# Patient Record
Sex: FEMALE | Race: WHITE | NOT HISPANIC OR LATINO | Employment: FULL TIME | URBAN - METROPOLITAN AREA
[De-identification: names, ages, dates, MRNs, and addresses within clinical notes are randomized per-mention and may not be internally consistent; named-entity substitution may affect disease eponyms.]

---
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
## 2022-01-05 ENCOUNTER — TREATMENT (OUTPATIENT)
Dept: URGENT CARE | Facility: CLINIC | Age: 22
End: 2022-01-05

## 2022-01-05 ENCOUNTER — APPOINTMENT (OUTPATIENT)
Dept: URGENT CARE | Facility: CLINIC | Age: 22
End: 2022-01-05
Payer: COMMERCIAL

## 2022-01-05 VITALS
RESPIRATION RATE: 16 BRPM | TEMPERATURE: 96.8 F | HEART RATE: 90 BPM | WEIGHT: 91 LBS | OXYGEN SATURATION: 99 % | HEIGHT: 59 IN | BODY MASS INDEX: 18.35 KG/M2

## 2022-01-05 DIAGNOSIS — Z11.59 SPECIAL SCREENING EXAMINATION FOR VIRAL DISEASE: Primary | ICD-10-CM

## 2022-01-05 PROCEDURE — 99213 OFFICE O/P EST LOW 20 MIN: CPT | Performed by: PHYSICIAN ASSISTANT

## 2022-01-05 PROCEDURE — U0003 INFECTIOUS AGENT DETECTION BY NUCLEIC ACID (DNA OR RNA); SEVERE ACUTE RESPIRATORY SYNDROME CORONAVIRUS 2 (SARS-COV-2) (CORONAVIRUS DISEASE [COVID-19]), AMPLIFIED PROBE TECHNIQUE, MAKING USE OF HIGH THROUGHPUT TECHNOLOGIES AS DESCRIBED BY CMS-2020-01-R: HCPCS | Performed by: PHYSICIAN ASSISTANT

## 2022-01-05 PROCEDURE — 99213 OFFICE O/P EST LOW 20 MIN: CPT

## 2022-01-05 RX ORDER — ATOMOXETINE 10 MG/1
10 CAPSULE ORAL DAILY
COMMUNITY
End: 2022-04-18

## 2022-01-05 NOTE — PROGRESS NOTES
[facility]        NAME: Martha Leija is a 25 y o  female  : 2000    MRN: [id number]  DATE: 2022  TIME: 1:54 PM    Assessment and Plan   Special screening examination for viral disease [Z11 59]  1  Special screening examination for viral disease  COVID Only -Office Collect         Patient Instructions     Swabbed for COVID-19, discussed self quarantine until contacted with results  Monitor for worsening symptoms  C/w OTC symptom relief including tylenol, fluids, rest  Recommend supplementing with vitamins D & C as well as a multivitamin    Follow up with PCP in 3-5 days  Proceed to  ER if symptoms worsen  Chief Complaint     Chief Complaint   Patient presents with    Cold Like Symptoms     Pt reports yesterday she developed a cough, congestion and sore throat  History of Present Illness       Patient presents for COVID-19 testing  Pt reports symptoms of cough, congestion, and fatigue  Pt denies fever, chills, sweats, chest tightness, dyspnea, abdominal pain, nausea, vomiting, and diarrhea  Pt denies history of underlying medical problems such as asthma  Pt reports taking OTC symptom relief without significant improvement  Pt denies recent known COVID exposure  Review of Systems   Review of Systems   Constitutional: Negative for chills and fever  HENT: Positive for congestion  Negative for ear pain and sore throat  Eyes: Negative for pain and visual disturbance  Respiratory: Positive for cough  Negative for shortness of breath  Cardiovascular: Negative for chest pain and palpitations  Gastrointestinal: Negative for abdominal pain and vomiting  Genitourinary: Negative for dysuria and hematuria  Musculoskeletal: Negative for arthralgias and back pain  Skin: Negative for color change and rash  Neurological: Positive for headaches  Negative for seizures and syncope  All other systems reviewed and are negative          Current Medications From: Radha Moura  To: Wood Mata  Sent: 4/19/2021 4:53 PM CDT  Subject: Medication Question    Good afternoon, can you please send refills for both my inhalers to the pharmacy on file and can you also send a prescription for the nebulizer albuterol solution? Thank you   Current Outpatient Medications:     atomoxetine (STRATTERA) 10 MG capsule, Take 10 mg by mouth daily, Disp: , Rfl:     citalopram (CeleXA) 20 mg tablet, Take 20 mg by mouth daily , Disp: , Rfl:     Current Allergies     Allergies as of 01/05/2022    (No Known Allergies)            The following portions of the patient's history were reviewed and updated as appropriate: allergies, current medications, past family history, past medical history, past social history, past surgical history and problem list      Past Medical History:   Diagnosis Date    Psychiatric disorder        Past Surgical History:   Procedure Laterality Date    HEMANGIOMA EXCISION Right 2011    [facility]       Family History   Problem Relation Age of Onset    Hypertension Mother     Migraines Mother     Hypertension Father     Diabetes Father          Medications have been verified  Objective   Pulse 90   Temp (!) 96 8 °F (36 °C)   Resp 16   Ht 4' 11" (1 499 m)   Wt 41 3 kg (91 lb)   SpO2 99%   BMI 18 38 kg/m²   No LMP recorded  Physical Exam     Physical Exam  Vitals and nursing note reviewed  Constitutional:       General: She is not in acute distress  Appearance: Normal appearance  She is well-developed  She is not ill-appearing or diaphoretic  HENT:      Head: Normocephalic and atraumatic  Right Ear: Tympanic membrane, ear canal and external ear normal       Left Ear: Tympanic membrane, ear canal and external ear normal       Nose: Congestion present  Mouth/Throat:      Mouth: Mucous membranes are moist       Pharynx: Oropharynx is clear  No oropharyngeal exudate or posterior oropharyngeal erythema  Eyes:      Conjunctiva/sclera: Conjunctivae normal       Pupils: Pupils are equal, round, and reactive to light  Cardiovascular:      Rate and Rhythm: Normal rate and regular rhythm  Heart sounds: Normal heart sounds     Pulmonary:      Effort: Pulmonary effort is normal  No respiratory distress  Breath sounds: Normal breath sounds  No stridor  No wheezing, rhonchi or rales  Musculoskeletal:      Cervical back: Normal range of motion and neck supple  Lymphadenopathy:      Cervical: No cervical adenopathy  Skin:     General: Skin is warm and dry  Capillary Refill: Capillary refill takes less than 2 seconds  Findings: No rash  Neurological:      Mental Status: She is alert and oriented to person, place, and time  Cranial Nerves: No cranial nerve deficit  Sensory: No sensory deficit  Psychiatric:         Behavior: Behavior normal          Thought Content:  Thought content normal

## 2022-01-05 NOTE — PATIENT INSTRUCTIONS
Swabbed for COVID-19, discussed self quarantine until contacted with results  Monitor for worsening symptoms  C/w OTC symptom relief including tylenol, fluids, rest  Recommend supplementing with vitamins D & C as well as a multivitamin  Discussed etiology is likely viral

## 2022-01-05 NOTE — LETTER
January 5, 2022     RUSS Salazar [address] [place] [address]    Patient: Alf Espitia   YOB: 2000   Date of Visit: 1/5/2022        To Whom This May Concern:     Thiago Hylton was seen in our urgent care on 1/5/2022  Please excuse her from work through 1/7/2022      Sincerely,        Ilda Smith PA-C

## 2022-01-08 LAB — SARS-COV-2 RNA RESP QL NAA+PROBE: POSITIVE

## 2022-01-14 ENCOUNTER — OFFICE VISIT (OUTPATIENT)
Dept: URGENT CARE | Facility: CLINIC | Age: 22
End: 2022-01-14
Payer: COMMERCIAL

## 2022-01-14 DIAGNOSIS — R51.9 ACUTE INTRACTABLE HEADACHE, UNSPECIFIED HEADACHE TYPE: Primary | ICD-10-CM

## 2022-01-14 PROCEDURE — 96372 THER/PROPH/DIAG INJ SC/IM: CPT | Performed by: PHYSICIAN ASSISTANT

## 2022-01-14 PROCEDURE — 99213 OFFICE O/P EST LOW 20 MIN: CPT | Performed by: PHYSICIAN ASSISTANT

## 2022-01-14 RX ORDER — KETOROLAC TROMETHAMINE 30 MG/ML
15 INJECTION, SOLUTION INTRAMUSCULAR; INTRAVENOUS ONCE
Status: COMPLETED | OUTPATIENT
Start: 2022-01-14 | End: 2022-01-14

## 2022-01-14 RX ADMIN — KETOROLAC TROMETHAMINE 15 MG: 30 INJECTION, SOLUTION INTRAMUSCULAR; INTRAVENOUS at 18:08

## 2022-01-14 NOTE — PATIENT INSTRUCTIONS
Injection of Toradol this evening to try and settle headache down  Starting tomorrow you may take your leave twice a day and your extra-strength Tylenol every 6-8 hours  May also do cold compresses on head for comfort as needed  Would recommend staying well hydrated  You may want to increase your electrolyte beverages such as Gatorade instead of plain water  If significant worsening of your headache recommend that you proceed to emergency room for further evaluation otherwise follow-up with your primary care provider this next week if persistent headaches

## 2022-01-14 NOTE — PROGRESS NOTES
[facility]    NAME: Azul Ramos is a 25 y o  female  : 2000    MRN: [id number]  DATE: 2022  TIME: 6:05 PM    Assessment and Plan   Acute intractable headache, unspecified headache type [R51 9]  1  Acute intractable headache, unspecified headache type  ketorolac (TORADOL) injection 15 mg     Nurse administered Toradol 15 mg IM  Patient waited 10 minutes after injection before discharge  Patient Instructions   Patient Instructions   Injection of Toradol this evening to try and settle headache down  Starting tomorrow you may take your leave twice a day and your extra-strength Tylenol every 6-8 hours  May also do cold compresses on head for comfort as needed  Would recommend staying well hydrated  You may want to increase your electrolyte beverages such as Gatorade instead of plain water  If significant worsening of your headache recommend that you proceed to emergency room for further evaluation otherwise follow-up with your primary care provider this next week if persistent headaches  Chief Complaint     Chief Complaint   Patient presents with    Headache     Patient with bad headaches since she had COVID  Taking   Tylenol and Ibuprofen       History of Present Illness   Nelly Ulises Sean presents to the clinic c/o  79-year-old female comes in with persistent headache  Diagnosed with COVID a days ago  Had telemedicine visit with her primary care provider yesterday who evidently recommended that she get seen in person  She was told by [facility] that they would not see her  She came here for further evaluation  She has been taking Aleve 1 tablet twice a day and 3 Tylenol twice a day without much relief  History of headache problems  Has seen Neurology  Review of Systems   Review of Systems   Constitutional: Positive for activity change, appetite change and fatigue  Negative for chills, diaphoresis and fever  HENT: Negative  Respiratory: Negative  Cardiovascular: Negative  Gastrointestinal: Negative for nausea and vomiting  Neurological: Positive for headaches  Negative for dizziness and weakness  Current Medications     Long-Term Medications   Medication Sig Dispense Refill    citalopram (CeleXA) 20 mg tablet Take 20 mg by mouth daily          Current Allergies     Allergies as of 01/14/2022    (No Known Allergies)          The following portions of the patient's history were reviewed and updated as appropriate: allergies, current medications, past family history, past medical history, past social history, past surgical history and problem list   Past Medical History:   Diagnosis Date    Psychiatric disorder      Past Surgical History:   Procedure Laterality Date    [facility]CISION Right [facility]     Family History   Problem Relation Age of Onset    Hypertension Mother     Migraines Mother     Hypertension Father     Diabetes Father        Objective   There were no vitals taken for this visit  No LMP recorded  Physical Exam     Physical Exam  Vitals and nursing note reviewed  Constitutional:       General: She is not in acute distress  Appearance: Normal appearance  She is not ill-appearing, toxic-appearing or diaphoretic  HENT:      Head: Normocephalic and atraumatic  Nose: Nose normal  No congestion or rhinorrhea  Mouth/Throat:      Mouth: Mucous membranes are moist       Pharynx: No oropharyngeal exudate or posterior oropharyngeal erythema  Eyes:      General: No scleral icterus  Right eye: No discharge  Left eye: No discharge  Extraocular Movements: Extraocular movements intact  Conjunctiva/sclera: Conjunctivae normal       Pupils: Pupils are equal, round, and reactive to light  Cardiovascular:      Rate and Rhythm: Normal rate and regular rhythm  Heart sounds: Normal heart sounds  No murmur heard  No friction rub  No gallop  Pulmonary:      Effort: Pulmonary effort is normal  No respiratory distress  Breath sounds: Normal breath sounds  No stridor  No wheezing, rhonchi or rales  Musculoskeletal:      Cervical back: Normal range of motion and neck supple  No rigidity or tenderness  Lymphadenopathy:      Cervical: No cervical adenopathy  Skin:     General: Skin is warm and dry  Coloration: Skin is not pale  Neurological:      General: No focal deficit present  Mental Status: She is alert and oriented to person, place, and time  Cranial Nerves: No cranial nerve deficit  Sensory: No sensory deficit  Motor: No weakness        Coordination: Coordination normal       Gait: Gait normal       Deep Tendon Reflexes: Reflexes normal    Psychiatric:         Mood and Affect: Mood normal          Behavior: Behavior normal

## 2022-04-06 ENCOUNTER — HOSPITAL ENCOUNTER (EMERGENCY)
Facility: HOSPITAL | Age: 22
Discharge: HOME | End: 2022-04-06
Attending: EMERGENCY MEDICINE
Payer: COMMERCIAL

## 2022-04-06 ENCOUNTER — APPOINTMENT (EMERGENCY)
Dept: CT IMAGING | Facility: HOSPITAL | Age: 22
End: 2022-04-06
Payer: COMMERCIAL

## 2022-04-06 VITALS
DIASTOLIC BLOOD PRESSURE: 57 MMHG | TEMPERATURE: 98.7 F | BODY MASS INDEX: 18.55 KG/M2 | OXYGEN SATURATION: 98 % | HEART RATE: 87 BPM | WEIGHT: 92 LBS | SYSTOLIC BLOOD PRESSURE: 112 MMHG | RESPIRATION RATE: 18 BRPM | HEIGHT: 59 IN

## 2022-04-06 DIAGNOSIS — N20.1 URETEROLITHIASIS: Primary | ICD-10-CM

## 2022-04-06 LAB
ALBUMIN SERPL BCP-MCNC: 4 G/DL (ref 3.5–5)
ALP SERPL-CCNC: 46 U/L (ref 46–384)
ALT SERPL W P-5'-P-CCNC: 12 U/L (ref 12–78)
ANION GAP SERPL CALCULATED.3IONS-SCNC: 7 MMOL/L (ref 4–13)
AST SERPL W P-5'-P-CCNC: 14 U/L (ref 5–45)
BASOPHILS # BLD AUTO: 0.03 THOUSANDS/ΜL (ref 0–0.1)
BASOPHILS NFR BLD AUTO: 0 % (ref 0–1)
BILIRUB SERPL-MCNC: 0.4 MG/DL (ref 0.2–1)
BILIRUB UR QL STRIP: NEGATIVE
BUN SERPL-MCNC: 12 MG/DL (ref 5–25)
CALCIUM SERPL-MCNC: 8.6 MG/DL (ref 8.3–10.1)
CHLORIDE SERPL-SCNC: 104 MMOL/L (ref 100–108)
CLARITY UR: CLEAR
CO2 SERPL-SCNC: 26 MMOL/L (ref 21–32)
COLOR UR: YELLOW
CREAT SERPL-MCNC: 0.78 MG/DL (ref 0.6–1.3)
EOSINOPHIL # BLD AUTO: 0.12 THOUSAND/ΜL (ref 0–0.61)
EOSINOPHIL NFR BLD AUTO: 1 % (ref 0–6)
ERYTHROCYTE [DISTWIDTH] IN BLOOD BY AUTOMATED COUNT: 12.6 % (ref 11.6–15.1)
EXT PREG TEST URINE: NEGATIVE
EXT. CONTROL ED NAV: NORMAL
GFR SERPL CREATININE-BSD FRML MDRD: 110 ML/MIN/1.73SQ M
GLUCOSE SERPL-MCNC: 89 MG/DL (ref 65–140)
GLUCOSE UR STRIP-MCNC: NEGATIVE MG/DL
HCT VFR BLD AUTO: 42.6 % (ref 34.8–46.1)
HGB BLD-MCNC: 14.8 G/DL (ref 11.5–15.4)
HGB UR QL STRIP.AUTO: NEGATIVE
IMM GRANULOCYTES # BLD AUTO: 0.02 THOUSAND/UL (ref 0–0.2)
IMM GRANULOCYTES NFR BLD AUTO: 0 % (ref 0–2)
KETONES UR STRIP-MCNC: NEGATIVE MG/DL
LEUKOCYTE ESTERASE UR QL STRIP: NEGATIVE
LIPASE SERPL-CCNC: 82 U/L (ref 73–393)
LYMPHOCYTES # BLD AUTO: 2.33 THOUSANDS/ΜL (ref 0.6–4.47)
LYMPHOCYTES NFR BLD AUTO: 27 % (ref 14–44)
MCH RBC QN AUTO: 30.1 PG (ref 26.8–34.3)
MCHC RBC AUTO-ENTMCNC: 34.7 G/DL (ref 31.4–37.4)
MCV RBC AUTO: 87 FL (ref 82–98)
MONOCYTES # BLD AUTO: 0.59 THOUSAND/ΜL (ref 0.17–1.22)
MONOCYTES NFR BLD AUTO: 7 % (ref 4–12)
NEUTROPHILS # BLD AUTO: 5.51 THOUSANDS/ΜL (ref 1.85–7.62)
NEUTS SEG NFR BLD AUTO: 65 % (ref 43–75)
NITRITE UR QL STRIP: NEGATIVE
NRBC BLD AUTO-RTO: 0 /100 WBCS
PH UR STRIP.AUTO: 7 [PH]
PLATELET # BLD AUTO: 231 THOUSANDS/UL (ref 149–390)
PMV BLD AUTO: 9.4 FL (ref 8.9–12.7)
POTASSIUM SERPL-SCNC: 3.5 MMOL/L (ref 3.5–5.3)
PROT SERPL-MCNC: 6.9 G/DL (ref 6.4–8.2)
PROT UR STRIP-MCNC: NEGATIVE MG/DL
RBC # BLD AUTO: 4.91 MILLION/UL (ref 3.81–5.12)
SODIUM SERPL-SCNC: 137 MMOL/L (ref 136–145)
SP GR UR STRIP.AUTO: 1.02 (ref 1–1.03)
UROBILINOGEN UR QL STRIP.AUTO: 0.2 E.U./DL
WBC # BLD AUTO: 8.6 THOUSAND/UL (ref 4.31–10.16)

## 2022-04-06 PROCEDURE — 85025 COMPLETE CBC W/AUTO DIFF WBC: CPT | Performed by: EMERGENCY MEDICINE

## 2022-04-06 PROCEDURE — 83690 ASSAY OF LIPASE: CPT | Performed by: EMERGENCY MEDICINE

## 2022-04-06 PROCEDURE — G1004 CDSM NDSC: HCPCS

## 2022-04-06 PROCEDURE — 74176 CT ABD & PELVIS W/O CONTRAST: CPT

## 2022-04-06 PROCEDURE — 96374 THER/PROPH/DIAG INJ IV PUSH: CPT

## 2022-04-06 PROCEDURE — 99284 EMERGENCY DEPT VISIT MOD MDM: CPT

## 2022-04-06 PROCEDURE — 99285 EMERGENCY DEPT VISIT HI MDM: CPT | Performed by: EMERGENCY MEDICINE

## 2022-04-06 PROCEDURE — 81003 URINALYSIS AUTO W/O SCOPE: CPT | Performed by: EMERGENCY MEDICINE

## 2022-04-06 PROCEDURE — 36415 COLL VENOUS BLD VENIPUNCTURE: CPT | Performed by: EMERGENCY MEDICINE

## 2022-04-06 PROCEDURE — 80053 COMPREHEN METABOLIC PANEL: CPT | Performed by: EMERGENCY MEDICINE

## 2022-04-06 PROCEDURE — 81025 URINE PREGNANCY TEST: CPT | Performed by: EMERGENCY MEDICINE

## 2022-04-06 RX ORDER — TAMSULOSIN HYDROCHLORIDE 0.4 MG/1
0.4 CAPSULE ORAL
Qty: 20 CAPSULE | Refills: 0 | Status: SHIPPED | OUTPATIENT
Start: 2022-04-06 | End: 2022-04-19 | Stop reason: HOSPADM

## 2022-04-06 RX ORDER — HYDROMORPHONE HCL/PF 1 MG/ML
0.5 SYRINGE (ML) INJECTION ONCE
Status: COMPLETED | OUTPATIENT
Start: 2022-04-06 | End: 2022-04-06

## 2022-04-06 RX ORDER — OXYCODONE HYDROCHLORIDE 5 MG/1
5 TABLET ORAL EVERY 4 HOURS PRN
Qty: 12 TABLET | Refills: 0 | Status: SHIPPED | OUTPATIENT
Start: 2022-04-06 | End: 2022-04-16

## 2022-04-06 RX ORDER — ONDANSETRON 4 MG/1
4 TABLET, ORALLY DISINTEGRATING ORAL EVERY 6 HOURS PRN
Qty: 20 TABLET | Refills: 0 | Status: SHIPPED | OUTPATIENT
Start: 2022-04-06

## 2022-04-06 RX ORDER — IBUPROFEN 800 MG/1
800 TABLET ORAL 3 TIMES DAILY
Qty: 21 TABLET | Refills: 0 | Status: SHIPPED | OUTPATIENT
Start: 2022-04-06

## 2022-04-06 RX ADMIN — HYDROMORPHONE HYDROCHLORIDE 0.5 MG: 1 INJECTION, SOLUTION INTRAMUSCULAR; INTRAVENOUS; SUBCUTANEOUS at 20:50

## 2022-04-06 NOTE — Clinical Note
Prabhu Phillips was seen and treated in our emergency department on 4/6/2022  Diagnosis:     Armando Newman  may return to work on return date  She may return on this date: 04/11/2022         If you have any questions or concerns, please don't hesitate to call        Jacobo Coffman MD    ______________________________           _______________          _______________  Hospital Representative                              Date                                Time
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
## 2022-04-12 ENCOUNTER — TELEPHONE (OUTPATIENT)
Dept: UROLOGY | Facility: AMBULATORY SURGERY CENTER | Age: 22
End: 2022-04-12

## 2022-04-12 ENCOUNTER — OFFICE VISIT (OUTPATIENT)
Dept: UROLOGY | Facility: AMBULATORY SURGERY CENTER | Age: 22
End: 2022-04-12
Payer: COMMERCIAL

## 2022-04-12 VITALS
HEART RATE: 88 BPM | HEIGHT: 59 IN | SYSTOLIC BLOOD PRESSURE: 92 MMHG | BODY MASS INDEX: 18.47 KG/M2 | WEIGHT: 91.6 LBS | DIASTOLIC BLOOD PRESSURE: 68 MMHG

## 2022-04-12 DIAGNOSIS — N20.0 NEPHROLITHIASIS: Primary | ICD-10-CM

## 2022-04-12 PROCEDURE — 87086 URINE CULTURE/COLONY COUNT: CPT | Performed by: NURSE PRACTITIONER

## 2022-04-12 PROCEDURE — 99203 OFFICE O/P NEW LOW 30 MIN: CPT | Performed by: NURSE PRACTITIONER

## 2022-04-12 NOTE — TELEPHONE ENCOUNTER
Returned call to patient mother   Pain levels is reported to be 8/10 with motrin  No nausea or vomiting     Advise patient mother she could be seen in our [place] location today at 1:30 pm

## 2022-04-12 NOTE — H&P (VIEW-ONLY)
4/12/2022      Chief Complaint   Patient presents with    Nephrolithiasis     Assessment and Plan    25 y o  female managed by NEW PATIENT    1  Nephrolithiasis  · CT abdomen pelvis performed 04/06/2022 reveals a right-sided UVJ stone with moderate right hydronephrosis  · Will continue with medical expulsive therapy-ibuprofen, tamsulosin, oxybutynin, Zofran  · Will continue with increase water intake upwards to 60-80 ounces daily  · Urine culture sent from office today  · Previous urine testing performed 04/06/2022 unremarkable for infection  · Urine hCG negative for pregnancy 04/06/2022  · OR case requested  · ER precautions discussed    History of Present Illness  Anne-Marie Pickett is a 25 y o  female here for follow up evaluation of nephrolithiasis  Patient initially presented to the emergency department 04/06/2022 with complaints of right flank pain and discomfort  At that time she was evaluated with CT scan the abdomen pelvis and was noted to have a 5 millimeter right UVJ calculi with moderate right hydroureteronephrosis  She reports this to be her 1st kidney stone  She has a significant family history of nephrolithiasis and all of her siblings  She reports drinking approximately 6 bottles of water per day and 1 energy drink per month  Patient denies smoking and the use/abuse of illicit substances and alcohol  Patient's mother reports she has a personal history of tolerating anesthesia; she reports difficulty waking up after surgery  The patient denies a history of complications with anesthesia  Review of Systems   Constitutional: Negative for chills and fever  Respiratory: Negative for cough and shortness of breath  Cardiovascular: Negative for chest pain  Gastrointestinal: Positive for nausea  Negative for abdominal distention, abdominal pain, blood in stool and vomiting  Genitourinary: Positive for flank pain   Negative for difficulty urinating, dysuria, enuresis, frequency, hematuria and urgency  Skin: Negative for rash  Urinary Incontinence Screening      Most Recent Value   Urinary Incontinence    Urinary Incontinence? No   Incomplete emptying? No   Urinary frequency? No   Urinary urgency? No   Urinary hesitancy? No   Dysuria (painful difficult urination)? Yes  [at times]   Nocturia (waking up to use the bathroom)? No   Straining (having to push to go)?  No   Weak stream? No   Intermittent stream? No                 Past Medical History  Past Medical History:   Diagnosis Date    Psychiatric disorder        Past Social History  Past Surgical History:   Procedure Laterality Date    HEMANGIOMA EXCISION Right 2011    [facility]     Social History     Tobacco Use   Smoking Status Never Smoker   Smokeless Tobacco Never Used       Past Family History  Family History   Problem Relation Age of Onset    Hypertension Mother    [facility] Migraines Mother     Hypertension Father     Diabetes Father        Past Social history  Social History     Socioeconomic History    Marital status: Single     Spouse name: Not on file    Number of children: Not on file    Years of education: Not on file    Highest education level: Not on file   Occupational History    Not on file   Tobacco Use    Smoking status: Never Smoker    Smokeless tobacco: Never Used   Vaping Use    Vaping Use: Never used   Substance and Sexual Activity    Alcohol use: Never    Drug use: Never    Sexual activity: Not on file   Other Topics Concern    Not on file   Social History Narrative    Not on file     Social Determinants of Health     Financial Resource Strain: Not on file   Food Insecurity: Not on file   Transportation Needs: Not on file   Physical Activity: Not on file   Stress: Not on file   Social Connections: Not on file   Intimate Partner Violence: Not At Risk    Fear of Current or Ex-Partner: No    Emotionally Abused: No    Physically Abused: No    Sexually Abused: No   Housing Stability: Not on file Current Medications  Current Outpatient Medications   Medication Sig Dispense Refill    ibuprofen (MOTRIN) 800 mg tablet Take 1 tablet (800 mg total) by mouth 3 (three) times a day 21 tablet 0    ondansetron (Zofran ODT) 4 mg disintegrating tablet Take 1 tablet (4 mg total) by mouth every 6 (six) hours as needed for nausea or vomiting 20 tablet 0    oxyCODONE (Roxicodone) 5 immediate release tablet Take 1 tablet (5 mg total) by mouth every 4 (four) hours as needed for moderate pain for up to 10 days Max Daily Amount: 30 mg 12 tablet 0    tamsulosin (FLOMAX) 0 4 mg Take 1 capsule (0 4 mg total) by mouth daily with dinner 20 capsule 0    atomoxetine (STRATTERA) 10 MG capsule Take 10 mg by mouth daily   (Patient not taking: Reported on 4/12/2022 )      citalopram (CeleXA) 20 mg tablet Take 20 mg by mouth daily   (Patient not taking: Reported on 4/12/2022 )       No current facility-administered medications for this visit  Allergies  Allergies   Allergen Reactions    Keflex [Cephalexin] Rash         The following portions of the patient's history were reviewed and updated as appropriate: allergies, current medications, past medical history, past social history, past surgical history and problem list       Vitals  Vitals:    04/12/22 1324   BP: 92/68   BP Location: Left arm   Patient Position: Sitting   Cuff Size: Adult   Pulse: 88   Weight: 41 5 kg (91 lb 9 6 oz)   Height: 4' 11" (1 499 m)     Physical Exam  Physical Exam  Vitals reviewed  Constitutional:       General: She is not in acute distress  Appearance: Normal appearance  HENT:      Head: Normocephalic  Eyes:      Pupils: Pupils are equal, round, and reactive to light  Cardiovascular:      Rate and Rhythm: Normal rate and regular rhythm  Heart sounds: Normal heart sounds  Pulmonary:      Effort: Pulmonary effort is normal  No respiratory distress  Breath sounds: Normal breath sounds     Abdominal:      Palpations: Abdomen is soft  Tenderness: There is no abdominal tenderness  Musculoskeletal:         General: Normal range of motion  Skin:     General: Skin is warm and dry  Neurological:      General: No focal deficit present  Mental Status: She is alert  Psychiatric:         Mood and Affect: Mood normal          Behavior: Behavior normal            Results  No results found for this or any previous visit (from the past 1 hour(s))  ]  No results found for: PSA  Lab Results   Component Value Date    CALCIUM 8 6 04/06/2022    K 3 5 04/06/2022    CO2 26 04/06/2022     04/06/2022    BUN 12 04/06/2022    CREATININE 0 78 04/06/2022     Lab Results   Component Value Date    WBC 8 60 04/06/2022    HGB 14 8 04/06/2022    HCT 42 6 04/06/2022    MCV 87 04/06/2022     04/06/2022     CT ABDOMEN AND PELVIS WITHOUT IV CONTRAST - LOW DOSE RENAL STONE      INDICATION:   Right flank pain stone suspected      COMPARISON:  None      TECHNIQUE:  Low radiation dose thin section CT examination of the abdomen and pelvis was performed without intravenous or oral contrast according to a protocol specifically designed to evaluate for urinary tract calculus  Axial, sagittal, and coronal 2D   reformatted images were created from the source data and submitted for interpretation  Evaluation for pathology in the abdomen and pelvis that is unrelated to urinary tract calculi is limited        Radiation dose length product (DLP) for this visit:  335 88 mGy-cm   This examination, like all CT scans performed in the [facility], was performed utilizing techniques to minimize radiation dose exposure, including the use of iterative   reconstruction and automated exposure control      URINARY TRACT FINDINGS:     RIGHT KIDNEY AND URETER:  Moderate hydronephrosis and moderate hydroureter visualized to the level of the mid ureter    The distal 3rd portion of the ureter is not well visualized due to overlap with nondilated, fluid-filled small bowel loops in the   pelvis  There is a focus of calcification in the pelvis in the region of the proximal ureterovesical junction measuring 5 mm suspicious for obstructing calculus  Multiple nonobstructing intrarenal calculi measuring up to 4 mm      LEFT KIDNEY AND URETER:  No urinary tract calculi  No hydronephrosis or hydroureter      URINARY BLADDER:  Decompressed         ADDITIONAL FINDINGS:     LOWER CHEST:  No clinically significant abnormality identified in the visualized lower chest      SOLID VISCERA: Limited low radiation dose noncontrast CT evaluation demonstrates no clinically significant abnormality of the imaged portions of the liver, spleen, pancreas, or adrenal glands        GALLBLADDER/BILIARY TREE:  No calcified gallstones  No pericholecystic inflammatory change  No biliary dilatation      STOMACH AND BOWEL:  Unremarkable      APPENDIX:  A normal appendix was visualized      ABDOMINOPELVIC CAVITY:  There is a small volume of free pelvic fluid, likely physiologic given the patient's age and gender  No pneumoperitoneum  No lymphadenopathy      REPRODUCTIVE ORGANS:  Unremarkable for patient's age      ABDOMINAL WALL/INGUINAL REGIONS:  Unremarkable      OSSEOUS STRUCTURES:  No acute fracture or destructive osseous lesion  Lumbar levocurvature      IMPRESSION:     1  Moderate right hydronephrosis and moderate hydroureter visualized to the level of the mid ureter  The distal 3rd portion of the right ureter is not well visualized  There is a focus of calcification in the pelvis in the region of the proximal   ureterovesical junction suspicious for obstructing calculus  Differential includes a phlebolith although this is felt to be less likely      2  Nonobstructing right renal calculi        Orders  Orders Placed This Encounter   Procedures    Urine culture     Order Specific Question:   Release to patient through [employer]     Answer:   Immediate       RUSS Vaz

## 2022-04-12 NOTE — TELEPHONE ENCOUNTER
Returned call to patients mother  Patient is reported to have a lot of pain  Pain levels are higher yesterday took oxycodone ever 4 hours  + nausea, vomiting on and off  No reported fevers or chills,burning with urination  Urine is dark  Is taking Zofran, Tamsulosin, Motrin   Took motrin this morning pain is 5/10  Advised patient mother with high levels of pain and nausea they should go to Ed  Ed precautions reviewed  Patient mother states she is not in as much pain as yesterday no nausea at this time  Appt given for today at 1:30pm pending providers review

## 2022-04-12 NOTE — PROGRESS NOTES
4/12/2022      Chief Complaint   Patient presents with    Nephrolithiasis     Assessment and Plan    25 y o  female managed by NEW PATIENT    1  Nephrolithiasis  · CT abdomen pelvis performed 04/06/2022 reveals a right-sided UVJ stone with moderate right hydronephrosis  · Will continue with medical expulsive therapy-ibuprofen, tamsulosin, oxybutynin, Zofran  · Will continue with increase water intake upwards to 60-80 ounces daily  · Urine culture sent from office today  · Previous urine testing performed 04/06/2022 unremarkable for infection  · Urine hCG negative for pregnancy 04/06/2022  · OR case requested  · ER precautions discussed    History of Present Illness  Dulce Peña is a 25 y o  female here for follow up evaluation of nephrolithiasis  Patient initially presented to the emergency department 04/06/2022 with complaints of right flank pain and discomfort  At that time she was evaluated with CT scan the abdomen pelvis and was noted to have a 5 millimeter right UVJ calculi with moderate right hydroureteronephrosis  She reports this to be her 1st kidney stone  She has a significant family history of nephrolithiasis and all of her siblings  She reports drinking approximately 6 bottles of water per day and 1 energy drink per month  Patient denies smoking and the use/abuse of illicit substances and alcohol  Patient's mother reports she has a personal history of tolerating anesthesia; she reports difficulty waking up after surgery  The patient denies a history of complications with anesthesia  Review of Systems   Constitutional: Negative for chills and fever  Respiratory: Negative for cough and shortness of breath  Cardiovascular: Negative for chest pain  Gastrointestinal: Positive for nausea  Negative for abdominal distention, abdominal pain, blood in stool and vomiting  Genitourinary: Positive for flank pain   Negative for difficulty urinating, dysuria, enuresis, frequency, hematuria and urgency  Skin: Negative for rash  Urinary Incontinence Screening      Most Recent Value   Urinary Incontinence    Urinary Incontinence? No   Incomplete emptying? No   Urinary frequency? No   Urinary urgency? No   Urinary hesitancy? No   Dysuria (painful difficult urination)? Yes  [at times]   Nocturia (waking up to use the bathroom)? No   Straining (having to push to go)?  No   Weak stream? No   Intermittent stream? No                 Past Medical History  Past Medical History:   Diagnosis Date    Psychiatric disorder        Past Social History  Past Surgical History:   Procedure Laterality Date    HEMANGIOMA [facility]CISION Right 2011    [facility]     Social History     Tobacco Use   Smoking Status Never Smoker   Smokeless Tobacco Never Used       Past Family History  Family History   Problem Relation Age of Onset    Hypertension Mother    Pooja Tarango Migraines Mother     Hypertension Father     Diabetes Father        Past Social history  Social History     Socioeconomic History    Marital status: Single     Spouse name: Not on file    Number of children: Not on file    Years of education: Not on file    Highest education level: Not on file   Occupational History    Not on file   Tobacco Use    Smoking status: Never Smoker    Smokeless tobacco: Never Used   Vaping Use    Vaping Use: Never used   Substance and Sexual Activity    Alcohol use: Never    Drug use: Never    Sexual activity: Not on file   Other Topics Concern    Not on file   Social History Narrative    Not on file     Social Determinants of Health     Financial Resource Strain: Not on file   Food Insecurity: Not on file   Transportation Needs: Not on file   Physical Activity: Not on file   Stress: Not on file   Social Connections: Not on file   Intimate Partner Violence: Not At Risk    Fear of Current or Ex-Partner: No    Emotionally Abused: No    Physically Abused: No    Sexually Abused: No   Housing Stability: Not on file Current Medications  Current Outpatient Medications   Medication Sig Dispense Refill    ibuprofen (MOTRIN) 800 mg tablet Take 1 tablet (800 mg total) by mouth 3 (three) times a day 21 tablet 0    ondansetron (Zofran ODT) 4 mg disintegrating tablet Take 1 tablet (4 mg total) by mouth every 6 (six) hours as needed for nausea or vomiting 20 tablet 0    oxyCODONE (Roxicodone) 5 immediate release tablet Take 1 tablet (5 mg total) by mouth every 4 (four) hours as needed for moderate pain for up to 10 days Max Daily Amount: 30 mg 12 tablet 0    tamsulosin (FLOMAX) 0 4 mg Take 1 capsule (0 4 mg total) by mouth daily with dinner 20 capsule 0    atomoxetine (STRATTERA) 10 MG capsule Take 10 mg by mouth daily   (Patient not taking: Reported on 4/12/2022 )      citalopram (CeleXA) 20 mg tablet Take 20 mg by mouth daily   (Patient not taking: Reported on 4/12/2022 )       No current facility-administered medications for this visit  Allergies  Allergies   Allergen Reactions    Keflex [Cephalexin] Rash         The following portions of the patient's history were reviewed and updated as appropriate: allergies, current medications, past medical history, past social history, past surgical history and problem list       Vitals  Vitals:    04/12/22 1324   BP: 92/68   BP Location: Left arm   Patient Position: Sitting   Cuff Size: Adult   Pulse: 88   Weight: 41 5 kg (91 lb 9 6 oz)   Height: 4' 11" (1 499 m)     Physical Exam  Physical Exam  Vitals reviewed  Constitutional:       General: She is not in acute distress  Appearance: Normal appearance  HENT:      Head: Normocephalic  Eyes:      Pupils: Pupils are equal, round, and reactive to light  Cardiovascular:      Rate and Rhythm: Normal rate and regular rhythm  Heart sounds: Normal heart sounds  Pulmonary:      Effort: Pulmonary effort is normal  No respiratory distress  Breath sounds: Normal breath sounds     Abdominal:      Palpations: Abdomen is soft  Tenderness: There is no abdominal tenderness  Musculoskeletal:         General: Normal range of motion  Skin:     General: Skin is warm and dry  Neurological:      General: No focal deficit present  Mental Status: She is alert  Psychiatric:         Mood and Affect: Mood normal          Behavior: Behavior normal            Results  No results found for this or any previous visit (from the past 1 hour(s))  ]  No results found for: PSA  Lab Results   Component Value Date    CALCIUM 8 6 04/06/2022    K 3 5 04/06/2022    CO2 26 04/06/2022     04/06/2022    BUN 12 04/06/2022    CREATININE 0 78 04/06/2022     Lab Results   Component Value Date    WBC 8 60 04/06/2022    HGB 14 8 04/06/2022    HCT 42 6 04/06/2022    MCV 87 04/06/2022     04/06/2022     CT ABDOMEN AND PELVIS WITHOUT IV CONTRAST - LOW DOSE RENAL STONE      INDICATION:   Right flank pain stone suspected      COMPARISON:  None      TECHNIQUE:  Low radiation dose thin section CT examination of the abdomen and pelvis was performed without intravenous or oral contrast according to a protocol specifically designed to evaluate for urinary tract calculus  Axial, sagittal, and coronal 2D   reformatted images were created from the source data and submitted for interpretation  Evaluation for pathology in the abdomen and pelvis that is unrelated to urinary tract calculi is limited        Radiation dose length product (DLP) for this visit:  335 88 mGy-cm   This examination, like all CT scans performed in the [facility], was performed utilizing techniques to minimize radiation dose exposure, including the use of iterative   reconstruction and automated exposure control      URINARY TRACT FINDINGS:     RIGHT KIDNEY AND URETER:  Moderate hydronephrosis and moderate hydroureter visualized to the level of the mid ureter    The distal 3rd portion of the ureter is not well visualized due to overlap with nondilated, fluid-filled small bowel loops in the   pelvis  There is a focus of calcification in the pelvis in the region of the proximal ureterovesical junction measuring 5 mm suspicious for obstructing calculus  Multiple nonobstructing intrarenal calculi measuring up to 4 mm      LEFT KIDNEY AND URETER:  No urinary tract calculi  No hydronephrosis or hydroureter      URINARY BLADDER:  Decompressed         ADDITIONAL FINDINGS:     LOWER CHEST:  No clinically significant abnormality identified in the visualized lower chest      SOLID VISCERA: Limited low radiation dose noncontrast CT evaluation demonstrates no clinically significant abnormality of the imaged portions of the liver, spleen, pancreas, or adrenal glands        GALLBLADDER/BILIARY TREE:  No calcified gallstones  No pericholecystic inflammatory change  No biliary dilatation      STOMACH AND BOWEL:  Unremarkable      APPENDIX:  A normal appendix was visualized      ABDOMINOPELVIC CAVITY:  There is a small volume of free pelvic fluid, likely physiologic given the patient's age and gender  No pneumoperitoneum  No lymphadenopathy      REPRODUCTIVE ORGANS:  Unremarkable for patient's age      ABDOMINAL WALL/INGUINAL REGIONS:  Unremarkable      OSSEOUS STRUCTURES:  No acute fracture or destructive osseous lesion  Lumbar levocurvature      IMPRESSION:     1  Moderate right hydronephrosis and moderate hydroureter visualized to the level of the mid ureter  The distal 3rd portion of the right ureter is not well visualized  There is a focus of calcification in the pelvis in the region of the proximal   ureterovesical junction suspicious for obstructing calculus  Differential includes a phlebolith although this is felt to be less likely      2  Nonobstructing right renal calculi        Orders  Orders Placed This Encounter   Procedures    Urine culture     Order Specific Question:   Release to patient through [facility]     Answer:   Immediate       RUSS Trinh

## 2022-04-12 NOTE — TELEPHONE ENCOUNTER
Patient mom called back and wanted to talk to Triage nurse again about the patient's pain level and if she should go to the ED for just come in for her appointment at 1:30

## 2022-04-12 NOTE — TELEPHONE ENCOUNTER
Please Triage  New Patient    What is the reason for the patients appointment? ED follow up    Pain right flank, nausea--given medication at ED  Sometimes she throws up  Burning when urinating sometimes  No blood in urine  Urine is a very dark yellow  Ureterolithiasis  What office location does the patient prefer? [place]    Imaging/Lab Results: epic    Do we accept the patient's insurance or is the patient Self-Pay? Insurance Provider: [facility]  Plan Type/Number:  Member ID#: Has the patient had any previous Urologist(s)? No    Have patient records been requested? If not are records showing in Epic: epic     Has the patient had any outside testing done? epic    Does the patient have a personal history of cancer?  No     Patient's mom Sena Wilkerson can be reached [phone]

## 2022-04-12 NOTE — PATIENT INSTRUCTIONS
Kidney Stones   WHAT YOU NEED TO KNOW:   Kidney stones form in the urinary system when the water and waste in your urine are out of balance  When this happens, certain types of waste crystals separate from the urine  The crystals build up and form kidney stones  You may have more than one kidney stone  DISCHARGE INSTRUCTIONS:   Return to the emergency department if:   · You are vomiting and it is not relieved with medicine  Call your doctor or kidney specialist if:   · You have a fever  · You have trouble urinating  · You see blood in your urine  · You have severe pain  · You have any questions or concerns about your condition or care  Medicines: You may need any of the following:  · NSAIDs , such as ibuprofen, help decrease swelling, pain, and fever  This medicine is available with or without a doctor's order  NSAIDs can cause stomach bleeding or kidney problems in certain people  If you take blood thinner medicine, always ask your healthcare provider if NSAIDs are safe for you  Always read the medicine label and follow directions  · Acetaminophen  decreases pain and fever  It is available without a doctor's order  Ask how much to take and how often to take it  Follow directions  Read the labels of all other medicines you are using to see if they also contain acetaminophen, or ask your doctor or pharmacist  Acetaminophen can cause liver damage if not taken correctly  Do not use more than 4 grams (4,000 milligrams) total of acetaminophen in one day  · Prescription pain medicine  may be given  Ask your healthcare provider how to take this medicine safely  Some prescription pain medicines contain acetaminophen  Do not take other medicines that contain acetaminophen without talking to your healthcare provider  Too much acetaminophen may cause liver damage  Prescription pain medicine may cause constipation  Ask your healthcare provider how to prevent or treat constipation  · Medicines  to balance your electrolytes may be needed  · Take your medicine as directed  Contact your healthcare provider if you think your medicine is not helping or if you have side effects  Tell him or her if you are allergic to any medicine  Keep a list of the medicines, vitamins, and herbs you take  Include the amounts, and when and why you take them  Bring the list or the pill bottles to follow-up visits  Carry your medicine list with you in case of an emergency  What you can do to manage kidney stones:   · Drink more liquids  Your healthcare provider may tell you to drink at least 8 to 12 (eight-ounce) cups of liquids each day  This helps flush out the kidney stones when you urinate  Water is the best liquid to drink  · Strain your urine every time you go to the bathroom  Urinate through a strainer or a piece of thin cloth to catch the stones  Take the stones to your healthcare provider so they can be sent to the lab for tests  This will help your healthcare providers plan the best treatment for you  · Ask if you should avoid any foods  You may need to limit oxalate  Oxalate is a chemical found in some plant foods  The most common type of kidney stone is made up of crystals that contain calcium and oxalate  Your healthcare provider or dietitian may recommend that you limit oxalate if you get this type of kidney stone often  You may need to limit how much sodium (salt) or protein you eat  Ask for information about the best foods for you  · Be physically active as directed  Your stones may pass more easily if you stay active  Physical activity can also help you manage your weight  Ask about the best activities for you  After you pass the kidney stones: Your healthcare provider may  order a 24-hour urine test  Results from a 24-hour urine test will help your healthcare provider plan ways to prevent more stones from forming   Your healthcare provider will give you more instructions  Follow up with your doctor or kidney specialist as directed:  Write down your questions so you remember to ask them during your visits  © Copyright [employer] 2022 Information is for End User's use only and may not be sold, redistributed or otherwise used for commercial purposes  All illustrations and images included in CareNotes® are the copyrighted property of A D A M [employer] Inc  or [employer]   The above information is an  only  It is not intended as medical advice for individual conditions or treatments  Talk to your doctor, nurse or pharmacist before following any medical regimen to see if it is safe and effective for you  Low Oxalate Diet   WHAT YOU NEED TO KNOW:   Oxalate is a chemical found in plant foods  You may need to eat foods that are low in oxalate to help clear kidney stones or prevent them from forming  People who have had kidney stones are at a higher risk of forming kidney stones again  The most common type of kidney stone is made up of crystals that contain calcium and oxalate  Your healthcare provider or dietitian may recommend that you limit oxalate if you get this type of kidney stone often  DISCHARGE INSTRUCTIONS:   Foods to include: Include the following foods that have a low to medium amount of oxalate  · Grains:      ? Egg noodles    ? Laz crackers    ? Pancakes and waffles    ? Cooked and dry cereals without nuts or bran    ? White or wild rice    ? White bread, cornbread, bagels, and white English muffins (medium oxalate)    ? Saltine or soda crackers and vanilla wafers (medium oxalate)    ? Brown rice, spaghetti, and other noodles and pastas (medium oxalate)    · Fruit:      ? Apples, bananas, grapes    ? Cranberries    ? Peaches, nectarines, apricots, and pears    ? Papayas and strawberries    ? Melons and pineapples    ?  Blackberries, blueberries, mangoes, and prunes (medium oxalate)    · Vegetables:      ? Artichokes, asparagus, and brussels sprouts    ? Broccoli and cauliflower    ? Kale, endive, cabbage, and lettuce    ? Cucumbers, peas, and zucchini    ? Mushrooms, onions, and peppers    ? Corn    ? Carrots, celery, and green beans (medium oxalate)    ? Parsnips, summer squash, tomatoes, and turnips (medium oxalate)    · Dairy:      ? American cheese, Swiss cheese, cottage cheese, ricotta cheese, and cheddar cheese    ? Milk and buttermilk    ? Yogurt    · Protein foods:      ? Meat, fish, shellfish, chicken, and turkey    ? Lunch meat and ham (medium oxalate)    ? Hot dogs, bratwurst, langford, and sausage (medium oxalate)    · Drinks and desserts:      ? Coffee    ? Fruit punch and lemonade or limeade without added vitamin C    · Desserts:      ? Cookies, cakes, and ice cream    ? Pudding without chocolate    Foods to limit or avoid:  Limit the following foods that are high in oxalate  · Grains:      ? Wheat bran, wheat germ, and barley    ? Grits and bran cereal    ? White corn flour and buckwheat flour    ? Whole wheat bread    · Fruit:      ? Dried apricots    ? Red currants, figs, and rhubarb    ? Stephanie Dice    ? Grapefruit    · Vegetables:      ? Potatoes and yams    ? Andre greens, leeks, okra, and spinach    ? Wax beans     ? Eggplant    ? Beets and beet greens    ? Swiss chard, escarole, parsley, and rutabagas    ? Tomato paste    · Protein foods:      ? Baked beans with tomato sauce    ? Nut butters and nuts (peanuts, almonds, pecans, cashews, hazelnuts)    ? Soy burgers    ? Miso    ? Dried beans    · Desserts:      ? Fruitcake    ? Chocolate    ? Carob and marmalade    · Beverages:      ? Chocolate drink mixes    ? Soy milk    ? Instant iced tea    · Other foods:      ? Sesame seeds and tahini (paste made of sesame seeds)    ? Poppy seeds    Other dietary guidelines:   · Drink about 12 to 16 (eight-ounce) cups of liquid each day  Liquids help clear kidney stones and prevent them from forming again   Water is the best liquid to drink  You may need more liquid if you are physically active  Ask your healthcare provider or dietitian how much liquid you need to drink each day  · Your healthcare provider may suggest that you make other diet changes to help prevent kidney stones  This may include decreasing the amount of sodium you eat each day  © Copyright [employer] 2022 Information is for End User's use only and may not be sold, redistributed or otherwise used for commercial purposes  All illustrations and images included in CareNotes® are the copyrighted property of [employer]  or [facility] Desire John   The above information is an  only  It is not intended as medical advice for individual conditions or treatments  Talk to your doctor, nurse or pharmacist before following any medical regimen to see if it is safe and effective for you

## 2022-04-13 NOTE — TELEPHONE ENCOUNTER
I spoke to Vannessa, she did not see my vm from earlier today   4/19/2022 works better due to a family situation     -instructions given verbally  -PATs are complete  -[address]  - on [employer] tracker 4/13/2022

## 2022-04-13 NOTE — TELEPHONE ENCOUNTER
I left a voice mail message for patients mom Gómez Royal to schedule a fast track #5 ordered by [facility]  Suggested Monday 4/18/2022 at [facility] with Dr Gaston Breen

## 2022-04-13 NOTE — TELEPHONE ENCOUNTER
Patient's mother  would like to talk to the office to schedule to schedule her surgery  Did not hear from the office today

## 2022-04-14 ENCOUNTER — TELEPHONE (OUTPATIENT)
Dept: UROLOGY | Facility: MEDICAL CENTER | Age: 22
End: 2022-04-14

## 2022-04-14 LAB — BACTERIA UR CULT: ABNORMAL

## 2022-04-18 NOTE — PRE-PROCEDURE INSTRUCTIONS
Pre-Surgery Instructions:   Medication Instructions    ibuprofen (MOTRIN) 800 mg tablet Avoid tonight and DOS    ondansetron (Zofran ODT) 4 mg disintegrating tablet Instructed to take as needed including DOS    tamsulosin (FLOMAX) 0 4 mg   Instructed to take per normal schedule including DOS     Spoke with pt via phone, COVID screening negative  Advised hospital location will call with arrival time night before surgery and NPO after midnight night before  Advised one vaccinated visitor is allowed to accompany pt to wait during the procedure  Instructed to leave contacts/jewelery/valuables at home  Instructed to avoid all ASA and OTC Vit/Supp  Tylenol ok to take prn  Reviewed above medication instructions and showering instructions  Patient verbalized understanding of all of the above

## 2022-04-19 ENCOUNTER — ANESTHESIA EVENT (OUTPATIENT)
Dept: PERIOP | Facility: HOSPITAL | Age: 22
End: 2022-04-19
Payer: COMMERCIAL

## 2022-04-19 ENCOUNTER — ANESTHESIA (OUTPATIENT)
Dept: PERIOP | Facility: HOSPITAL | Age: 22
End: 2022-04-19
Payer: COMMERCIAL

## 2022-04-19 ENCOUNTER — TELEPHONE (OUTPATIENT)
Dept: UROLOGY | Facility: AMBULATORY SURGERY CENTER | Age: 22
End: 2022-04-19

## 2022-04-19 ENCOUNTER — APPOINTMENT (OUTPATIENT)
Dept: RADIOLOGY | Facility: HOSPITAL | Age: 22
End: 2022-04-19
Payer: COMMERCIAL

## 2022-04-19 ENCOUNTER — HOSPITAL ENCOUNTER (OUTPATIENT)
Facility: HOSPITAL | Age: 22
Setting detail: OUTPATIENT SURGERY
Discharge: HOME | End: 2022-04-19
Attending: UROLOGY | Admitting: UROLOGY
Payer: COMMERCIAL

## 2022-04-19 VITALS
DIASTOLIC BLOOD PRESSURE: 58 MMHG | SYSTOLIC BLOOD PRESSURE: 110 MMHG | HEART RATE: 58 BPM | HEIGHT: 58 IN | OXYGEN SATURATION: 100 % | WEIGHT: 88 LBS | BODY MASS INDEX: 18.47 KG/M2 | TEMPERATURE: 98.6 F | RESPIRATION RATE: 16 BRPM

## 2022-04-19 DIAGNOSIS — N20.0 NEPHROLITHIASIS: ICD-10-CM

## 2022-04-19 DIAGNOSIS — N20.0 NEPHROLITHIASIS: Primary | ICD-10-CM

## 2022-04-19 PROBLEM — U07.1 COVID-19: Status: ACTIVE | Noted: 2022-04-19

## 2022-04-19 LAB
EXT PREGNANCY TEST URINE: NEGATIVE
EXT. CONTROL: NORMAL

## 2022-04-19 PROCEDURE — 74018 RADEX ABDOMEN 1 VIEW: CPT

## 2022-04-19 PROCEDURE — 52356 CYSTO/URETERO W/LITHOTRIPSY: CPT | Performed by: UROLOGY

## 2022-04-19 PROCEDURE — C1769 GUIDE WIRE: HCPCS | Performed by: UROLOGY

## 2022-04-19 PROCEDURE — C2617 STENT, NON-COR, TEM W/O DEL: HCPCS | Performed by: UROLOGY

## 2022-04-19 PROCEDURE — 81025 URINE PREGNANCY TEST: CPT | Performed by: UROLOGY

## 2022-04-19 DEVICE — STENT URETERAL 6 FR 26CM INLAY OPTIMA: Type: IMPLANTABLE DEVICE | Site: URETER | Status: FUNCTIONAL

## 2022-04-19 RX ORDER — ONDANSETRON 2 MG/ML
INJECTION INTRAMUSCULAR; INTRAVENOUS AS NEEDED
Status: DISCONTINUED | OUTPATIENT
Start: 2022-04-19 | End: 2022-04-19

## 2022-04-19 RX ORDER — FENTANYL CITRATE 50 UG/ML
INJECTION, SOLUTION INTRAMUSCULAR; INTRAVENOUS AS NEEDED
Status: DISCONTINUED | OUTPATIENT
Start: 2022-04-19 | End: 2022-04-19

## 2022-04-19 RX ORDER — HYDROCODONE BITARTRATE AND ACETAMINOPHEN 5; 325 MG/1; MG/1
1-2 TABLET ORAL EVERY 6 HOURS PRN
Qty: 5 TABLET | Refills: 0 | Status: SHIPPED | OUTPATIENT
Start: 2022-04-19

## 2022-04-19 RX ORDER — SODIUM CHLORIDE, SODIUM LACTATE, POTASSIUM CHLORIDE, CALCIUM CHLORIDE 600; 310; 30; 20 MG/100ML; MG/100ML; MG/100ML; MG/100ML
125 INJECTION, SOLUTION INTRAVENOUS CONTINUOUS
Status: DISCONTINUED | OUTPATIENT
Start: 2022-04-19 | End: 2022-04-19 | Stop reason: HOSPADM

## 2022-04-19 RX ORDER — OXYBUTYNIN CHLORIDE 5 MG/1
5 TABLET ORAL 3 TIMES DAILY PRN
Qty: 20 TABLET | Refills: 0 | Status: SHIPPED | OUTPATIENT
Start: 2022-04-19

## 2022-04-19 RX ORDER — PHENAZOPYRIDINE HYDROCHLORIDE 100 MG/1
200 TABLET, FILM COATED ORAL ONCE
Status: COMPLETED | OUTPATIENT
Start: 2022-04-19 | End: 2022-04-19

## 2022-04-19 RX ORDER — DEXAMETHASONE SODIUM PHOSPHATE 10 MG/ML
INJECTION, SOLUTION INTRAMUSCULAR; INTRAVENOUS AS NEEDED
Status: DISCONTINUED | OUTPATIENT
Start: 2022-04-19 | End: 2022-04-19

## 2022-04-19 RX ORDER — ONDANSETRON 2 MG/ML
4 INJECTION INTRAMUSCULAR; INTRAVENOUS ONCE AS NEEDED
Status: DISCONTINUED | OUTPATIENT
Start: 2022-04-19 | End: 2022-04-19 | Stop reason: HOSPADM

## 2022-04-19 RX ORDER — SODIUM CHLORIDE, SODIUM LACTATE, POTASSIUM CHLORIDE, CALCIUM CHLORIDE 600; 310; 30; 20 MG/100ML; MG/100ML; MG/100ML; MG/100ML
20 INJECTION, SOLUTION INTRAVENOUS CONTINUOUS
Status: DISCONTINUED | OUTPATIENT
Start: 2022-04-19 | End: 2022-04-19 | Stop reason: HOSPADM

## 2022-04-19 RX ORDER — PHENAZOPYRIDINE HYDROCHLORIDE 200 MG/1
200 TABLET, FILM COATED ORAL 3 TIMES DAILY PRN
Qty: 30 TABLET | Refills: 0 | Status: SHIPPED | OUTPATIENT
Start: 2022-04-19

## 2022-04-19 RX ORDER — KETOROLAC TROMETHAMINE 10 MG/1
10 TABLET, FILM COATED ORAL EVERY 6 HOURS PRN
Status: DISCONTINUED | OUTPATIENT
Start: 2022-04-19 | End: 2022-04-19 | Stop reason: HOSPADM

## 2022-04-19 RX ORDER — NITROFURANTOIN 25; 75 MG/1; MG/1
100 CAPSULE ORAL 2 TIMES DAILY
Qty: 6 CAPSULE | Refills: 0 | Status: SHIPPED | OUTPATIENT
Start: 2022-04-19 | End: 2022-04-23 | Stop reason: HOSPADM

## 2022-04-19 RX ORDER — MAGNESIUM HYDROXIDE 1200 MG/15ML
LIQUID ORAL AS NEEDED
Status: DISCONTINUED | OUTPATIENT
Start: 2022-04-19 | End: 2022-04-19 | Stop reason: HOSPADM

## 2022-04-19 RX ORDER — DIPHENHYDRAMINE HYDROCHLORIDE 50 MG/ML
25 INJECTION INTRAMUSCULAR; INTRAVENOUS ONCE
Status: COMPLETED | OUTPATIENT
Start: 2022-04-19 | End: 2022-04-19

## 2022-04-19 RX ORDER — LIDOCAINE HYDROCHLORIDE 10 MG/ML
0.5 INJECTION, SOLUTION EPIDURAL; INFILTRATION; INTRACAUDAL; PERINEURAL ONCE AS NEEDED
Status: DISCONTINUED | OUTPATIENT
Start: 2022-04-19 | End: 2022-04-19 | Stop reason: HOSPADM

## 2022-04-19 RX ORDER — LEVOFLOXACIN 5 MG/ML
750 INJECTION, SOLUTION INTRAVENOUS ONCE
Status: COMPLETED | OUTPATIENT
Start: 2022-04-19 | End: 2022-04-19

## 2022-04-19 RX ORDER — FENTANYL CITRATE/PF 50 MCG/ML
25 SYRINGE (ML) INJECTION
Status: DISCONTINUED | OUTPATIENT
Start: 2022-04-19 | End: 2022-04-19 | Stop reason: HOSPADM

## 2022-04-19 RX ORDER — HYDROCODONE BITARTRATE AND ACETAMINOPHEN 5; 325 MG/1; MG/1
1 TABLET ORAL EVERY 6 HOURS PRN
Status: DISCONTINUED | OUTPATIENT
Start: 2022-04-19 | End: 2022-04-19 | Stop reason: HOSPADM

## 2022-04-19 RX ORDER — OXYBUTYNIN CHLORIDE 5 MG/1
5 TABLET ORAL 3 TIMES DAILY PRN
Status: DISCONTINUED | OUTPATIENT
Start: 2022-04-19 | End: 2022-04-19 | Stop reason: HOSPADM

## 2022-04-19 RX ORDER — MIDAZOLAM HYDROCHLORIDE 2 MG/2ML
INJECTION, SOLUTION INTRAMUSCULAR; INTRAVENOUS AS NEEDED
Status: DISCONTINUED | OUTPATIENT
Start: 2022-04-19 | End: 2022-04-19

## 2022-04-19 RX ORDER — PROPOFOL 10 MG/ML
INJECTION, EMULSION INTRAVENOUS AS NEEDED
Status: DISCONTINUED | OUTPATIENT
Start: 2022-04-19 | End: 2022-04-19

## 2022-04-19 RX ADMIN — FENTANYL CITRATE 25 MCG: 50 INJECTION INTRAMUSCULAR; INTRAVENOUS at 12:50

## 2022-04-19 RX ADMIN — GENTAMICIN SULFATE 50 MG: 40 INJECTION, SOLUTION INTRAMUSCULAR; INTRAVENOUS at 12:43

## 2022-04-19 RX ADMIN — MIDAZOLAM 2 MG: 1 INJECTION INTRAMUSCULAR; INTRAVENOUS at 12:41

## 2022-04-19 RX ADMIN — HYDROCODONE BITARTRATE AND ACETAMINOPHEN 1 TABLET: 5; 325 TABLET ORAL at 14:24

## 2022-04-19 RX ADMIN — PHENAZOPYRIDINE 200 MG: 100 TABLET ORAL at 14:24

## 2022-04-19 RX ADMIN — FENTANYL CITRATE 25 MCG: 50 INJECTION INTRAMUSCULAR; INTRAVENOUS at 13:33

## 2022-04-19 RX ADMIN — LEVOFLOXACIN 750 MG: 5 INJECTION, SOLUTION INTRAVENOUS at 11:32

## 2022-04-19 RX ADMIN — PROPOFOL 140 MG: 10 INJECTION, EMULSION INTRAVENOUS at 12:50

## 2022-04-19 RX ADMIN — DEXAMETHASONE SODIUM PHOSPHATE 8 MG: 10 INJECTION, SOLUTION INTRAMUSCULAR; INTRAVENOUS at 12:51

## 2022-04-19 RX ADMIN — ONDANSETRON 4 MG: 2 INJECTION INTRAMUSCULAR; INTRAVENOUS at 12:51

## 2022-04-19 RX ADMIN — SODIUM CHLORIDE, SODIUM LACTATE, POTASSIUM CHLORIDE, AND CALCIUM CHLORIDE: .6; .31; .03; .02 INJECTION, SOLUTION INTRAVENOUS at 12:26

## 2022-04-19 RX ADMIN — FENTANYL CITRATE 25 MCG: 50 INJECTION INTRAMUSCULAR; INTRAVENOUS at 12:53

## 2022-04-19 RX ADMIN — FENTANYL CITRATE 25 MCG: 50 INJECTION INTRAMUSCULAR; INTRAVENOUS at 13:21

## 2022-04-19 RX ADMIN — DIPHENHYDRAMINE HYDROCHLORIDE 25 MG: 50 INJECTION, SOLUTION INTRAMUSCULAR; INTRAVENOUS at 12:30

## 2022-04-19 RX ADMIN — SODIUM CHLORIDE, SODIUM LACTATE, POTASSIUM CHLORIDE, AND CALCIUM CHLORIDE 125 ML/HR: .6; .31; .03; .02 INJECTION, SOLUTION INTRAVENOUS at 11:29

## 2022-04-19 RX ADMIN — KETOROLAC TROMETHAMINE 10 MG: 10 TABLET, FILM COATED ORAL at 14:56

## 2022-04-19 NOTE — OP NOTE
OPERATIVE REPORT  PATIENT NAME: Kely Prince    :  2000  MRN: [id number]  Pt Location: [facility] CYSTO ROOM 01    SURGERY DATE: 2022    Surgeon(s) and Role:     * Gisell Sandoval MD - Primary    Preop Diagnosis:  Nephrolithiasis [N20 0]right, right ureteral calculi 5 mm mid ureter    Post-Op Diagnosis Codes:     * Nephrolithiasis [N20 0],same    Procedure(s) (LRB):  CYSTOSCOPY URETEROSCOPY WITH LITHOTRIPSY HOLMIUM LASER AND INSERTION STENT URETERAL (Right)    Specimen(s):  * No specimens in log *    Estimated Blood Loss:   Minimal    Drains:  * No LDAs found *    Anesthesia Type:   Choice    Operative Indications:  Nephrolithiasis [N20 0]  5 mm mid right ureteral stone, 4mm stones in right kidney    Operative Findings:  4-5 mm stone in the lower pole right kidney, no other stones  Dilated right ureter  Broken up into tiny passable fragments with the laser  Complications:   None    Procedure and Technique:  72-year-old woman with right flank pain and hydronephrosis due to a distal right ureteral stone and she has 4 mm stones in her right kidney as well  She was offered cystoscopy right ureteroscopy laser lithotripsy right ureteral stent placement  The risks of bleeding infection damage urinary tract and possible need for additional procedures were explained she gives informed consent  The patient was brought to the operating room identified properly  LMA was induced patient was prepped and draped in dorsal lithotomy position usual fashion  A time-out was performed  Cursory inspection of the genitalia revealed no abnormalities  Urethra was normal without stricture  The bladder was smooth not trabeculated there are no stones tumors or lesions  The orifices were orthotopic intact  Fluoroscopy revealed no radiopaque stones    I placed a 0 035 in guidewire up the right ureter without any resistance into the kidney under fluoroscopic guidance and then placed a rigid ureteral scope into the distal ureter  This went up easily up a dilated right ureter and no stone was seen I went up to the UPJ  I then placed another wire and over that wire I placed a flexible ureteral scope which I performed panendoscopy of the renal pelvis and calices  I only saw 1 stone in the lower pole calyx which was about 4-5 mm, and using the 200 micron holmium laser fiber broken up into tiny passable fragments  I then withdrew the scope and I placed a 6 [place] by 26 cm stent over the wire and coils were established renal pelvis and bladder  The bladder was drained cystoscopy sheath and stent string was taped to the lower abdomen     I was present for the entire procedure and A qualified resident physician was not available    Patient Disposition:  PACU       SIGNATURE: Hailey Lucas MD  DATE: April 19, 2022  TIME: 1:29 PM

## 2022-04-19 NOTE — DISCHARGE INSTRUCTIONS
Expect to see blood in the urine, and to experience urgency/frequency/burning with urination and dribbling  This is normal after urological procedures  Is also normal to experience some nausea after these procedures  Go back your regular diet carefully  It is normal to feel pain in the kidney when urinating and when the bladder is filling due to urine refluxing up to the kidney because of the open stent  The stent is necessary to keep the ureter open to allow clots and swelling to resolve and to allow the kidney to drain properly after instrumentation  Some stones may pass around the stent, but most stones pass after the stent is removed  The presence of the stent makes the ureter wider while it is in and after has been removed, allowing passage of larger fragments  You had 1 stone that was broken up into tiny passable fragments  You may remove the stent on your own on Monday by pulling on the string, or our nurses can do for you  I will have our office contact you with a nephrology referral, a nurse visit to remove the stent if you wish, and a follow-up appointment in 6 months with a renal ultrasound  Call for fever greater that 101 5, inability to urinate, prolonged nausea and vomiting, or severe pain not relieved by pain medications  Magda Console Keep stent string taped- if it becomes dislodged or gets pulled out early, that is OK- simply remove it completely by pulling on string until it is completely out  No driving/operating machinery for 24 hours, and while taking narcotics  Take over the counter remedy of choice to avoid constipation  Drink plenty of fluids

## 2022-04-19 NOTE — ANESTHESIA PREPROCEDURE EVALUATION
Procedure:  CYSTOSCOPY URETEROSCOPY WITH LITHOTRIPSY HOLMIUM LASER, RETROGRADE PYELOGRAM AND INSERTION STENT URETERAL (Right Bladder)    Relevant Problems   /RENAL   (+) Nephrolithiasis      NEURO/PSYCH   (+) Other headache syndrome      Other   (+) Allergic rhinitis   (+) COVID-19 (1/2022)        Physical Exam    Airway    Mallampati score: III  TM Distance: >3 FB  Neck ROM: full     Dental       Cardiovascular      Pulmonary      Other Findings        Anesthesia Plan  ASA Score- 2     Anesthesia Type- general with ASA Monitors  Additional Monitors:   Airway Plan: LMA  Comment: History of mild motion sickness  Will also give decadron for ponv ppx  Update: in holding area, while receiving levaquin IV, developed rash and itchiness over left arm where infusion was running  Allergy list updated, will give benadryl 25mg IV  Jenny Burk Plan Factors-    Chart reviewed  Existing labs reviewed  Patient summary reviewed  Induction- intravenous  Postoperative Plan- Plan for postoperative opioid use  Informed Consent- Anesthetic plan and risks discussed with patient  I personally reviewed this patient with the CRNA  Discussed and agreed on the Anesthesia Plan with the CRNA  Jenny Burk

## 2022-04-19 NOTE — INTERVAL H&P NOTE
H&P reviewed  After examining the patient I find no changes in the patients condition since the H&P had been written  procedure and risks of bleeding, infection, damage to urinary tract and neeed for additional procedures explained and she gives informed consent      Vitals:    04/19/22 1021   BP: 140/84   Pulse: 66   Resp: 18   Temp: (!) 96 5 °F (35 8 °C)   SpO2: 100%

## 2022-04-19 NOTE — ANESTHESIA POSTPROCEDURE EVALUATION
Post-Op Assessment Note    CV Status:  Stable  Pain Score: 1    Pain management: adequate     Mental Status:  Alert and awake   Hydration Status:  Euvolemic   PONV Controlled:  Controlled   Airway Patency:  Patent      Post Op Vitals Reviewed: Yes            No complications documented      BP   97/60   Temp  98 3   Pulse  68   Resp   18   SpO2   100

## 2022-04-19 NOTE — LETTER
[address]  [address] [place] [address]  Dept: [phone]    April 19, 2022     Patient: Miguel Liu   YOB: 2000   Date of Visit: 4/13/2022       To Whom it May Concern:    Hardeep Anna is under my professional care  She was seen in the hospital from 4/13/2022   to 04/19/22 for surgery  She has a trip booked starting April 29th  I do not think she should be traveling for the next 4 weeks  There is a possibility of her having complications due to stone fragment passage  She will be unable to go on the cruise that was planned  If you have any questions or concerns, please don't hesitate to call           Sincerely,          Joseph Dick MD

## 2022-04-19 NOTE — TELEPHONE ENCOUNTER
----- Message from Grady Henley MD sent at 4/19/2022  1:37 PM EDT -----  Patient underwent cystoscopy right ureteroscopy laser lithotripsy right ureteral stent placement-she is a string  Please call her with an appointment see nurse's next week for stent removal by pulling on the string, then follow-up with a provider in 6 months with a renal ultrasound  She also can be offered the option of pulling the stent out on her own  Please also make nephrology referral for recurrent nephrolithiasis  This has been ordered  Renal ultrasound has also been ordered for 6 months

## 2022-04-20 ENCOUNTER — HOSPITAL ENCOUNTER (INPATIENT)
Facility: HOSPITAL | Age: 22
LOS: 1 days | Discharge: HOME | DRG: 690 | End: 2022-04-23
Attending: EMERGENCY MEDICINE | Admitting: STUDENT IN AN ORGANIZED HEALTH CARE EDUCATION/TRAINING PROGRAM
Payer: COMMERCIAL

## 2022-04-20 ENCOUNTER — APPOINTMENT (EMERGENCY)
Dept: CT IMAGING | Facility: HOSPITAL | Age: 22
DRG: 690 | End: 2022-04-20
Payer: COMMERCIAL

## 2022-04-20 DIAGNOSIS — N20.0 NEPHROLITHIASIS: ICD-10-CM

## 2022-04-20 DIAGNOSIS — R10.9 RIGHT FLANK PAIN: ICD-10-CM

## 2022-04-20 DIAGNOSIS — R10.9 LEFT FLANK PAIN: Primary | ICD-10-CM

## 2022-04-20 PROBLEM — F32.A ANXIETY AND DEPRESSION: Status: ACTIVE | Noted: 2022-04-20

## 2022-04-20 PROBLEM — D72.829 LEUKOCYTOSIS: Status: ACTIVE | Noted: 2022-04-20

## 2022-04-20 PROBLEM — N39.0 UTI (URINARY TRACT INFECTION): Status: ACTIVE | Noted: 2022-04-20

## 2022-04-20 PROBLEM — F41.9 ANXIETY AND DEPRESSION: Status: ACTIVE | Noted: 2022-04-20

## 2022-04-20 LAB
ALBUMIN SERPL BCP-MCNC: 3.8 G/DL (ref 3.5–5)
ALP SERPL-CCNC: 43 U/L (ref 46–384)
ALT SERPL W P-5'-P-CCNC: 14 U/L (ref 12–78)
ANION GAP SERPL CALCULATED.3IONS-SCNC: 8 MMOL/L (ref 4–13)
AST SERPL W P-5'-P-CCNC: 10 U/L (ref 5–45)
BACTERIA UR QL AUTO: ABNORMAL /HPF
BASOPHILS # BLD AUTO: 0.03 THOUSANDS/ΜL (ref 0–0.1)
BASOPHILS NFR BLD AUTO: 0 % (ref 0–1)
BILIRUB SERPL-MCNC: 0.4 MG/DL (ref 0.2–1)
BILIRUB UR QL STRIP: ABNORMAL
BUN SERPL-MCNC: 18 MG/DL (ref 5–25)
CALCIUM SERPL-MCNC: 8.9 MG/DL (ref 8.3–10.1)
CHLORIDE SERPL-SCNC: 106 MMOL/L (ref 100–108)
CLARITY UR: ABNORMAL
CO2 SERPL-SCNC: 26 MMOL/L (ref 21–32)
COLOR UR: ABNORMAL
CREAT SERPL-MCNC: 1.13 MG/DL (ref 0.6–1.3)
EOSINOPHIL # BLD AUTO: 0.02 THOUSAND/ΜL (ref 0–0.61)
EOSINOPHIL NFR BLD AUTO: 0 % (ref 0–6)
ERYTHROCYTE [DISTWIDTH] IN BLOOD BY AUTOMATED COUNT: 12.4 % (ref 11.6–15.1)
GFR SERPL CREATININE-BSD FRML MDRD: 70 ML/MIN/1.73SQ M
GLUCOSE SERPL-MCNC: 87 MG/DL (ref 65–140)
GLUCOSE UR STRIP-MCNC: ABNORMAL MG/DL
HCT VFR BLD AUTO: 42.3 % (ref 34.8–46.1)
HGB BLD-MCNC: 14.5 G/DL (ref 11.5–15.4)
HGB UR QL STRIP.AUTO: ABNORMAL
IMM GRANULOCYTES # BLD AUTO: 0.05 THOUSAND/UL (ref 0–0.2)
IMM GRANULOCYTES NFR BLD AUTO: 0 % (ref 0–2)
KETONES UR STRIP-MCNC: ABNORMAL MG/DL
LEUKOCYTE ESTERASE UR QL STRIP: ABNORMAL
LIPASE SERPL-CCNC: 47 U/L (ref 73–393)
LYMPHOCYTES # BLD AUTO: 2.68 THOUSANDS/ΜL (ref 0.6–4.47)
LYMPHOCYTES NFR BLD AUTO: 20 % (ref 14–44)
MCH RBC QN AUTO: 30.5 PG (ref 26.8–34.3)
MCHC RBC AUTO-ENTMCNC: 34.3 G/DL (ref 31.4–37.4)
MCV RBC AUTO: 89 FL (ref 82–98)
MONOCYTES # BLD AUTO: 1.23 THOUSAND/ΜL (ref 0.17–1.22)
MONOCYTES NFR BLD AUTO: 9 % (ref 4–12)
NEUTROPHILS # BLD AUTO: 9.31 THOUSANDS/ΜL (ref 1.85–7.62)
NEUTS SEG NFR BLD AUTO: 71 % (ref 43–75)
NITRITE UR QL STRIP: POSITIVE
NON-SQ EPI CELLS URNS QL MICRO: ABNORMAL /HPF
NRBC BLD AUTO-RTO: 0 /100 WBCS
PH UR STRIP.AUTO: 5.5 [PH]
PLATELET # BLD AUTO: 223 THOUSANDS/UL (ref 149–390)
PMV BLD AUTO: 9.9 FL (ref 8.9–12.7)
POTASSIUM SERPL-SCNC: 3.6 MMOL/L (ref 3.5–5.3)
PROT SERPL-MCNC: 6.6 G/DL (ref 6.4–8.2)
PROT UR STRIP-MCNC: >=300 MG/DL
RBC # BLD AUTO: 4.75 MILLION/UL (ref 3.81–5.12)
RBC #/AREA URNS AUTO: ABNORMAL /HPF
SODIUM SERPL-SCNC: 140 MMOL/L (ref 136–145)
SP GR UR STRIP.AUTO: >=1.03 (ref 1–1.03)
UROBILINOGEN UR QL STRIP.AUTO: 4 E.U./DL
WBC # BLD AUTO: 13.32 THOUSAND/UL (ref 4.31–10.16)
WBC #/AREA URNS AUTO: ABNORMAL /HPF

## 2022-04-20 PROCEDURE — 83690 ASSAY OF LIPASE: CPT | Performed by: EMERGENCY MEDICINE

## 2022-04-20 PROCEDURE — 85025 COMPLETE CBC W/AUTO DIFF WBC: CPT | Performed by: EMERGENCY MEDICINE

## 2022-04-20 PROCEDURE — 96375 TX/PRO/DX INJ NEW DRUG ADDON: CPT

## 2022-04-20 PROCEDURE — 80053 COMPREHEN METABOLIC PANEL: CPT | Performed by: EMERGENCY MEDICINE

## 2022-04-20 PROCEDURE — 99220 PR INITIAL OBSERVATION CARE/DAY 70 MINUTES: CPT | Performed by: INTERNAL MEDICINE

## 2022-04-20 PROCEDURE — 74176 CT ABD & PELVIS W/O CONTRAST: CPT

## 2022-04-20 PROCEDURE — 96374 THER/PROPH/DIAG INJ IV PUSH: CPT

## 2022-04-20 PROCEDURE — 81001 URINALYSIS AUTO W/SCOPE: CPT | Performed by: EMERGENCY MEDICINE

## 2022-04-20 PROCEDURE — 99285 EMERGENCY DEPT VISIT HI MDM: CPT

## 2022-04-20 PROCEDURE — 36415 COLL VENOUS BLD VENIPUNCTURE: CPT | Performed by: EMERGENCY MEDICINE

## 2022-04-20 PROCEDURE — 99285 EMERGENCY DEPT VISIT HI MDM: CPT | Performed by: EMERGENCY MEDICINE

## 2022-04-20 PROCEDURE — G1004 CDSM NDSC: HCPCS

## 2022-04-20 PROCEDURE — 96361 HYDRATE IV INFUSION ADD-ON: CPT

## 2022-04-20 RX ORDER — ONDANSETRON 2 MG/ML
4 INJECTION INTRAMUSCULAR; INTRAVENOUS EVERY 6 HOURS PRN
Status: DISCONTINUED | OUTPATIENT
Start: 2022-04-20 | End: 2022-04-23 | Stop reason: HOSPADM

## 2022-04-20 RX ORDER — ONDANSETRON 2 MG/ML
4 INJECTION INTRAMUSCULAR; INTRAVENOUS ONCE
Status: COMPLETED | OUTPATIENT
Start: 2022-04-20 | End: 2022-04-20

## 2022-04-20 RX ORDER — IBUPROFEN 400 MG/1
800 TABLET ORAL 3 TIMES DAILY
Status: DISCONTINUED | OUTPATIENT
Start: 2022-04-20 | End: 2022-04-23 | Stop reason: HOSPADM

## 2022-04-20 RX ORDER — PHENAZOPYRIDINE HYDROCHLORIDE 100 MG/1
200 TABLET, FILM COATED ORAL
Status: DISCONTINUED | OUTPATIENT
Start: 2022-04-21 | End: 2022-04-20

## 2022-04-20 RX ORDER — TAMSULOSIN HYDROCHLORIDE 0.4 MG/1
0.4 CAPSULE ORAL
Status: DISCONTINUED | OUTPATIENT
Start: 2022-04-21 | End: 2022-04-23 | Stop reason: HOSPADM

## 2022-04-20 RX ORDER — KETOROLAC TROMETHAMINE 30 MG/ML
30 INJECTION, SOLUTION INTRAMUSCULAR; INTRAVENOUS EVERY 6 HOURS PRN
Status: DISPENSED | OUTPATIENT
Start: 2022-04-20 | End: 2022-04-22

## 2022-04-20 RX ORDER — KETOROLAC TROMETHAMINE 30 MG/ML
30 INJECTION, SOLUTION INTRAMUSCULAR; INTRAVENOUS ONCE
Status: COMPLETED | OUTPATIENT
Start: 2022-04-20 | End: 2022-04-20

## 2022-04-20 RX ORDER — HYDROCODONE BITARTRATE AND ACETAMINOPHEN 5; 325 MG/1; MG/1
1 TABLET ORAL EVERY 6 HOURS PRN
Status: DISCONTINUED | OUTPATIENT
Start: 2022-04-20 | End: 2022-04-23 | Stop reason: HOSPADM

## 2022-04-20 RX ORDER — SODIUM CHLORIDE 9 MG/ML
125 INJECTION, SOLUTION INTRAVENOUS CONTINUOUS
Status: DISCONTINUED | OUTPATIENT
Start: 2022-04-20 | End: 2022-04-23 | Stop reason: HOSPADM

## 2022-04-20 RX ORDER — PHENAZOPYRIDINE HYDROCHLORIDE 100 MG/1
200 TABLET, FILM COATED ORAL
Status: COMPLETED | OUTPATIENT
Start: 2022-04-21 | End: 2022-04-21

## 2022-04-20 RX ORDER — HYDROMORPHONE HCL/PF 1 MG/ML
1 SYRINGE (ML) INJECTION ONCE
Status: COMPLETED | OUTPATIENT
Start: 2022-04-20 | End: 2022-04-20

## 2022-04-20 RX ORDER — OXYBUTYNIN CHLORIDE 5 MG/1
5 TABLET ORAL 3 TIMES DAILY PRN
Status: DISCONTINUED | OUTPATIENT
Start: 2022-04-20 | End: 2022-04-23 | Stop reason: HOSPADM

## 2022-04-20 RX ORDER — ACETAMINOPHEN 325 MG/1
650 TABLET ORAL EVERY 6 HOURS PRN
Status: DISCONTINUED | OUTPATIENT
Start: 2022-04-20 | End: 2022-04-23 | Stop reason: HOSPADM

## 2022-04-20 RX ORDER — FENTANYL CITRATE 50 UG/ML
50 INJECTION, SOLUTION INTRAMUSCULAR; INTRAVENOUS ONCE
Status: COMPLETED | OUTPATIENT
Start: 2022-04-20 | End: 2022-04-20

## 2022-04-20 RX ORDER — POLYETHYLENE GLYCOL 3350 17 G/17G
17 POWDER, FOR SOLUTION ORAL DAILY PRN
Status: DISCONTINUED | OUTPATIENT
Start: 2022-04-20 | End: 2022-04-22

## 2022-04-20 RX ORDER — PHENAZOPYRIDINE HYDROCHLORIDE 100 MG/1
200 TABLET, FILM COATED ORAL 3 TIMES DAILY PRN
Status: DISCONTINUED | OUTPATIENT
Start: 2022-04-20 | End: 2022-04-20

## 2022-04-20 RX ORDER — TAMSULOSIN HYDROCHLORIDE 0.4 MG/1
0.4 CAPSULE ORAL
Qty: 30 CAPSULE | Refills: 0 | Status: SHIPPED | OUTPATIENT
Start: 2022-04-20 | End: 2022-05-15

## 2022-04-20 RX ORDER — SENNOSIDES 8.6 MG
2 TABLET ORAL
Status: DISCONTINUED | OUTPATIENT
Start: 2022-04-20 | End: 2022-04-23 | Stop reason: HOSPADM

## 2022-04-20 RX ORDER — NITROFURANTOIN 25; 75 MG/1; MG/1
100 CAPSULE ORAL 2 TIMES DAILY
Status: COMPLETED | OUTPATIENT
Start: 2022-04-20 | End: 2022-04-22

## 2022-04-20 RX ADMIN — HYDROMORPHONE HYDROCHLORIDE 1 MG: 1 INJECTION, SOLUTION INTRAMUSCULAR; INTRAVENOUS; SUBCUTANEOUS at 21:20

## 2022-04-20 RX ADMIN — SODIUM CHLORIDE 1000 ML: 0.9 INJECTION, SOLUTION INTRAVENOUS at 18:27

## 2022-04-20 RX ADMIN — SODIUM CHLORIDE 125 ML/HR: 0.9 INJECTION, SOLUTION INTRAVENOUS at 22:41

## 2022-04-20 RX ADMIN — IBUPROFEN 800 MG: 400 TABLET, FILM COATED ORAL at 23:17

## 2022-04-20 RX ADMIN — FENTANYL CITRATE 50 MCG: 50 INJECTION, SOLUTION INTRAMUSCULAR; INTRAVENOUS at 20:35

## 2022-04-20 RX ADMIN — KETOROLAC TROMETHAMINE 30 MG: 30 INJECTION, SOLUTION INTRAMUSCULAR at 18:23

## 2022-04-20 RX ADMIN — SENNOSIDES 17.2 MG: 8.6 TABLET, FILM COATED ORAL at 23:17

## 2022-04-20 RX ADMIN — NITROFURANTOIN (MONOHYDRATE/MACROCRYSTALS) 100 MG: 75; 25 CAPSULE ORAL at 23:17

## 2022-04-20 RX ADMIN — ONDANSETRON 4 MG: 2 INJECTION INTRAMUSCULAR; INTRAVENOUS at 18:24

## 2022-04-20 NOTE — TELEPHONE ENCOUNTER
Patient's mother calling back stating that she has been taking her medication without any pain relief  Mother states she took her tamsulosin about an hour ago and it still isn't helping  Informed mother of providers recommendations regarding hydration, stool softeners, oxybutynin and tamsulosin  Also informed mother that she can continue to take her hydrocodone and tylneol and ibuprofen as prescribed for pain relief  Mother is asking if there is anything else she can do for her pain

## 2022-04-20 NOTE — ED PROVIDER NOTES
History  Chief Complaint   Patient presents with    Flank Pain     Pt with R flank pain post stent placement 4/19/22     This is a 61-year-old female presents with right flank pain nausea vomiting increasing today she did have a stent placed for a 5 mm right-sided kidney stone denies any fevers does have some burning with urination  Patient is currently on Flomax Percocet and Macrobid  History provided by:  Patient and parent  Medical Problem  Location:  Right flank  Quality:  Sharp pain  Severity:  Severe  Onset quality:  Gradual  Timing:  Constant  Chronicity:  Recurrent  Context:  Right flank pain with kidney stone and recent stent placement  Relieved by:  Nothing  Worsened by:  Sitting up  Associated symptoms: nausea and vomiting    Associated symptoms: no abdominal pain and no fever        Prior to Admission Medications   Prescriptions Last Dose Informant Patient Reported? Taking?    HYDROcodone-acetaminophen (NORCO) 5-325 mg per tablet   No No   Sig: Take 1-2 tablets by mouth every 6 (six) hours as needed for pain for up to 5 doses Max Daily Amount: 8 tablets   ibuprofen (MOTRIN) 800 mg tablet   No No   Sig: Take 1 tablet (800 mg total) by mouth 3 (three) times a day   nitrofurantoin (MACROBID) 100 mg capsule   No No   Sig: Take 1 capsule (100 mg total) by mouth 2 (two) times a day for 3 days   ondansetron (Zofran ODT) 4 mg disintegrating tablet   No No   Sig: Take 1 tablet (4 mg total) by mouth every 6 (six) hours as needed for nausea or vomiting   oxybutynin (DITROPAN) 5 mg tablet   No No   Sig: Take 1 tablet (5 mg total) by mouth 3 (three) times a day as needed (bladder spasm)   phenazopyridine (PYRIDIUM) 200 mg tablet   No No   Sig: Take 1 tablet (200 mg total) by mouth 3 (three) times a day as needed for bladder spasms   tamsulosin (FLOMAX) 0 4 mg   No No   Sig: Take 1 capsule (0 4 mg total) by mouth daily with dinner      Facility-Administered Medications: None       Past Medical History: Diagnosis Date    Kidney stone     Psychiatric disorder        Past Surgical History:   Procedure Laterality Date    [facility]ISION [facility]    MA CYSTO/URETERO W/LITHOTRIPSY &INDWELL STENT INSRT Right 4/19/2022    Procedure: CYSTOSCOPY URETEROSCOPY WITH LITHOTRIPSY HOLMIUM LASER AND INSERTION STENT URETERAL;  Surgeon: Nacho Palumbo MD;  Location: [facility];  Service: Urology       Family History   Problem Relation Age of Onset    Hypertension Mother     Migraines Mother     Hypertension Father     Diabetes Father      I have reviewed and agree with the history as documented  E-Cigarette/Vaping    E-Cigarette Use Never User      E-Cigarette/Vaping Substances    Nicotine No     THC No     CBD No     Flavoring No     Other No     Unknown No      Social History     Tobacco Use    Smoking status: Never Smoker    Smokeless tobacco: Never Used   Vaping Use    Vaping Use: Never used   Substance Use Topics    Alcohol use: Never    Drug use: Never       Review of Systems   Constitutional: Negative for fever  Gastrointestinal: Positive for nausea and vomiting  Negative for abdominal pain  Genitourinary: Positive for dysuria and flank pain  All other systems reviewed and are negative  Physical Exam  Physical Exam  Vitals reviewed  Constitutional:       Appearance: She is ill-appearing  She is not toxic-appearing or diaphoretic  HENT:      Head: Normocephalic and atraumatic  Right Ear: External ear normal       Left Ear: External ear normal       Nose: Nose normal    Eyes:      General: No scleral icterus  Right eye: No discharge  Left eye: No discharge  Extraocular Movements: Extraocular movements intact  Cardiovascular:      Rate and Rhythm: Normal rate and regular rhythm  Pulses: Normal pulses  Heart sounds: No murmur heard  No friction rub  No gallop      Pulmonary:      Effort: Pulmonary effort is normal  No respiratory distress  Breath sounds: No stridor  No wheezing, rhonchi or rales  Abdominal:      General: There is no distension  Palpations: Abdomen is soft  Tenderness: There is abdominal tenderness (Slight suprapubic)  There is no guarding or rebound  Musculoskeletal:         General: No swelling, tenderness, deformity or signs of injury  Normal range of motion  Cervical back: Normal range of motion and neck supple  No rigidity or tenderness  Right lower leg: No edema  Left lower leg: No edema  Skin:     General: Skin is warm and dry  Coloration: Skin is not jaundiced  Findings: No bruising, erythema or rash  Neurological:      General: No focal deficit present  Mental Status: She is alert and oriented to person, place, and time  Cranial Nerves: No cranial nerve deficit  Sensory: No sensory deficit  Coordination: Coordination normal    Psychiatric:         Mood and Affect: Mood normal          Behavior: Behavior normal          Thought Content:  Thought content normal          Vital Signs  ED Triage Vitals [04/20/22 1720]   Temperature Pulse Respirations Blood Pressure SpO2   98 1 °F (36 7 °C) 78 16 116/76 98 %      Temp Source Heart Rate Source Patient Position - Orthostatic VS BP Location FiO2 (%)   Temporal Monitor Sitting Left arm --      Pain Score       10 - Worst Possible Pain           Vitals:    04/20/22 2033 04/20/22 2213 04/21/22 0729 04/21/22 1443   BP: 117/63 110/76 126/78 111/72   Pulse: 63 62 (!) 51 55   Patient Position - Orthostatic VS:  Lying Lying Lying         Visual Acuity  Visual Acuity      Most Recent Value   L Pupil Size (mm) 5   R Pupil Size (mm) 5   L Pupil Shape Round   R Pupil Shape Round          ED Medications  Medications   acetaminophen (TYLENOL) tablet 650 mg (650 mg Oral Given 4/21/22 8892)   HYDROcodone-acetaminophen (NORCO) 5-325 mg per tablet 1 tablet (has no administration in time range)   ketorolac (TORADOL) injection 30 mg (30 mg Intravenous Given 4/21/22 1156)   sodium chloride 0 9 % infusion (125 mL/hr Intravenous New Bag 4/21/22 1455)   senna (SENOKOT) tablet 17 2 mg (17 2 mg Oral Given 4/20/22 2317)   polyethylene glycol (MIRALAX) packet 17 g (has no administration in time range)   ibuprofen (MOTRIN) tablet 800 mg (800 mg Oral Given 4/21/22 0804)   nitrofurantoin (MACROBID) extended-release capsule 100 mg (100 mg Oral Given 4/21/22 0804)   tamsulosin (FLOMAX) capsule 0 4 mg (has no administration in time range)   oxybutynin (DITROPAN) tablet 5 mg (has no administration in time range)   ondansetron (ZOFRAN) injection 4 mg (4 mg Intravenous Given 4/21/22 0508)   phenazopyridine (PYRIDIUM) tablet 200 mg (200 mg Oral Given 4/21/22 1209)   sodium chloride 0 9 % bolus 1,000 mL (0 mL Intravenous Stopped 4/20/22 2016)   ondansetron (ZOFRAN) injection 4 mg (4 mg Intravenous Given 4/20/22 1824)   ketorolac (TORADOL) injection 30 mg (30 mg Intravenous Given 4/20/22 1823)   fentanyl citrate (PF) 100 MCG/2ML 50 mcg (50 mcg Intravenous Given 4/20/22 2035)   HYDROmorphone (DILAUDID) injection 1 mg (1 mg Intravenous Given 4/20/22 2120)       Diagnostic Studies  Results Reviewed     Procedure Component Value Units Date/Time    Urine culture [[id number]] Collected: 04/20/22 2130    Lab Status:  In process Specimen: Urine, Straight Cath Updated: 04/20/22 2254    Urine Microscopic [[id number]]  (Abnormal) Collected: 04/20/22 2130    Lab Status: Final result Specimen: Urine, Straight Cath Updated: 04/20/22 2145     RBC, UA Innumerable /hpf      WBC, UA 0-1 /hpf      Epithelial Cells None Seen /hpf      Bacteria, UA None Seen /hpf     UA w Reflex to Microscopic w Reflex to Culture [[id number]]  (Abnormal) Collected: 04/20/22 2130    Lab Status: Final result Specimen: Urine, Straight Cath Updated: 04/20/22 2144     Color, UA Orange     Clarity, UA Cloudy     Specific Gravity, UA >=1 030     pH, UA 5 5     Leukocytes, UA Small     Nitrite, UA Positive Protein, UA >=300 mg/dl      Glucose,  (1/10%) mg/dl      Ketones, UA 15 (1+) mg/dl      Urobilinogen, UA 4 0 E U /dl      Bilirubin, UA Interference- unable to analyze     Blood, UA Large    Comprehensive metabolic panel [[id number]]  (Abnormal) Collected: 04/20/22 1826    Lab Status: Final result Specimen: Blood from Arm, Right Updated: 04/20/22 1859     Sodium 140 mmol/L      Potassium 3 6 mmol/L      Chloride 106 mmol/L      CO2 26 mmol/L      ANION GAP 8 mmol/L      BUN 18 mg/dL      Creatinine 1 13 mg/dL      Glucose 87 mg/dL      Calcium 8 9 mg/dL      AST 10 U/L      ALT 14 U/L      Alkaline Phosphatase 43 U/L      Total Protein 6 6 g/dL      Albumin 3 8 g/dL      Total Bilirubin 0 40 mg/dL      eGFR 70 ml/min/1 73sq m     Narrative:      National Kidney Disease Foundation guidelines for Chronic Kidney Disease (CKD):     Stage 1 with normal or high GFR (GFR > 90 mL/min/1 73 square meters)    Stage 2 Mild CKD (GFR = 60-89 mL/min/1 73 square meters)    Stage 3A Moderate CKD (GFR = 45-59 mL/min/1 73 square meters)    Stage 3B Moderate CKD (GFR = 30-44 mL/min/1 73 square meters)    Stage 4 Severe CKD (GFR = 15-29 mL/min/1 73 square meters)    Stage 5 End Stage CKD (GFR <15 mL/min/1 73 square meters)  Note: GFR calculation is accurate only with a steady state creatinine    Lipase [[id number]]  (Abnormal) Collected: 04/20/22 1826    Lab Status: Final result Specimen: Blood from Arm, Right Updated: 04/20/22 1859     Lipase 47 u/L     CBC and differential [[id number]]  (Abnormal) Collected: 04/20/22 1826    Lab Status: Final result Specimen: Blood from Arm, Right Updated: 04/20/22 1836     WBC 13 32 Thousand/uL      RBC 4 75 Million/uL      Hemoglobin 14 5 g/dL      Hematocrit 42 3 %      MCV 89 fL      MCH 30 5 pg      MCHC 34 3 g/dL      RDW 12 4 %      MPV 9 9 fL      Platelets 277 Thousands/uL      nRBC 0 /100 WBCs      Neutrophils Relative 71 %      Immat GRANS % 0 %      Lymphocytes Relative 20 % Monocytes Relative 9 %      Eosinophils Relative 0 %      Basophils Relative 0 %      Neutrophils Absolute 9 31 Thousands/µL      Immature Grans Absolute 0 05 Thousand/uL      Lymphocytes Absolute 2 68 Thousands/µL      Monocytes Absolute 1 23 Thousand/µL      Eosinophils Absolute 0 02 Thousand/µL      Basophils Absolute 0 03 Thousands/µL     POCT urinalysis dipstick [[id number]]     Lab Status: No result Specimen: Urine                  CT renal stone study abdomen pelvis without contrast   Final Result by Zena Landeros MD (04/20 1917)      Interval placement of a right renal stent with mild right renal collecting system fullness  No hydronephrosis  The previously seen distal right ureteral calculus is no longer identified  Workstation performed: [id number]                    Procedures  Procedures         ED Course  ED Course as of 04/21/22 1518   Wed Apr 20, 2022 2117 Discussed case with urology PA patient still uncomfortable will admit for observation pain control and urology consult in the morning   2125 Bladder scan shows 150 mL of urine patient still not passing her urine will do straight cath         CRAFFT      Most Recent Value   SBIRT (13-21 yo)    In order to provide better care to our patients, we are screening all of our patients for alcohol and drug use  Would it be okay to ask you these screening questions?  No Filed at: 04/20/2022 1731                                          MDM  Number of Diagnoses or Management Options  Diagnosis management comments: Right flank pain with recent stent placement rule out infection versus stent displacement will check CT scan labs urinalysis for further evaluation treat symptomatically       Amount and/or Complexity of Data Reviewed  Clinical lab tests: ordered  Tests in the radiology section of CPT®: ordered        Disposition  Final diagnoses:   Left flank pain - With intractable pain     Time reflects when diagnosis was documented in both MDM as applicable and the Disposition within this note     Time User Action Codes Description Comment    4/20/2022  9:26 PM Jackelin Price Add [R10 9] Left flank pain     4/20/2022  9:27 PM Jackelin Price Modify [R10 9] Left flank pain With intractable pain    4/20/2022  9:45 PM Kate Talley Add [R10 9] Right flank pain       ED Disposition     ED Disposition Condition Date/Time Comment    Admit Stable Wed Apr 20, 2022  9:27 PM Case was discussed with *[facility] covering Dr Walter Velazquez** and the patient's admission status was agreed to be Admission Status: observation status to the service of Dr Walter Velazquez           Follow-up Information    None         Current Discharge Medication List      CONTINUE these medications which have NOT CHANGED    Details   HYDROcodone-acetaminophen (NORCO) 5-325 mg per tablet Take 1-2 tablets by mouth every 6 (six) hours as needed for pain for up to 5 doses Max Daily Amount: 8 tablets  Qty: 5 tablet, Refills: 0    Associated Diagnoses: Nephrolithiasis      ibuprofen (MOTRIN) 800 mg tablet Take 1 tablet (800 mg total) by mouth 3 (three) times a day  Qty: 21 tablet, Refills: 0    Associated Diagnoses: Ureterolithiasis      nitrofurantoin (MACROBID) 100 mg capsule Take 1 capsule (100 mg total) by mouth 2 (two) times a day for 3 days  Qty: 6 capsule, Refills: 0    Associated Diagnoses: Nephrolithiasis      ondansetron (Zofran ODT) 4 mg disintegrating tablet Take 1 tablet (4 mg total) by mouth every 6 (six) hours as needed for nausea or vomiting  Qty: 20 tablet, Refills: 0    Associated Diagnoses: Ureterolithiasis      oxybutynin (DITROPAN) 5 mg tablet Take 1 tablet (5 mg total) by mouth 3 (three) times a day as needed (bladder spasm)  Qty: 20 tablet, Refills: 0    Associated Diagnoses: Nephrolithiasis      phenazopyridine (PYRIDIUM) 200 mg tablet Take 1 tablet (200 mg total) by mouth 3 (three) times a day as needed for bladder spasms  Qty: 30 tablet, Refills: 0    Associated Diagnoses: Nephrolithiasis tamsulosin (FLOMAX) 0 4 mg Take 1 capsule (0 4 mg total) by mouth daily with dinner  Qty: 30 capsule, Refills: 0    Associated Diagnoses: Nephrolithiasis             No discharge procedures on file      PDMP Review       Value Time User    PDMP Reviewed  Yes 4/19/2022 12:17 PM Marline Childs MD          ED Provider  Electronically Signed by           Parvez Christopher DO  04/21/22 4305

## 2022-04-20 NOTE — TELEPHONE ENCOUNTER
Patient is likely experiencing stent colic  Recommend she take oxybutynin and Flomax was sent to her pharmacy  Encouraged patient to increase water intake and avoid constipation  She may take OTC stool softeners

## 2022-04-20 NOTE — TELEPHONE ENCOUNTER
Called and LM for patient in regards to medications that were sent to her pharmacy, increasing water intake and trying to avoid constipation by taking OTC stool softeners

## 2022-04-20 NOTE — TELEPHONE ENCOUNTER
Post Op Note    Dagmar Riggs is a 25 y o  female s/p CYSTOSCOPY URETEROSCOPY WITH LITHOTRIPSY HOLMIUM LASER AND INSERTION STENT URETERAL (Right) performed 4/19/2022  Dagmar Riggs is a patient of Dr Jose Ambriz and is seen at the [place] office  How would you rate your pain on a scale from 1 to 10, 10 being the worst pain ever? 10  Have you had a fever? No  Have your bowel movements been regular? No  Do you have any difficulty urinating? Yes  Do you have any other questions or concerns that I can address at this time? Patient has a lot of discomfort in her back  Stated she can't have a bowel movement because she can't sit long or her back locks up from the pain  Reviewed normal and abnormal symptoms with a stent in place as well as when it is removed  Reviewed how to remove stent on Monday with patient  She is comfortable removing stent on her own  Advised to call the office once the stent is removed  Scheduled 6 month follow up in [facility] per patient request  Advised she will need US ptv  Order in chart

## 2022-04-21 LAB
ANION GAP SERPL CALCULATED.3IONS-SCNC: 7 MMOL/L (ref 4–13)
BASOPHILS # BLD AUTO: 0.02 THOUSANDS/ΜL (ref 0–0.1)
BASOPHILS NFR BLD AUTO: 0 % (ref 0–1)
BUN SERPL-MCNC: 18 MG/DL (ref 5–25)
CALCIUM SERPL-MCNC: 8.2 MG/DL (ref 8.3–10.1)
CHLORIDE SERPL-SCNC: 108 MMOL/L (ref 100–108)
CO2 SERPL-SCNC: 25 MMOL/L (ref 21–32)
CREAT SERPL-MCNC: 1.02 MG/DL (ref 0.6–1.3)
EOSINOPHIL # BLD AUTO: 0.04 THOUSAND/ΜL (ref 0–0.61)
EOSINOPHIL NFR BLD AUTO: 0 % (ref 0–6)
ERYTHROCYTE [DISTWIDTH] IN BLOOD BY AUTOMATED COUNT: 12.5 % (ref 11.6–15.1)
GFR SERPL CREATININE-BSD FRML MDRD: 79 ML/MIN/1.73SQ M
GLUCOSE P FAST SERPL-MCNC: 83 MG/DL (ref 65–99)
GLUCOSE SERPL-MCNC: 83 MG/DL (ref 65–140)
HCT VFR BLD AUTO: 39.2 % (ref 34.8–46.1)
HGB BLD-MCNC: 12.8 G/DL (ref 11.5–15.4)
IMM GRANULOCYTES # BLD AUTO: 0.02 THOUSAND/UL (ref 0–0.2)
IMM GRANULOCYTES NFR BLD AUTO: 0 % (ref 0–2)
LYMPHOCYTES # BLD AUTO: 3.27 THOUSANDS/ΜL (ref 0.6–4.47)
LYMPHOCYTES NFR BLD AUTO: 35 % (ref 14–44)
MCH RBC QN AUTO: 29.6 PG (ref 26.8–34.3)
MCHC RBC AUTO-ENTMCNC: 32.7 G/DL (ref 31.4–37.4)
MCV RBC AUTO: 91 FL (ref 82–98)
MONOCYTES # BLD AUTO: 0.73 THOUSAND/ΜL (ref 0.17–1.22)
MONOCYTES NFR BLD AUTO: 8 % (ref 4–12)
NEUTROPHILS # BLD AUTO: 5.18 THOUSANDS/ΜL (ref 1.85–7.62)
NEUTS SEG NFR BLD AUTO: 57 % (ref 43–75)
NRBC BLD AUTO-RTO: 0 /100 WBCS
PLATELET # BLD AUTO: 187 THOUSANDS/UL (ref 149–390)
PMV BLD AUTO: 9.9 FL (ref 8.9–12.7)
POTASSIUM SERPL-SCNC: 3.6 MMOL/L (ref 3.5–5.3)
RBC # BLD AUTO: 4.33 MILLION/UL (ref 3.81–5.12)
SODIUM SERPL-SCNC: 140 MMOL/L (ref 136–145)
WBC # BLD AUTO: 9.26 THOUSAND/UL (ref 4.31–10.16)

## 2022-04-21 PROCEDURE — 85025 COMPLETE CBC W/AUTO DIFF WBC: CPT | Performed by: STUDENT IN AN ORGANIZED HEALTH CARE EDUCATION/TRAINING PROGRAM

## 2022-04-21 PROCEDURE — 99214 OFFICE O/P EST MOD 30 MIN: CPT | Performed by: PHYSICIAN ASSISTANT

## 2022-04-21 PROCEDURE — 80048 BASIC METABOLIC PNL TOTAL CA: CPT | Performed by: STUDENT IN AN ORGANIZED HEALTH CARE EDUCATION/TRAINING PROGRAM

## 2022-04-21 PROCEDURE — 99225 PR SBSQ OBSERVATION CARE/DAY 25 MINUTES: CPT | Performed by: STUDENT IN AN ORGANIZED HEALTH CARE EDUCATION/TRAINING PROGRAM

## 2022-04-21 RX ORDER — LANOLIN ALCOHOL/MO/W.PET/CERES
3 CREAM (GRAM) TOPICAL
Status: DISCONTINUED | OUTPATIENT
Start: 2022-04-21 | End: 2022-04-23 | Stop reason: HOSPADM

## 2022-04-21 RX ORDER — HYDROMORPHONE HCL/PF 1 MG/ML
0.5 SYRINGE (ML) INJECTION EVERY 6 HOURS PRN
Status: DISCONTINUED | OUTPATIENT
Start: 2022-04-21 | End: 2022-04-23 | Stop reason: HOSPADM

## 2022-04-21 RX ORDER — PROMETHAZINE HYDROCHLORIDE 25 MG/ML
12.5 INJECTION, SOLUTION INTRAMUSCULAR; INTRAVENOUS EVERY 6 HOURS PRN
Status: DISCONTINUED | OUTPATIENT
Start: 2022-04-21 | End: 2022-04-21

## 2022-04-21 RX ORDER — PROMETHAZINE HYDROCHLORIDE 25 MG/ML
12.5 INJECTION, SOLUTION INTRAMUSCULAR; INTRAVENOUS EVERY 6 HOURS PRN
Status: DISCONTINUED | OUTPATIENT
Start: 2022-04-22 | End: 2022-04-23 | Stop reason: HOSPADM

## 2022-04-21 RX ADMIN — SENNOSIDES 17.2 MG: 8.6 TABLET, FILM COATED ORAL at 22:24

## 2022-04-21 RX ADMIN — IBUPROFEN 800 MG: 400 TABLET, FILM COATED ORAL at 22:23

## 2022-04-21 RX ADMIN — ONDANSETRON 4 MG: 2 INJECTION INTRAMUSCULAR; INTRAVENOUS at 16:43

## 2022-04-21 RX ADMIN — TAMSULOSIN HYDROCHLORIDE 0.4 MG: 0.4 CAPSULE ORAL at 16:39

## 2022-04-21 RX ADMIN — SODIUM CHLORIDE 125 ML/HR: 0.9 INJECTION, SOLUTION INTRAVENOUS at 06:45

## 2022-04-21 RX ADMIN — PHENAZOPYRIDINE HYDROCHLORIDE 200 MG: 100 TABLET ORAL at 12:09

## 2022-04-21 RX ADMIN — PHENAZOPYRIDINE HYDROCHLORIDE 200 MG: 100 TABLET ORAL at 08:04

## 2022-04-21 RX ADMIN — PHENAZOPYRIDINE HYDROCHLORIDE 200 MG: 100 TABLET ORAL at 16:39

## 2022-04-21 RX ADMIN — Medication 3 MG: at 22:23

## 2022-04-21 RX ADMIN — KETOROLAC TROMETHAMINE 30 MG: 30 INJECTION, SOLUTION INTRAMUSCULAR at 11:56

## 2022-04-21 RX ADMIN — HYDROMORPHONE HYDROCHLORIDE 0.5 MG: 1 INJECTION, SOLUTION INTRAMUSCULAR; INTRAVENOUS; SUBCUTANEOUS at 17:20

## 2022-04-21 RX ADMIN — IBUPROFEN 800 MG: 400 TABLET, FILM COATED ORAL at 08:04

## 2022-04-21 RX ADMIN — NITROFURANTOIN (MONOHYDRATE/MACROCRYSTALS) 100 MG: 75; 25 CAPSULE ORAL at 08:04

## 2022-04-21 RX ADMIN — NITROFURANTOIN (MONOHYDRATE/MACROCRYSTALS) 100 MG: 75; 25 CAPSULE ORAL at 19:41

## 2022-04-21 RX ADMIN — SODIUM CHLORIDE 125 ML/HR: 0.9 INJECTION, SOLUTION INTRAVENOUS at 22:23

## 2022-04-21 RX ADMIN — ACETAMINOPHEN 650 MG: 325 TABLET ORAL at 05:08

## 2022-04-21 RX ADMIN — SODIUM CHLORIDE 125 ML/HR: 0.9 INJECTION, SOLUTION INTRAVENOUS at 14:55

## 2022-04-21 RX ADMIN — HYDROCODONE BITARTRATE AND ACETAMINOPHEN 1 TABLET: 5; 325 TABLET ORAL at 19:41

## 2022-04-21 RX ADMIN — PROMETHAZINE HYDROCHLORIDE 12.5 MG: 25 INJECTION INTRAMUSCULAR; INTRAVENOUS at 18:39

## 2022-04-21 RX ADMIN — ONDANSETRON 4 MG: 2 INJECTION INTRAMUSCULAR; INTRAVENOUS at 05:08

## 2022-04-21 RX ADMIN — KETOROLAC TROMETHAMINE 30 MG: 30 INJECTION, SOLUTION INTRAMUSCULAR at 03:37

## 2022-04-21 RX ADMIN — IBUPROFEN 800 MG: 400 TABLET, FILM COATED ORAL at 16:39

## 2022-04-21 NOTE — PROGRESS NOTES
[place]  Progress Note Sonja Alvarenga 2000, 25 y o  female MRN: [id number]  Unit/Bed#: -01 Encounter: [id number]  Primary Care Provider: Ananya Llanes DO   Date and time admitted to hospital: 4/20/2022  5:27 PM    * Right flank pain  Assessment & Plan  · Right flank pain, nausea, vomiting  Increased flank pain with urination/BM  · s/p CYSTOSCOPY URETEROSCOPY WITH LITHOTRIPSY HOLMIUM LASER AND INSERTION STENT URETERAL performed on 04/19  · Urology recommended oxybutynin, Flomax, Pyridium and stool softener  · CT Renal Stone Study  · Interval placement of a right renal stent with mild right renal collecting system fullness  No hydronephrosis  The previously seen distal right ureteral calculus is no longer identified  · Continuous IVF  · Urinary retention protocol  · I/O's  · Manage pain  · Consult urology    Anxiety and depression  Assessment & Plan  · Reports history of anxiety and depression  · Has not seen a therapist in over 2 years due to COVID-19  · No longer on antidepressants were sleep aid medications due to not seeing a therapist  · Patient reports chronic issues with insomnia  · Denies SI/HI    UTI (urinary tract infection)  Assessment & Plan  · Urine (partially treated with antibiotic)  · UA small amount of leukocytes, positive nitrate  · Micro no bacteria or WBC seen  · Patient was started on Macrobid on 04/19 after procedure  Continue antibiotic  · Elevated WBC at 13 32  Other vital stable  Not meeting SIRS criteria        VTE Pharmacologic Prophylaxis: VTE Score: 1 Low Risk (Score 0-2) - Encourage Ambulation  Patient Centered Rounds: I performed bedside rounds with nursing staff today  Discussions with Specialists or Other Care Team Provider: Urology    Education and Discussions with Family / Patient: Updated  (mother) at bedside  Time Spent for Care: 15 minutes   More than 50% of total time spent on counseling and coordination of care as described above  Current Length of Stay: 0 day(s)  Current Patient Status: Observation   Certification Statement: The patient will continue to require additional inpatient hospital stay due to Ureteral stone  Discharge Plan: Anticipate discharge in 24-48 hrs to home  Code Status: Level 1 - Full Code    Subjective:   Alysha Smith was seen and examined at bedside  No acute events overnight  Patient looks ill  She describes pain and associated nausea with pain all other symptoms on review of systems negative  Discussed plan of care  All questions and concerns were answered and addressed  Objective:     Vitals:   Temp (24hrs), Av 8 °F (36 6 °C), Min:97 5 °F (36 4 °C), Max:98 1 °F (36 7 °C)    Temp:  [97 5 °F (36 4 °C)-98 1 °F (36 7 °C)] 97 7 °F (36 5 °C)  HR:  [51-78] 51  Resp:  [16-18] 18  BP: (110-126)/(63-78) 126/78  SpO2:  [96 %-98 %] 96 %  Body mass index is 17 77 kg/m²  Input and Output Summary (last 24 hours): Intake/Output Summary (Last 24 hours) at 2022 0742  Last data filed at 2022 0501  Gross per 24 hour   Intake 1000 ml   Output 450 ml   Net 550 ml       Physical Exam:   Physical Exam  Vitals and nursing note reviewed  Constitutional:       Appearance: She is ill-appearing  Comments: thin   HENT:      Head: Normocephalic and atraumatic  Cardiovascular:      Rate and Rhythm: Normal rate and regular rhythm  Pulses: Normal pulses  Heart sounds: Normal heart sounds  Pulmonary:      Effort: Pulmonary effort is normal       Breath sounds: Normal breath sounds  Abdominal:      General: Abdomen is flat  Bowel sounds are normal       Palpations: Abdomen is soft  Tenderness: There is right CVA tenderness  There is no left CVA tenderness  Musculoskeletal:      Right lower leg: No edema  Left lower leg: No edema  Skin:     General: Skin is warm  Coloration: Skin is pale  Neurological:      General: No focal deficit present  Mental Status: She is alert and oriented to person, place, and time  Additional Data:     Labs:  Results from last 7 days   Lab Units 04/21/22  0352   WBC Thousand/uL 9 26   HEMOGLOBIN g/dL 12 8   HEMATOCRIT % 39 2   PLATELETS Thousands/uL 187   NEUTROS PCT % 57   LYMPHS PCT % 35   MONOS PCT % 8   EOS PCT % 0     Results from last 7 days   Lab Units 04/21/22  0353 04/20/22  1826 04/20/22  1826   SODIUM mmol/L 140   < > 140   POTASSIUM mmol/L 3 6   < > 3 6   CHLORIDE mmol/L 108   < > 106   CO2 mmol/L 25   < > 26   BUN mg/dL 18   < > 18   CREATININE mg/dL 1 02   < > 1 13   ANION GAP mmol/L 7   < > 8   CALCIUM mg/dL 8 2*   < > 8 9   ALBUMIN g/dL  --   --  3 8   TOTAL BILIRUBIN mg/dL  --   --  0 40   ALK PHOS U/L  --   --  43*   ALT U/L  --   --  14   AST U/L  --   --  10   GLUCOSE RANDOM mg/dL 83   < > 87    < > = values in this interval not displayed                         Lines/Drains:  Invasive Devices  Report    Peripheral Intravenous Line            Peripheral IV 04/20/22 Right Antecubital <1 day                      Imaging: Reviewed radiology reports from this admission including: abdominal/pelvic CT    Recent Cultures (last 7 days):         Last 24 Hours Medication List:   Current Facility-Administered Medications   Medication Dose Route Frequency Provider Last Rate    acetaminophen  650 mg Oral Q6H PRN [facility], PA-SARA      HYDROcodone-acetaminophen  1 tablet Oral Q6H PRN [facility], PA-C      ibuprofen  800 mg Oral TID [facility], PA-SARA      ketorolac  30 mg Intravenous Q6H PRN [facility], PA-C      nitrofurantoin  100 mg Oral BID [facility], PA-SARA      ondansetron  4 mg Intravenous Q6H PRN [facility], PA-SARA      oxybutynin  5 mg Oral TID PRN [facility], PA-SARA      phenazopyridine  200 mg Oral TID With Meals [facility]      polyethylene glycol  17 g Oral Daily PRN [facility]      senna  2 tablet Oral HS Sabi Jay PA-C      sodium chloride  125 mL/hr Intravenous Continuous Derrick Wolf PA-C 125 mL/hr (04/21/22 0645)    tamsulosin  0 4 mg Oral Daily With Dinner Derrick Wolf PA-C          Today, Patient Was Seen By: Fermin Hale MD    **Please Note: This note may have been constructed using a voice recognition system  **

## 2022-04-21 NOTE — ASSESSMENT & PLAN NOTE
· WBC 13 32    Other vitals WNL   · On 04/19 had cystoscope be with lithotripsy and stent insertion on the right side   · WBC elevation most likely inflammatory reaction from surgery/pain   · UA pending  · Continue to monitor without the use of antibiotics at this time

## 2022-04-21 NOTE — PLAN OF CARE
Problem: PAIN - ADULT  Goal: Verbalizes/displays adequate comfort level or baseline comfort level  Description: Interventions:  - Encourage patient to monitor pain and request assistance  - Assess pain using appropriate pain scale  - Administer analgesics based on type and severity of pain and evaluate response  - Implement non-pharmacological measures as appropriate and evaluate response  - Consider cultural and social influences on pain and pain management  - Notify physician/advanced practitioner if interventions unsuccessful or patient reports new pain  Outcome: Progressing     Problem: INFECTION - ADULT  Goal: Absence of fever/infection during neutropenic period  Description: INTERVENTIONS:  - Monitor WBC    Outcome: Progressing

## 2022-04-21 NOTE — CONSULTS
Consultation - General Surgery   Pasquale Oliveira 25 y o  female MRN: [id number]  Unit/Bed#: -01 Encounter: [id number]               Assessment/Plan     Nephrolithiasis, POD 2 s/p CYSTOSCOPY URETEROSCOPY WITH LITHOTRIPSY HOLMIUM LASER AND INSERTION STENT URETERAL   -pain most likely 2/2 colic  CT yesterday :-Interval placement of a right renal stent with mild right renal collecting system fullness  No hydronephrosis  The previously seen distal right ureteral calculus is no longer identified    -cont flomax and oxybutynin and pyridium and stool softener  -IVF  -Monitor I/Os  -Cr 1 02  -Retention protocol  -pain control  -needs nurse visit next week for pulling out stent or she can do it on her own  -She needs nephro referral for recurrent nephrolithiasis OP  -Needs renal ultrasound in 6 months and follow up with urology    UTI  -positive leukocytes/nitrites  -Macrobid  -on flomax and IVF  -pain control  -wbc 13 on admission    Anxiety/Depression  -home meds, plan per slim        History of Present Illness    Admit Date:  4/20/2022  Hospital Day:  0 days  Primary Service:  Hospitalist  Attending Provider:  Gene Silveira MD  Physician Requesting Consult: Gene Silveira MD    HPI: Pasquale Oliveira is a 25y o  year old female with pmhx of anxiety and depression who presents with R Flank pain  Patient went to the ER on 6th and was found to have stones  Patient was treated with MET but did not tolerate  Patient was continuing to have pain and was then seen on 4/19 and underwent cystoscopy ureteroscopy with lithotripsy laser and stent insertion  Pt toelrated procedure and was discharged  Has not noted any stones in her urine yet  Yesterday she was having dysuria and flank pain on the right side  She was called in Oxybutynin and flomax but that did not work and she ended up coming to the ER  She was admitted  UA was positive and she is now on abx  Urine cx was sent  Pain is now controlled   She denies fevers or chills, no cp or abdominal pain  +some nausea and vomiting on admission  She had some blood in urine, but is currently on her menstrual cycle  She was asked to take out her stent with the string on wed/thur next week  She has not done so but states she will do it  Inpatient consult to Urology  Consult performed by: Geri Montano PA-C  Consult ordered by: Apryl Goode PA-C          Review of Systems     CONSTITUTIONAL: Denies any fever, chills, rigors, and weight loss  HEENT: No facial trauma, earache or tinnitus  Denies hearing loss or visual disturbances  CARDIOVASCULAR: No chest pain or palpitations  RESPIRATORY: Denies any cough, hemoptysis, shortness of breath or dyspnea on exertion  GASTROINTESTINAL: No constipation, no diarrhea, no abdominal hernias, + nausea or vomiting  GENITOURINARY: see hpi  NEUROLOGIC:  negative for dizziness, weakness, vertigo, denies headaches  MUSCULOSKELETAL: Denies any muscle or joint pain  SKIN: Denies skin rashes or itching  ENDOCRINE: Denies excessive thirst  Denies intolerance to heat or cold  PSYCHOSOCIAL:  Neg  for depression  Does have some anxiety Denies any recent memory loss              Historical Information   Past Medical History:   Diagnosis Date    Kidney stone     Psychiatric disorder      Past Surgical History:   Procedure Laterality Date    [facility]CISION [facility]    [place] CYSTO/URETERO W/LITHOTRIPSY &INDWELL STENT INSRT Right 4/19/2022    Procedure: CYSTOSCOPY URETEROSCOPY WITH LITHOTRIPSY HOLMIUM LASER AND INSERTION STENT URETERAL;  Surgeon: Brian French MD;  Location: [facility];  Service: Urology     Social History   Social History     Substance and Sexual Activity   Alcohol Use Never     Social History     Substance and Sexual Activity   Drug Use Never     E-Cigarette/Vaping    E-Cigarette Use Never User      E-Cigarette/Vaping Substances    Nicotine No     THC No     CBD No     Flavoring No     Other No     Unknown No      Social History     Tobacco Use   Smoking Status Never Smoker   Smokeless Tobacco Never Used     Family History:   Family History   Problem Relation Age of Onset    Hypertension Mother    [place] Migraines Mother     Hypertension Father     Diabetes Father        Meds/Allergies   current meds:   Current Facility-Administered Medications   Medication Dose Route Frequency    acetaminophen (TYLENOL) tablet 650 mg  650 mg Oral Q6H PRN    HYDROcodone-acetaminophen (NORCO) 5-325 mg per tablet 1 tablet  1 tablet Oral Q6H PRN    ibuprofen (MOTRIN) tablet 800 mg  800 mg Oral TID    ketorolac (TORADOL) injection 30 mg  30 mg Intravenous Q6H PRN    nitrofurantoin (MACROBID) extended-release capsule 100 mg  100 mg Oral BID    ondansetron (ZOFRAN) injection 4 mg  4 mg Intravenous Q6H PRN    oxybutynin (DITROPAN) tablet 5 mg  5 mg Oral TID PRN    phenazopyridine (PYRIDIUM) tablet 200 mg  200 mg Oral TID With Meals    polyethylene glycol (MIRALAX) packet 17 g  17 g Oral Daily PRN    senna (SENOKOT) tablet 17 2 mg  2 tablet Oral HS    sodium chloride 0 9 % infusion  125 mL/hr Intravenous Continuous    tamsulosin (FLOMAX) capsule 0 4 mg  0 4 mg Oral Daily With Dinner       Allergies   Allergen Reactions    Keflex [Cephalexin] Rash    Levaquin [Levofloxacin] Rash     Upper arm red and burning        Objective   Temp:  [97 5 °F (36 4 °C)-98 1 °F (36 7 °C)] 97 7 °F (36 5 °C)  HR:  [51-78] 51  Resp:  [16-18] 18  BP: (110-126)/(63-78) 126/78    Intake/Output Summary (Last 24 hours) at 4/21/2022 0944  Last data filed at 4/21/2022 0501  Gross per 24 hour   Intake 1000 ml   Output 450 ml   Net 550 ml       Physical Exam   General Appearance: NAD, cooperative, alert  Eyes: Anicteric, conjunctiva clear  HENT:  Normocephalic, atraumatic, normal mucosa    external ears normal, external nose normal, no drainage  Neck:    Supple, symmetrical, trachea midline  Resp:  Clear to auscultation bilaterally; no rales, rhonchi or wheezing; respirations unlabored   CV:  S1 S2, Regular rate and rhythm; no murmur, rub, or gallop  GI:  Soft, non-tender, non-distended; normal bowel sounds; no masses, no organomegaly   Musculoskeletal: No cyanosis, clubbing or edema  Normal ROM  +R flank pain  Skin:   No jaundice, rashes, or lesions   Psych: Normal affect, good eye contact  Neuro: No gross deficits, AAOx3, speech nl, reyes      Lab Results:   CBC:   Lab Results   Component Value Date    WBC 9 26 04/21/2022    HGB 12 8 04/21/2022    HCT 39 2 04/21/2022    MCV 91 04/21/2022     04/21/2022    MCH 29 6 04/21/2022    MCHC 32 7 04/21/2022    RDW 12 5 04/21/2022    MPV 9 9 04/21/2022    NRBC 0 04/21/2022   , CMP:   Lab Results   Component Value Date    SODIUM 140 04/21/2022    K 3 6 04/21/2022     04/21/2022    CO2 25 04/21/2022    BUN 18 04/21/2022    CREATININE 1 02 04/21/2022    CALCIUM 8 2 (L) 04/21/2022    AST 10 04/20/2022    ALT 14 04/20/2022    ALKPHOS 43 (L) 04/20/2022    EGFR 79 04/21/2022     Imaging Studies: I have personally reviewed pertinent reports  EKG, Pathology, and Other Studies: I have personally reviewed pertinent reports  Counseling / Coordination of Care  Total floor / unit time spent today 15 minutes  Greater than 50% of total time was spent with the patient and / or family counseling and / or coordination of care   A description of the counseling / coordination of care: 30mins

## 2022-04-21 NOTE — ASSESSMENT & PLAN NOTE
· Urine (partially treated with antibiotic)  · UA small amount of leukocytes, positive nitrate  · Micro no bacteria or WBC seen  · Patient was started on Macrobid on 04/19 after procedure  Continue antibiotic  · Elevated WBC at 13 32  Other vital stable    Not meeting SIRS criteria

## 2022-04-21 NOTE — ASSESSMENT & PLAN NOTE
· Reports history of anxiety and depression  · Has not seen a therapist in over 2 years due to COVID-19    · No longer on antidepressants were sleep aid medications due to not seeing a therapist  · Patient reports chronic issues with insomnia  · Denies SI/HI

## 2022-04-21 NOTE — ASSESSMENT & PLAN NOTE
· Urine (partially treated with antibiotic)  · UA small amount of leukocytes, positive nitrate  · Micro no bacteria or WBC seen  · Patient was started on Macrobid on 04/19 after procedure  Continue antibiotic day 2/5  · Elevated WBC at 13 32  Other vital stable    Not meeting SIRS criteria

## 2022-04-21 NOTE — H&P
Dae Elyssaon  H&P- Ghazal Sprain 2000, 25 y o  female MRN: [id number]  Unit/Bed#: -01 Encounter: [id number]  Primary Care Provider: Mendy Diez DO   Date and time admitted to hospital: 4/20/2022  5:27 PM    * Right flank pain  Assessment & Plan  · Right flank pain, nausea, vomiting  Increased flank pain with urination/BM  · s/p CYSTOSCOPY URETEROSCOPY WITH LITHOTRIPSY HOLMIUM LASER AND INSERTION STENT URETERAL performed on 04/19  · Urology recommended oxybutynin, Flomax, Pyridium and stool softener  · CT Renal Stone Study  · Interval placement of a right renal stent with mild right renal collecting system fullness  No hydronephrosis  The previously seen distal right ureteral calculus is no longer identified  · Continuous IVF  · Urinary retention protocol  · I/O's  · Manage pain  · Consult urology    UTI (urinary tract infection)  Assessment & Plan  · Urine (partially treated with antibiotic)  · UA small amount of leukocytes, positive nitrate  · Micro no bacteria or WBC seen  · Patient was started on Macrobid on 04/19 after procedure  Continue antibiotic  · Elevated WBC at 13 32  Other vital stable  Not meeting SIRS criteria    Anxiety and depression  Assessment & Plan  · Reports history of anxiety and depression  · Has not seen a therapist in over 2 years due to COVID-19  · No longer on antidepressants were sleep aid medications due to not seeing a therapist  · Patient reports chronic issues with insomnia  · Denies SI/HI    VTE Pharmacologic Prophylaxis: VTE Score: 1 Low Risk (Score 0-2) - Encourage Ambulation  Code Status: Level 1 - Full Code   Discussion with family: Patient declined call to   Anticipated Length of Stay: Patient will be admitted on an observation basis with an anticipated length of stay of less than 2 midnights secondary to Right flank pain, UTI      Total Time for Visit, including Counseling / Coordination of Care: 60 minutes Greater than 50% of this total time spent on direct patient counseling and coordination of care  Chief Complaint:  Right flank pain    History of Present Illness:  Moraima Resendiz is a 25 y o  female with a PMH of anxiety and depression who presents with right flank pain, nausea and vomiting  Patient with sudden right-sided flank pain on 04/06  Presented to the ER at that time in found to have kidney stones on the right side  Patient was instructed to follow-up with urology/   Saw Urology on 04/12  Patient was instructed to treat with ibuprofen, Tamsulosin, oxybutynin and attempt medical expulsion therapy  Continue to have pain outpatient and surgery was planned for 4/19  Surgery was performed without issues  However, on 04/20 patient with continued right-sided flank pain, nausea and vomiting  Denies fevers or chills  Patient having difficulty urinating or having bowel movements due to increased pain  Last bowel movement morning of 4/19  Denies chest pain, shortness of breath, abdominal pain, dysuria, hematuria or other complaints  Review of Systems:  Review of Systems   Constitutional: Negative for fatigue and fever  HENT: Negative for sore throat  Respiratory: Negative for cough, chest tightness and shortness of breath  Cardiovascular: Negative for chest pain  Gastrointestinal: Negative for abdominal distention, abdominal pain, diarrhea, nausea and vomiting  Pain with defecation   Genitourinary: Positive for difficulty urinating  Musculoskeletal: Positive for back pain  Negative for arthralgias  Right-sided flank pain   Neurological: Negative for weakness and headaches  Psychiatric/Behavioral: Negative for agitation and behavioral problems  All other systems reviewed and are negative        Past Medical and Surgical History:   Past Medical History:   Diagnosis Date    Kidney stone     Psychiatric disorder        Past Surgical History:   Procedure Laterality Date    HEMANGIOMA EXCISION Right 2011    [facility]    TN CYSTO/URETERO W/LITHOTRIPSY &INDWELL STENT INSRT Right 4/19/2022    Procedure: CYSTOSCOPY URETEROSCOPY WITH LITHOTRIPSY HOLMIUM LASER AND INSERTION STENT URETERAL;  Surgeon: Seble Pollard MD;  Location: [facility];  Service: Urology       Meds/Allergies:  Prior to Admission medications    Medication Sig Start Date End Date Taking? Authorizing Provider   HYDROcodone-acetaminophen (NORCO) 5-325 mg per tablet Take 1-2 tablets by mouth every 6 (six) hours as needed for pain for up to 5 doses Max Daily Amount: 8 tablets 4/19/22   Seble Pollard MD   ibuprofen (MOTRIN) 800 mg tablet Take 1 tablet (800 mg total) by mouth 3 (three) times a day 4/6/22   Anthony Castaneda MD   nitrofurantoin (MACROBID) 100 mg capsule Take 1 capsule (100 mg total) by mouth 2 (two) times a day for 3 days 4/19/22 4/22/22  Seble Pollard MD   ondansetron (Zofran ODT) 4 mg disintegrating tablet Take 1 tablet (4 mg total) by mouth every 6 (six) hours as needed for nausea or vomiting 4/6/22   Anthony Castaneda MD   oxybutynin (DITROPAN) 5 mg tablet Take 1 tablet (5 mg total) by mouth 3 (three) times a day as needed (bladder spasm) 4/19/22   Seble Pollard MD   phenazopyridine (PYRIDIUM) 200 mg tablet Take 1 tablet (200 mg total) by mouth 3 (three) times a day as needed for bladder spasms 4/19/22   Seble Pollard MD   tamsulosin [facility]) 0 4 mg Take 1 capsule (0 4 mg total) by mouth daily with dinner 4/20/22   RUSS Rodriguez     I have reviewed home medications with patient personally  Allergies:    Allergies   Allergen Reactions    Keflex [Cephalexin] Rash    Levaquin [Levofloxacin] Rash     Upper arm red and burning        Social History:  Marital Status: Single   Occupation:  Unknown  Patient Pre-hospital Living Situation: Home  Patient Pre-hospital Level of Mobility: walks  Patient Pre-hospital Diet Restrictions:  Regular  Substance Use History:   Social History Substance and Sexual Activity   Alcohol Use Never     Social History     Tobacco Use   Smoking Status Never Smoker   Smokeless Tobacco Never Used     Social History     Substance and Sexual Activity   Drug Use Never       Family History:  Family History   Problem Relation Age of Onset    Hypertension Mother     Migraines Mother     Hypertension Father     Diabetes Father        Physical Exam:     Vitals:   Blood Pressure: 110/76 (04/20/22 2213)  Pulse: 62 (04/20/22 2213)  Temperature: 97 5 °F (36 4 °C) (04/20/22 2213)  Temp Source: Oral (04/20/22 2213)  Respirations: 16 (04/20/22 2213)  Height: 4' 11" (149 9 cm) (04/20/22 1720)  Weight - Scale: 39 9 kg (88 lb) (04/20/22 1720)  SpO2: 98 % (04/20/22 2033)    Physical Exam  Vitals and nursing note reviewed  Constitutional:       Appearance: Normal appearance  HENT:      Head: Normocephalic  Eyes:      Extraocular Movements: Extraocular movements intact  Pupils: Pupils are equal, round, and reactive to light  Cardiovascular:      Rate and Rhythm: Normal rate and regular rhythm  Heart sounds: No murmur heard  Pulmonary:      Effort: Pulmonary effort is normal  No respiratory distress  Breath sounds: Normal breath sounds  No wheezing  Abdominal:      General: Bowel sounds are normal  There is no distension  Tenderness: There is no abdominal tenderness  There is right CVA tenderness  There is no left CVA tenderness or guarding  Musculoskeletal:         General: Normal range of motion  Cervical back: Normal range of motion  Right lower leg: No edema  Left lower leg: No edema  Skin:     General: Skin is warm  Neurological:      General: No focal deficit present  Mental Status: She is alert and oriented to person, place, and time  Psychiatric:         Mood and Affect: Mood normal          Behavior: Behavior normal          Thought Content:  Thought content normal           Additional Data:     Lab Results:  Results from last 7 days   Lab Units 04/20/22  1826   WBC Thousand/uL 13 32*   HEMOGLOBIN g/dL 14 5   HEMATOCRIT % 42 3   PLATELETS Thousands/uL 223   NEUTROS PCT % 71   LYMPHS PCT % 20   MONOS PCT % 9   EOS PCT % 0     Results from last 7 days   Lab Units 04/20/22  1826   SODIUM mmol/L 140   POTASSIUM mmol/L 3 6   CHLORIDE mmol/L 106   CO2 mmol/L 26   BUN mg/dL 18   CREATININE mg/dL 1 13   ANION GAP mmol/L 8   CALCIUM mg/dL 8 9   ALBUMIN g/dL 3 8   TOTAL BILIRUBIN mg/dL 0 40   ALK PHOS U/L 43*   ALT U/L 14   AST U/L 10   GLUCOSE RANDOM mg/dL 87                       Imaging: Reviewed radiology reports from this admission including: abdominal/pelvic CT  CT renal stone study abdomen pelvis without contrast   Final Result by Deirdre Myrick MD (04/20 1917)      Interval placement of a right renal stent with mild right renal collecting system fullness  No hydronephrosis  The previously seen distal right ureteral calculus is no longer identified  Workstation performed: [id number]             ** Please Note: This note has been constructed using a voice recognition system   **

## 2022-04-21 NOTE — ASSESSMENT & PLAN NOTE
· Right flank pain, nausea, vomiting  Increased flank pain with urination/BM  · s/p CYSTOSCOPY URETEROSCOPY WITH LITHOTRIPSY HOLMIUM LASER AND INSERTION STENT URETERAL performed on 04/19  · Urology recommended oxybutynin, Flomax, Pyridium and stool softener  · CT Renal Stone Study  · Interval placement of a right renal stent with mild right renal collecting system fullness  No hydronephrosis  The previously seen distal right ureteral calculus is no longer identified    · Continuous IVF  · Urinary retention protocol  · I/O's  · Manage pain

## 2022-04-21 NOTE — NURSING NOTE
Pt got to floor at 2200  Pt is not able to be hooked up to lelo until 2300  [facility] called and Evander Najjar, notified us RENA Escobar will start shift at 2300 and that only a nurse can give the 4 digit number to set pt up  Pt is stable and being assessed and monitored      Gillian Dao RN

## 2022-04-21 NOTE — ASSESSMENT & PLAN NOTE
· Right flank pain, nausea, vomiting  Increased flank pain with urination/BM  · s/p CYSTOSCOPY URETEROSCOPY WITH LITHOTRIPSY HOLMIUM LASER AND INSERTION STENT URETERAL performed on 04/19  · Urology recommended oxybutynin, Flomax, Pyridium and stool softener  · CT Renal Stone Study  · Interval placement of a right renal stent with mild right renal collecting system fullness  No hydronephrosis  The previously seen distal right ureteral calculus is no longer identified    · Continuous IVF  · Urinary retention protocol  · I/O's  · Manage pain  · Consult urology

## 2022-04-21 NOTE — UTILIZATION REVIEW
Initial Clinical Review    Admission: Date/Time/Statement:  4/20/22 2127 Observation  AND CHANGED 4/22/22 1436 INPATIENT RE: AFTER 41 HOURS OF OBSERVATION CARE PATIENT  HAS CONTINUED NEED PAIN AND NAUSEA CONTROL POST OP PROCEDURE DONE ON 4/19/22 AS HAS CONTINUED NEED FOR IVF, ANALGESIA AND ANTIEMETICS AS NEEDED  Start   Ordered   04/22/22 1436  Inpatient Admission  Once        Transfer Service: Hospitalist       Question Answer Comment   Level of Care Med Surg    Estimated length of stay More than 2 Midnights    Certification I certify that inpatient services are medically necessary for this patient for a duration of greater than two midnights  See H&P and MD Progress Notes for additional information about the patient's course of treatment  04/22/22 1436       ED Arrival Information     Expected Arrival Acuity    - 4/20/2022 17:20 Urgent         Means of arrival Escorted by Service Admission type    [facility] Urgent         Arrival complaint    flank pain, vomiting        Chief Complaint   Patient presents with    Flank Pain     Pt with R flank pain post stent placement 4/19/22       Initial Presentation: 25 y o  female from home to ED admitted to observation due to Right flank pain/UTI  Patient is status post cystoscopy, ureteroscopy with lithotripsy holmium laser and insertion of ureteral stent 4/19/22  Presented due to worsening right flank pain, vomiting and burning with urination starting day of arrival  On Macrobid post procedure  On exam right CVA tenderness  Wbc 13 32  Ct abdomen showed right ureteral stent with right renal collecting system fullness  UA small leukocytes, + nitrite, >300 protein, 1/10% glucose, 1+ ketones, large blood  In the ED given 1 liter IVF, Zofran, and 3 doses of IV analgesia  Plan is IVF, pain control and consult Urology  Continue Macrobid  4/21/22 observation:  Pain is controlled  Has nausea  On exam: no focal deficits    Urine culture in progress  Continue IVF, Macrobid, flomax and pain control  Antiemetics as needed  4/21/22 per Urology - Patient with nephrolithiasis and is post operative day 2 s/p cystoscopy, ureteroscopy with lithotripsy holmium laser and insertion of ureteral stent  Suspect pain is due to colic,  Has UTI on Macrobid  Plan is continue IVF, Flomax, oxybutynin, pyridium and stool softener  Continue Macrobid  4/22/22 CHANGED TO INPATIENT - Still with right flank pain  On exam :  Stent string noted, pulled out  Stent out in one piece  Urine culture in process  Plan is continued IVF  Check for urinary retention  Pain control  Nausea control  On day 2/5 of antibiotics and to continue  ED Triage Vitals [04/20/22 1720]   Temperature Pulse Respirations Blood Pressure SpO2   98 1 °F (36 7 °C) 78 16 116/76 98 %      Temp Source Heart Rate Source Patient Position - Orthostatic VS BP Location FiO2 (%)   Temporal Monitor Sitting Left arm --      Pain Score       10 - Worst Possible Pain          Wt Readings from Last 1 Encounters:   04/20/22 39 9 kg (88 lb) (<1 %, Z= -3 07)*     * Growth percentiles are based on CDC (Girls, 2-20 Years) data  Additional Vital Signs:   04/22/22 0700 98 °F (36 7 °C) 53 Abnormal  16 103/61 77 95 % None (Room air) Lying   04/21/22 2228 97 9 °F (36 6 °C) 56 -- 101/55 72 94 % None (Room air      04/21/22 0729 97 7 °F (36 5 °C) 51 Abnormal  18 126/78 97 96 % None (Room air) Lying   04/20/22 2213 97 5 °F (36 4 °C) 62 16 110/76 88 -- None (Room air) Lying   04/20/22 2033 -- 63 -- 117/63 84 98 %         Pertinent Labs/Diagnostic Test Results:   CT renal stone study abdomen pelvis without contrast   Final Result by Tyler Eldridge MD (04/20 1917)      Interval placement of a right renal stent with mild right renal collecting system fullness  No hydronephrosis  The previously seen distal right ureteral calculus is no longer identified           Workstation performed: [id number]             Results from last 7 days   Lab Units 04/22/22  0536 04/21/22  0352 04/20/22  1826   WBC Thousand/uL 5 97 9 26 13 32*   HEMOGLOBIN g/dL 12 2 12 8 14 5   HEMATOCRIT % 37 7 39 2 42 3   PLATELETS Thousands/uL 144* 187 223   NEUTROS ABS Thousands/µL  --  5 18 9 31*     Results from last 7 days   Lab Units 04/22/22  0536 04/21/22  0353 04/20/22  1826   SODIUM mmol/L 139 140 140   POTASSIUM mmol/L 3 7 3 6 3 6   CHLORIDE mmol/L 108 108 106   CO2 mmol/L 25 25 26   ANION GAP mmol/L 6 7 8   BUN mg/dL 10 18 18   CREATININE mg/dL 0 83 1 02 1 13   EGFR ml/min/1 73sq m 102 79 70   CALCIUM mg/dL 7 7* 8 2* 8 9     Results from last 7 days   Lab Units 04/20/22  1826   AST U/L 10   ALT U/L 14   ALK PHOS U/L 43*   TOTAL PROTEIN g/dL 6 6   ALBUMIN g/dL 3 8   TOTAL BILIRUBIN mg/dL 0 40     Results from last 7 days   Lab Units 04/22/22  0536 04/21/22  0353 04/20/22  1826   GLUCOSE RANDOM mg/dL 106 83 87     Results from last 7 days   Lab Units 04/20/22  1826   LIPASE u/L 47*     Results from last 7 days   Lab Units 04/20/22  2130   CLARITY UA  Cloudy   COLOR UA  Orange   SPEC GRAV UA  >=1 030   PH UA  5 5   GLUCOSE UA mg/dl 100 (1/10%)*   KETONES UA mg/dl 15 (1+)*   BLOOD UA  Large*   PROTEIN UA mg/dl >=300*   NITRITE UA  Positive*   BILIRUBIN UA  Interference- unable to analyze*   UROBILINOGEN UA E U /dl 4 0*   LEUKOCYTES UA  Small*   WBC UA /hpf 0-1   RBC UA /hpf Innumerable*   BACTERIA UA /hpf None Seen   EPITHELIAL CELLS WET PREP /hpf None Seen     Collected Updated Procedure Result Status Patient Facility Result Comment    04/20/2022 2130 04/20/2022 2254 Urine culture [[id number]]   Urine, Straight Cath    In process [place]  Component Value   No component results          ED Treatment:   Medication Administration from 04/20/2022 1720 to 04/20/2022 2200       Date/Time Order Dose Route Action Comments     04/20/2022 1827 sodium chloride 0 9 % bolus 1,000 mL 1,000 mL Intravenous New Bag      04/20/2022 1824 ondansetron (ZOFRAN) injection 4 mg 4 mg Intravenous Given      04/20/2022 1823 ketorolac (TORADOL) injection 30 mg 30 mg Intravenous Given      04/20/2022 2035 fentanyl citrate (PF) 100 MCG/2ML 50 mcg 50 mcg Intravenous Given      04/20/2022 2120 HYDROmorphone (DILAUDID) injection 1 mg 1 mg Intravenous Given         Past Medical History:   Diagnosis Date    Kidney stone     Psychiatric disorder      Present on Admission:  **None**      Admitting Diagnosis: Flank pain [R10 9]  Right flank pain [R10 9]  Left flank pain [R10 9]  Age/Sex: 25 y o  female  Admission Orders:  Scheduled Medications:  ibuprofen, 800 mg, Oral, TID  nitrofurantoin, 100 mg, Oral, BID - dc 1759 on 4/22/22  phenazopyridine, 200 mg, Oral, TID With Meals  senna, 2 tablet, Oral, HS  tamsulosin, 0 4 mg, Oral, Daily With Dinner      Continuous IV Infusions:  sodium chloride, 125 mL/hr, Intravenous, Continuous      PRN Meds:  acetaminophen, 650 mg, Oral, Q6H PRN - used x 1 4/21/22   HYDROcodone-acetaminophen, 1 tablet, Oral, Q6H PRN - used x 1 4/21, x 1 4/22  ketorolac, 30 mg, Intravenous, Q6H PRN - used x 2 4/21/22   ondansetron, 4 mg, Intravenous, Q6H PRN - used x 2 4/21/22   oxybutynin, 5 mg, Oral, TID PRN  polyethylene glycol, 17 g, Oral, Daily PRN    HYDROmorphone (DILAUDID) injection 0 5 mg - used x 1 4/21, x 1 4/22/22   Dose: 0 5 mg  Freq: Every 6 hours PRN Route: IV  PRN Reason: breakthrough pain  PRN Comment: Breakthrough pain  Start: 04/21/22 1548    promethazine (PHENERGAN) injection 12 5 mg - used x 1 4/21/22, x 1 4/22/22    Dose: 12 5 mg  Freq: Every 6 hours PRN Route: IV  PRN Reasons: nausea,vomiting      IP CONSULT TO UROLOGY      Network Utilization Review Department  ATTENTION: Please call with any questions or concerns to [phone] and carefully listen to the prompts so that you are directed to the right person   All voicemails are confidential   Deirdre Richie all requests for admission clinical reviews, approved or denied determinations and any other requests to dedicated fax number below belonging to the campus where the patient is receiving treatment   List of dedicated fax numbers for the Facilities:  [address] [place] [address] DENIALS (Administrative/Medical Necessity) [phone]   [address]  [facility] (Maternity/NICU/Pediatrics) [phone]   [address] [facility]  [address] [place] [address] [facility] [address] [place] [address] [place] [address] [address] [phone]

## 2022-04-22 LAB
ANION GAP SERPL CALCULATED.3IONS-SCNC: 6 MMOL/L (ref 4–13)
BUN SERPL-MCNC: 10 MG/DL (ref 5–25)
CALCIUM SERPL-MCNC: 7.7 MG/DL (ref 8.3–10.1)
CHLORIDE SERPL-SCNC: 108 MMOL/L (ref 100–108)
CO2 SERPL-SCNC: 25 MMOL/L (ref 21–32)
CREAT SERPL-MCNC: 0.83 MG/DL (ref 0.6–1.3)
ERYTHROCYTE [DISTWIDTH] IN BLOOD BY AUTOMATED COUNT: 12.5 % (ref 11.6–15.1)
GFR SERPL CREATININE-BSD FRML MDRD: 102 ML/MIN/1.73SQ M
GLUCOSE P FAST SERPL-MCNC: 106 MG/DL (ref 65–99)
GLUCOSE SERPL-MCNC: 106 MG/DL (ref 65–140)
HCT VFR BLD AUTO: 37.7 % (ref 34.8–46.1)
HGB BLD-MCNC: 12.2 G/DL (ref 11.5–15.4)
MCH RBC QN AUTO: 29.9 PG (ref 26.8–34.3)
MCHC RBC AUTO-ENTMCNC: 32.4 G/DL (ref 31.4–37.4)
MCV RBC AUTO: 92 FL (ref 82–98)
PLATELET # BLD AUTO: 144 THOUSANDS/UL (ref 149–390)
PMV BLD AUTO: 10.5 FL (ref 8.9–12.7)
POTASSIUM SERPL-SCNC: 3.7 MMOL/L (ref 3.5–5.3)
RBC # BLD AUTO: 4.08 MILLION/UL (ref 3.81–5.12)
SODIUM SERPL-SCNC: 139 MMOL/L (ref 136–145)
WBC # BLD AUTO: 5.97 THOUSAND/UL (ref 4.31–10.16)

## 2022-04-22 PROCEDURE — 99232 SBSQ HOSP IP/OBS MODERATE 35: CPT | Performed by: PHYSICIAN ASSISTANT

## 2022-04-22 PROCEDURE — 85027 COMPLETE CBC AUTOMATED: CPT | Performed by: STUDENT IN AN ORGANIZED HEALTH CARE EDUCATION/TRAINING PROGRAM

## 2022-04-22 PROCEDURE — 0TP9XDZ REMOVAL OF INTRALUMINAL DEVICE FROM URETER, EXTERNAL APPROACH: ICD-10-PCS | Performed by: UROLOGY

## 2022-04-22 PROCEDURE — 80048 BASIC METABOLIC PNL TOTAL CA: CPT | Performed by: STUDENT IN AN ORGANIZED HEALTH CARE EDUCATION/TRAINING PROGRAM

## 2022-04-22 PROCEDURE — 99232 SBSQ HOSP IP/OBS MODERATE 35: CPT | Performed by: STUDENT IN AN ORGANIZED HEALTH CARE EDUCATION/TRAINING PROGRAM

## 2022-04-22 RX ORDER — POLYETHYLENE GLYCOL 3350 17 G/17G
17 POWDER, FOR SOLUTION ORAL DAILY
Status: DISCONTINUED | OUTPATIENT
Start: 2022-04-22 | End: 2022-04-23 | Stop reason: HOSPADM

## 2022-04-22 RX ADMIN — Medication 3 MG: at 22:06

## 2022-04-22 RX ADMIN — HYDROCODONE BITARTRATE AND ACETAMINOPHEN 1 TABLET: 5; 325 TABLET ORAL at 07:58

## 2022-04-22 RX ADMIN — SODIUM CHLORIDE 125 ML/HR: 0.9 INJECTION, SOLUTION INTRAVENOUS at 20:49

## 2022-04-22 RX ADMIN — HYDROMORPHONE HYDROCHLORIDE 0.5 MG: 1 INJECTION, SOLUTION INTRAMUSCULAR; INTRAVENOUS; SUBCUTANEOUS at 10:15

## 2022-04-22 RX ADMIN — PROMETHAZINE HYDROCHLORIDE 12.5 MG: 25 INJECTION INTRAMUSCULAR; INTRAVENOUS at 11:45

## 2022-04-22 RX ADMIN — POLYETHYLENE GLYCOL 3350 17 G: 17 POWDER, FOR SOLUTION ORAL at 15:56

## 2022-04-22 RX ADMIN — TAMSULOSIN HYDROCHLORIDE 0.4 MG: 0.4 CAPSULE ORAL at 15:50

## 2022-04-22 RX ADMIN — IBUPROFEN 800 MG: 400 TABLET, FILM COATED ORAL at 20:24

## 2022-04-22 RX ADMIN — IBUPROFEN 800 MG: 400 TABLET, FILM COATED ORAL at 07:58

## 2022-04-22 RX ADMIN — IBUPROFEN 800 MG: 400 TABLET, FILM COATED ORAL at 15:51

## 2022-04-22 RX ADMIN — SODIUM CHLORIDE 125 ML/HR: 0.9 INJECTION, SOLUTION INTRAVENOUS at 05:31

## 2022-04-22 RX ADMIN — SENNOSIDES 17.2 MG: 8.6 TABLET, FILM COATED ORAL at 22:06

## 2022-04-22 RX ADMIN — NITROFURANTOIN (MONOHYDRATE/MACROCRYSTALS) 100 MG: 75; 25 CAPSULE ORAL at 07:58

## 2022-04-22 RX ADMIN — HYDROCODONE BITARTRATE AND ACETAMINOPHEN 1 TABLET: 5; 325 TABLET ORAL at 15:50

## 2022-04-22 RX ADMIN — HYDROCODONE BITARTRATE AND ACETAMINOPHEN 1 TABLET: 5; 325 TABLET ORAL at 20:24

## 2022-04-22 NOTE — PROGRESS NOTES
[place]  Progress Note Jessaha Bowens 2000, 25 y o  female MRN: [id number]  Unit/Bed#: -01 Encounter: [id number]  Primary Care Provider: Tyrone Hoffman DO   Date and time admitted to hospital: 4/20/2022  5:27 PM    * Right flank pain  Assessment & Plan  · Right flank pain, nausea, vomiting  Increased flank pain with urination/BM  · s/p CYSTOSCOPY URETEROSCOPY WITH LITHOTRIPSY HOLMIUM LASER AND INSERTION STENT URETERAL performed on 04/19  · Urology recommended oxybutynin, Flomax, Pyridium and stool softener  · CT Renal Stone Study  · Interval placement of a right renal stent with mild right renal collecting system fullness  No hydronephrosis  The previously seen distal right ureteral calculus is no longer identified  · Continuous IVF  · Urinary retention protocol  · I/O's  · Manage pain    Anxiety and depression  Assessment & Plan  · Reports history of anxiety and depression  · Has not seen a therapist in over 2 years due to COVID-19  · No longer on antidepressants were sleep aid medications due to not seeing a therapist  · Patient reports chronic issues with insomnia  · Denies SI/HI    UTI (urinary tract infection)  Assessment & Plan  · Urine (partially treated with antibiotic)  · UA small amount of leukocytes, positive nitrate  · Micro no bacteria or WBC seen  · Patient was started on Macrobid on 04/19 after procedure  Continue antibiotic day 2/5  · Elevated WBC at 13 32  Other vital stable  Not meeting SIRS criteria        VTE Pharmacologic Prophylaxis: VTE Score: 1 Low Risk (Score 0-2) - Encourage Ambulation  Patient Centered Rounds: I performed bedside rounds with nursing staff today  Discussions with Specialists or Other Care Team Provider: Urology, CM    Education and Discussions with Family / Patient: Patient declined call to   Time Spent for Care: 20 minutes   More than 50% of total time spent on counseling and coordination of care as described above  Current Length of Stay: 0 day(s)  Current Patient Status: Observation   Certification Statement: The patient will continue to require additional inpatient hospital stay due to renal stone  Discharge Plan: Anticipate discharge tomorrow to home  Code Status: Level 1 - Full Code    Subjective:   Sergio Lund was seen and examined at bedside  No acute events overnight  States that she is feeling much better  All other symptoms on review of systems negative  Discussed plan of care  All questions and concerns were answered and addressed  Objective:     Vitals:   Temp (24hrs), Av 8 °F (36 6 °C), Min:97 6 °F (36 4 °C), Max:98 °F (36 7 °C)    Temp:  [97 6 °F (36 4 °C)-98 °F (36 7 °C)] 98 °F (36 7 °C)  HR:  [51-56] 53  Resp:  [16-18] 16  BP: (101-126)/(55-78) 103/61  SpO2:  [94 %-97 %] 95 %  Body mass index is 17 77 kg/m²  Input and Output Summary (last 24 hours): Intake/Output Summary (Last 24 hours) at 2022 0713  Last data filed at 2022 0600  Gross per 24 hour   Intake 3240 ml   Output 2500 ml   Net 740 ml       Physical Exam:   Physical Exam  Vitals and nursing note reviewed  Constitutional:       Appearance: Normal appearance  Comments: thin   HENT:      Head: Normocephalic and atraumatic  Cardiovascular:      Rate and Rhythm: Normal rate and regular rhythm  Pulses: Normal pulses  Heart sounds: Normal heart sounds  Pulmonary:      Effort: Pulmonary effort is normal       Breath sounds: Normal breath sounds  Abdominal:      General: Abdomen is flat  Bowel sounds are normal       Palpations: Abdomen is soft  Musculoskeletal:      Right lower leg: No edema  Left lower leg: No edema  Skin:     General: Skin is warm  Neurological:      General: No focal deficit present  Mental Status: She is alert and oriented to person, place, and time            Additional Data:     Labs:  Results from last 7 days   Lab Units 22  6706 04/21/22  0352 04/21/22  0352   WBC Thousand/uL 5 97   < > 9 26   HEMOGLOBIN g/dL 12 2   < > 12 8   HEMATOCRIT % 37 7   < > 39 2   PLATELETS Thousands/uL 144*   < > 187   NEUTROS PCT %  --   --  57   LYMPHS PCT %  --   --  35   MONOS PCT %  --   --  8   EOS PCT %  --   --  0    < > = values in this interval not displayed  Results from last 7 days   Lab Units 04/22/22  0536 04/21/22  0353 04/20/22  1826   SODIUM mmol/L 139   < > 140   POTASSIUM mmol/L 3 7   < > 3 6   CHLORIDE mmol/L 108   < > 106   CO2 mmol/L 25   < > 26   BUN mg/dL 10   < > 18   CREATININE mg/dL 0 83   < > 1 13   ANION GAP mmol/L 6   < > 8   CALCIUM mg/dL 7 7*   < > 8 9   ALBUMIN g/dL  --   --  3 8   TOTAL BILIRUBIN mg/dL  --   --  0 40   ALK PHOS U/L  --   --  43*   ALT U/L  --   --  14   AST U/L  --   --  10   GLUCOSE RANDOM mg/dL 106   < > 87    < > = values in this interval not displayed                         Lines/Drains:  Invasive Devices  Report    Peripheral Intravenous Line            Peripheral IV 04/20/22 Right Antecubital 1 day                      Imaging: Reviewed radiology reports from this admission including: procedure reports    Recent Cultures (last 7 days):         Last 24 Hours Medication List:   Current Facility-Administered Medications   Medication Dose Route Frequency Provider Last Rate    acetaminophen  650 mg Oral Q6H PRN Erma Aleman PA-C      HYDROcodone-acetaminophen  1 tablet Oral Q6H PRN Erma Aleman PA-C      HYDROmorphone  0 5 mg Intravenous Q6H PRN Nicolle Mcmillan MD      ibuprofen  800 mg Oral TID Erma Aleman PA-C      ketorolac  30 mg Intravenous Q6H PRN Erma Aleman PA-C      melatonin  3 mg Oral HS Nicolle Mcmillan MD      nitrofurantoin  100 mg Oral BID Erma Aleman PA-C      ondansetron  4 mg Intravenous Q6H PRN Erma Aleman PA-C      oxybutynin  5 mg Oral TID PRN Erma Aleman PA-C      polyethylene glycol  17 g Oral Daily PRN Erma Aleman PA-C      promethazine  12 5 mg Intravenous Q6H PRN Irma Martines MD [place]      senna  2 tablet Oral HS Anabel Dakins, PA-C      sodium chloride  125 mL/hr Intravenous Continuous Anabel Dakins, PA-C 125 mL/hr (04/22/22 0531)    tamsulosin  0 4 mg Oral Daily With Dinner Anabel Dakins, PA-C          Today, Patient Was Seen By: Alex He MD    **Please Note: This note may have been constructed using a voice recognition system  **

## 2022-04-22 NOTE — ASSESSMENT & PLAN NOTE
· Urine (partially treated with antibiotic)  · UA small amount of leukocytes, positive nitrate  · Micro no bacteria or WBC seen  · Patient was started on Macrobid on 04/19 after procedure  Continue antibiotic day 3/5  · Elevated WBC at 13 32  Other vital stable    Not meeting SIRS criteria

## 2022-04-22 NOTE — ASSESSMENT & PLAN NOTE
· Right flank pain, nausea, vomiting  Increased flank pain with urination/BM  · s/p CYSTOSCOPY URETEROSCOPY WITH LITHOTRIPSY HOLMIUM LASER AND INSERTION STENT URETERAL performed on 04/19  · Urology recommended oxybutynin, Flomax, Pyridium and stool softener  · CT Renal Stone Study  · Interval placement of a right renal stent with mild right renal collecting system fullness  No hydronephrosis  The previously seen distal right ureteral calculus is no longer identified    · Continuous IVF  · Urinary retention protocol  · I/O's  · Manage pain  · Stent removed on 4/22/22

## 2022-04-22 NOTE — NURSING NOTE
Pt slept during approx half of hrly rounds overnight; denied pain  Used BR to void ad malachi and qs

## 2022-04-22 NOTE — PROGRESS NOTES
Progress Note - Pasquale Camachor 25 y o  female MRN: [id number]    Unit/Bed#: -01 Encounter: [id number]      Assessment/Plan    Nephrolithiasis, POD 3 s/p CYSTOSCOPY URETEROSCOPY WITH LITHOTRIPSY HOLMIUM LASER AND INSERTION STENT URETERAL   UTI  -pain most likely 2/2 colic  CT yesterday :-Interval placement of a right renal stent with mild right renal collecting system fullness   No hydronephrosis   The previously seen distal right ureteral calculus is no longer identified    -cont flomax and oxybutynin and pyridium and stool softener  -stent removed  -Retention protocol  -pain control  -ok to dc once pain controlled  -Abx for UTI  Change to po and narrow as urine cx results  -She needs nephro referral for recurrent nephrolithiasis OP   -Needs renal ultrasound in 6 months and follow up with urology        Subjective:   Still with flank pain, improved somewhat    Objective:     Vitals: Blood pressure 103/61, pulse (!) 53, temperature 98 °F (36 7 °C), temperature source Oral, resp  rate 16, height 4' 11" (1 499 m), weight 39 9 kg (88 lb), last menstrual period 04/15/2022, SpO2 95 %, not currently breastfeeding  ,Body mass index is 17 77 kg/m²  Intake/Output Summary (Last 24 hours) at 4/22/2022 9316  Last data filed at 4/22/2022 0600  Gross per 24 hour   Intake 3240 ml   Output 2500 ml   Net 740 ml       Physical Exam:  General Appearance: NAD, cooperative, alert  Eyes: Anicteric, conjunctiva clear  HENT:  Normocephalic, atraumatic, normal mucosa  external ears normal, external nose normal, no drainage  Neck:    Supple, symmetrical, trachea midline  Resp:  Clear to auscultation bilaterally; no rales, rhonchi or wheezing; respirations unlabored   CV:  S1 S2, Regular rate and rhythm; no murmur, rub, or gallop  GI:  Soft, non-tender, non-distended; normal bowel sounds; no masses, no organomegaly   : stent string noted, pulled out  Stent out in once piece   Pt tolerated it well  Musculoskeletal: No cyanosis, clubbing or edema  Normal ROM    Skin:   No jaundice, rashes, or lesions   Psych: Normal affect, good eye contact  Neuro: No gross deficits, AAOx3, speech nl, eryes      Invasive Devices  Report    Peripheral Intravenous Line            Peripheral IV 04/20/22 Right Antecubital 1 day

## 2022-04-22 NOTE — TELEPHONE ENCOUNTER
Patient was hospitalized due to ureteral stent colic  Discussed with Dr Adrienne Jimenez or removing ureteral stent early postop day 3  Ureteral stent was removed in the hospital       Continue with outpatient follow-up in 6 months as directed by Dr Adrienne Jimenez

## 2022-04-22 NOTE — PLAN OF CARE
Problem: PAIN - ADULT  Goal: Verbalizes/displays adequate comfort level or baseline comfort level  Description: Interventions:  - Encourage patient to monitor pain and request assistance  - Assess pain using appropriate pain scale  - Administer analgesics based on type and severity of pain and evaluate response  - Implement non-pharmacological measures as appropriate and evaluate response  - Consider cultural and social influences on pain and pain management  - Notify physician/advanced practitioner if interventions unsuccessful or patient reports new pain  Outcome: Progressing     Problem: INFECTION - ADULT  Goal: Absence or prevention of progression during hospitalization  Description: INTERVENTIONS:  - Assess and monitor for signs and symptoms of infection  - Monitor lab/diagnostic results  - Monitor all insertion sites, i e  indwelling lines, tubes, and drains  - Monitor endotracheal if appropriate and nasal secretions for changes in amount and color  - Hopkins appropriate cooling/warming therapies per order  - Administer medications as ordered  - Instruct and encourage patient and family to use good hand hygiene technique  - Identify and instruct in appropriate isolation precautions for identified infection/condition  Outcome: Progressing  Goal: Absence of fever/infection during neutropenic period  Description: INTERVENTIONS:  - Monitor WBC    Outcome: Progressing     Problem: SAFETY ADULT  Goal: Patient will remain free of falls  Description: INTERVENTIONS:  - Educate patient/family on patient safety including physical limitations  - Instruct patient to call for assistance with activity   - Consult OT/PT to assist with strengthening/mobility   - Keep Call bell within reach  - Keep bed low and locked with side rails adjusted as appropriate  - Keep care items and personal belongings within reach  - Initiate and maintain comfort rounds  - Make Fall Risk Sign visible to staff  - Offer Toileting every 2 Hours, in advance of need  - Initiate/Maintain alarm  - Obtain necessary fall risk management equipment:   - Apply yellow socks and bracelet for high fall risk patients  - Consider moving patient to room near nurses station  Outcome: Progressing  Goal: Maintain or return to baseline ADL function  Description: INTERVENTIONS:  -  Assess patient's ability to carry out ADLs; assess patient's baseline for ADL function and identify physical deficits which impact ability to perform ADLs (bathing, care of mouth/teeth, toileting, grooming, dressing, etc )  - Assess/evaluate cause of self-care deficits   - Assess range of motion  - Assess patient's mobility; develop plan if impaired  - Assess patient's need for assistive devices and provide as appropriate  - Encourage maximum independence but intervene and supervise when necessary  - Involve family in performance of ADLs  - Assess for home care needs following discharge   - Consider OT consult to assist with ADL evaluation and planning for discharge  - Provide patient education as appropriate  Outcome: Progressing  Goal: Maintains/Returns to pre admission functional level  Description: INTERVENTIONS:  - Perform BMAT or MOVE assessment daily    - Set and communicate daily mobility goal to care team and patient/family/caregiver     - Collaborate with rehabilitation services on mobility goals if consulted  - Out of bed for toileting  - Record patient progress and toleration of activity level   Outcome: Progressing     Problem: DISCHARGE PLANNING  Goal: Discharge to home or other facility with appropriate resources  Description: INTERVENTIONS:  - Identify barriers to discharge w/patient and caregiver  - Arrange for needed discharge resources and transportation as appropriate  - Identify discharge learning needs (meds, wound care, etc )  - Arrange for interpretive services to assist at discharge as needed  - Refer to Case Management Department for coordinating discharge planning if the patient needs post-hospital services based on physician/advanced practitioner order or complex needs related to functional status, cognitive ability, or social support system  Outcome: Progressing     Problem: Knowledge Deficit  Goal: Patient/family/caregiver demonstrates understanding of disease process, treatment plan, medications, and discharge instructions  Description: Complete learning assessment and assess knowledge base  Interventions:  - Provide teaching at level of understanding  - Provide teaching via preferred learning methods  Outcome: Progressing     Problem: Potential for Falls  Goal: Patient will remain free of falls  Description: INTERVENTIONS:  - Educate patient/family on patient safety including physical limitations  - Instruct patient to call for assistance with activity   - Consult OT/PT to assist with strengthening/mobility   - Keep Call bell within reach  - Keep bed low and locked with side rails adjusted as appropriate  - Keep care items and personal belongings within reach  - Initiate and maintain comfort rounds  - Make Fall Risk Sign visible to staff  - Offer Toileting every 2 Hours, in advance of need  - Initiate/Maintain alarm  - Obtain necessary fall risk management equipment:   - Apply yellow socks and bracelet for high fall risk patients  - Consider moving patient to room near nurses station  Outcome: Progressing     Problem: Nutrition/Hydration-ADULT  Goal: Nutrient/Hydration intake appropriate for improving, restoring or maintaining nutritional needs  Description: Monitor and assess patient's nutrition/hydration status for malnutrition  Collaborate with interdisciplinary team and initiate plan and interventions as ordered  Monitor patient's weight and dietary intake as ordered or per policy  Utilize nutrition screening tool and intervene as necessary  Determine patient's food preferences and provide high-protein, high-caloric foods as appropriate       INTERVENTIONS:  - Monitor oral intake, urinary output, labs, and treatment plans  - Assess nutrition and hydration status and recommend course of action  - Evaluate amount of meals eaten  - Assist patient with eating if necessary   - Allow adequate time for meals  - Recommend/ encourage appropriate diets, oral nutritional supplements, and vitamin/mineral supplements  - Order, calculate, and assess calorie counts as needed  - Recommend, monitor, and adjust tube feedings and TPN/PPN based on assessed needs  - Assess need for intravenous fluids  - Provide specific nutrition/hydration education as appropriate  - Include patient/family/caregiver in decisions related to nutrition  Outcome: Progressing

## 2022-04-22 NOTE — DISCHARGE SUMMARY
[place]  Discharge- Jena Frost 2000, 25 y o  female MRN: [id number]  Unit/Bed#: -01 Encounter: [id number]  Primary Care Provider: Walter Navarro DO   Date and time admitted to hospital: 4/20/2022  5:27 PM    * Right flank pain  Assessment & Plan  · Right flank pain, nausea, vomiting  Increased flank pain with urination/BM  · s/p CYSTOSCOPY URETEROSCOPY WITH LITHOTRIPSY HOLMIUM LASER AND INSERTION STENT URETERAL performed on 04/19  · Urology recommended oxybutynin, Flomax, Pyridium and stool softener  · CT Renal Stone Study  · Interval placement of a right renal stent with mild right renal collecting system fullness  No hydronephrosis  The previously seen distal right ureteral calculus is no longer identified  · Continuous IVF  · Urinary retention protocol  · I/O's  · Manage pain  · Stent removed on 4/22/22    Anxiety and depression  Assessment & Plan  · Reports history of anxiety and depression  · Has not seen a therapist in over 2 years due to COVID-19  · No longer on antidepressants were sleep aid medications due to not seeing a therapist  · Patient reports chronic issues with insomnia  · Denies SI/HI    UTI (urinary tract infection)  Assessment & Plan  · Urine (partially treated with antibiotic)  · UA small amount of leukocytes, positive nitrate  · Micro no bacteria or WBC seen  · Patient was started on Macrobid on 04/19 after procedure  Continue antibiotic day 3/5  · Elevated WBC at 13 32  Other vital stable    Not meeting SIRS criteria    Medical Problems             Resolved Problems  Date Reviewed: 4/20/2022    None              Discharging Physician / Practitioner: Ruby Gloria MD  PCP: Walter Navarro DO  Admission Date:   Admission Orders (From admission, onward)     Ordered        04/22/22 1436  Inpatient Admission  Once            04/20/22 2127  Place in Observation  Once                      Discharge Date: 04/23/22    Consultations During Hospital Stay:  · Urology  · CM    Procedures Performed:   · Ureteral right stent removal on 4/22/22    Significant Findings / Test Results:   · CT renal:Interval placement of a right renal stent with mild right renal collecting system fullness   No hydronephrosis   The previously seen distal right ureteral calculus is no longer identified  Incidental Findings:   · none     Test Results Pending at Discharge (will require follow up):   · none     Outpatient Tests Requested:  · none    Complications:  None known at this time    Reason for Admission: right flank pain    Hospital Course:   Pasquale Oliveira is a 25 y o  female patient who originally presented to the hospital on 4/20/2022 due to continued right flank pain, nausea, vomiting status post cystoscopy ureteroscopy with lithotripsy that was performed on 04/19/2022  Urology was consulted and recommended oxybutynin, Flomax and peridium as well as a stool softener  She was placed on IV fluids Zofran and pain management  She continued to have severe pain from spasms of the stent placement  On 04/22/2022 this stent was removed  She was observed for another 24 hours and was found to have a relief of pain  She remained hemodynamically stable throughout her stay and has been medically cleared by Urology and internal medicine for discharge  She is to follow-up with nephrology as to why she developed nephrolithiasis  If she continues to have pain she is to follow-up with urology  She is also to follow-up with her PCP in 1-2 weeks  Please see above list of diagnoses and related plan for additional information  Condition at Discharge: stable    Discharge Day Visit / Exam:   Subjective:  Vickie Meadows was seen and examined at bedside  No acute events overnight  Discussed plan of care  All questions and concerns were answered and addressed  All symptoms on review of systems was negative    Vitals: Blood Pressure: 119/76 (04/23/22 0728)  Pulse: 62 (04/23/22 6241)  Temperature: 98 2 °F (36 8 °C) (04/23/22 0728)  Temp Source: Oral (04/23/22 0728)  Respirations: 14 (04/23/22 0728)  Height: 4' 11" (149 9 cm) (04/20/22 1720)  Weight - Scale: 39 9 kg (88 lb) (04/20/22 1720)  SpO2: 98 % (04/23/22 0728)  Exam:   Physical Exam  Vitals and nursing note reviewed  Constitutional:       Appearance: Normal appearance  Comments: thin   HENT:      Head: Normocephalic and atraumatic  Cardiovascular:      Rate and Rhythm: Normal rate and regular rhythm  Pulses: Normal pulses  Heart sounds: Normal heart sounds  Pulmonary:      Effort: Pulmonary effort is normal       Breath sounds: Normal breath sounds  Abdominal:      General: Abdomen is flat  Bowel sounds are normal       Palpations: Abdomen is soft  Musculoskeletal:      Right lower leg: No edema  Left lower leg: No edema  Skin:     General: Skin is warm  Neurological:      General: No focal deficit present  Mental Status: She is alert and oriented to person, place, and time  Discussion with Family: Updated  (mother) at bedside  Discharge instructions/Information to patient and family:   See after visit summary for information provided to patient and family  Provisions for Follow-Up Care:  See after visit summary for information related to follow-up care and any pertinent home health orders  Disposition:   Home    Planned Readmission: no     Discharge Statement:  I spent 20 minutes discharging the patient  This time was spent on the day of discharge  I had direct contact with the patient on the day of discharge  Greater than 50% of the total time was spent examining patient, answering all patient questions, arranging and discussing plan of care with patient as well as directly providing post-discharge instructions  Additional time then spent on discharge activities      Discharge Medications:  See after visit summary for reconciled discharge medications provided to patient and/or family        **Please Note: This note may have been constructed using a voice recognition system**

## 2022-04-22 NOTE — DISCHARGE INSTRUCTIONS
Ureteral Stones   WHAT YOU NEED TO KNOW:   A ureteral stone forms in the kidney and moves down the ureter and gets stuck there  The ureter is the tube that takes urine from the kidney to the bladder  Stones can form in the urinary system when your urine has high levels of minerals and salts  Urinary stones can be made of uric acid, calcium, phosphate, or oxalate crystals  DISCHARGE INSTRUCTIONS:   Seek care immediately if:   · You have severe pain that does not improve, even after you take medicine  · You have vomiting that is not relieved by medicine  Call your doctor if:   · You develop a fever  · You have questions or concerns about your condition or care  Medicines: You may need any of the following:  · NSAIDs , such as ibuprofen, help decrease swelling, pain, and fever  This medicine is available with or without a doctor's order  NSAIDs can cause stomach bleeding or kidney problems in certain people  If you take blood thinner medicine, always ask your healthcare provider if NSAIDs are safe for you  Always read the medicine label and follow directions  · Prescription pain medicine  may be given  Ask your healthcare provider how to take this medicine safely  Some prescription pain medicines contain acetaminophen  Do not take other medicines that contain acetaminophen without talking to your healthcare provider  Too much acetaminophen may cause liver damage  Prescription pain medicine may cause constipation  Ask your healthcare provider how to prevent or treat constipation  · Nausea medicine  may help calm your stomach and prevent vomiting  · Take your medicine as directed  Contact your healthcare provider if you think your medicine is not helping or if you have side effects  Tell him or her if you are allergic to any medicine  Keep a list of the medicines, vitamins, and herbs you take  Include the amounts, and when and why you take them   Bring the list or the pill bottles to follow-up visits  Carry your medicine list with you in case of an emergency  What you can do to manage uretal stones:   · Drink more liquids  Your healthcare provider may tell you to drink at least 8 to 12 (eight-ounce) cups of liquid each day  This helps flush out the stones when you urinate  Water is the best liquid to drink  Your urine will be clear (not yellow) if you are drinking enough liquid  · Strain your urine every time you go to the bathroom  Urinate through a strainer or a piece of thin cloth to catch the stone  Take the stone to your healthcare provider so it can be sent to a lab for tests  This will help your healthcare providers plan the best treatment for you  · Ask your healthcare provider about any nutrition changes you need to make  You may need to limit certain foods, such as foods high in sodium (salt) or protein  You may need to limit leafy green vegetables, carbonated drinks, or beer  These changes will depend on the kind of stones you have  · Stay active  Your stones may pass more easily if you stay active  Exercise can also help you manage your weight  Ask about the best activities for you  After you pass the ureteral stone: Your healthcare provider may  order a 24-hour urine test  Results from a 24-hour urine test will help your healthcare provider plan ways to prevent more stones from forming  Your healthcare provider will give you more instructions  Follow up with your doctor as directed: You may need to return for more tests  Write down your questions so you remember to ask them during your visits  © Copyright [address] 2022 Information is for End User's use only and may not be sold, redistributed or otherwise used for commercial purposes  All illustrations and images included in CareNotes® are the copyrighted property of A D A M [employer]  or [employer]   The above information is an  only   It is not intended as medical advice for individual conditions or treatments  Talk to your doctor, nurse or pharmacist before following any medical regimen to see if it is safe and effective for you

## 2022-04-22 NOTE — MALNUTRITION/BMI
This medical record reflects one or more clinical indicators suggestive of malnutrition and/or morbid obesity  BMI Findings:  Adult BMI Classifications: Underweight < 18 5        Body mass index is 17 77 kg/m²  Treat with regular diet, pt declines supplemnents currently  See Nutrition note dated 4/22/22  for additional details  Completed nutrition assessment is viewable in the nutrition documentation

## 2022-04-23 VITALS
RESPIRATION RATE: 14 BRPM | DIASTOLIC BLOOD PRESSURE: 76 MMHG | WEIGHT: 88 LBS | HEIGHT: 59 IN | TEMPERATURE: 98.2 F | BODY MASS INDEX: 17.74 KG/M2 | OXYGEN SATURATION: 98 % | HEART RATE: 62 BPM | SYSTOLIC BLOOD PRESSURE: 119 MMHG

## 2022-04-23 LAB
ANION GAP SERPL CALCULATED.3IONS-SCNC: 4 MMOL/L (ref 4–13)
BUN SERPL-MCNC: 8 MG/DL (ref 5–25)
CALCIUM SERPL-MCNC: 7.6 MG/DL (ref 8.3–10.1)
CHLORIDE SERPL-SCNC: 111 MMOL/L (ref 100–108)
CO2 SERPL-SCNC: 26 MMOL/L (ref 21–32)
CREAT SERPL-MCNC: 0.71 MG/DL (ref 0.6–1.3)
ERYTHROCYTE [DISTWIDTH] IN BLOOD BY AUTOMATED COUNT: 12.2 % (ref 11.6–15.1)
GFR SERPL CREATININE-BSD FRML MDRD: 123 ML/MIN/1.73SQ M
GLUCOSE SERPL-MCNC: 95 MG/DL (ref 65–140)
HCT VFR BLD AUTO: 37.3 % (ref 34.8–46.1)
HGB BLD-MCNC: 12.5 G/DL (ref 11.5–15.4)
MCH RBC QN AUTO: 29.6 PG (ref 26.8–34.3)
MCHC RBC AUTO-ENTMCNC: 33.5 G/DL (ref 31.4–37.4)
MCV RBC AUTO: 88 FL (ref 82–98)
PLATELET # BLD AUTO: 178 THOUSANDS/UL (ref 149–390)
PMV BLD AUTO: 9.8 FL (ref 8.9–12.7)
POTASSIUM SERPL-SCNC: 3.8 MMOL/L (ref 3.5–5.3)
RBC # BLD AUTO: 4.22 MILLION/UL (ref 3.81–5.12)
SODIUM SERPL-SCNC: 141 MMOL/L (ref 136–145)
WBC # BLD AUTO: 5.19 THOUSAND/UL (ref 4.31–10.16)

## 2022-04-23 PROCEDURE — 85027 COMPLETE CBC AUTOMATED: CPT | Performed by: STUDENT IN AN ORGANIZED HEALTH CARE EDUCATION/TRAINING PROGRAM

## 2022-04-23 PROCEDURE — 99238 HOSP IP/OBS DSCHRG MGMT 30/<: CPT | Performed by: STUDENT IN AN ORGANIZED HEALTH CARE EDUCATION/TRAINING PROGRAM

## 2022-04-23 PROCEDURE — 80048 BASIC METABOLIC PNL TOTAL CA: CPT | Performed by: STUDENT IN AN ORGANIZED HEALTH CARE EDUCATION/TRAINING PROGRAM

## 2022-04-23 RX ORDER — ONDANSETRON 4 MG/1
4 TABLET, FILM COATED ORAL EVERY 8 HOURS PRN
Qty: 20 TABLET | Refills: 0 | Status: SHIPPED | OUTPATIENT
Start: 2022-04-23

## 2022-04-23 RX ADMIN — IBUPROFEN 800 MG: 400 TABLET, FILM COATED ORAL at 08:46

## 2022-04-23 RX ADMIN — POLYETHYLENE GLYCOL 3350 17 G: 17 POWDER, FOR SOLUTION ORAL at 08:47

## 2022-04-23 RX ADMIN — SODIUM CHLORIDE 125 ML/HR: 0.9 INJECTION, SOLUTION INTRAVENOUS at 04:10

## 2022-04-23 NOTE — PLAN OF CARE
Problem: PAIN - ADULT  Goal: Verbalizes/displays adequate comfort level or baseline comfort level  Description: Interventions:  - Encourage patient to monitor pain and request assistance  - Assess pain using appropriate pain scale  - Administer analgesics based on type and severity of pain and evaluate response  - Implement non-pharmacological measures as appropriate and evaluate response  - Consider cultural and social influences on pain and pain management  - Notify physician/advanced practitioner if interventions unsuccessful or patient reports new pain  Outcome: Progressing     Problem: INFECTION - ADULT  Goal: Absence or prevention of progression during hospitalization  Description: INTERVENTIONS:  - Assess and monitor for signs and symptoms of infection  - Monitor lab/diagnostic results  - Monitor all insertion sites, i e  indwelling lines, tubes, and drains  - Monitor endotracheal if appropriate and nasal secretions for changes in amount and color  - [place] appropriate cooling/warming therapies per order  - Administer medications as ordered  - Instruct and encourage patient and family to use good hand hygiene technique  - Identify and instruct in appropriate isolation precautions for identified infection/condition  Outcome: Progressing  Goal: Absence of fever/infection during neutropenic period  Description: INTERVENTIONS:  - Monitor WBC    Outcome: Progressing     Problem: SAFETY ADULT  Goal: Patient will remain free of falls  Description: INTERVENTIONS:  - Educate patient/family on patient safety including physical limitations  - Instruct patient to call for assistance with activity   - Consult OT/PT to assist with strengthening/mobility   - Keep Call bell within reach  - Keep bed low and locked with side rails adjusted as appropriate  - Keep care items and personal belongings within reach  - Initiate and maintain comfort rounds  - Make Fall Risk Sign visible to staff  - Offer Toileting every 2 Hours, in advance of need  - Initiate/Maintain bed alarm  - Obtain necessary fall risk management equipment:   - Apply yellow socks and bracelet for high fall risk patients  - Consider moving patient to room near nurses station  Outcome: Progressing  Goal: Maintain or return to baseline ADL function  Description: INTERVENTIONS:  -  Assess patient's ability to carry out ADLs; assess patient's baseline for ADL function and identify physical deficits which impact ability to perform ADLs (bathing, care of mouth/teeth, toileting, grooming, dressing, etc )  - Assess/evaluate cause of self-care deficits   - Assess range of motion  - Assess patient's mobility; develop plan if impaired  - Assess patient's need for assistive devices and provide as appropriate  - Encourage maximum independence but intervene and supervise when necessary  - Involve family in performance of ADLs  - Assess for home care needs following discharge   - Consider OT consult to assist with ADL evaluation and planning for discharge  - Provide patient education as appropriate  Outcome: Progressing  Goal: Maintains/Returns to pre admission functional level  Description: INTERVENTIONS:  - Perform BMAT or MOVE assessment daily    - Set and communicate daily mobility goal to care team and patient/family/caregiver  - Collaborate with rehabilitation services on mobility goals if consulted  - Perform Range of Motion 2 times a day  - Reposition patient every 2 hours    - Dangle patient 2 times a day  - Stand patient 2 times a day  - Ambulate patient 2 times a day  - Out of bed to chair 2 times a day   - Out of bed for meals 2 times a day  - Out of bed for toileting  - Record patient progress and toleration of activity level   Outcome: Progressing     Problem: DISCHARGE PLANNING  Goal: Discharge to home or other facility with appropriate resources  Description: INTERVENTIONS:  - Identify barriers to discharge w/patient and caregiver  - Arrange for needed discharge resources and transportation as appropriate  - Identify discharge learning needs (meds, wound care, etc )  - Arrange for interpretive services to assist at discharge as needed  - Refer to Case Management Department for coordinating discharge planning if the patient needs post-hospital services based on physician/advanced practitioner order or complex needs related to functional status, cognitive ability, or social support system  Outcome: Progressing     Problem: Knowledge Deficit  Goal: Patient/family/caregiver demonstrates understanding of disease process, treatment plan, medications, and discharge instructions  Description: Complete learning assessment and assess knowledge base  Interventions:  - Provide teaching at level of understanding  - Provide teaching via preferred learning methods  Outcome: Progressing     Problem: Potential for Falls  Goal: Patient will remain free of falls  Description: INTERVENTIONS:  - Educate patient/family on patient safety including physical limitations  - Instruct patient to call for assistance with activity   - Consult OT/PT to assist with strengthening/mobility   - Keep Call bell within reach  - Keep bed low and locked with side rails adjusted as appropriate  - Keep care items and personal belongings within reach  - Initiate and maintain comfort rounds  - Make Fall Risk Sign visible to staff  - Offer Toileting every 2 Hours, in advance of need  - Initiate/Maintain bed alarm  - Obtain necessary fall risk management equipment:   - Apply yellow socks and bracelet for high fall risk patients  - Consider moving patient to room near nurses station  Outcome: Progressing     Problem: Nutrition/Hydration-ADULT  Goal: Nutrient/Hydration intake appropriate for improving, restoring or maintaining nutritional needs  Description: Monitor and assess patient's nutrition/hydration status for malnutrition  Collaborate with interdisciplinary team and initiate plan and interventions as ordered  Monitor patient's weight and dietary intake as ordered or per policy  Utilize nutrition screening tool and intervene as necessary  Determine patient's food preferences and provide high-protein, high-caloric foods as appropriate       INTERVENTIONS:  - Monitor oral intake, urinary output, labs, and treatment plans  - Assess nutrition and hydration status and recommend course of action  - Evaluate amount of meals eaten  - Assist patient with eating if necessary   - Allow adequate time for meals  - Recommend/ encourage appropriate diets, oral nutritional supplements, and vitamin/mineral supplements  - Order, calculate, and assess calorie counts as needed  - Recommend, monitor, and adjust tube feedings and TPN/PPN based on assessed needs  - Assess need for intravenous fluids  - Provide specific nutrition/hydration education as appropriate  - Include patient/family/caregiver in decisions related to nutrition  Outcome: Progressing

## 2022-04-23 NOTE — UTILIZATION REVIEW
Continued Stay Review    Date: 04/23/2022  Day 2:                    Current Patient Class: Inpatient  Current Level of Care: Med Surg    HPI:21 y o  female initially admitted on 04/20/2022    Assessment/Plan: No acute events overnight  Pt reports relief from pain  On exam, abdomen is flat, soft with normal bowel sounds  Pain and nausea control prn  F/u with op nephrology for nephrolithiasis and OP urology if continues with pain  Vital Signs: /76 (BP Location: Left arm)   Pulse 62   Temp 98 2 °F (36 8 °C) (Oral)   Resp 14   Ht 4' 11" (1 499 m)   Wt 39 9 kg (88 lb)   LMP 04/15/2022   SpO2 98%   BMI 17 77 kg/m²       Pertinent Labs/Diagnostic Results:       Results from last 7 days   Lab Units 04/23/22 0415 04/22/22 0536 04/21/22 0352 04/20/22  1826 04/20/22  1826   WBC Thousand/uL 5 19 5 97 9 26   < > 13 32*   HEMOGLOBIN g/dL 12 5 12 2 12 8  --  14 5   HEMATOCRIT % 37 3 37 7 39 2  --  42 3   PLATELETS Thousands/uL 178 144* 187   < > 223   NEUTROS ABS Thousands/µL  --   --  5 18  --  9 31*    < > = values in this interval not displayed           Results from last 7 days   Lab Units 04/23/22 0415 04/22/22 0536 04/21/22 0353 04/20/22  1826   SODIUM mmol/L 141 139 140 140   POTASSIUM mmol/L 3 8 3 7 3 6 3 6   CHLORIDE mmol/L 111* 108 108 106   CO2 mmol/L 26 25 25 26   ANION GAP mmol/L 4 6 7 8   BUN mg/dL 8 10 18 18   CREATININE mg/dL 0 71 0 83 1 02 1 13   EGFR ml/min/1 73sq m 123 102 79 70   CALCIUM mg/dL 7 6* 7 7* 8 2* 8 9     Results from last 7 days   Lab Units 04/20/22  1826   AST U/L 10   ALT U/L 14   ALK PHOS U/L 43*   TOTAL PROTEIN g/dL 6 6   ALBUMIN g/dL 3 8   TOTAL BILIRUBIN mg/dL 0 40         Results from last 7 days   Lab Units 04/23/22 0415 04/22/22  0536 04/21/22 0353 04/20/22  1826   GLUCOSE RANDOM mg/dL 95 106 83 87       Results from last 7 days   Lab Units 04/20/22  1826   LIPASE u/L 47*     Results from last 7 days   Lab Units 04/20/22  2130   CLARITY UA  Cloudy   COLOR UA Orange   SPEC GRAV UA  >=1 030   PH UA  5 5   GLUCOSE UA mg/dl 100 (1/10%)*   KETONES UA mg/dl 15 (1+)*   BLOOD UA  Large*   PROTEIN UA mg/dl >=300*   NITRITE UA  Positive*   BILIRUBIN UA  Interference- unable to analyze*   UROBILINOGEN UA E U /dl 4 0*   LEUKOCYTES UA  Small*   WBC UA /hpf 0-1   RBC UA /hpf Innumerable*   BACTERIA UA /hpf None Seen   EPITHELIAL CELLS WET PREP /hpf None Seen       Medications:   Scheduled Medications:  ibuprofen, 800 mg, Oral, TID  melatonin, 3 mg, Oral, HS  polyethylene glycol, 17 g, Oral, Daily  senna, 2 tablet, Oral, HS  tamsulosin, 0 4 mg, Oral, Daily With Dinner      Continuous IV Infusions:  sodium chloride, 125 mL/hr, Intravenous, Continuous      PRN Meds:  acetaminophen, 650 mg, Oral, Q6H PRN  HYDROcodone-acetaminophen, 1 tablet, Oral, Q6H PRN  HYDROmorphone, 0 5 mg, Intravenous, Q6H PRN  ondansetron, 4 mg, Intravenous, Q6H PRN  oxybutynin, 5 mg, Oral, TID PRN  promethazine, 12 5 mg, Intravenous, Q6H PRN        Discharge Plan: D    Network Utilization Review Department  ATTENTION: Please call with any questions or concerns to [phone] and carefully listen to the prompts so that you are directed to the right person  All voicemails are confidential   Basia Puentes all requests for admission clinical reviews, approved or denied determinations and any other requests to dedicated fax number below belonging to the campus where the patient is receiving treatment   List of dedicated fax numbers for the Facilities:  [address] DENIALS (Administrative/Medical Necessity) [phone]   [address] (Maternity/NICU/Pediatrics) [phone]   [address] 40 125 [facility]  [address] [facility] [place] [address]   192 [facility]   [address] [facility] [address] [phone]

## 2022-04-24 NOTE — UTILIZATION REVIEW
Inpatient Admission Authorization Request   NOTIFICATION OF INPATIENT ADMISSION/INPATIENT AUTHORIZATION REQUEST   SERVICING FACILITY:   [address] [address]  Tax ID: [id number]  NPI: [id number]  Place of Service: Inpatient [address] [facility]  [address]  Place of Service Code: 24     ATTENDING PROVIDER:  Attending Name and NPI#: IlaTanisha Jurado [[id number]]  Address: [address] [place] [address]  Phone: [phone]     UTILIZATION REVIEW CONTACT:  Ricardo Mckoy, Utilization   Network Utilization Review Department  Phone: [phone]  Fax [phone]  Email: [email or web address]     PHYSICIAN ADVISORY SERVICES:  FOR SVON-DT-GLET REVIEW - MEDICAL NECESSITY DENIAL  Phone: [phone]  Fax: [phone]  Email: [email or web address]     TYPE OF REQUEST:  Inpatient Status     ADMISSION INFORMATION:  ADMISSION DATE/TIME: 4/22/22  2:36 PM  PATIENT DIAGNOSIS CODE/DESCRIPTION:  Flank pain [R10 9]  Right flank pain [R10 9]  Left flank pain [R10 9]  DISCHARGE DATE/TIME: 4/23/2022 10:21 AM   IMPORTANT INFORMATION:  Please contact the Ricardo Mckoy directly with any questions or concerns regarding this request  Department voicemails are confidential     Send requests for admission clinical reviews, concurrent reviews, approvals, and administrative denials due to lack of clinical to fax [phone]

## 2022-05-15 DIAGNOSIS — N20.0 NEPHROLITHIASIS: ICD-10-CM

## 2022-05-15 RX ORDER — TAMSULOSIN HYDROCHLORIDE 0.4 MG/1
CAPSULE ORAL
Qty: 30 CAPSULE | Refills: 0 | Status: SHIPPED | OUTPATIENT
Start: 2022-05-15 | End: 2022-05-16

## 2022-05-16 DIAGNOSIS — N20.0 NEPHROLITHIASIS: ICD-10-CM

## 2022-05-16 RX ORDER — TAMSULOSIN HYDROCHLORIDE 0.4 MG/1
0.4 CAPSULE ORAL
Qty: 90 CAPSULE | Refills: 3 | Status: SHIPPED | OUTPATIENT
Start: 2022-05-16

## 2022-06-01 ENCOUNTER — HOSPITAL ENCOUNTER (EMERGENCY)
Facility: HOSPITAL | Age: 22
Discharge: HOME | End: 2022-06-01
Attending: EMERGENCY MEDICINE
Payer: OTHER MISCELLANEOUS

## 2022-06-01 VITALS
OXYGEN SATURATION: 98 % | DIASTOLIC BLOOD PRESSURE: 74 MMHG | TEMPERATURE: 97.7 F | HEART RATE: 89 BPM | SYSTOLIC BLOOD PRESSURE: 124 MMHG | RESPIRATION RATE: 16 BRPM

## 2022-06-01 DIAGNOSIS — S06.0X9A CONCUSSION: ICD-10-CM

## 2022-06-01 DIAGNOSIS — S09.90XA CLOSED HEAD INJURY, INITIAL ENCOUNTER: Primary | ICD-10-CM

## 2022-06-01 DIAGNOSIS — R82.71 BACTERIA IN URINE: ICD-10-CM

## 2022-06-01 LAB
BACTERIA UR QL AUTO: ABNORMAL /HPF
BILIRUB UR QL STRIP: NEGATIVE
CLARITY UR: ABNORMAL
COLOR UR: YELLOW
EXT PREG TEST URINE: NEGATIVE
EXT. CONTROL ED NAV: NORMAL
GLUCOSE UR STRIP-MCNC: NEGATIVE MG/DL
HGB UR QL STRIP.AUTO: ABNORMAL
KETONES UR STRIP-MCNC: ABNORMAL MG/DL
LEUKOCYTE ESTERASE UR QL STRIP: ABNORMAL
MUCOUS THREADS UR QL AUTO: ABNORMAL
NITRITE UR QL STRIP: POSITIVE
NON-SQ EPI CELLS URNS QL MICRO: ABNORMAL /HPF
PH UR STRIP.AUTO: 7.5 [PH]
PROT UR STRIP-MCNC: ABNORMAL MG/DL
RBC #/AREA URNS AUTO: ABNORMAL /HPF
SP GR UR STRIP.AUTO: 1.02 (ref 1–1.03)
UROBILINOGEN UR QL STRIP.AUTO: 0.2 E.U./DL
WBC #/AREA URNS AUTO: ABNORMAL /HPF

## 2022-06-01 PROCEDURE — 81025 URINE PREGNANCY TEST: CPT | Performed by: EMERGENCY MEDICINE

## 2022-06-01 PROCEDURE — 99283 EMERGENCY DEPT VISIT LOW MDM: CPT

## 2022-06-01 PROCEDURE — 99282 EMERGENCY DEPT VISIT SF MDM: CPT | Performed by: EMERGENCY MEDICINE

## 2022-06-01 PROCEDURE — 81001 URINALYSIS AUTO W/SCOPE: CPT | Performed by: EMERGENCY MEDICINE

## 2022-06-01 RX ORDER — ACETAMINOPHEN 325 MG/1
975 TABLET ORAL ONCE
Status: COMPLETED | OUTPATIENT
Start: 2022-06-01 | End: 2022-06-01

## 2022-06-01 RX ORDER — IBUPROFEN 400 MG/1
400 TABLET ORAL ONCE
Status: COMPLETED | OUTPATIENT
Start: 2022-06-01 | End: 2022-06-01

## 2022-06-01 RX ADMIN — IBUPROFEN 400 MG: 400 TABLET, FILM COATED ORAL at 19:55

## 2022-06-01 RX ADMIN — ACETAMINOPHEN 975 MG: 325 TABLET ORAL at 19:55

## 2022-06-01 NOTE — Clinical Note
Arnol Cochran was seen and treated in our emergency department on 6/1/2022  Diagnosis:     Jolie Epps  may return to work on return date  She may return on this date: 06/04/2022         If you have any questions or concerns, please don't hesitate to call        Olegario Haynes,     ______________________________           _______________          _______________  Hospital Representative                              Date                                Time

## 2022-06-02 NOTE — ED PROVIDER NOTES
History  Chief Complaint   Patient presents with    Head Injury     Pt reports that while at work a ladder fell on her head approximately 3 hours ago  Denies loc  States that she is feeling dizzy with a headache, denies blurred vision or vomiting  30-year-old female presents for evaluation of low mechanism head injury that occurred a few hours prior to arrival   Patient was at work and a ladder that was and upright position fell and hit her in the back of her head  Patient states the ladder was approximately 7 ft tall  No loss of consciousness a patient reports feeling lightheaded with a mild posterior headache  No pain medications prior to arrival   Denies changes in vision or light sensitivity  Patient denies any urinary symptoms of frequency, urgency, dysuria  Recent history of urinary tract infection with kidney stone and completed course of antibiotics  Denies abdominal pain, flank pain, fever  Prior to Admission Medications   Prescriptions Last Dose Informant Patient Reported? Taking?    HYDROcodone-acetaminophen (NORCO) 5-325 mg per tablet   No No   Sig: Take 1-2 tablets by mouth every 6 (six) hours as needed for pain for up to 5 doses Max Daily Amount: 8 tablets   Patient not taking: Reported on 4/22/2022    ibuprofen (MOTRIN) 800 mg tablet   No No   Sig: Take 1 tablet (800 mg total) by mouth 3 (three) times a day   Patient not taking: Reported on 4/22/2022    ondansetron (ZOFRAN) 4 mg tablet   No No   Sig: Take 1 tablet (4 mg total) by mouth every 8 (eight) hours as needed for nausea or vomiting   ondansetron (Zofran ODT) 4 mg disintegrating tablet   No No   Sig: Take 1 tablet (4 mg total) by mouth every 6 (six) hours as needed for nausea or vomiting   Patient not taking: Reported on 4/22/2022    oxybutynin (DITROPAN) 5 mg tablet   No No   Sig: Take 1 tablet (5 mg total) by mouth 3 (three) times a day as needed (bladder spasm)   Patient not taking: Reported on 4/22/2022 phenazopyridine (PYRIDIUM) 200 mg tablet   No No   Sig: Take 1 tablet (200 mg total) by mouth 3 (three) times a day as needed for bladder spasms   Patient not taking: Reported on 4/22/2022    tamsulosin (FLOMAX) 0 4 mg   No No   Sig: Take 1 capsule (0 4 mg total) by mouth daily with dinner      Facility-Administered Medications: None       Past Medical History:   Diagnosis Date    Kidney stone     Psychiatric disorder        Past Surgical History:   Procedure Laterality Date    [facility]CISION Right 2011    [facility]    [place] CYSTO/URETERO W/LITHOTRIPSY &INDWELL STENT INSRT Right 4/19/2022    Procedure: CYSTOSCOPY URETEROSCOPY WITH LITHOTRIPSY HOLMIUM LASER AND INSERTION STENT URETERAL;  Surgeon: Godwin Sanchez MD;  [facility];  Service: Urology       Family History   Problem Relation Age of Onset    Hypertension Mother     Migraines Mother     Hypertension Father     Diabetes Father      I have reviewed and agree with the history as documented  E-Cigarette/Vaping    E-Cigarette Use Never User      E-Cigarette/Vaping Substances    Nicotine No     THC No     CBD No     Flavoring No     Other No     Unknown No      Social History     Tobacco Use    Smoking status: Never Smoker    Smokeless tobacco: Never Used   Vaping Use    Vaping Use: Never used   Substance Use Topics    Alcohol use: Never    Drug use: Never       Review of Systems   Constitutional: Negative for fever  Neurological: Positive for dizziness and headaches  All other systems reviewed and are negative  Physical Exam  Physical Exam  Vitals and nursing note reviewed  Constitutional:       Appearance: She is well-developed  HENT:      Head: Normocephalic and atraumatic  Comments: No external evidence of trauma including hematoma, laceration     Right Ear: External ear normal       Left Ear: External ear normal       Nose: Nose normal    Eyes:      General: No scleral icterus       Extraocular Movements: Extraocular movements intact  Pupils: Pupils are equal, round, and reactive to light  Comments: Visual fields intact in all 4 quadrants  Pupils equal reactive  Cardiovascular:      Rate and Rhythm: Normal rate  Pulmonary:      Effort: Pulmonary effort is normal  No respiratory distress  Abdominal:      General: There is no distension  Tenderness: There is no abdominal tenderness  Musculoskeletal:         General: No deformity  Normal range of motion  Cervical back: Normal range of motion  Comments: 5/5 strength of bilateral upper and lower extremities  Skin:     Findings: No rash  Neurological:      General: No focal deficit present  Mental Status: She is alert and oriented to person, place, and time     Psychiatric:         Mood and Affect: Mood normal          Vital Signs  ED Triage Vitals [06/01/22 1738]   Temperature Pulse Respirations Blood Pressure SpO2   97 7 °F (36 5 °C) 89 16 124/74 98 %      Temp Source Heart Rate Source Patient Position - Orthostatic VS BP Location FiO2 (%)   Temporal Monitor Sitting Left arm --      Pain Score       10 - Worst Possible Pain           Vitals:    06/01/22 1738   BP: 124/74   Pulse: 89   Patient Position - Orthostatic VS: Sitting         Visual Acuity  Visual Acuity    Flowsheet Row Most Recent Value   L Pupil Size (mm) 3   R Pupil Size (mm) 2          ED Medications  Medications   acetaminophen (TYLENOL) tablet 975 mg (975 mg Oral Given 6/1/22 1955)   ibuprofen (MOTRIN) tablet 400 mg (400 mg Oral Given 6/1/22 1955)       Diagnostic Studies  Results Reviewed     Procedure Component Value Units Date/Time    Urine Microscopic [[id number]]  (Abnormal) Collected: 06/01/22 1738    Lab Status: Final result Specimen: Urine, Clean Catch Updated: 06/01/22 1852     RBC, UA None Seen /hpf      WBC, UA 4-10 /hpf      Epithelial Cells Occasional /hpf      Bacteria, UA Innumerable /hpf      MUCUS THREADS None Seen    UA w Reflex to Microscopic w Reflex to Culture [[id number]]  (Abnormal) Collected: 06/01/22 1738    Lab Status: Final result Specimen: Urine, Clean Catch Updated: 06/01/22 1750     Color, UA Yellow     Clarity, UA Slightly Cloudy     Specific [place], UA 1 020     pH, UA 7 5     Leukocytes, UA Small     Nitrite, UA Positive     Protein, UA Trace mg/dl      Glucose, UA Negative mg/dl      Ketones, UA Trace mg/dl      Urobilinogen, UA 0 2 E U /dl      Bilirubin, UA Negative     Blood, UA Moderate    POCT pregnancy, urine [[id number]]  (Normal) Resulted: 06/01/22 1739    Lab Status: Final result Updated: 06/01/22 1739     EXT PREG TEST UR (Ref: Negative) negative     Control valid                 No orders to display              Procedures  Procedures         ED Course         CRAFFT    Flowsheet Row Most Recent Value   SBIRT (13-23 yo)    In order to provide better care to our patients, we are screening all of our patients for alcohol and drug use  Would it be okay to ask you these screening questions? Yes Filed at: 06/01/2022 1958   SAE Initial Screen: During the past 12 months, did you:    1  Drink any alcohol (more than a few sips)? No Filed at: 06/01/2022 1958   2  Smoke any marijuana or hashish No Filed at: 06/01/2022 1958   3  Use anything else to get high? ("anything else" includes illegal drugs, over the counter and prescription drugs, and things that you sniff or 'bowser')? No Filed at: 06/01/2022 1958                                          MDM  Number of Diagnoses or Management Options  Bacteria in urine  Closed head injury, initial encounter  Concussion: new and requires workup  Diagnosis management comments: 68-year-old female presenting with close head injury and symptoms of concussion  Symptom control  Discussed UA results with patient  Patient is completely asymptomatic and has been treated within the last few weeks  Has appointment with Urology  Ambulatory referral to concussion Clinic ordered         Amount and/or Complexity of Data Reviewed  Clinical lab tests: reviewed  Tests in the radiology section of CPT®: reviewed  Tests in the medicine section of CPT®: reviewed and ordered  Decide to obtain previous medical records or to obtain history from someone other than the patient: yes  Review and summarize past medical records: yes        Disposition  Final diagnoses:   Closed head injury, initial encounter   Concussion   Bacteria in urine     Time reflects when diagnosis was documented in both MDM as applicable and the Disposition within this note     Time User Action Codes Description Comment    6/1/2022  7:53 PM Felix Barahona Add [S09 90XA] Closed head injury, initial encounter     6/1/2022  7:53 PM Felix Barahona Add [S06 0X9A] Concussion     6/1/2022  7:53 PM Felix Barahona Add [R82 71] Bacteria in urine       ED Disposition     ED Disposition   Discharge    Condition   Stable    Date/Time   Wed Jun 1, 2022  7:53 PM    Comment   Brent Estrada discharge to home/self care                 Follow-up Information     Follow up With Specialties Details Why Contact Info Additional [facility], [address]   [address]  [address] [facility] [address] [place] [address] Emergency Department Emergency Medicine  If symptoms worsen [address] [place], [id number]  [address] [facility] Emergency Department, [address] [place] [address], [facility], [facility]          Discharge Medication List as of 6/1/2022  7:54 PM      CONTINUE these medications which have NOT CHANGED    Details   HYDROcodone-acetaminophen (NORCO) 5-325 mg per tablet Take 1-2 tablets by mouth every 6 (six) hours as needed for pain for up to 5 doses Max Daily Amount: 8 tablets, Starting Tue 4/19/2022, Normal      ibuprofen (MOTRIN) 800 mg tablet Take 1 tablet (800 mg total) by mouth 3 (three) times a day, Starting Wed 4/6/2022, Normal      ondansetron (Zofran ODT) 4 mg disintegrating tablet Take 1 tablet (4 mg total) by mouth every 6 (six) hours as needed for nausea or vomiting, Starting Wed 4/6/2022, Normal      ondansetron (ZOFRAN) 4 mg tablet Take 1 tablet (4 mg total) by mouth every 8 (eight) hours as needed for nausea or vomiting, Starting Sat 4/23/2022, Normal      oxybutynin (DITROPAN) 5 mg tablet Take 1 tablet (5 mg total) by mouth 3 (three) times a day as needed (bladder spasm), Starting Tue 4/19/2022, Normal      phenazopyridine (PYRIDIUM) 200 mg tablet Take 1 tablet (200 mg total) by mouth 3 (three) times a day as needed for bladder spasms, Starting Tue 4/19/2022, Normal      tamsulosin (FLOMAX) 0 4 mg Take 1 capsule (0 4 mg total) by mouth daily with dinner, Starting Mon 5/16/2022, Normal                 PDMP Review       Value Time User    PDMP Reviewed  Yes 4/22/2022  2:26 PM Noni Rodarte MD          ED Provider  Electronically Signed by           Anthony Walls DO  06/02/22 0031

## 2022-06-06 ENCOUNTER — TELEPHONE (OUTPATIENT)
Dept: URGENT CARE | Facility: CLINIC | Age: 22
End: 2022-06-06

## 2022-06-29 ENCOUNTER — CONSULT (OUTPATIENT)
Dept: NEPHROLOGY | Facility: HOSPITAL | Age: 22
End: 2022-06-29
Payer: COMMERCIAL

## 2022-06-29 VITALS
SYSTOLIC BLOOD PRESSURE: 118 MMHG | HEIGHT: 59 IN | BODY MASS INDEX: 18.14 KG/M2 | DIASTOLIC BLOOD PRESSURE: 72 MMHG | HEART RATE: 59 BPM | WEIGHT: 90 LBS

## 2022-06-29 DIAGNOSIS — E55.9 VITAMIN D DEFICIENCY: ICD-10-CM

## 2022-06-29 DIAGNOSIS — N20.0 NEPHROLITHIASIS: Primary | ICD-10-CM

## 2022-06-29 DIAGNOSIS — R10.9 LEFT FLANK PAIN: ICD-10-CM

## 2022-06-29 PROCEDURE — 99203 OFFICE O/P NEW LOW 30 MIN: CPT | Performed by: INTERNAL MEDICINE

## 2022-06-29 PROCEDURE — 1036F TOBACCO NON-USER: CPT | Performed by: INTERNAL MEDICINE

## 2022-06-29 PROCEDURE — 3008F BODY MASS INDEX DOCD: CPT | Performed by: INTERNAL MEDICINE

## 2022-06-29 NOTE — PATIENT INSTRUCTIONS
1  Nephrolithiasis-noted on previous imaging status post right renal stent   -avoid high salt/sodium diet  Avoid canned foods, packets foods, [place] food, restaurant and fast food   -Stay well hydrated  Drink enough water to urinate 2-2-1/2 L a day  - obtain litholink(24 hr urine collection)  Bottle to be mailed to your home  -repeat CMP, and stone analysis bloodwork including PTH, phos, mag, uric acid levels  -[address] [place] [address] kidney stones for more information  -Obtain renal ultrasound ordered by urology in Oct  2022    2  Flank pain - resolved    3  Vitamin D deficiency - takes OTC vitamin D 1000u daily, level was low at 19 as of 5/5/22    Of note, you are not anemic  RTC in 1 year

## 2022-06-29 NOTE — PROGRESS NOTES
NEPHROLOGY OUTPATIENT CONSULTATION   Francisco Estrada 25 y o  female MRN: [id number]  Date: 6/29/2022  Reason for consultation:   Chief Complaint   Patient presents with    Consult    Nephrolithiasis       Patient instructions:  Patient Instructions   1  Nephrolithiasis-noted on previous imaging status post right renal stent   -avoid high salt/sodium diet  Avoid canned foods, packets foods, [place] food, restaurant and fast food   -Stay well hydrated  Drink enough water to urinate 2-2-1/2 L a day  - obtain litholink(24 hr urine collection)  Bottle to be mailed to your home  -repeat CMP, and stone analysis bloodwork including PTH, phos, mag, uric acid levels  -[address] [place] [address] kidney stones for more information  -Obtain renal ultrasound ordered by urology in Oct  2022    2  Flank pain - resolved    3  Vitamin D deficiency - takes OTC vitamin D 1000u daily, level was low at 19 as of 5/5/22    Of note, you are not anemic  RTC in 1 year  Hermila Norberto was seen today for consult and nephrolithiasis  Diagnoses and all orders for this visit:    Nephrolithiasis  -     Ambulatory Referral to Nephrology  -     Ambulatory referral to Nephrology    Left flank pain  Comments: With intractable pain  Orders:  -     Ambulatory referral to Nephrology    Vitamin D deficiency        ASSESSMENT and PLAN:  1  Nephrolithiasis-noted on previous imaging status post right renal stent   -avoid high salt/sodium diet  Avoid canned foods, packets foods, [place] food, restaurant and fast food   -Stay well hydrated  Drink enough water to urinate 2-2-1/2 L a day  - obtain litholink(24 hr urine collection)  Bottle to be mailed to your home  -repeat CMP, and stone analysis bloodwork including PTH, phos, mag, uric acid levels  -[address] [place] [address] kidney stones for more information  -Obtain renal ultrasound ordered by urology in Oct  2022    2  Flank pain - resolved    3   Vitamin D deficiency - takes OTC vitamin D 1000u daily, level was low at 19 as of 5/5/22    Of note, you are not anemic  HISTORY OF PRESENT ILLNESS:  Requesting Physician: DO Dagmar Chinchilla Oneil is a 25 y o  female with history of kidney stone who presents in consultation for kidney stones  Denies history of recent NSAID use or herbal medications  Nephrolithiasis - first episode of kidney stones in April 2020, had an obstructive stone, pain medication did not help, had surgery(cysto + laser lithotripsy + stent), and went back for another 4 days due to stent pain  Was also treated UTI  Had had UTIs more frequently since the stent       PAST MEDICAL HISTORY:  Past Medical History:   Diagnosis Date    Kidney stone     Psychiatric disorder        PAST SURGICAL HISTORY:  Past Surgical History:   Procedure Laterality Date    [facility]ISION Right 2011    [facility]    KIDNEY STONE SURGERY      LITHOTRIPSY      [place] CYSTO/URETERO W/LITHOTRIPSY &INDWELL STENT INSRT Right 04/19/2022    Procedure: CYSTOSCOPY URETEROSCOPY WITH LITHOTRIPSY HOLMIUM LASER AND INSERTION STENT URETERAL;  Surgeon: West Gillette MD;  Location[facility];  Service: Urology       ALLERGIES:  Allergies   Allergen Reactions    Keflex [Cephalexin] Rash    Levaquin [Levofloxacin] Rash     Upper arm red and burning        SOCIAL HISTORY:  Social History     Substance and Sexual Activity   Alcohol Use Never     Social History     Substance and Sexual Activity   Drug Use Never     Social History     Tobacco Use   Smoking Status Never Smoker   Smokeless Tobacco Never Used       FAMILY HISTORY:  Family History   Problem Relation Age of Onset    Hypertension Mother     Migraines Mother     Hypertension Father     Diabetes Father     Nephrolithiasis Brother        MEDICATIONS:    Current Outpatient Medications:     ondansetron (ZOFRAN) 4 mg tablet, Take 1 tablet (4 mg total) by mouth every 8 (eight) hours as needed for nausea or vomiting, Disp: 20 tablet, Rfl: 0    tamsulosin (FLOMAX) 0 4 mg, Take 1 capsule (0 4 mg total) by mouth daily with dinner, Disp: 90 capsule, Rfl: 3    HYDROcodone-acetaminophen (NORCO) 5-325 mg per tablet, Take 1-2 tablets by mouth every 6 (six) hours as needed for pain for up to 5 doses Max Daily Amount: 8 tablets (Patient not taking: No sig reported), Disp: 5 tablet, Rfl: 0    ibuprofen (MOTRIN) 800 mg tablet, Take 1 tablet (800 mg total) by mouth 3 (three) times a day (Patient not taking: No sig reported), Disp: 21 tablet, Rfl: 0    ondansetron (Zofran ODT) 4 mg disintegrating tablet, Take 1 tablet (4 mg total) by mouth every 6 (six) hours as needed for nausea or vomiting (Patient not taking: No sig reported), Disp: 20 tablet, Rfl: 0    oxybutynin (DITROPAN) 5 mg tablet, Take 1 tablet (5 mg total) by mouth 3 (three) times a day as needed (bladder spasm) (Patient not taking: No sig reported), Disp: 20 tablet, Rfl: 0    phenazopyridine (PYRIDIUM) 200 mg tablet, Take 1 tablet (200 mg total) by mouth 3 (three) times a day as needed for bladder spasms (Patient not taking: No sig reported), Disp: 30 tablet, Rfl: 0    REVIEW OF SYSTEMS:  Review of Systems   Constitutional: Negative for chills and fever  HENT: Negative for sore throat and trouble swallowing  Eyes: Negative for visual disturbance  Respiratory: Negative for cough and shortness of breath  Cardiovascular: Negative for chest pain and leg swelling  Gastrointestinal: Negative for abdominal pain, constipation, diarrhea, nausea and vomiting  Endocrine: Negative for polyuria  Genitourinary: Positive for vaginal bleeding (more frequent)  Negative for difficulty urinating, dysuria and hematuria  Musculoskeletal: Negative for back pain and neck pain  Skin: Negative for rash  Neurological: Negative for dizziness, light-headedness and numbness  Hematological: Negative for adenopathy  Bruises/bleeds easily (bruise easily)     Psychiatric/Behavioral: The patient is not nervous/anxious  PHYSICAL EXAM:   Vitals:    22 0856   BP: 118/72   BP Location: Left arm   Patient Position: Sitting   Cuff Size: Standard   Pulse: 59   Weight: 40 8 kg (90 lb)   Height: 4' 11" (1 499 m)     Body mass index is 18 18 kg/m²  Physical Exam  Vitals and nursing note reviewed  Constitutional:       General: She is not in acute distress  Appearance: Normal appearance  She is well-developed  She is not diaphoretic  HENT:      Head: Normocephalic and atraumatic  Nose: Nose normal       Mouth/Throat:      Mouth: Mucous membranes are moist       Pharynx: No oropharyngeal exudate  Eyes:      General: No scleral icterus  Right eye: No discharge  Left eye: No discharge  Neck:      Thyroid: No thyromegaly  Cardiovascular:      Rate and Rhythm: Normal rate and regular rhythm  Heart sounds: No murmur heard  Pulmonary:      Effort: Pulmonary effort is normal  No respiratory distress  Breath sounds: Normal breath sounds  No wheezing  Abdominal:      General: Bowel sounds are normal  There is no distension  Palpations: Abdomen is soft  Musculoskeletal:         General: No swelling  Normal range of motion  Cervical back: Normal range of motion and neck supple  Comments: thin   Skin:     General: Skin is warm and dry  Coloration: Skin is not jaundiced  Findings: No rash  Neurological:      General: No focal deficit present  Mental Status: She is alert  Motor: No abnormal muscle tone        Comments: awake   Psychiatric:         Mood and Affect: Mood normal          Behavior: Behavior normal            Laboratory results:   Lab Results   Component Value Date    SODIUM 141 2022    K 3 8 2022     (H) 2022    CO2 26 2022    BUN 8 2022    CREATININE 0 71 2022    GLUC 95 2022    CALCIUM 7 6 (L) 2022        Imagin22 CT scan: right renal stent with mild righ renal collecting system fullness  4mm and smaller nonobstructive calculi     Portions of the record may have been created with voice recognition software   Occasional wrong word or "sound a like" substitutions may have occurred due to the inherent limitations of voice recognition software   Read the chart carefully and recognize, using context, where substitutions have occurred

## 2022-09-10 ENCOUNTER — APPOINTMENT (OUTPATIENT)
Dept: ULTRASOUND IMAGING | Facility: HOSPITAL | Age: 22
End: 2022-09-10
Payer: COMMERCIAL

## 2022-09-10 ENCOUNTER — HOSPITAL ENCOUNTER (EMERGENCY)
Facility: HOSPITAL | Age: 22
Discharge: HOME | End: 2022-09-10
Attending: EMERGENCY MEDICINE
Payer: COMMERCIAL

## 2022-09-10 VITALS
HEIGHT: 59 IN | WEIGHT: 91.7 LBS | TEMPERATURE: 98.1 F | RESPIRATION RATE: 14 BRPM | BODY MASS INDEX: 18.48 KG/M2 | OXYGEN SATURATION: 100 % | SYSTOLIC BLOOD PRESSURE: 97 MMHG | DIASTOLIC BLOOD PRESSURE: 62 MMHG | HEART RATE: 68 BPM

## 2022-09-10 DIAGNOSIS — R10.9 ABDOMINAL CRAMPING: ICD-10-CM

## 2022-09-10 DIAGNOSIS — Z34.90 INTRAUTERINE PREGNANCY: Primary | ICD-10-CM

## 2022-09-10 LAB
ALBUMIN SERPL BCP-MCNC: 4 G/DL (ref 3.5–5)
ALP SERPL-CCNC: 44 U/L (ref 46–384)
ALT SERPL W P-5'-P-CCNC: 19 U/L (ref 12–78)
ANION GAP SERPL CALCULATED.3IONS-SCNC: 7 MMOL/L (ref 4–13)
AST SERPL W P-5'-P-CCNC: 14 U/L (ref 5–45)
B-HCG SERPL-ACNC: ABNORMAL MIU/ML
BASOPHILS # BLD AUTO: 0.04 THOUSANDS/ΜL (ref 0–0.1)
BASOPHILS NFR BLD AUTO: 1 % (ref 0–1)
BILIRUB SERPL-MCNC: 0.32 MG/DL (ref 0.2–1)
BILIRUB UR QL STRIP: NEGATIVE
BUN SERPL-MCNC: 10 MG/DL (ref 5–25)
CALCIUM SERPL-MCNC: 9.4 MG/DL (ref 8.3–10.1)
CHLORIDE SERPL-SCNC: 104 MMOL/L (ref 96–108)
CLARITY UR: CLEAR
CO2 SERPL-SCNC: 27 MMOL/L (ref 21–32)
COLOR UR: YELLOW
CREAT SERPL-MCNC: 0.73 MG/DL (ref 0.6–1.3)
EOSINOPHIL # BLD AUTO: 0.15 THOUSAND/ΜL (ref 0–0.61)
EOSINOPHIL NFR BLD AUTO: 2 % (ref 0–6)
ERYTHROCYTE [DISTWIDTH] IN BLOOD BY AUTOMATED COUNT: 12.6 % (ref 11.6–15.1)
GFR SERPL CREATININE-BSD FRML MDRD: 119 ML/MIN/1.73SQ M
GLUCOSE SERPL-MCNC: 85 MG/DL (ref 65–140)
GLUCOSE UR STRIP-MCNC: NEGATIVE MG/DL
HCT VFR BLD AUTO: 40.3 % (ref 34.8–46.1)
HGB BLD-MCNC: 14.4 G/DL (ref 11.5–15.4)
HGB UR QL STRIP.AUTO: NEGATIVE
KETONES UR STRIP-MCNC: NEGATIVE MG/DL
LEUKOCYTE ESTERASE UR QL STRIP: NEGATIVE
LIPASE SERPL-CCNC: 74 U/L (ref 73–393)
LYMPHOCYTES # BLD AUTO: 2.53 THOUSANDS/ΜL (ref 0.6–4.47)
LYMPHOCYTES NFR BLD AUTO: 38 % (ref 14–44)
MCH RBC QN AUTO: 30.3 PG (ref 26.8–34.3)
MCHC RBC AUTO-ENTMCNC: 35.7 G/DL (ref 31.4–37.4)
MCV RBC AUTO: 85 FL (ref 82–98)
MONOCYTES # BLD AUTO: 0.69 THOUSAND/ΜL (ref 0.17–1.22)
MONOCYTES NFR BLD AUTO: 10 % (ref 4–12)
NEUTROPHILS # BLD AUTO: 3.21 THOUSANDS/ΜL (ref 1.85–7.62)
NEUTS SEG NFR BLD AUTO: 48 % (ref 43–75)
NITRITE UR QL STRIP: NEGATIVE
PH UR STRIP.AUTO: 6 [PH]
PLATELET # BLD AUTO: 241 THOUSANDS/UL (ref 149–390)
PMV BLD AUTO: 9.4 FL (ref 8.9–12.7)
POTASSIUM SERPL-SCNC: 3.9 MMOL/L (ref 3.5–5.3)
PROT SERPL-MCNC: 7.2 G/DL (ref 6.4–8.4)
PROT UR STRIP-MCNC: NEGATIVE MG/DL
RBC # BLD AUTO: 4.75 MILLION/UL (ref 3.81–5.12)
SODIUM SERPL-SCNC: 138 MMOL/L (ref 135–147)
SP GR UR STRIP.AUTO: 1.02 (ref 1–1.03)
UROBILINOGEN UR QL STRIP.AUTO: 0.2 E.U./DL
WBC # BLD AUTO: 6.64 THOUSAND/UL (ref 4.31–10.16)

## 2022-09-10 PROCEDURE — 85025 COMPLETE CBC W/AUTO DIFF WBC: CPT | Performed by: PHYSICIAN ASSISTANT

## 2022-09-10 PROCEDURE — 99284 EMERGENCY DEPT VISIT MOD MDM: CPT

## 2022-09-10 PROCEDURE — 36415 COLL VENOUS BLD VENIPUNCTURE: CPT | Performed by: PHYSICIAN ASSISTANT

## 2022-09-10 PROCEDURE — 81003 URINALYSIS AUTO W/O SCOPE: CPT | Performed by: PHYSICIAN ASSISTANT

## 2022-09-10 PROCEDURE — 99284 EMERGENCY DEPT VISIT MOD MDM: CPT | Performed by: PHYSICIAN ASSISTANT

## 2022-09-10 PROCEDURE — 80053 COMPREHEN METABOLIC PANEL: CPT | Performed by: PHYSICIAN ASSISTANT

## 2022-09-10 PROCEDURE — 84702 CHORIONIC GONADOTROPIN TEST: CPT | Performed by: PHYSICIAN ASSISTANT

## 2022-09-10 PROCEDURE — 83690 ASSAY OF LIPASE: CPT | Performed by: PHYSICIAN ASSISTANT

## 2022-09-10 PROCEDURE — 76815 OB US LIMITED FETUS(S): CPT

## 2022-09-10 NOTE — ED ATTENDING ATTESTATION
9/10/2022  IShawnee DO, saw and evaluated the patient  I have discussed the patient with the resident/non-physician practitioner and agree with the resident's/non-physician practitioner's findings, Plan of Care, and MDM as documented in the resident's/non-physician practitioner's note, except where noted  All available labs and Radiology studies were reviewed  I was present for key portions of any procedure(s) performed by the resident/non-physician practitioner and I was immediately available to provide assistance  At this point I agree with the current assessment done in the Emergency Department  I have conducted an independent evaluation of this patient a history and physical is as follows:    25year-old  presents for abdominal/pelvic pain and cramping beginning this morning  The patient denies any associated vaginal bleeding or discharge  The patient denies fevers or chills  She has had nausea throughout the pregnancy usually in the evening although she is more nauseous now  The patient has also had intermittent nonbloody diarrhea  ED Course     On examination:  The patient is awake, alert and oriented  HEENT: Normocephalic/atraumatic  External examination of the ears is unremarkable  Pupils are equal round and reactive to light, there is no conjunctival injection or scleral icterus noted  Nares are patent without rhinorrhea  The oropharynx is moist without injection  The neck is supple  Lungs: Clear to auscultation bilaterally  Heart: Regular without murmurs rubs or gallops  Abdomen: Soft and nontender  There are positive bowel sounds  there is no rebound or guarding  Musculoskeletal: Normal range of motion with grossly normal strength  Neuro: Cranial nerves II through XII grossly intact   Nonfocal exam  Skin: No rash noted  Psych: Mood and affect normal  Results for orders placed or performed during the hospital encounter of 09/10/22   CBC and differential   Result Value Ref Range WBC 6 64 4 31 - 10 16 Thousand/uL    RBC 4 75 3 81 - 5 12 Million/uL    Hemoglobin 14 4 11 5 - 15 4 g/dL    Hematocrit 40 3 34 8 - 46 1 %    MCV 85 82 - 98 fL    MCH 30 3 26 8 - 34 3 pg    MCHC 35 7 31 4 - 37 4 g/dL    RDW 12 6 11 6 - 15 1 %    MPV 9 4 8 9 - 12 7 fL    Platelets 510 608 - 298 Thousands/uL    Neutrophils Relative 48 43 - 75 %    Lymphocytes Relative 38 14 - 44 %    Monocytes Relative 10 4 - 12 %    Eosinophils Relative 2 0 - 6 %    Basophils Relative 1 0 - 1 %    Neutrophils Absolute 3 21 1 85 - 7 62 Thousands/µL    Lymphocytes Absolute 2 53 0 60 - 4 47 Thousands/µL    Monocytes Absolute 0 69 0 17 - 1 22 Thousand/µL    Eosinophils Absolute 0 15 0 00 - 0 61 Thousand/µL    Basophils Absolute 0 04 0 00 - 0 10 Thousands/µL   Comprehensive metabolic panel   Result Value Ref Range    Sodium 138 135 - 147 mmol/L    Potassium 3 9 3 5 - 5 3 mmol/L    Chloride 104 96 - 108 mmol/L    CO2 27 21 - 32 mmol/L    ANION GAP 7 4 - 13 mmol/L    BUN 10 5 - 25 mg/dL    Creatinine 0 73 0 60 - 1 30 mg/dL    Glucose 85 65 - 140 mg/dL    Calcium 9 4 8 3 - 10 1 mg/dL    AST 14 5 - 45 U/L    ALT 19 12 - 78 U/L    Alkaline Phosphatase 44 (L) 46 - 384 U/L    Total Protein 7 2 6 4 - 8 4 g/dL    Albumin 4 0 3 5 - 5 0 g/dL    Total Bilirubin 0 32 0 20 - 1 00 mg/dL    eGFR 119 ml/min/1 73sq m   Lipase   Result Value Ref Range    Lipase 74 73 - 393 u/L   Pregnancy, hCG, quantitative   Result Value Ref Range    HCG, Quant 14,726 (H) <=6 mIU/mL   UA (URINE) with reflex to Scope   Result Value Ref Range    Color, UA Yellow     Clarity, UA Clear     Specific Gravity, UA 1 025 1 003 - 1 030    pH, UA 6 0 4 5, 5 0, 5 5, 6 0, 6 5, 7 0, 7 5, 8 0    Leukocytes, UA Negative Negative    Nitrite, UA Negative Negative    Protein, UA Negative Negative mg/dl    Glucose, UA Negative Negative mg/dl    Ketones, UA Negative Negative mg/dl    Urobilinogen, UA 0 2 0 2, 1 0 E U /dl E U /dl    Bilirubin, UA Negative Negative    Occult Blood, UA Negative Negative     US OB pregnancy limited with transvaginal   Final Result      1  Findings for early intrauterine pregnancy with small gestational sac and yolk sac  No definite fetal pole but may be too small to assess at this time  Recommend continued follow up with serial serum hcg levels and repeat US as warranted  Workstation performed: [id number]           Plan is for discharge and outpatient Ob/gyn follow-up  I discussed the case with the physician assistant      Critical Care Time  Procedures

## 2022-09-10 NOTE — ED PROVIDER NOTES
History  Chief Complaint   Patient presents with    Abdominal Pain Pregnant     Pt is , LMP 22  Pt with abd  Pain today with nausea and cramping  53-year-old female  lmp 22 presents today with nausea,  Cramping and diarrhea  No PMHX no Rx  APAP for occasional HAs  Pending initial OB establishment appointment  No vaginal bleeding  Started w/ abdominal cramping at work this morning and came to ED  Hx of kidney stones w/ prior required lithotripsy / cysto and stenting  Prior to Admission Medications   Prescriptions Last Dose Informant Patient Reported? Taking?    HYDROcodone-acetaminophen (NORCO) 5-325 mg per tablet Not Taking at Unknown time  No No   Sig: Take 1-2 tablets by mouth every 6 (six) hours as needed for pain for up to 5 doses Max Daily Amount: 8 tablets   Patient not taking: No sig reported   Prenatal Vit-Fe Fumarate-FA (PRENATAL 19 PO) 9/10/2022 at Unknown time  Yes Yes   Sig: Take by mouth   ibuprofen (MOTRIN) 800 mg tablet Not Taking at Unknown time  No No   Sig: Take 1 tablet (800 mg total) by mouth 3 (three) times a day   Patient not taking: No sig reported   ondansetron (ZOFRAN) 4 mg tablet Not Taking at Unknown time  No No   Sig: Take 1 tablet (4 mg total) by mouth every 8 (eight) hours as needed for nausea or vomiting   Patient not taking: Reported on 9/10/2022   ondansetron (Zofran ODT) 4 mg disintegrating tablet Not Taking at Unknown time  No No   Sig: Take 1 tablet (4 mg total) by mouth every 6 (six) hours as needed for nausea or vomiting   Patient not taking: No sig reported   oxybutynin (DITROPAN) 5 mg tablet Not Taking at Unknown time  No No   Sig: Take 1 tablet (5 mg total) by mouth 3 (three) times a day as needed (bladder spasm)   Patient not taking: No sig reported   phenazopyridine (PYRIDIUM) 200 mg tablet Not Taking at Unknown time  No No   Sig: Take 1 tablet (200 mg total) by mouth 3 (three) times a day as needed for bladder spasms   Patient not taking: No sig reported   tamsulosin (FLOMAX) 0 4 mg Not Taking at Unknown time  No No   Sig: Take 1 capsule (0 4 mg total) by mouth daily with dinner   Patient not taking: Reported on 9/10/2022      Facility-Administered Medications: None       Past Medical History:   Diagnosis Date    Kidney stone     Psychiatric disorder        Past Surgical History:   Procedure Laterality Date    HEMANGIOMA EXCISION Right 2011    [facility]    KIDNEY STONE SURGERY      LITHOTRIPSY      MI CYSTO/URETERO W/LITHOTRIPSY &INDWELL STENT INSRT Right 04/19/2022    Procedure: CYSTOSCOPY URETEROSCOPY WITH LITHOTRIPSY HOLMIUM LASER AND INSERTION STENT URETERAL;  Surgeon: Parminder Arroyo MD;  Location: [facility];  Service: Urology       Family History   Problem Relation Age of Onset    Hypertension Mother     Migraines Mother     Hypertension Father     Diabetes Father     Nephrolithiasis Brother      I have reviewed and agree with the history as documented  E-Cigarette/Vaping    E-Cigarette Use Never User      E-Cigarette/Vaping Substances    Nicotine No     THC No     CBD No     Flavoring No     Other No     Unknown No      Social History     Tobacco Use    Smoking status: Never Smoker    Smokeless tobacco: Never Used   Vaping Use    Vaping Use: Never used   Substance Use Topics    Alcohol use: Never    Drug use: Never       Review of Systems   Constitutional: Negative for chills and fever  HENT: Negative for ear pain and sore throat  Eyes: Negative for pain and visual disturbance  Respiratory: Negative for cough and shortness of breath  Cardiovascular: Negative for chest pain and palpitations  Gastrointestinal: Positive for abdominal pain and nausea  Negative for blood in stool, constipation, diarrhea and vomiting  Genitourinary: Negative for dysuria and hematuria  Musculoskeletal: Negative for arthralgias and back pain  Skin: Negative for color change and rash     Neurological: Negative for seizures and syncope  All other systems reviewed and are negative  Physical Exam  Physical Exam  Vitals and nursing note reviewed  Constitutional:       General: She is not in acute distress  Appearance: She is well-developed  HENT:      Head: Normocephalic and atraumatic  Eyes:      Conjunctiva/sclera: Conjunctivae normal    Cardiovascular:      Rate and Rhythm: Normal rate and regular rhythm  Heart sounds: No murmur heard  Pulmonary:      Effort: Pulmonary effort is normal  No respiratory distress  Breath sounds: Normal breath sounds  Abdominal:      Palpations: Abdomen is soft  Tenderness: There is no abdominal tenderness  Comments: Mild generalized voluntary guarding w/o involuntary guarding  Musculoskeletal:      Cervical back: Neck supple  Skin:     General: Skin is warm and dry  Neurological:      Mental Status: She is alert           Vital Signs  ED Triage Vitals [09/10/22 0941]   Temperature Pulse Respirations Blood Pressure SpO2   98 1 °F (36 7 °C) 78 18 113/72 100 %      Temp Source Heart Rate Source Patient Position - Orthostatic VS BP Location FiO2 (%)   Temporal Monitor Sitting Left arm --      Pain Score       10 - Worst Possible Pain           Vitals:    09/10/22 0941 09/10/22 1223   BP: 113/72 97/62   Pulse: 78 68   Patient Position - Orthostatic VS: Sitting Sitting         Visual Acuity      ED Medications  Medications - No data to display    Diagnostic Studies  Results Reviewed     Procedure Component Value Units Date/Time    UA (URINE) with reflex to Scope [[id number]] Collected: 09/10/22 1118    Lab Status: Final result Specimen: Urine, Clean Catch Updated: 09/10/22 1136     Color, UA Yellow     Clarity, UA Clear     Specific Gravity, UA 1 025     pH, UA 6 0     Leukocytes, UA Negative     Nitrite, UA Negative     Protein, UA Negative mg/dl      Glucose, UA Negative mg/dl      Ketones, UA Negative mg/dl      Urobilinogen, UA 0 2 E U /dl      Bilirubin, UA Negative     Occult Blood, UA Negative    Pregnancy, hCG, quantitative [[id number]]  (Abnormal) Collected: 09/10/22 1009    Lab Status: Final result Specimen: Blood from Arm, Right Updated: 09/10/22 1058     HCG, Quant 14,726 mIU/mL     Narrative:       Expected Ranges:     Approximate               Approximate HCG  Gestation age          Concentration ( mIU/mL)  _____________          ______________________   Praveen Simmons                      HCG values  0 2-1                       5-50  1-2                           2-3                         100-5000  3-4                         500-11211  4-5                         1000-33924  5-6                         [id number]  6-8                         76375-805885  8-12                        74048-767009      Lipase [[id number]]  (Normal) Collected: 09/10/22 1009    Lab Status: Final result Specimen: Blood from Arm, Right Updated: 09/10/22 1057     Lipase 74 u/L     Comprehensive metabolic panel [[id number]]  (Abnormal) Collected: 09/10/22 1009    Lab Status: Final result Specimen: Blood from Arm, Right Updated: 09/10/22 1042     Sodium 138 mmol/L      Potassium 3 9 mmol/L      Chloride 104 mmol/L      CO2 27 mmol/L      ANION GAP 7 mmol/L      BUN 10 mg/dL      Creatinine 0 73 mg/dL      Glucose 85 mg/dL      Calcium 9 4 mg/dL      AST 14 U/L      ALT 19 U/L      Alkaline Phosphatase 44 U/L      Total Protein 7 2 g/dL      Albumin 4 0 g/dL      Total Bilirubin 0 32 mg/dL      eGFR 119 ml/min/1 73sq m     Narrative:      Ramez guidelines for Chronic Kidney Disease (CKD):     Stage 1 with normal or high GFR (GFR > 90 mL/min/1 73 square meters)    Stage 2 Mild CKD (GFR = 60-89 mL/min/1 73 square meters)    Stage 3A Moderate CKD (GFR = 45-59 mL/min/1 73 square meters)    Stage 3B Moderate CKD (GFR = 30-44 mL/min/1 73 square meters)    Stage 4 Severe CKD (GFR = 15-29 mL/min/1 73 square meters)    Stage 5 End Stage CKD (GFR <15 mL/min/1 73 square meters)  Note: GFR calculation is accurate only with a steady state creatinine    CBC and differential [[id number]]  (Normal) Collected: 09/10/22 1009    Lab Status: Final result Specimen: Blood from Arm, Right Updated: 09/10/22 1023     WBC 6 64 Thousand/uL      RBC 4 75 Million/uL      Hemoglobin 14 4 g/dL      Hematocrit 40 3 %      MCV 85 fL      MCH 30 3 pg      MCHC 35 7 g/dL      RDW 12 6 %      MPV 9 4 fL      Platelets 017 Thousands/uL      Neutrophils Relative 48 %      Lymphocytes Relative 38 %      Monocytes Relative 10 %      Eosinophils Relative 2 %      Basophils Relative 1 %      Neutrophils Absolute 3 21 Thousands/µL      Lymphocytes Absolute 2 53 Thousands/µL      Monocytes Absolute 0 69 Thousand/µL      Eosinophils Absolute 0 15 Thousand/µL      Basophils Absolute 0 04 Thousands/µL                  US OB pregnancy limited with transvaginal   Final Result by Angy Garrison MD (09/10 1258)      1  Findings for early intrauterine pregnancy with small gestational sac and yolk sac  No definite fetal pole but may be too small to assess at this time  Recommend continued follow up with serial serum hcg levels and repeat US as warranted  Workstation performed: [id number]                    Procedures  Procedures         ED Course  ED Course as of 09/11/22 0717   Sat Sep 10, 2022   1128 Patient currently status post transvaginal ultrasound  CRAFFT    Flowsheet Row Most Recent Value   SBIRT (13-23 yo)    In order to provide better care to our patients, we are screening all of our patients for alcohol and drug use  Would it be okay to ask you these screening questions? Yes Filed at: 09/10/2022 0948   SAE Initial Screen: During the past 12 months, did you:    1  Drink any alcohol (more than a few sips)? No Filed at: 09/10/2022 0948   2  Smoke any marijuana or hashish No Filed at: 09/10/2022 0948   3   Use anything else to get high? ("anything else" includes illegal drugs, over the counter and prescription drugs, and things that you sniff or 'bowser')? No Filed at: 09/10/2022 0948                                          [facility]  Number of Diagnoses or Management Options  Abdominal cramping: minor  Intrauterine pregnancy: minor     Amount and/or Complexity of Data Reviewed  Clinical lab tests: reviewed  Tests in the radiology section of CPT®: ordered and reviewed        Disposition  Final diagnoses:   Intrauterine pregnancy   Abdominal cramping     Time reflects when diagnosis was documented in both MDM as applicable and the Disposition within this note     Time User Action Codes Description Comment    9/10/2022 12:03 PM Mario Zabala Add [Z34 90] Intrauterine pregnancy     9/10/2022 12:03 PM Mario Zabala Add [R10 9] Abdominal cramping       ED Disposition     ED Disposition   Discharge    Condition   Stable    Date/Time   Sat Sep 10, 2022 12:03 PM    Comment   Zak Estrada discharge to home/self care                 Follow-up Information     Follow up With Specialties Details Why Contact Info Additional [id number] [facility] 24 Ob/Gyn Obstetrics and Gynecology Go to  Go to your scheduled appointment on 09/14  [address] [facility] [address] [facility] Emergency Department Emergency Medicine  Return emergency department if any worsening signs and symptoms specially vaginal bleeding or worsening abdominal pain [address] [place], [id number]  [address] [facility] Emergency Department, [address]          Discharge Medication List as of 9/10/2022 12:04 PM      CONTINUE these medications which have NOT CHANGED    Details   Prenatal Vit-Fe Fumarate-FA (PRENATAL 19 PO) Take by mouth, Historical Med      HYDROcodone-acetaminophen (NORCO) 5-325 mg per tablet Take 1-2 tablets by mouth every 6 (six) hours as needed for pain for up to 5 doses Max Daily Amount: 8 tablets, Starting Tue 4/19/2022, Normal      ibuprofen (MOTRIN) 800 mg tablet Take 1 tablet (800 mg total) by mouth 3 (three) times a day, Starting Wed 4/6/2022, Normal      ondansetron (Zofran ODT) 4 mg disintegrating tablet Take 1 tablet (4 mg total) by mouth every 6 (six) hours as needed for nausea or vomiting, Starting Wed 4/6/2022, Normal      ondansetron (ZOFRAN) 4 mg tablet Take 1 tablet (4 mg total) by mouth every 8 (eight) hours as needed for nausea or vomiting, Starting Sat 4/23/2022, Normal      oxybutynin (DITROPAN) 5 mg tablet Take 1 tablet (5 mg total) by mouth 3 (three) times a day as needed (bladder spasm), Starting Tue 4/19/2022, Normal      phenazopyridine (PYRIDIUM) 200 mg tablet Take 1 tablet (200 mg total) by mouth 3 (three) times a day as needed for bladder spasms, Starting Tue 4/19/2022, Normal      tamsulosin (FLOMAX) 0 4 mg Take 1 capsule (0 4 mg total) by mouth daily with dinner, Starting Mon 5/16/2022, Normal             No discharge procedures on file      PDMP Review       Value Time User    PDMP Reviewed  Yes 4/22/2022  2:26 PM Stephanie Evans MD          ED Provider  Electronically Signed by           Shannon Thomson PA-C  09/11/22 2080

## 2022-09-10 NOTE — Clinical Note
Nani Pollard was seen and treated in our emergency department on 9/10/2022  Diagnosis:     Angela Tineo  may return to work on return date  She may return on this date: 09/15/2022    May return to work if cleared by [facility]     If you have any questions or concerns, please don't hesitate to call        Qamar January, DO    ______________________________           _______________          _______________  Hospital Representative                              Date                                Time

## 2022-09-10 NOTE — ED NOTES
Pt provided discharge instructions from PA-C independent of nursing staff  VS obtained and stable prior to discharge  Pt and pt mother deny further questions prior to discharge  Pt ambulates out of department without difficulty        Judy Solomon RN  09/10/22 8867

## 2022-09-14 LAB
CO2 SERPL-SCNC: 22 MMOL/L (ref 21–32)
EXT GLUCOSE BLD: 87
EXTERNAL ALBUMIN: 4.5
EXTERNAL ALK PHOS: 46
EXTERNAL ALT: 8
EXTERNAL AST: 15
EXTERNAL BUN: 10
EXTERNAL CALCIUM: 9.2
EXTERNAL CHLORIDE: 102
EXTERNAL CREATININE: 0.61
EXTERNAL MAGNESIUM: 2.1
EXTERNAL PHOSPHORUS: 4
EXTERNAL POTASSIUM: 4
EXTERNAL PTH: 15
EXTERNAL SODIUM: 139
EXTERNAL T.BILIRUBIN: 0.2
EXTERNAL TOTAL PROTEIN: 6.6
EXTERNAL URIC ACID: 2.4

## 2022-09-16 ENCOUNTER — TELEPHONE (OUTPATIENT)
Dept: UROLOGY | Facility: AMBULATORY SURGERY CENTER | Age: 22
End: 2022-09-16

## 2022-09-16 NOTE — TELEPHONE ENCOUNTER
Patient returned call regarding changing appointment to October 25 and she can not do that date   She wants to keep it on the 24 since she already took time off     Patient can be reached at [phone]

## 2022-09-16 NOTE — TELEPHONE ENCOUNTER
lmom if the 25th does not work to call the office to r/s to another day because the provider is in bls class on the 24th

## 2022-10-06 ENCOUNTER — TELEPHONE (OUTPATIENT)
Dept: NEPHROLOGY | Facility: CLINIC | Age: 22
End: 2022-10-06

## 2022-10-06 NOTE — TELEPHONE ENCOUNTER
----- Message from [facility] sent at 10/4/2022  4:34 PM EDT -----  Regarding: Daniela Mora  I still have not got my order for the [facility] to take the 24 hour pee test I got a message to order another one and I did but I still havent got either

## 2022-10-06 NOTE — TELEPHONE ENCOUNTER
Called patient and left a voicemail stating I have sent another [employer] to her home address  I advised if she does not receive it in about a week to please give us a call or send another Panjo message

## 2022-10-11 PROBLEM — N39.0 UTI (URINARY TRACT INFECTION): Status: RESOLVED | Noted: 2022-04-20 | Resolved: 2022-10-11

## 2022-11-23 ENCOUNTER — HOSPITAL ENCOUNTER (OUTPATIENT)
Dept: ULTRASOUND IMAGING | Facility: HOSPITAL | Age: 22
Discharge: HOME | End: 2022-11-23
Attending: UROLOGY

## 2022-11-23 DIAGNOSIS — N20.0 NEPHROLITHIASIS: ICD-10-CM

## 2022-11-30 ENCOUNTER — TELEPHONE (OUTPATIENT)
Dept: NEPHROLOGY | Facility: HOSPITAL | Age: 22
End: 2022-11-30

## 2022-11-30 NOTE — TELEPHONE ENCOUNTER
----- Message from Bhargav Thurman DO sent at 11/30/2022 10:47 AM EST -----  11/16/22 litholink results reviewed: urine volume inadequate for preventing stone formation at only 0 56L  She should increase fluid intake to urinate 2-2 5L/day  The lack of water intake alone can lead to kidney stone formation  Further, her urine citrate is low  She should increase fruit juice intake, and should begin drinking 1 tablespoon lemon juice in 8oz water twice daily  She should have repeat litholink testing in 12 weeks after increasing water intake and starting to drink lemon juice in water  Thank you

## 2022-11-30 NOTE — TELEPHONE ENCOUNTER
Called and left voicemail  Urine volume inadequate for preventing stone formation at only 0 56L  Patient should increase fluid intake to urinate 2-2 5L/day  The lack of water can lead to kidney stone formation  Patient  urine citrate is low  She should increase fruit juice intake, and should begin drinking 1 tablespoon lemon juice in 8oz water twice daily  She should have repeat litholink testing in 12 weeks after increasing water intake and starting to drink lemon juice in water

## 2022-12-02 NOTE — TELEPHONE ENCOUNTER
Called unable to reach patient  preventing stone formation at only 0 56L  Patient should increase fluid intake to urinate 2-2 5L/day  The lack of water can lead to kidney stone formation  Patient  urine citrate is low  She should increase fruit juice intake, and should begin drinking 1 tablespoon lemon juice in 8oz water twice daily  She should have repeat litholink testing in 12 weeks after increasing water intake and starting to drink lemon juice in water  2nd Attempt  Letter been mailed

## 2022-12-08 ENCOUNTER — DOCUMENTATION (OUTPATIENT)
Dept: NEPHROLOGY | Facility: CLINIC | Age: 22
End: 2022-12-08

## 2022-12-15 ENCOUNTER — TELEPHONE (OUTPATIENT)
Dept: NEPHROLOGY | Facility: CLINIC | Age: 22
End: 2022-12-15

## 2022-12-15 NOTE — TELEPHONE ENCOUNTER
Left message to schedule June  follow up appointment with Dr Roxana Fitzgerald in [facility]  This is the 2nd attempt

## 2022-12-16 NOTE — TELEPHONE ENCOUNTER
Mailing recall card to patient to please call and schedule June follow up  This is the third attempt

## 2023-01-11 ENCOUNTER — TELEPHONE (OUTPATIENT)
Dept: NEPHROLOGY | Facility: CLINIC | Age: 23
End: 2023-01-11

## 2023-01-11 NOTE — TELEPHONE ENCOUNTER
Spoke with Patient and schedule appointment for 6/22/23 1:30 pm with Dr Angy Abrams in the [facility]

## 2023-03-08 ENCOUNTER — TELEPHONE (OUTPATIENT)
Dept: NEPHROLOGY | Facility: CLINIC | Age: 23
End: 2023-03-08

## 2023-03-08 ENCOUNTER — NURSE TRIAGE (OUTPATIENT)
Dept: OTHER | Facility: OTHER | Age: 23
End: 2023-03-08

## 2023-03-08 ENCOUNTER — APPOINTMENT (EMERGENCY)
Dept: RADIOLOGY | Facility: HOSPITAL | Age: 23
End: 2023-03-08

## 2023-03-08 ENCOUNTER — HOSPITAL ENCOUNTER (EMERGENCY)
Facility: HOSPITAL | Age: 23
Discharge: HOME | End: 2023-03-09
Attending: EMERGENCY MEDICINE | Admitting: EMERGENCY MEDICINE

## 2023-03-08 VITALS
TEMPERATURE: 97.9 F | SYSTOLIC BLOOD PRESSURE: 136 MMHG | DIASTOLIC BLOOD PRESSURE: 94 MMHG | HEART RATE: 74 BPM | OXYGEN SATURATION: 97 % | RESPIRATION RATE: 18 BRPM

## 2023-03-08 DIAGNOSIS — N12 PYELONEPHRITIS: ICD-10-CM

## 2023-03-08 DIAGNOSIS — R10.9 RIGHT FLANK PAIN: Primary | ICD-10-CM

## 2023-03-08 DIAGNOSIS — R30.0 DYSURIA: ICD-10-CM

## 2023-03-08 LAB
AMORPH URATE CRY URNS QL MICRO: ABNORMAL
ANION GAP SERPL CALCULATED.3IONS-SCNC: 2 MMOL/L (ref 4–13)
BACTERIA UR QL AUTO: ABNORMAL /HPF
BASOPHILS # BLD AUTO: 0.04 THOUSANDS/ÂΜL (ref 0–0.1)
BASOPHILS NFR BLD AUTO: 1 % (ref 0–1)
BILIRUB UR QL STRIP: NEGATIVE
BUN SERPL-MCNC: 11 MG/DL (ref 5–25)
CALCIUM SERPL-MCNC: 8.9 MG/DL (ref 8.3–10.1)
CHLORIDE SERPL-SCNC: 110 MMOL/L (ref 96–108)
CLARITY UR: ABNORMAL
CO2 SERPL-SCNC: 25 MMOL/L (ref 21–32)
COLOR UR: YELLOW
CREAT SERPL-MCNC: 0.74 MG/DL (ref 0.6–1.3)
EOSINOPHIL # BLD AUTO: 0.14 THOUSAND/ÂΜL (ref 0–0.61)
EOSINOPHIL NFR BLD AUTO: 2 % (ref 0–6)
ERYTHROCYTE [DISTWIDTH] IN BLOOD BY AUTOMATED COUNT: 12.7 % (ref 11.6–15.1)
EXT PREGNANCY TEST URINE: NEGATIVE
GFR SERPL CREATININE-BSD FRML MDRD: 116 ML/MIN/1.73SQ M
GLUCOSE SERPL-MCNC: 101 MG/DL (ref 65–140)
GLUCOSE UR STRIP-MCNC: NEGATIVE MG/DL
HCT VFR BLD AUTO: 42.1 % (ref 34.8–46.1)
HGB BLD-MCNC: 14.6 G/DL (ref 11.5–15.4)
HGB UR QL STRIP.AUTO: ABNORMAL
IMM GRANULOCYTES # BLD AUTO: 0.03 THOUSAND/UL (ref 0–0.2)
IMM GRANULOCYTES NFR BLD AUTO: 0 % (ref 0–2)
KETONES UR STRIP-MCNC: NEGATIVE MG/DL
LEUKOCYTE ESTERASE UR QL STRIP: ABNORMAL
LYMPHOCYTES # BLD AUTO: 2.84 THOUSANDS/ÂΜL (ref 0.6–4.47)
LYMPHOCYTES NFR BLD AUTO: 38 % (ref 14–44)
MCH RBC QN AUTO: 30.4 PG (ref 26.8–34.3)
MCHC RBC AUTO-ENTMCNC: 34.7 G/DL (ref 31.4–37.4)
MCV RBC AUTO: 88 FL (ref 82–98)
MONOCYTES # BLD AUTO: 0.74 THOUSAND/ÂΜL (ref 0.17–1.22)
MONOCYTES NFR BLD AUTO: 10 % (ref 4–12)
NEUTROPHILS # BLD AUTO: 3.66 THOUSANDS/ÂΜL (ref 1.85–7.62)
NEUTS SEG NFR BLD AUTO: 49 % (ref 43–75)
NITRITE UR QL STRIP: NEGATIVE
NON-SQ EPI CELLS URNS QL MICRO: ABNORMAL /HPF
NRBC BLD AUTO-RTO: 0 /100 WBCS
PH UR STRIP.AUTO: 7.5 [PH]
PLATELET # BLD AUTO: 219 THOUSANDS/UL (ref 149–390)
PMV BLD AUTO: 9.6 FL (ref 8.9–12.7)
POTASSIUM SERPL-SCNC: 3.5 MMOL/L (ref 3.5–5.3)
PROT UR STRIP-MCNC: ABNORMAL MG/DL
RBC # BLD AUTO: 4.8 MILLION/UL (ref 3.81–5.12)
RBC #/AREA URNS AUTO: ABNORMAL /HPF
SODIUM SERPL-SCNC: 137 MMOL/L (ref 135–147)
SP GR UR STRIP.AUTO: 1.02 (ref 1–1.03)
UROBILINOGEN UR STRIP-ACNC: <2 MG/DL
WBC # BLD AUTO: 7.45 THOUSAND/UL (ref 4.31–10.16)
WBC #/AREA URNS AUTO: ABNORMAL /HPF

## 2023-03-08 RX ORDER — OXYCODONE HYDROCHLORIDE 5 MG/1
5 TABLET ORAL ONCE
Status: COMPLETED | OUTPATIENT
Start: 2023-03-08 | End: 2023-03-08

## 2023-03-08 RX ORDER — CEPHALEXIN 500 MG/1
500 CAPSULE ORAL EVERY 6 HOURS SCHEDULED
Qty: 48 CAPSULE | Refills: 0 | Status: SHIPPED | OUTPATIENT
Start: 2023-03-08 | End: 2023-03-09 | Stop reason: CLARIF

## 2023-03-08 RX ORDER — ONDANSETRON 4 MG/1
4 TABLET, FILM COATED ORAL EVERY 6 HOURS
Qty: 20 TABLET | Refills: 0 | Status: SHIPPED | OUTPATIENT
Start: 2023-03-08

## 2023-03-08 RX ORDER — PHENAZOPYRIDINE HYDROCHLORIDE 200 MG/1
200 TABLET, FILM COATED ORAL 3 TIMES DAILY
Qty: 12 TABLET | Refills: 0 | Status: SHIPPED | OUTPATIENT
Start: 2023-03-08

## 2023-03-08 RX ADMIN — OXYCODONE HYDROCHLORIDE 5 MG: 5 TABLET ORAL at 19:41

## 2023-03-08 NOTE — TELEPHONE ENCOUNTER
Reason for Disposition  • Pain or burning with passing urine (urination)    Answer Assessment - Initial Assessment Questions  1  LOCATION: "Where does it hurt?" (e g , left, right)      Right flank    2  ONSET: "When did the pain start?"      8-9:30am    3  SEVERITY: "How bad is the pain?" (e g , Scale 1-10; mild, moderate, or severe)    - MILD (1-3): doesn't interfere with normal activities     - MODERATE (4-7): interferes with normal activities or awakens from sleep     - SEVERE (8-10): excruciating pain and patient unable to do normal activities (stays in bed)        5/10, moderate     4  PATTERN: "Does the pain come and go, or is it constant?"       constant    5  CAUSE: "What do you think is causing the pain?"      Kidney stone    6  OTHER SYMPTOMS:  "Do you have any other symptoms?" (e g , fever, abdominal pain, vomiting, leg weakness, burning with urination, blood in urine)      Burning with urination since this morning, mild lower abdominal pain/cramping but is getting her menses soon  7  PREGNANCY:  "Is there any chance you are pregnant?" "When was your last menstrual period?"      Denies    Urinating normal amounts, no frequency  Patient reports that this pain feels like when she had kidney stones and ended up in the hospital for 2 weeks, but she cannot remember if she had burning with urination when she had kidney stones  Protocols used:  FLANK PAIN-ADULT-

## 2023-03-08 NOTE — Clinical Note
Rosanne Quachsamantha was seen and treated in our emergency department on 3/8/2023  Diagnosis:     Godwin Yang    She may return on this date: 03/10/2023         If you have any questions or concerns, please don't hesitate to call        Major Nunn MD    ______________________________           _______________          _______________  Hospital Representative                              Date                                Time

## 2023-03-08 NOTE — TELEPHONE ENCOUNTER
Returned call to patient   Last seen in [facility] ER for nephrolithiasis on 4/12/22  She did have Urethroscopy / with lithotripsy on 4/19/22  Was advised she should fu in 6 months with US  Appt was cancelled due to provider change in schedule and no new appt was scheduled  Reports burning with urination and lower back pain right side 5/10  continuous 8:30 -9:00 am this morning is getting worse throughout the day  No n,v,f or chills  Has been taking motrin has not helped  Patient states she feels like she did when she had her last kidney stone which did lead to a hospitalization for 2 weeks  They also placed a call to neurology for care advice  They are going to go to Ed for evaluation   Advised we would fu with them in the am to schedule an appt after ed evaluation

## 2023-03-08 NOTE — TELEPHONE ENCOUNTER
Regarding: Lower Back Pain  ----- Message from [address] sent at 3/8/2023  3:49 PM EST -----  " I'm  having a pain in the right side of lower back its  same type of pain when I had my kidney stones I don't know if I should go to the ER  "

## 2023-03-08 NOTE — TELEPHONE ENCOUNTER
Patient called stating she has been having a pain in the right side of lower back as well as a burning pain when urinating since 8:30 AM  Patient stated this is the same type of pain as she had before when she had kidney stones  Patient would like to know if she needs to go to the emergency room or what she should do  Patient denies any other symptoms

## 2023-03-09 LAB — BACTERIA UR CULT: NORMAL

## 2023-03-09 RX ORDER — CEFPODOXIME PROXETIL 200 MG/1
200 TABLET, FILM COATED ORAL 2 TIMES DAILY
Qty: 24 TABLET | Refills: 0 | Status: SHIPPED | OUTPATIENT
Start: 2023-03-09 | End: 2023-03-12 | Stop reason: SDUPTHER

## 2023-03-09 RX ADMIN — CEFTRIAXONE SODIUM 1000 MG: 10 INJECTION, POWDER, FOR SOLUTION INTRAVENOUS at 00:59

## 2023-03-09 NOTE — TELEPHONE ENCOUNTER
Called and spoke with patient, reports she did go to ED and was diagnosed with kidney infection  Pt was started on medication Pydrium and Zofran  Reports still having pain at level 5  Also taking ibuprofen which is helping  Follow up appointment scheduled and ER precautions reviewed

## 2023-03-09 NOTE — TELEPHONE ENCOUNTER
Called and spoke with patient she still have pain she went to ED last night she got a Ct done patient stated she has a kidney infection

## 2023-03-09 NOTE — ED NOTES
Hx of bladder stone  Lithotripsy approx  1 year ago  F/U appt scheduled with Neurology in June  No f/u appts  w/ Urology  Patient states pain feels exactly the same as prior to Lithotripsy  Took Ibuprofen w/ no relief  Pain with urination    No pain with urination during previous episode       Hawk Gannon, RN  03/08/23 0720 [address] Jessica, RN  03/08/23 [id number]  [address] Rangel Diaz RN  03/08/23 1877

## 2023-03-09 NOTE — DISCHARGE INSTRUCTIONS
Take the Keflex for the next 12 days  Take the Zofran for your nausea  Take the pyridium for your pain, it will turn your urine orange  Follow up with the urologists  If you have any worsening nausea and vomiting, fevers, chills, or any other new or worsening symptoms, please return to your nearest ER

## 2023-03-09 NOTE — ED ATTENDING ATTESTATION
Ewa Rojas MD, saw and evaluated the patient  I have discussed the patient with the resident and agree with the resident's findings, Plan of Care, and MDM as documented in the resident's note, except where noted  All available labs and Radiology studies were reviewed  I was present for key portions of any procedure(s) performed by the resident and I was immediately available to provide assistance  At this point I agree with the current assessment done in the Emergency Department  I have conducted an independent evaluation of this patient a history and physical is as follows:    24 yo female with a history of nephrolithiasis, anxiety, and depression presents to the ED complaining of dysuria and flank pain since 0830 today  The patient reports a progressively worsening "stabbing" pain in the right flank  (+) "Burning" with urination  No gross hematuria  No nausea or vomiting  She denies fevers/chills  Symptoms are reportedly consistent with past episodes of renal colic  No vaginal bleeding or discharge  No other specific complaints  ROS: per resident physician note    Gen: uncomfortable appearing, AA&Ox3  HEENT: PERRL, EOMI  Neck: supple  CV: RRR  Lungs: CTA B/L  Abdomen: soft, NT/ND  Back: (+) right CVA tenderness  Ext: no swelling or deformity  Neuro: 5/5 strength all extremities, sensation grossly intact  Skin: no rash    ED Course  The patient is uncomfortable appearing with right CVA tenderness on exam  Vital signs are stable  Nephrolithiasis vs pyelonephritis vs musculoskeletal pain? Will check UA, hCG, and bedside ultrasound  Oxycodone administered, will continue to monitor in the ED  Disposition per workup and reassessment        Critical Care Time  Procedures

## 2023-03-09 NOTE — ED PROVIDER NOTES
History  Chief Complaint   Patient presents with   • Flank Pain     Patient reports right back/flank pain since 0830 this morning  Patient reports hx of kidney stones that were "lasered" but they could not get all of them  Patient reports 7/10 stabbing pain  Denies n/v but softer stool  Denies fever  Patient reports burning with urination  Bryce Tena is a 23year-old woman with a history significant for nephrolithiasis presenting with right flank pain and dysuria which started this morning  Pain is constant  Tylenol and ibuprofen have not helped, last taken shortly prior to presentation  The pain is constant  This feels similar to her prior kidney stone  No fevers, chills, nausea, vomiting, urinary urgency or frequency, hematuria, diarrhea, blood in stool, melena, prior intraabdominal surgeries  Prior to Admission Medications   Prescriptions Last Dose Informant Patient Reported? Taking?    HYDROcodone-acetaminophen (NORCO) 5-325 mg per tablet   No No   Sig: Take 1-2 tablets by mouth every 6 (six) hours as needed for pain for up to 5 doses Max Daily Amount: 8 tablets   Patient not taking: No sig reported   Prenatal Vit-Fe Fumarate-FA (PRENATAL 19 PO)   Yes No   Sig: Take by mouth   ibuprofen (MOTRIN) 800 mg tablet   No No   Sig: Take 1 tablet (800 mg total) by mouth 3 (three) times a day   Patient not taking: No sig reported   ondansetron (ZOFRAN) 4 mg tablet   No No   Sig: Take 1 tablet (4 mg total) by mouth every 8 (eight) hours as needed for nausea or vomiting   Patient not taking: Reported on 9/10/2022   ondansetron (Zofran ODT) 4 mg disintegrating tablet   No No   Sig: Take 1 tablet (4 mg total) by mouth every 6 (six) hours as needed for nausea or vomiting   Patient not taking: No sig reported   oxybutynin (DITROPAN) 5 mg tablet   No No   Sig: Take 1 tablet (5 mg total) by mouth 3 (three) times a day as needed (bladder spasm)   Patient not taking: No sig reported   phenazopyridine (PYRIDIUM) 200 mg tablet   No No   Sig: Take 1 tablet (200 mg total) by mouth 3 (three) times a day as needed for bladder spasms   Patient not taking: No sig reported   tamsulosin (FLOMAX) 0 4 mg   No No   Sig: Take 1 capsule (0 4 mg total) by mouth daily with dinner   Patient not taking: Reported on 9/10/2022      Facility-Administered Medications: None       Past Medical History:   Diagnosis Date   • Kidney stone    • Psychiatric disorder        Past Surgical History:   Procedure Laterality Date   • HEMANGIOMA EXCISION Right 2011    [facility]   • KIDNEY STONE SURGERY     • LITHOTRIPSY     • RI CYSTO/URETERO W/LITHOTRIPSY &INDWELL STENT INSRT Right 04/19/2022    Procedure: CYSTOSCOPY URETEROSCOPY WITH LITHOTRIPSY HOLMIUM LASER AND INSERTION STENT URETERAL;  Surgeon: Diaz Guo MD;  Location: [facility];  Service: Urology       Family History   Problem Relation Age of Onset   • Hypertension Mother    • Migraines Mother    • Hypertension Father    • Diabetes Father    • Nephrolithiasis Brother      I have reviewed and agree with the history as documented  E-Cigarette/Vaping   • E-Cigarette Use Never User      E-Cigarette/Vaping Substances   • Nicotine No    • THC No    • CBD No    • Flavoring No    • Other No    • Unknown No      Social History     Tobacco Use   • Smoking status: Never   • Smokeless tobacco: Never   Vaping Use   • Vaping Use: Never used   Substance Use Topics   • Alcohol use: Never   • Drug use: Never        Review of Systems   All other systems reviewed and are negative        Physical Exam  ED Triage Vitals [03/08/23 1837]   Temperature Pulse Respirations Blood Pressure SpO2   97 9 °F (36 6 °C) 82 18 136/94 100 %      Temp src Heart Rate Source Patient Position - Orthostatic VS BP Location FiO2 (%)   -- Monitor Sitting Left arm --      Pain Score       7             Orthostatic Vital Signs  Vitals:    03/08/23 1837 03/08/23 2130   BP: 136/94    Pulse: 82 74   Patient Position - Orthostatic VS: Sitting        Physical Exam  Vitals and nursing note reviewed  Constitutional:       General: She is not in acute distress  Appearance: She is well-developed  She is not ill-appearing or diaphoretic  HENT:      Head: Normocephalic and atraumatic  Right Ear: External ear normal       Left Ear: External ear normal       Nose: Nose normal    Eyes:      Conjunctiva/sclera: Conjunctivae normal    Cardiovascular:      Rate and Rhythm: Normal rate and regular rhythm  Pulmonary:      Effort: Pulmonary effort is normal  No respiratory distress  Breath sounds: Normal breath sounds  Abdominal:      General: There is no distension  Palpations: Abdomen is soft  Tenderness: There is no abdominal tenderness  There is right CVA tenderness  There is no left CVA tenderness or guarding  Musculoskeletal:         General: No deformity  Cervical back: Neck supple  Skin:     General: Skin is warm and dry  Neurological:      General: No focal deficit present  Mental Status: She is alert and oriented to person, place, and time           ED Medications  Medications   cefTRIAXone (ROCEPHIN) 1,000 mg in dextrose 5 % 50 mL IVPB (1,000 mg Intravenous New Bag 3/9/23 0059)   oxyCODONE (ROXICODONE) IR tablet 5 mg (5 mg Oral Given 3/8/23 1941)       Diagnostic Studies  Results Reviewed     Procedure Component Value Units Date/Time    Basic metabolic panel [[id number]]  (Abnormal) Collected: 03/08/23 2044    Lab Status: Final result Specimen: Blood from Arm, Left Updated: 03/08/23 2114     Sodium 137 mmol/L      Potassium 3 5 mmol/L      Chloride 110 mmol/L      CO2 25 mmol/L      ANION GAP 2 mmol/L      BUN 11 mg/dL      Creatinine 0 74 mg/dL      Glucose 101 mg/dL      Calcium 8 9 mg/dL      eGFR 116 ml/min/1 73sq m     Narrative:      [place] guidelines for Chronic Kidney Disease (CKD):   •  Stage 1 with normal or high GFR (GFR > 90 mL/min/1 73 square meters)  •  Stage 2 Mild CKD (GFR = 60-89 mL/min/1 73 square meters)  •  Stage 3A Moderate CKD (GFR = 45-59 mL/min/1 73 square meters)  •  Stage 3B Moderate CKD (GFR = 30-44 mL/min/1 73 square meters)  •  Stage 4 Severe CKD (GFR = 15-29 mL/min/1 73 square meters)  •  Stage 5 End Stage CKD (GFR <15 mL/min/1 73 square meters)  Note: GFR calculation is accurate only with a steady state creatinine    CBC and differential [[id number]] Collected: 03/08/23 2044    Lab Status: Final result Specimen: Blood from Arm, Left Updated: 03/08/23 2052     WBC 7 45 Thousand/uL      RBC 4 80 Million/uL      Hemoglobin 14 6 g/dL      Hematocrit 42 1 %      MCV 88 fL      MCH 30 4 pg      MCHC 34 7 g/dL      RDW 12 7 %      MPV 9 6 fL      Platelets 272 Thousands/uL      nRBC 0 /100 WBCs      Neutrophils Relative 49 %      Immat GRANS % 0 %      Lymphocytes Relative 38 %      Monocytes Relative 10 %      Eosinophils Relative 2 %      Basophils Relative 1 %      Neutrophils Absolute 3 66 Thousands/µL      Immature Grans Absolute 0 03 Thousand/uL      Lymphocytes Absolute 2 84 Thousands/µL      Monocytes Absolute 0 74 Thousand/µL      Eosinophils Absolute 0 14 Thousand/µL      Basophils Absolute 0 04 Thousands/µL     Urine Microscopic [[id number]]  (Abnormal) Collected: 03/08/23 1941    Lab Status: Final result Specimen: Urine, Clean Catch Updated: 03/08/23 2028     RBC, UA 4-10 /hpf      WBC, UA Innumerable /hpf      Epithelial Cells Occasional /hpf      Bacteria, UA None Seen /hpf      Amorphous Crystals, UA Moderate    Urine culture [[id number]] Collected: 03/08/23 1941    Lab Status:  In process Specimen: Urine, Clean Catch Updated: 03/08/23 2028    UA w Reflex to Microscopic w Reflex to Culture [[id number]]  (Abnormal) Collected: 03/08/23 1941    Lab Status: Final result Specimen: Urine, Clean Catch Updated: 03/08/23 2019     Color, UA Yellow     Clarity, UA Extra Turbid     Specific [place], UA 1 020     pH, UA 7 5     Leukocytes, UA Large     Nitrite, UA Negative     Protein, UA Trace mg/dl      Glucose, UA Negative mg/dl      Ketones, UA Negative mg/dl      Urobilinogen, UA <2 0 mg/dl      Bilirubin, UA Negative     Occult Blood, UA Trace    POCT pregnancy, urine [[id number]]  (Normal) Resulted: 03/08/23 2007    Lab Status: Final result Updated: 03/08/23 2007     EXT Preg Test, Ur Negative     Control --                 CT renal stone study abdomen pelvis without contrast   Final Result by Jessica Alarcon MD (03/08 2674)      No acute intra-abdominal abnormality  No free air or free fluid  No hydronephrosis or hydroureter  A few small right-sided nonobstructing calculi measuring up to 3 mm  Normal appendix visualized  Questionable anterior lower pelvic muscle wall edema  Clinical correlation is recommended  Workstation performed: [id number]               Procedures  Procedures      ED Course  ED Course as of 03/09/23 0115   Wed Mar 08, 2023   2151 Creatinine: 0 74                                       Medical Decision Making  24 yo woman presenting with right flank pain and dysuria  Concerning for nephrolithiasis, pyelonephritis  Will evaluate with UA, urine pregnancy, CT renal stone study, CBC, BMP  Pain treated with oxycodone  UA shows signs of infection  CT negative for kidney stone  Given Rocephin in ED for pyelonephritis  Prescribed cefpodoxime for home  Given Zofran and pyridium for symptom control  Discharged home in stable condition, return precautions given, follow up with PCP or urology  Amount and/or Complexity of Data Reviewed  Labs: ordered  Decision-making details documented in ED Course  Radiology: ordered  Risk  Prescription drug management              Disposition  Final diagnoses:   Right flank pain   Dysuria   Pyelonephritis     Time reflects when diagnosis was documented in both MDM as applicable and the Disposition within this note     Time User Action Codes Description Comment    3/8/2023 11:44 PM Alex Cordia Add [R10 9] Right flank pain     3/8/2023 11:44 PM Alex Cordia Add [R30 0] Dysuria     3/8/2023 11:56 PM Alex Cordia Add [N12] Pyelonephritis       ED Disposition     ED Disposition   Discharge    Condition   Stable    Date/Time   Wed Mar 8, 2023 11:56 PM    Comment   Tee Crooked Klopchin discharge to home/self care  Follow-up Information     Follow up With Specialties Details Why Contact Info Additional [facility] [place] [facility]   [address] [facility] [address] [phone] [facility] [place], [address] [place] [address], 29 [facility]    [address] [facility] 34 [facility] Emergency Medicine  If symptoms worsen [address] [id number]  956 [facility] 64 [facility], [address], [address] [phone]          Patient's Medications   Discharge Prescriptions    CEFPODOXIME (VANTIN) 200 MG TABLET    Take 1 tablet (200 mg total) by mouth 2 (two) times a day for 12 days       Start Date: 3/9/2023  End Date: 3/21/2023       Order Dose: 200 mg       Quantity: 24 tablet    Refills: 0    ONDANSETRON (ZOFRAN) 4 MG TABLET    Take 1 tablet (4 mg total) by mouth every 6 (six) hours       Start Date: 3/8/2023  End Date: --       Order Dose: 4 mg       Quantity: 20 tablet    Refills: 0    PHENAZOPYRIDINE (PYRIDIUM) 200 MG TABLET    Take 1 tablet (200 mg total) by mouth 3 (three) times a day       Start Date: 3/8/2023  End Date: --       Order Dose: 200 mg       Quantity: 12 tablet    Refills: 0     No discharge procedures on file  PDMP Review       Value Time User    PDMP Reviewed  Yes 4/22/2022  2:26 PM Rik Malin MD           ED Provider  Attending physically available and evaluated Bryce Tena I managed the patient along with the ED Attending      Electronically Signed by Kim Medrano MD  03/09/23 8413

## 2023-03-11 ENCOUNTER — HOSPITAL ENCOUNTER (EMERGENCY)
Facility: HOSPITAL | Age: 23
Discharge: HOME | End: 2023-03-12
Attending: EMERGENCY MEDICINE

## 2023-03-11 VITALS
SYSTOLIC BLOOD PRESSURE: 116 MMHG | OXYGEN SATURATION: 97 % | RESPIRATION RATE: 16 BRPM | HEART RATE: 69 BPM | DIASTOLIC BLOOD PRESSURE: 69 MMHG | TEMPERATURE: 98.6 F

## 2023-03-11 DIAGNOSIS — N12 PYELONEPHRITIS: Primary | ICD-10-CM

## 2023-03-11 RX ORDER — KETOROLAC TROMETHAMINE 30 MG/ML
15 INJECTION, SOLUTION INTRAMUSCULAR; INTRAVENOUS ONCE
Status: COMPLETED | OUTPATIENT
Start: 2023-03-11 | End: 2023-03-12

## 2023-03-11 RX ORDER — ONDANSETRON 2 MG/ML
4 INJECTION INTRAMUSCULAR; INTRAVENOUS ONCE
Status: COMPLETED | OUTPATIENT
Start: 2023-03-11 | End: 2023-03-12

## 2023-03-11 RX ORDER — HYDROMORPHONE HCL/PF 1 MG/ML
0.5 SYRINGE (ML) INJECTION ONCE
Status: COMPLETED | OUTPATIENT
Start: 2023-03-11 | End: 2023-03-11

## 2023-03-11 RX ADMIN — HYDROMORPHONE HYDROCHLORIDE 0.5 MG: 1 INJECTION, SOLUTION INTRAMUSCULAR; INTRAVENOUS; SUBCUTANEOUS at 23:23

## 2023-03-12 LAB
AMORPH URATE CRY URNS QL MICRO: NORMAL
BACTERIA UR QL AUTO: NORMAL /HPF
BILIRUB UR QL STRIP: NEGATIVE
CLARITY UR: CLEAR
COLOR UR: ABNORMAL
GLUCOSE UR STRIP-MCNC: ABNORMAL MG/DL
HGB UR QL STRIP.AUTO: NEGATIVE
KETONES UR STRIP-MCNC: ABNORMAL MG/DL
LEUKOCYTE ESTERASE UR QL STRIP: ABNORMAL
NITRITE UR QL STRIP: POSITIVE
NON-SQ EPI CELLS URNS QL MICRO: NORMAL /HPF
PH UR STRIP.AUTO: 6 [PH] (ref 4.5–8)
PROT UR STRIP-MCNC: ABNORMAL MG/DL
RBC #/AREA URNS AUTO: NORMAL /HPF
SP GR UR STRIP.AUTO: 1.01 (ref 1–1.03)
UROBILINOGEN UR QL STRIP.AUTO: 2 E.U./DL
WBC #/AREA URNS AUTO: NORMAL /HPF

## 2023-03-12 RX ORDER — CEFPODOXIME PROXETIL 200 MG/1
200 TABLET, FILM COATED ORAL 2 TIMES DAILY
Qty: 24 TABLET | Refills: 0 | Status: SHIPPED | OUTPATIENT
Start: 2023-03-12 | End: 2023-03-24

## 2023-03-12 RX ADMIN — CEFTRIAXONE SODIUM 1000 MG: 10 INJECTION, POWDER, FOR SOLUTION INTRAVENOUS at 00:10

## 2023-03-12 RX ADMIN — ONDANSETRON 4 MG: 2 INJECTION INTRAMUSCULAR; INTRAVENOUS at 00:11

## 2023-03-12 RX ADMIN — SODIUM CHLORIDE 500 ML: 0.9 INJECTION, SOLUTION INTRAVENOUS at 00:45

## 2023-03-12 RX ADMIN — KETOROLAC TROMETHAMINE 15 MG: 30 INJECTION, SOLUTION INTRAMUSCULAR; INTRAVENOUS at 00:11

## 2023-03-12 NOTE — ED ATTENDING ATTESTATION
Final Diagnoses:     1  Pyelonephritis      ED Course as of 03/12/23 0317   Sat Mar 11, 2023   2333 This is a 24 y/o F with 3mm kidney stone found 4 days ago  Urine had UTI  CT scan and was found with the stone  She was given Ceftriaxone and DC-ed on oral   Patient never picked up the antibiotic  Worsening RIGHT flank pain  Same nausea without vomiting  No fevers / chills  Here for pain control primarily  Denies any urinary tract infection symptoms (burning, itching, pain, blood, frequency)  2334 General: VSS, NAD, awake, alert  Well-nourished, well-developed  Appears stated age  Head: Normocephalic, atraumatic, nontender  Eyes: PERRL, EOM-I  No diplopia  No hyphema  No subconjunctival hemorrhages  Symmetrical lids  ENTAtraumatic external nose and ears  MMM  No stridor  Normal phonation  No drooling  Base of mouth is soft  No mastoid tenderness  Neck: Symmetric, trachea midline  No JVD  CV: Peripheral pulses +2 throughout  No chest wall tenderness  Lungs:   Unlabored   No retractions  No crepitus  No tachypnea  No paradoxical motion  Abd: +BS, soft, NT/ND    MSK:   FROM   Back:   No C/T/L-spine tenderness  RIGHT CVAT  Skin: Dry, intact  Neuro: AAOx3, GCS 15, CN II-XII grossly intact  Motor grossly intact  Psychiatric/Behavioral: Appropriate mood and affect   Exam: deferred     2334 A/P:  - Pyelo vs stone  - will treat with abx; complicated UTI  - pain control  Sun Mar 12, 2023   0316 Leukocytes, UA(!): Large   0316 Nitrite, UA(!): Positive       IJuliano MD, saw and evaluated the patient  All available labs and X-rays were ordered by me or the resident and have been reviewed by myself  I discussed the patient with the resident / non-physician and agree with the resident's / non-physician practitioner's findings and plan as documented in the resident's / non-physician practicitioner's note, except where noted     At this point, I agree with the current assessment done in the ED  I was present during key portions of all procedures performed unless otherwise stated  Nursing Triage:     Chief Complaint   Patient presents with   • Flank Pain     Pt presents ambulatory with c/o worsening kidney infection       HPI:   As above     ASSESSMENT + PLAN:   As above     Physical:   As above     Vitals:    03/11/23 2243   BP: 116/69   Pulse: 69   Resp: 16   Temp: 98 6 °F (37 °C)   TempSrc: Temporal   SpO2: 97%     - There are no obvious limitations to social determinants of care  - Nursing note reviewed  - Vitals reviewed  - Orders placed by myself and/or advanced practitioner / resident     - Previous chart was reviewed  - No language barrier    - History obtained from patient  - There are no limitations to the history obtained:     Past Medical:    has a past medical history of Kidney stone and Psychiatric disorder  Past Surgical:    has a past surgical history that includes Hemangioma excision (Right, 2011); pr cysto/uretero w/lithotripsy &indwell stent insrt (Right, 04/19/2022); Lithotripsy; and Kidney stone surgery  Social:     Social History     Substance and Sexual Activity   Alcohol Use Never     Social History     Tobacco Use   Smoking Status Never   Smokeless Tobacco Never     Social History     Substance and Sexual Activity   Drug Use Never       Echo:   No results found for this or any previous visit  No results found for this or any previous visit  Cath:    No results found for this or any previous visit        Code Status: Prior  Advance Directive and Living Will:      Power of :    POLST:    Medications   HYDROmorphone (DILAUDID) injection 0 5 mg (0 5 mg Intravenous Given 3/11/23 2323)   cefTRIAXone (ROCEPHIN) 1,000 mg in dextrose 5 % 50 mL IVPB (0 mg Intravenous Stopped 3/12/23 0040)   ketorolac (TORADOL) injection 15 mg (15 mg Intravenous Given 3/12/23 0011)   ondansetron (ZOFRAN) injection 4 mg (4 mg Intravenous Given 3/12/23 0011)   sodium chloride 0 9 % bolus 500 mL (0 mL Intravenous Stopped 3/12/23 0113)     No orders to display     Orders Placed This Encounter   Procedures   • Urine Microscopic     Labs Reviewed   URINE MACROSCOPIC, POC - Abnormal       Result Value Ref Range Status    Color, UA Orange   Final    Clarity, UA Clear   Final    pH, UA 6 0  4 5 - 8 0 Final    Leukocytes, UA Large (*) Negative Final    Nitrite, UA Positive (*) Negative Final    Protein, UA 30 (1+) (*) Negative mg/dl Final    Glucose,  (1/4%) (*) Negative mg/dl Final    Ketones, UA Trace (*) Negative mg/dl Final    Urobilinogen, UA 2 0 (*) 0 2, 1 0 E U /dl E U /dl Final    Bilirubin, UA Negative  Negative Final    Occult Blood, UA Negative  Negative Final    Specific [place], UA 1 015  1 003 - 1 030 Final    Narrative:     CLINITEK RESULT     Time reflects when diagnosis was documented in both MDM as applicable and the Disposition within this note     Time User Action Codes Description Comment    3/12/2023 12:59 AM Radha Alicia Add [N12] Pyelonephritis     3/12/2023  3:17 AM Alvarado Cameron Modify [N12] Pyelonephritis       ED Disposition     ED Disposition   Discharge    Condition   Stable    Date/Time   Sun Mar 12, 2023 12:59 AM    Comment   Vishnu Estrada discharge to home/self care                 Follow-up Information     Follow up With Specialties Details Why Contact Info Additional Antonina 27, DO Family Medicine Schedule an appointment as soon as possible for a visit   [address]  [address] [address] [address] Emergency Department Emergency Medicine Go to  If symptoms worsen [address] Emergency Department, [address] [place] [address], [address]        Discharge Medication List as of 3/12/2023  1:00 AM      CONTINUE these medications which have CHANGED    Details   cefpodoxime (VANTIN) 200 mg tablet Take 1 tablet (200 mg total) by mouth 2 (two) times a day for 12 days, Starting Sun 3/12/2023, Until Fri 3/24/2023, Normal         CONTINUE these medications which have NOT CHANGED    Details   HYDROcodone-acetaminophen (NORCO) 5-325 mg per tablet Take 1-2 tablets by mouth every 6 (six) hours as needed for pain for up to 5 doses Max Daily Amount: 8 tablets, Starting Tue 4/19/2022, Normal      ibuprofen (MOTRIN) 800 mg tablet Take 1 tablet (800 mg total) by mouth 3 (three) times a day, Starting Wed 4/6/2022, Normal      ondansetron (Zofran ODT) 4 mg disintegrating tablet Take 1 tablet (4 mg total) by mouth every 6 (six) hours as needed for nausea or vomiting, Starting Wed 4/6/2022, Normal      !! ondansetron (ZOFRAN) 4 mg tablet Take 1 tablet (4 mg total) by mouth every 8 (eight) hours as needed for nausea or vomiting, Starting Sat 4/23/2022, Normal      !! ondansetron (ZOFRAN) 4 mg tablet Take 1 tablet (4 mg total) by mouth every 6 (six) hours, Starting Wed 3/8/2023, Normal      oxybutynin (DITROPAN) 5 mg tablet Take 1 tablet (5 mg total) by mouth 3 (three) times a day as needed (bladder spasm), Starting Tue 4/19/2022, Normal      !! phenazopyridine (PYRIDIUM) 200 mg tablet Take 1 tablet (200 mg total) by mouth 3 (three) times a day as needed for bladder spasms, Starting Tue 4/19/2022, Normal      !! phenazopyridine (PYRIDIUM) 200 mg tablet Take 1 tablet (200 mg total) by mouth 3 (three) times a day, Starting Wed 3/8/2023, Normal      Prenatal Vit-Fe Fumarate-FA (PRENATAL 19 PO) Take by mouth, Historical Med      tamsulosin (FLOMAX) 0 4 mg Take 1 capsule (0 4 mg total) by mouth daily with dinner, Starting Mon 5/16/2022, Normal       !! - Potential duplicate medications found  Please discuss with provider  No discharge procedures on file  Prior to Admission Medications   Prescriptions Last Dose Informant Patient Reported? Taking? HYDROcodone-acetaminophen (NORCO) 5-325 mg per tablet   No No   Sig: Take 1-2 tablets by mouth every 6 (six) hours as needed for pain for up to 5 doses Max Daily Amount: 8 tablets   Patient not taking: No sig reported   Prenatal Vit-Fe Fumarate-FA (PRENATAL 19 PO)   Yes No   Sig: Take by mouth   cefpodoxime (VANTIN) 200 mg tablet   No No   Sig: Take 1 tablet (200 mg total) by mouth 2 (two) times a day for 12 days   cefpodoxime (VANTIN) 200 mg tablet   No Yes   Sig: Take 1 tablet (200 mg total) by mouth 2 (two) times a day for 12 days   ibuprofen (MOTRIN) 800 mg tablet   No No   Sig: Take 1 tablet (800 mg total) by mouth 3 (three) times a day   Patient not taking: No sig reported   ondansetron (ZOFRAN) 4 mg tablet   No No   Sig: Take 1 tablet (4 mg total) by mouth every 8 (eight) hours as needed for nausea or vomiting   Patient not taking: Reported on 9/10/2022   ondansetron (ZOFRAN) 4 mg tablet   No No   Sig: Take 1 tablet (4 mg total) by mouth every 6 (six) hours   ondansetron (Zofran ODT) 4 mg disintegrating tablet   No No   Sig: Take 1 tablet (4 mg total) by mouth every 6 (six) hours as needed for nausea or vomiting   Patient not taking: No sig reported   oxybutynin (DITROPAN) 5 mg tablet   No No   Sig: Take 1 tablet (5 mg total) by mouth 3 (three) times a day as needed (bladder spasm)   Patient not taking: No sig reported   phenazopyridine (PYRIDIUM) 200 mg tablet   No No   Sig: Take 1 tablet (200 mg total) by mouth 3 (three) times a day as needed for bladder spasms   Patient not taking: No sig reported   phenazopyridine (PYRIDIUM) 200 mg tablet   No No   Sig: Take 1 tablet (200 mg total) by mouth 3 (three) times a day   tamsulosin (FLOMAX) 0 4 mg   No No   Sig: Take 1 capsule (0 4 mg total) by mouth daily with dinner   Patient not taking: Reported on 9/10/2022      Facility-Administered Medications: None                        Portions of the record may have been created with voice recognition software  Occasional wrong word or "sound a like" substitutions may have occurred due to the inherent limitations of voice recognition software  Read the chart carefully and recognize, using context, where substitutions have occurred      Electronically signed by:  Kary Epstein

## 2023-03-12 NOTE — ED PROVIDER NOTES
History  Chief Complaint   Patient presents with   • Flank Pain     Pt presents ambulatory with c/o worsening kidney infection     HPI  Patient is a 32-year-old female presenting with flank pain  Patient was recently evaluated in the emergency department was diagnosed with nonobstructing kidney stones in the right side as well as urinalysis concerning for UTI  Patient was given IV ceftriaxone and was discharged with oral antibiotics  Patient states that she was unable to  the antibiotics because the pharmacy did not contact her notifying that she had antibiotics  She was able to get the other medications filled besides the antibiotics  States that since being discharged she has been having worsening right-sided flank pain  Patient is able to urinate without difficulty  Denies any dysuria or hematuria  Patient also endorses mild nausea  Otherwise denies any other complaints  Denies fever, chills, chest pain, shortness of breath, abdominal pain, bowel movement changes, weakness, numbness  Prior to Admission Medications   Prescriptions Last Dose Informant Patient Reported? Taking?    HYDROcodone-acetaminophen (NORCO) 5-325 mg per tablet   No No   Sig: Take 1-2 tablets by mouth every 6 (six) hours as needed for pain for up to 5 doses Max Daily Amount: 8 tablets   Patient not taking: No sig reported   Prenatal Vit-Fe Fumarate-FA (PRENATAL 19 PO)   Yes No   Sig: Take by mouth   cefpodoxime (VANTIN) 200 mg tablet   No No   Sig: Take 1 tablet (200 mg total) by mouth 2 (two) times a day for 12 days   cefpodoxime (VANTIN) 200 mg tablet   No Yes   Sig: Take 1 tablet (200 mg total) by mouth 2 (two) times a day for 12 days   ibuprofen (MOTRIN) 800 mg tablet   No No   Sig: Take 1 tablet (800 mg total) by mouth 3 (three) times a day   Patient not taking: No sig reported   ondansetron (ZOFRAN) 4 mg tablet   No No   Sig: Take 1 tablet (4 mg total) by mouth every 8 (eight) hours as needed for nausea or vomiting Patient not taking: Reported on 9/10/2022   ondansetron (ZOFRAN) 4 mg tablet   No No   Sig: Take 1 tablet (4 mg total) by mouth every 6 (six) hours   ondansetron (Zofran ODT) 4 mg disintegrating tablet   No No   Sig: Take 1 tablet (4 mg total) by mouth every 6 (six) hours as needed for nausea or vomiting   Patient not taking: No sig reported   oxybutynin (DITROPAN) 5 mg tablet   No No   Sig: Take 1 tablet (5 mg total) by mouth 3 (three) times a day as needed (bladder spasm)   Patient not taking: No sig reported   phenazopyridine (PYRIDIUM) 200 mg tablet   No No   Sig: Take 1 tablet (200 mg total) by mouth 3 (three) times a day as needed for bladder spasms   Patient not taking: No sig reported   phenazopyridine (PYRIDIUM) 200 mg tablet   No No   Sig: Take 1 tablet (200 mg total) by mouth 3 (three) times a day   tamsulosin (FLOMAX) 0 4 mg   No No   Sig: Take 1 capsule (0 4 mg total) by mouth daily with dinner   Patient not taking: Reported on 9/10/2022      Facility-Administered Medications: None       Past Medical History:   Diagnosis Date   • Kidney stone    • Psychiatric disorder        Past Surgical History:   Procedure Laterality Date   • HEMANGIOMA EXCISION Right 2011    [facility]   • KIDNEY STONE SURGERY     • LITHOTRIPSY     • ID CYSTO/URETERO W/LITHOTRIPSY &INDWELL STENT INSRT Right 04/19/2022    Procedure: CYSTOSCOPY URETEROSCOPY WITH LITHOTRIPSY HOLMIUM LASER AND INSERTION STENT URETERAL;  Surgeon: Rene Plunkett MD;  Location: [facility];  Service: Urology       Family History   Problem Relation Age of Onset   • Hypertension Mother    • Migraines Mother    • Hypertension Father    • Diabetes Father    • Nephrolithiasis Brother      I have reviewed and agree with the history as documented      E-Cigarette/Vaping   • E-Cigarette Use Never User      E-Cigarette/Vaping Substances   • Nicotine No    • THC No    • CBD No    • Flavoring No    • Other No    • Unknown No      Social History     Tobacco Use   • Smoking status: Never   • Smokeless tobacco: Never   Vaping Use   • Vaping Use: Never used   Substance Use Topics   • Alcohol use: Never   • Drug use: Never        Review of Systems   Constitutional: Negative for chills, diaphoresis, fever and unexpected weight change  HENT: Negative for ear pain and sore throat  Eyes: Negative for visual disturbance  Respiratory: Negative for cough, chest tightness and shortness of breath  Cardiovascular: Negative for chest pain and leg swelling  Gastrointestinal: Positive for nausea and vomiting  Negative for abdominal distention, abdominal pain, constipation and diarrhea  Endocrine: Negative  Genitourinary: Positive for flank pain  Negative for difficulty urinating and dysuria  Skin: Negative  Allergic/Immunologic: Negative  Neurological: Negative  Hematological: Negative  Psychiatric/Behavioral: Negative  All other systems reviewed and are negative  Physical Exam  ED Triage Vitals   Temperature Pulse Respirations Blood Pressure SpO2   03/11/23 2243 03/11/23 2243 03/11/23 2243 03/11/23 2243 03/11/23 2243   98 6 °F (37 °C) 69 16 116/69 97 %      Temp Source Heart Rate Source Patient Position - Orthostatic VS BP Location FiO2 (%)   03/11/23 2243 03/11/23 2243 -- -- --   Temporal Monitor         Pain Score       03/11/23 2323       9             Orthostatic Vital Signs  Vitals:    03/11/23 2243   BP: 116/69   Pulse: 69       Physical Exam  Vitals and nursing note reviewed  Constitutional:       General: She is not in acute distress  Appearance: Normal appearance  She is not ill-appearing  HENT:      Head: Normocephalic and atraumatic  Right Ear: External ear normal       Left Ear: External ear normal       Nose: Nose normal       Mouth/Throat:      Mouth: Mucous membranes are moist       Pharynx: Oropharynx is clear  Eyes:      General: No scleral icterus  Right eye: No discharge  Left eye: No discharge  Extraocular Movements: Extraocular movements intact  Conjunctiva/sclera: Conjunctivae normal       Pupils: Pupils are equal, round, and reactive to light  Cardiovascular:      Rate and Rhythm: Normal rate and regular rhythm  Pulses: Normal pulses  Heart sounds: Normal heart sounds  Pulmonary:      Effort: Pulmonary effort is normal       Breath sounds: Normal breath sounds  Abdominal:      General: Abdomen is flat  Bowel sounds are normal  There is no distension  Palpations: Abdomen is soft  Tenderness: There is no abdominal tenderness  There is right CVA tenderness  There is no guarding or rebound  Musculoskeletal:         General: Normal range of motion  Cervical back: Normal range of motion and neck supple  Skin:     General: Skin is warm and dry  Capillary Refill: Capillary refill takes less than 2 seconds  Neurological:      General: No focal deficit present  Mental Status: She is alert and oriented to person, place, and time  Mental status is at baseline  Psychiatric:         Mood and Affect: Mood normal          Behavior: Behavior normal          Thought Content:  Thought content normal          Judgment: Judgment normal          ED Medications  Medications   HYDROmorphone (DILAUDID) injection 0 5 mg (0 5 mg Intravenous Given 3/11/23 2323)   cefTRIAXone (ROCEPHIN) 1,000 mg in dextrose 5 % 50 mL IVPB (0 mg Intravenous Stopped 3/12/23 0040)   ketorolac (TORADOL) injection 15 mg (15 mg Intravenous Given 3/12/23 0011)   ondansetron (ZOFRAN) injection 4 mg (4 mg Intravenous Given 3/12/23 0011)   sodium chloride 0 9 % bolus 500 mL (0 mL Intravenous Stopped 3/12/23 0113)       Diagnostic Studies  Results Reviewed     Procedure Component Value Units Date/Time    Urine Microscopic [[id number]] Collected: 03/12/23 0057    Lab Status: Final result Specimen: Urine, Clean Catch Updated: 03/12/23 0137     RBC, UA None Seen /hpf      WBC, UA 1-2 /hpf      Epithelial Cells Occasional /hpf      Bacteria, UA Occasional /hpf      Amorphous Crystals, UA Occasional    Urine Macroscopic, POC [[id number]]  (Abnormal) Collected: 03/12/23 0057    Lab Status: Final result Specimen: Urine Updated: 03/12/23 0058     Color, UA Orange     Clarity, UA Clear     pH, UA 6 0     Leukocytes, UA Large     Nitrite, UA Positive     Protein, UA 30 (1+) mg/dl      Glucose,  (1/4%) mg/dl      Ketones, UA Trace mg/dl      Urobilinogen, UA 2 0 E U /dl      Bilirubin, UA Negative     Occult Blood, UA Negative     Specific Gravity, UA 1 015    Narrative:      CLINITEK RESULT                 No orders to display         Procedures  Procedures      ED Course                                       Medical Decision Making  Patient is a 27-year-old female presenting with right-sided flank pain  Patient with a small nonobstructing stone not concerning for infected stone however will obtain urinalysis to assess for ongoing UTI/kidney infection  Urinalysis confirms urinary tract infection  Will discharge with instructions to fill the oral antibiotics  In the meantime patient was given IV antibiotics  Discharged with return precautions provided  Instructed to follow-up with urology    Pyelonephritis: acute illness or injury  Risk  Prescription drug management  Disposition  Final diagnoses:   Pyelonephritis     Time reflects when diagnosis was documented in both MDM as applicable and the Disposition within this note     Time User Action Codes Description Comment    3/12/2023 12:59 AM Sheryl Martinez Add [N12] Pyelonephritis     3/12/2023  3:17 AM Gino Gutierrez Modify [N12] Pyelonephritis       ED Disposition     ED Disposition   Discharge    Condition   Stable    Date/Time   Sun Mar 12, 2023 12:59 AM    Comment   Kezia Estrada discharge to home/self care                 Follow-up Information     Follow up With Specialties Details Why Contact Info Additional Antonina 27, DO Family Medicine Schedule an appointment as soon as possible for a visit   [address]  [address] [address] [address] Emergency Department Emergency Medicine Go to  If symptoms worsen [address] Emergency Department, [address], [place], [facility], [address]          Discharge Medication List as of 3/12/2023  1:00 AM      CONTINUE these medications which have CHANGED    Details   cefpodoxime (VANTIN) 200 mg tablet Take 1 tablet (200 mg total) by mouth 2 (two) times a day for 12 days, Starting Sun 3/12/2023, Until Fri 3/24/2023, Normal         CONTINUE these medications which have NOT CHANGED    Details   HYDROcodone-acetaminophen (NORCO) 5-325 mg per tablet Take 1-2 tablets by mouth every 6 (six) hours as needed for pain for up to 5 doses Max Daily Amount: 8 tablets, Starting Tue 4/19/2022, Normal      ibuprofen (MOTRIN) 800 mg tablet Take 1 tablet (800 mg total) by mouth 3 (three) times a day, Starting Wed 4/6/2022, Normal      ondansetron (Zofran ODT) 4 mg disintegrating tablet Take 1 tablet (4 mg total) by mouth every 6 (six) hours as needed for nausea or vomiting, Starting Wed 4/6/2022, Normal      !! ondansetron (ZOFRAN) 4 mg tablet Take 1 tablet (4 mg total) by mouth every 8 (eight) hours as needed for nausea or vomiting, Starting Sat 4/23/2022, Normal      !! ondansetron (ZOFRAN) 4 mg tablet Take 1 tablet (4 mg total) by mouth every 6 (six) hours, Starting Wed 3/8/2023, Normal      oxybutynin (DITROPAN) 5 mg tablet Take 1 tablet (5 mg total) by mouth 3 (three) times a day as needed (bladder spasm), Starting Tue 4/19/2022, Normal      !! phenazopyridine (PYRIDIUM) 200 mg tablet Take 1 tablet (200 mg total) by mouth 3 (three) times a day as needed for bladder spasms, Starting Tue 4/19/2022, Normal      !! phenazopyridine (PYRIDIUM) 200 mg tablet Take 1 tablet (200 mg total) by mouth 3 (three) times a day, Starting Wed 3/8/2023, Normal      Prenatal Vit-Fe Fumarate-FA (PRENATAL 19 PO) Take by mouth, Historical Med      tamsulosin (FLOMAX) 0 4 mg Take 1 capsule (0 4 mg total) by mouth daily with dinner, Starting Mon 5/16/2022, Normal       !! - Potential duplicate medications found  Please discuss with provider  No discharge procedures on file  PDMP Review       Value Time User    PDMP Reviewed  Yes 4/22/2022  2:26 PM Rik Malin MD           ED Provider  Attending physically available and evaluated Bryce Tena I managed the patient along with the ED Attending      Electronically Signed by         Bucky Hickey MD  03/13/23 1445

## 2023-03-14 NOTE — PROGRESS NOTES
3/16/2023    Tee Estrada  2000  [id number]        Assessment  -Nephrolithiasis s/p right ureteroscopy (4/2022)  -Urinary tract infection    Discussion/Plan  Mariel Stallworth is a 23 y o  female being managed by Dr Chavo Jc  1  Nephrolithiasis s/p right ureteroscopy (4/2022), urinary tract infection- we discussed the results of her recent urinalysis from the emergency department on 3/11/2023 which identified positive nitrites  Unfortunately, a urine culture was not sent  Given her report of symptoms, we discussed completing cefpodoxime as prescribed by ER  We will recheck her urine with culture after completion of antibiotic to ensure resolution of infection  Encouraged patient to increase water hydration  She defers refill for Pyridium  Call patient with results of urine culture  She was advised to call her office sooner with any additional questions or issues     -All questions answered, patients agree with plan     History of Present Illness  23 y o  female with a history of nephrolithiasis presents today for follow up  Patient underwent right sided ureteroscopy on 4/19/2022  She recently experienced acute onset right-sided flank pain  She presented to the emergency department and underwent CT renal stone study on 3/8/2023  Findings revealed nonobstructing renal calculi measuring up to 3 mm in size  Urine culture negative for infection  She represented to the emergency department on 3/11/2023 for worsening symptoms and treated for urinary tract infection  Urine microscopic identified positive nitrites, urine culture was not performed  She is currently on cefpodoxime  Patient continues to report right-sided flank discomfort  She describes as an ache  Patient denies any additional lower urinary tract symptoms, gross hematuria, or dysuria  She denies any fever or chills  Review of Systems  Review of Systems   Constitutional: Negative  HENT: Negative  Respiratory: Negative  Cardiovascular: Negative  Gastrointestinal: Negative  Genitourinary: Positive for flank pain (right sided)  Negative for decreased urine volume, difficulty urinating, dysuria, frequency, hematuria and urgency  Skin: Negative  Neurological: Negative  Psychiatric/Behavioral: Negative  Past Medical History  Past Medical History:   Diagnosis Date   • Kidney stone    • Psychiatric disorder        Past Social History  Past Surgical History:   Procedure Laterality Date   • HEMANGIOMA EXCISION Right 2011    [facility]   • KIDNEY STONE SURGERY     • LITHOTRIPSY     • NM CYSTO/URETERO W/LITHOTRIPSY &INDWELL STENT INSRT Right 04/19/2022    Procedure: CYSTOSCOPY URETEROSCOPY WITH LITHOTRIPSY HOLMIUM LASER AND INSERTION STENT URETERAL;  Surgeon: José Adler MD;  Location: [facility];  Service: Urology       Past Family History  Family History   Problem Relation Age of Onset   • Hypertension Mother    • Migraines Mother    • Hypertension Father    • Diabetes Father    • Nephrolithiasis Brother        Past Social history  Social History     Socioeconomic History   • Marital status: Single     Spouse name: Not on file   • Number of children: Not on file   • Years of education: Not on file   • Highest education level: Not on file   Occupational History   • Not on file   Tobacco Use   • Smoking status: Never   • Smokeless tobacco: Never   Vaping Use   • Vaping Use: Never used   Substance and Sexual Activity   • Alcohol use: Never   • Drug use: Never   • Sexual activity: Not on file   Other Topics Concern   • Not on file   Social History Narrative   • Not on file     Social Determinants of Health     Financial Resource Strain: Not on file   Food Insecurity: No Food Insecurity   • Worried About Running Out of Food in the Last Year: Never true   • Ran Out of Food in the Last Year: Never true   Transportation Needs: No Transportation Needs   • Lack of Transportation (Medical):  No   • Lack of Transportation (Non-Medical):  No   Physical Activity: Not on file   Stress: Not on file   Social Connections: Not on file   Intimate Partner Violence: Not on file   Housing Stability: Low Risk    • Unable to Pay for Housing in the Last Year: No   • Number of Places Lived in the Last Year: 1   • Unstable Housing in the Last Year: No       Current Medications  Current Outpatient Medications   Medication Sig Dispense Refill   • cefpodoxime (VANTIN) 200 mg tablet Take 1 tablet (200 mg total) by mouth 2 (two) times a day for 12 days 24 tablet 0   • HYDROcodone-acetaminophen (NORCO) 5-325 mg per tablet Take 1-2 tablets by mouth every 6 (six) hours as needed for pain for up to 5 doses Max Daily Amount: 8 tablets (Patient not taking: No sig reported) 5 tablet 0   • ibuprofen (MOTRIN) 800 mg tablet Take 1 tablet (800 mg total) by mouth 3 (three) times a day (Patient not taking: No sig reported) 21 tablet 0   • ondansetron (Zofran ODT) 4 mg disintegrating tablet Take 1 tablet (4 mg total) by mouth every 6 (six) hours as needed for nausea or vomiting (Patient not taking: No sig reported) 20 tablet 0   • ondansetron (ZOFRAN) 4 mg tablet Take 1 tablet (4 mg total) by mouth every 8 (eight) hours as needed for nausea or vomiting (Patient not taking: Reported on 9/10/2022) 20 tablet 0   • ondansetron (ZOFRAN) 4 mg tablet Take 1 tablet (4 mg total) by mouth every 6 (six) hours 20 tablet 0   • oxybutynin (DITROPAN) 5 mg tablet Take 1 tablet (5 mg total) by mouth 3 (three) times a day as needed (bladder spasm) (Patient not taking: No sig reported) 20 tablet 0   • phenazopyridine (PYRIDIUM) 200 mg tablet Take 1 tablet (200 mg total) by mouth 3 (three) times a day as needed for bladder spasms (Patient not taking: No sig reported) 30 tablet 0   • phenazopyridine (PYRIDIUM) 200 mg tablet Take 1 tablet (200 mg total) by mouth 3 (three) times a day 12 tablet 0   • Prenatal Vit-Fe Fumarate-FA (PRENATAL 19 PO) Take by mouth     • tamsulosin (FLOMAX) 0 4 mg Take 1 capsule (0 4 mg total) by mouth daily with dinner (Patient not taking: Reported on 9/10/2022) 90 capsule 3     No current facility-administered medications for this visit  Allergies  Allergies   Allergen Reactions   • Keflex [Cephalexin] Rash   • Levaquin [Levofloxacin] Rash     Upper arm red and burning        Past medical history, social history, family history, medications and allergies were reviewed  Vitals  There were no vitals filed for this visit  Physical Exam  Physical Exam  Constitutional:       Appearance: Normal appearance  She is well-developed  HENT:      Head: Normocephalic  Eyes:      Pupils: Pupils are equal, round, and reactive to light  Pulmonary:      Effort: Pulmonary effort is normal    Abdominal:      Palpations: Abdomen is soft  Tenderness: There is no right CVA tenderness or left CVA tenderness  Musculoskeletal:         General: Normal range of motion  Cervical back: Normal range of motion  Skin:     General: Skin is warm and dry  Neurological:      General: No focal deficit present  Mental Status: She is alert and oriented to person, place, and time  Psychiatric:         Mood and Affect: Mood normal          Behavior: Behavior normal          Thought Content: Thought content normal          Judgment: Judgment normal          Results    I have personally reviewed all pertinent lab results and reviewed with patient  Lab Results   Component Value Date    CALCIUM 8 9 03/08/2023    K 3 5 03/08/2023    CO2 25 03/08/2023     (H) 03/08/2023    BUN 11 03/08/2023    CREATININE 0 74 03/08/2023     Lab Results   Component Value Date    WBC 7 45 03/08/2023    HGB 14 6 03/08/2023    HCT 42 1 03/08/2023    MCV 88 03/08/2023     03/08/2023     No results found for this or any previous visit (from the past 1 hour(s))

## 2023-03-16 ENCOUNTER — OFFICE VISIT (OUTPATIENT)
Dept: UROLOGY | Facility: AMBULATORY SURGERY CENTER | Age: 23
End: 2023-03-16

## 2023-03-16 VITALS
WEIGHT: 91 LBS | HEART RATE: 78 BPM | OXYGEN SATURATION: 96 % | DIASTOLIC BLOOD PRESSURE: 82 MMHG | RESPIRATION RATE: 18 BRPM | BODY MASS INDEX: 18.35 KG/M2 | SYSTOLIC BLOOD PRESSURE: 116 MMHG | HEIGHT: 59 IN

## 2023-03-16 DIAGNOSIS — N20.0 NEPHROLITHIASIS: Primary | ICD-10-CM

## 2023-03-16 DIAGNOSIS — N39.0 URINARY TRACT INFECTION WITHOUT HEMATURIA, SITE UNSPECIFIED: ICD-10-CM

## 2023-03-16 RX ORDER — METHYLERGONOVINE MALEATE 0.2 MG/1
200 TABLET ORAL EVERY 6 HOURS
COMMUNITY
Start: 2022-10-03

## 2023-03-16 RX ORDER — MISOPROSTOL 200 UG/1
TABLET ORAL
COMMUNITY
Start: 2022-09-27

## 2023-03-16 RX ORDER — BENZONATATE 100 MG/1
1 CAPSULE ORAL EVERY 8 HOURS
COMMUNITY
Start: 2022-12-12

## 2023-03-16 RX ORDER — BENZONATATE 100 MG/1
100 CAPSULE ORAL 3 TIMES DAILY
COMMUNITY
Start: 2022-12-12

## 2023-05-30 ENCOUNTER — HOSPITAL ENCOUNTER (EMERGENCY)
Facility: HOSPITAL | Age: 23
Discharge: HOME HEALTH | End: 2023-05-30
Attending: EMERGENCY MEDICINE

## 2023-05-30 ENCOUNTER — APPOINTMENT (EMERGENCY)
Dept: ULTRASOUND IMAGING | Facility: HOSPITAL | Age: 23
End: 2023-05-30

## 2023-05-30 ENCOUNTER — HOSPITAL ENCOUNTER (OUTPATIENT)
Facility: HOSPITAL | Age: 23
Setting detail: OBSERVATION
Discharge: HOME | End: 2023-05-31
Attending: EMERGENCY MEDICINE | Admitting: OBSTETRICS & GYNECOLOGY
Payer: COMMERCIAL

## 2023-05-30 VITALS
DIASTOLIC BLOOD PRESSURE: 68 MMHG | SYSTOLIC BLOOD PRESSURE: 128 MMHG | HEART RATE: 64 BPM | OXYGEN SATURATION: 100 % | TEMPERATURE: 98.3 F | RESPIRATION RATE: 16 BRPM

## 2023-05-30 DIAGNOSIS — O21.9 NAUSEA AND VOMITING IN PREGNANCY: Primary | ICD-10-CM

## 2023-05-30 DIAGNOSIS — O23.41 BETA HEMOLYTIC STREP UTI COMPLICATING PREGNANCY, FIRST TRIMESTER: ICD-10-CM

## 2023-05-30 DIAGNOSIS — B95.5 BETA HEMOLYTIC STREP UTI COMPLICATING PREGNANCY, FIRST TRIMESTER: ICD-10-CM

## 2023-05-30 DIAGNOSIS — R10.2 PELVIC CRAMPING: ICD-10-CM

## 2023-05-30 LAB
ALBUMIN SERPL BCP-MCNC: 3.6 G/DL (ref 3.5–5)
ALBUMIN SERPL BCP-MCNC: 4.3 G/DL (ref 3.5–5)
ALP SERPL-CCNC: 29 U/L (ref 34–104)
ALP SERPL-CCNC: 39 U/L (ref 34–104)
ALT SERPL W P-5'-P-CCNC: 11 U/L (ref 7–52)
ALT SERPL W P-5'-P-CCNC: 9 U/L (ref 7–52)
ANION GAP SERPL CALCULATED.3IONS-SCNC: 6 MMOL/L (ref 4–13)
ANION GAP SERPL CALCULATED.3IONS-SCNC: 6 MMOL/L (ref 4–13)
AST SERPL W P-5'-P-CCNC: 14 U/L (ref 13–39)
AST SERPL W P-5'-P-CCNC: 16 U/L (ref 13–39)
B-HCG SERPL-ACNC: ABNORMAL MIU/ML (ref 0–5)
BASOPHILS # BLD AUTO: 0.04 THOUSANDS/ÂΜL (ref 0–0.1)
BASOPHILS NFR BLD AUTO: 0 % (ref 0–1)
BILIRUB SERPL-MCNC: 0.35 MG/DL (ref 0.2–1)
BILIRUB SERPL-MCNC: 0.35 MG/DL (ref 0.2–1)
BILIRUB UR QL STRIP: NEGATIVE
BUN SERPL-MCNC: 13 MG/DL (ref 5–25)
BUN SERPL-MCNC: 14 MG/DL (ref 5–25)
CALCIUM SERPL-MCNC: 9.1 MG/DL (ref 8.4–10.2)
CALCIUM SERPL-MCNC: 9.5 MG/DL (ref 8.4–10.2)
CHLORIDE SERPL-SCNC: 104 MMOL/L (ref 96–108)
CHLORIDE SERPL-SCNC: 107 MMOL/L (ref 96–108)
CLARITY UR: NORMAL
CO2 SERPL-SCNC: 24 MMOL/L (ref 21–32)
CO2 SERPL-SCNC: 26 MMOL/L (ref 21–32)
COLOR UR: YELLOW
CREAT SERPL-MCNC: 0.67 MG/DL (ref 0.6–1.3)
CREAT SERPL-MCNC: 0.74 MG/DL (ref 0.6–1.3)
EOSINOPHIL # BLD AUTO: 0.1 THOUSAND/ÂΜL (ref 0–0.61)
EOSINOPHIL NFR BLD AUTO: 1 % (ref 0–6)
ERYTHROCYTE [DISTWIDTH] IN BLOOD BY AUTOMATED COUNT: 12.7 % (ref 11.6–15.1)
EXT PREGNANCY TEST URINE: POSITIVE
EXT. CONTROL: ABNORMAL
GFR SERPL CREATININE-BSD FRML MDRD: 116 ML/MIN/1.73SQ M
GFR SERPL CREATININE-BSD FRML MDRD: 126 ML/MIN/1.73SQ M
GLUCOSE SERPL-MCNC: 87 MG/DL (ref 65–140)
GLUCOSE SERPL-MCNC: 92 MG/DL (ref 65–140)
GLUCOSE UR STRIP-MCNC: NEGATIVE MG/DL
HCT VFR BLD AUTO: 42.4 % (ref 34.8–46.1)
HGB BLD-MCNC: 14.7 G/DL (ref 11.5–15.4)
HGB UR QL STRIP.AUTO: NEGATIVE
IMM GRANULOCYTES # BLD AUTO: 0.04 THOUSAND/UL (ref 0–0.2)
IMM GRANULOCYTES NFR BLD AUTO: 0 % (ref 0–2)
KETONES UR STRIP-MCNC: NEGATIVE MG/DL
LEUKOCYTE ESTERASE UR QL STRIP: NEGATIVE
LIPASE SERPL-CCNC: 18 U/L (ref 11–82)
LYMPHOCYTES # BLD AUTO: 2.31 THOUSANDS/ÂΜL (ref 0.6–4.47)
LYMPHOCYTES NFR BLD AUTO: 26 % (ref 14–44)
MCH RBC QN AUTO: 30.6 PG (ref 26.8–34.3)
MCHC RBC AUTO-ENTMCNC: 34.7 G/DL (ref 31.4–37.4)
MCV RBC AUTO: 88 FL (ref 82–98)
MONOCYTES # BLD AUTO: 0.76 THOUSAND/ÂΜL (ref 0.17–1.22)
MONOCYTES NFR BLD AUTO: 8 % (ref 4–12)
NEUTROPHILS # BLD AUTO: 5.75 THOUSANDS/ÂΜL (ref 1.85–7.62)
NEUTS SEG NFR BLD AUTO: 65 % (ref 43–75)
NITRITE UR QL STRIP: NEGATIVE
NRBC BLD AUTO-RTO: 0 /100 WBCS
PH UR STRIP.AUTO: 7 [PH]
PLATELET # BLD AUTO: 228 THOUSANDS/UL (ref 149–390)
PMV BLD AUTO: 9.6 FL (ref 8.9–12.7)
POTASSIUM SERPL-SCNC: 3.9 MMOL/L (ref 3.5–5.3)
POTASSIUM SERPL-SCNC: 4 MMOL/L (ref 3.5–5.3)
PROT SERPL-MCNC: 5.9 G/DL (ref 6.4–8.4)
PROT SERPL-MCNC: 7 G/DL (ref 6.4–8.4)
PROT UR STRIP-MCNC: NEGATIVE MG/DL
RBC # BLD AUTO: 4.81 MILLION/UL (ref 3.81–5.12)
SODIUM SERPL-SCNC: 136 MMOL/L (ref 135–147)
SODIUM SERPL-SCNC: 137 MMOL/L (ref 135–147)
SP GR UR STRIP.AUTO: 1.02 (ref 1–1.03)
TSH SERPL DL<=0.05 MIU/L-ACNC: 4.04 UIU/ML (ref 0.45–4.5)
UROBILINOGEN UR STRIP-ACNC: <2 MG/DL
WBC # BLD AUTO: 9 THOUSAND/UL (ref 4.31–10.16)

## 2023-05-30 PROCEDURE — 85025 COMPLETE CBC W/AUTO DIFF WBC: CPT | Performed by: EMERGENCY MEDICINE

## 2023-05-30 PROCEDURE — NC001 PR NO CHARGE: Performed by: OBSTETRICS & GYNECOLOGY

## 2023-05-30 PROCEDURE — 99284 EMERGENCY DEPT VISIT MOD MDM: CPT

## 2023-05-30 PROCEDURE — 80053 COMPREHEN METABOLIC PANEL: CPT | Performed by: EMERGENCY MEDICINE

## 2023-05-30 PROCEDURE — 84443 ASSAY THYROID STIM HORMONE: CPT | Performed by: OBSTETRICS & GYNECOLOGY

## 2023-05-30 RX ORDER — ONDANSETRON 2 MG/ML
4 INJECTION INTRAMUSCULAR; INTRAVENOUS EVERY 6 HOURS PRN
Status: DISCONTINUED | OUTPATIENT
Start: 2023-05-30 | End: 2023-05-30

## 2023-05-30 RX ORDER — DIPHENHYDRAMINE HYDROCHLORIDE 50 MG/ML
25 INJECTION INTRAMUSCULAR; INTRAVENOUS EVERY 6 HOURS PRN
Status: DISCONTINUED | OUTPATIENT
Start: 2023-05-30 | End: 2023-05-31 | Stop reason: HOSPADM

## 2023-05-30 RX ORDER — SODIUM CHLORIDE, SODIUM LACTATE, POTASSIUM CHLORIDE, CALCIUM CHLORIDE 600; 310; 30; 20 MG/100ML; MG/100ML; MG/100ML; MG/100ML
125 INJECTION, SOLUTION INTRAVENOUS CONTINUOUS
Status: DISCONTINUED | OUTPATIENT
Start: 2023-05-30 | End: 2023-05-30

## 2023-05-30 RX ORDER — DIPHENHYDRAMINE HYDROCHLORIDE 25 MG/1
25 CAPSULE ORAL DAILY
COMMUNITY

## 2023-05-30 RX ORDER — DEXTROSE AND SODIUM CHLORIDE 5; .9 G/100ML; G/100ML
125 INJECTION, SOLUTION INTRAVENOUS ONCE
Status: COMPLETED | OUTPATIENT
Start: 2023-05-30 | End: 2023-05-30

## 2023-05-30 RX ORDER — ONDANSETRON 2 MG/ML
4 INJECTION INTRAMUSCULAR; INTRAVENOUS EVERY 6 HOURS PRN
Status: DISCONTINUED | OUTPATIENT
Start: 2023-05-30 | End: 2023-05-31 | Stop reason: HOSPADM

## 2023-05-30 RX ORDER — ONDANSETRON 4 MG/1
4 TABLET, ORALLY DISINTEGRATING ORAL EVERY 6 HOURS PRN
Qty: 20 TABLET | Refills: 0 | Status: SHIPPED | OUTPATIENT
Start: 2023-05-30

## 2023-05-30 RX ORDER — METOCLOPRAMIDE HYDROCHLORIDE 5 MG/ML
10 INJECTION INTRAMUSCULAR; INTRAVENOUS EVERY 6 HOURS PRN
Status: DISCONTINUED | OUTPATIENT
Start: 2023-05-30 | End: 2023-05-30

## 2023-05-30 RX ORDER — PYRIDOXINE HCL (VITAMIN B6) 50 MG
25 TABLET ORAL DAILY
Status: DISCONTINUED | OUTPATIENT
Start: 2023-05-31 | End: 2023-05-30

## 2023-05-30 RX ORDER — ONDANSETRON 2 MG/ML
4 INJECTION INTRAMUSCULAR; INTRAVENOUS ONCE
Status: COMPLETED | OUTPATIENT
Start: 2023-05-30 | End: 2023-05-30

## 2023-05-30 RX ORDER — METOCLOPRAMIDE HYDROCHLORIDE 5 MG/ML
10 INJECTION INTRAMUSCULAR; INTRAVENOUS EVERY 6 HOURS PRN
Status: DISCONTINUED | OUTPATIENT
Start: 2023-05-30 | End: 2023-05-31 | Stop reason: HOSPADM

## 2023-05-30 RX ADMIN — METOCLOPRAMIDE 10 MG: 5 INJECTION, SOLUTION INTRAMUSCULAR; INTRAVENOUS at 23:16

## 2023-05-30 RX ADMIN — SODIUM CHLORIDE 1000 ML: 0.9 INJECTION, SOLUTION INTRAVENOUS at 12:51

## 2023-05-30 RX ADMIN — FOLIC ACID 125 ML/HR: 5 INJECTION, SOLUTION INTRAMUSCULAR; INTRAVENOUS; SUBCUTANEOUS at 23:14

## 2023-05-30 RX ADMIN — DEXTROSE AND SODIUM CHLORIDE 125 ML/HR: 5; .9 INJECTION, SOLUTION INTRAVENOUS at 22:28

## 2023-05-30 RX ADMIN — ONDANSETRON 4 MG: 2 INJECTION INTRAMUSCULAR; INTRAVENOUS at 12:51

## 2023-05-30 NOTE — ED PROVIDER NOTES
History  Chief Complaint   Patient presents with   • Vomiting During Pregnancy     Have been vomiting for 4 days straight, started on zofran, unisom and b6 and still vomiting  Last took zofran at 8am does not have the ODT only the swallow pill  Last episode of vomiting was 10am  Last void of urine was 8am     Patient is a 24 yo F who is  at 7 week 4 days gestation based on FDLMP of 2023 who presents with nausea and vomiting  Patient reports that she has been nauseous for the majority of the pregnancy, worse within the last 2 weeks and then 4 days ago started to have numerous episodes of nonbloody, nonbilious emesis  She states that she has been trying to take the pill form of Zofran, Unisom and B6, but she continues to vomit anytime she tries to eat or drink anything  Its that the last 2 days she has had to leave work early due to all of the vomiting episodes  She also states that she in the last day has developed pelvic cramping that she describes as intermittent, mild, over the lower abdomen, crampy in nature  Denies any vaginal bleeding, vaginal discharge, fevers, chills, lightheadedness, dizziness  Prior to Admission Medications   Prescriptions Last Dose Informant Patient Reported? Taking?    HYDROcodone-acetaminophen (NORCO) 5-325 mg per tablet   No No   Sig: Take 1-2 tablets by mouth every 6 (six) hours as needed for pain for up to 5 doses Max Daily Amount: 8 tablets   Patient not taking: Reported on 2022   Prenatal Vit-Fe Fumarate-FA (PRENATAL 19 PO)   Yes No   Sig: Take by mouth   benzonatate (TESSALON PERLES) 100 mg capsule   Yes No   Sig: Take 1 capsule by mouth every 8 (eight) hours   benzonatate (TESSALON PERLES) 100 mg capsule   Yes No   Sig: Take 100 mg by mouth 3 (three) times a day   ibuprofen (MOTRIN) 800 mg tablet   No No   Sig: Take 1 tablet (800 mg total) by mouth 3 (three) times a day   Patient not taking: Reported on 2022   methylergonovine (METHERGINE) 0 2 mg tablet   Yes No   Sig: Take 200 mcg by mouth every 6 (six) hours   miSOPROStol (Cytotec) 200 mcg tablet   Yes No   Sig: Place 4 tablets (800mcgs) vaginally     ondansetron (ZOFRAN) 4 mg tablet   No No   Sig: Take 1 tablet (4 mg total) by mouth every 8 (eight) hours as needed for nausea or vomiting   Patient not taking: Reported on 9/10/2022   ondansetron (ZOFRAN) 4 mg tablet   No No   Sig: Take 1 tablet (4 mg total) by mouth every 6 (six) hours   ondansetron (Zofran ODT) 4 mg disintegrating tablet   No No   Sig: Take 1 tablet (4 mg total) by mouth every 6 (six) hours as needed for nausea or vomiting   Patient not taking: Reported on 4/22/2022   oxybutynin (DITROPAN) 5 mg tablet   No No   Sig: Take 1 tablet (5 mg total) by mouth 3 (three) times a day as needed (bladder spasm)   Patient not taking: Reported on 4/22/2022   phenazopyridine (PYRIDIUM) 200 mg tablet   No No   Sig: Take 1 tablet (200 mg total) by mouth 3 (three) times a day as needed for bladder spasms   Patient not taking: Reported on 4/22/2022   phenazopyridine (PYRIDIUM) 200 mg tablet   No No   Sig: Take 1 tablet (200 mg total) by mouth 3 (three) times a day   tamsulosin (FLOMAX) 0 4 mg   No No   Sig: Take 1 capsule (0 4 mg total) by mouth daily with dinner   Patient not taking: Reported on 9/10/2022      Facility-Administered Medications: None       Past Medical History:   Diagnosis Date   • Kidney stone    • Psychiatric disorder        Past Surgical History:   Procedure Laterality Date   • HEMANGIOMA EXCISION Right 2011    [facility]   • KIDNEY STONE SURGERY     • LITHOTRIPSY     • NH CYSTO/URETERO W/LITHOTRIPSY &INDWELL STENT INSRT Right 04/19/2022    Procedure: CYSTOSCOPY URETEROSCOPY WITH LITHOTRIPSY HOLMIUM LASER AND INSERTION STENT URETERAL;  Surgeon: Jaleesa Lambert MD;  Location: [facility];  Service: Urology       Family History   Problem Relation Age of Onset   • Hypertension Mother    • Migraines Mother    • Hypertension Father    • Diabetes Father    • Nephrolithiasis Brother      I have reviewed and agree with the history as documented  E-Cigarette/Vaping   • E-Cigarette Use Never User      E-Cigarette/Vaping Substances   • Nicotine No    • THC No    • CBD No    • Flavoring No    • Other No    • Unknown No      Social History     Tobacco Use   • Smoking status: Never   • Smokeless tobacco: Never   Vaping Use   • Vaping Use: Never used   Substance Use Topics   • Alcohol use: Never   • Drug use: Never       Review of Systems   Constitutional: Negative for chills and fever  Respiratory: Negative for shortness of breath  Cardiovascular: Negative for chest pain  Gastrointestinal: Positive for nausea and vomiting  Negative for abdominal pain, blood in stool, constipation and diarrhea  Genitourinary: Positive for pelvic pain  Negative for decreased urine volume, dysuria, flank pain, frequency, urgency, vaginal bleeding, vaginal discharge and vaginal pain  Neurological: Negative for dizziness, light-headedness and headaches  Physical Exam  Physical Exam  Vitals and nursing note reviewed  Constitutional:       General: She is not in acute distress  Appearance: Normal appearance  She is underweight  She is not ill-appearing, toxic-appearing or diaphoretic  HENT:      Head: Normocephalic and atraumatic  Mouth/Throat:      Mouth: Mucous membranes are moist    Eyes:      Conjunctiva/sclera: Conjunctivae normal       Pupils: Pupils are equal, round, and reactive to light  Cardiovascular:      Rate and Rhythm: Normal rate and regular rhythm  Pulses: Normal pulses  Heart sounds: Normal heart sounds  No murmur heard  Pulmonary:      Effort: Pulmonary effort is normal  No respiratory distress  Breath sounds: Normal breath sounds  No stridor  No wheezing, rhonchi or rales  Chest:      Chest wall: No tenderness  Abdominal:      General: Bowel sounds are normal  There is no distension        Palpations: Abdomen is soft       Tenderness: There is no abdominal tenderness  There is no right CVA tenderness, left CVA tenderness, guarding or rebound  Skin:     General: Skin is warm and dry  Neurological:      General: No focal deficit present  Mental Status: She is alert and oriented to person, place, and time  Mental status is at baseline  Psychiatric:         Mood and Affect: Mood normal          Behavior: Behavior normal          Vital Signs  ED Triage Vitals [05/30/23 1101]   Temperature Pulse Respirations Blood Pressure SpO2   98 3 °F (36 8 °C) 60 16 129/68 100 %      Temp Source Heart Rate Source Patient Position - Orthostatic VS BP Location FiO2 (%)   Oral Monitor Sitting Right arm --      Pain Score       No Pain           Vitals:    05/30/23 1101 05/30/23 1500   BP: 129/68 128/68   Pulse: 60 64   Patient Position - Orthostatic VS: Sitting          Visual Acuity      ED Medications  Medications   sodium chloride 0 9 % bolus 1,000 mL (0 mL Intravenous Stopped 5/30/23 1351)   ondansetron (ZOFRAN) injection 4 mg (4 mg Intravenous Given 5/30/23 1251)       Diagnostic Studies  Results Reviewed     Procedure Component Value Units Date/Time    Quantitative hCG [[id number]]  (Abnormal) Collected: 05/30/23 1249    Lab Status: Final result Specimen: Blood from Arm, Right Updated: 05/30/23 1416     HCG, Quant 104,303 mIU/mL     Narrative:       Expected Ranges:    HCG results between 5 and 25 mIU/mL may be indicative of early pregnancy but should be interpreted in light of the total clinical presentation  HCG can rise to detectable levels in nina and post menopausal women (0-11 6 mIU/mL)       Approximate               Approximate HCG  Gestation age          Concentration ( mIU/mL)  _____________          ______________________   Yahir Bonus                      HCG values  0 2-1                       5-50  1-2                           2-3                         100-5000  3-4                         500-17777  4-5 1000-04837  5-6                         [id number]  6-8                         20948-180018  8-12                        [id number]      UA w Reflex to Microscopic w Reflex to Culture [[id number]] Collected: 05/30/23 1250    Lab Status: Final result Specimen: Urine, Clean Catch Updated: 05/30/23 1323     Color, UA Yellow     Clarity, UA Cloudy     Specific [place], UA 1 020     pH, UA 7 0     Leukocytes, UA Negative     Nitrite, UA Negative     Protein, UA Negative mg/dl      Glucose, UA Negative mg/dl      Ketones, UA Negative mg/dl      Urobilinogen, UA <2 0 mg/dl      Bilirubin, UA Negative     Occult Blood, UA Negative     URINE COMMENT --    Urine culture [[id number]] Collected: 05/30/23 1250    Lab Status:  In process Specimen: Urine, Clean Catch Updated: 05/30/23 1323    Comprehensive metabolic panel [[id number]] Collected: 05/30/23 1249    Lab Status: Final result Specimen: Blood from Arm, Right Updated: 05/30/23 1311     Sodium 136 mmol/L      Potassium 3 9 mmol/L      Chloride 104 mmol/L      CO2 26 mmol/L      ANION GAP 6 mmol/L      BUN 14 mg/dL      Creatinine 0 74 mg/dL      Glucose 87 mg/dL      Calcium 9 5 mg/dL      AST 14 U/L      ALT 11 U/L      Alkaline Phosphatase 39 U/L      Total Protein 7 0 g/dL      Albumin 4 3 g/dL      Total Bilirubin 0 35 mg/dL      eGFR 116 ml/min/1 73sq m     Narrative:      Ramez guidelines for Chronic Kidney Disease (CKD):   •  Stage 1 with normal or high GFR (GFR > 90 mL/min/1 73 square meters)  •  Stage 2 Mild CKD (GFR = 60-89 mL/min/1 73 square meters)  •  Stage 3A Moderate CKD (GFR = 45-59 mL/min/1 73 square meters)  •  Stage 3B Moderate CKD (GFR = 30-44 mL/min/1 73 square meters)  •  Stage 4 Severe CKD (GFR = 15-29 mL/min/1 73 square meters)  •  Stage 5 End Stage CKD (GFR <15 mL/min/1 73 square meters)  Note: GFR calculation is accurate only with a steady state creatinine    Lipase [[id number]]  (Normal) Collected: 05/30/23 1249    Lab Status: Final result Specimen: Blood from Arm, Right Updated: 05/30/23 1311     Lipase 18 u/L     CBC and differential [[id number]] Collected: 05/30/23 1249    Lab Status: Final result Specimen: Blood from Arm, Right Updated: 05/30/23 1255     WBC 9 00 Thousand/uL      RBC 4 81 Million/uL      Hemoglobin 14 7 g/dL      Hematocrit 42 4 %      MCV 88 fL      MCH 30 6 pg      MCHC 34 7 g/dL      RDW 12 7 %      MPV 9 6 fL      Platelets 281 Thousands/uL      nRBC 0 /100 WBCs      Neutrophils Relative 65 %      Immat GRANS % 0 %      Lymphocytes Relative 26 %      Monocytes Relative 8 %      Eosinophils Relative 1 %      Basophils Relative 0 %      Neutrophils Absolute 5 75 Thousands/µL      Immature Grans Absolute 0 04 Thousand/uL      Lymphocytes Absolute 2 31 Thousands/µL      Monocytes Absolute 0 76 Thousand/µL      Eosinophils Absolute 0 10 Thousand/µL      Basophils Absolute 0 04 Thousands/µL     POCT pregnancy, urine [[id number]]  (Abnormal) Resulted: 05/30/23 1250    Lab Status: Final result Updated: 05/30/23 1250     EXT Preg Test, Ur Positive     Control Valid                 US OB < 14 weeks with transvaginal   Final Result by Yennifer Leung MD (05/30 1415)      Single live intrauterine gestation at 6 weeks 6 days (range +/- 4 days)  JOSE of 01/17/2024  Workstation performed: [id number]                    Procedures  Procedures         ED Course  ED Course as of 05/30/23 1534   Tue May 30, 2023   1313 PREGNANCY TEST URINE(!): Positive   1426 IMPRESSION:  Single live intrauterine gestation at 6 weeks 6 days (range +/- 4 days)  JOSE of 01/17/2024    1430 Patient attempting PO challenge  1438 HCG QUANTITATIVE(!): 104,303   1455 Patient tolerated p o  challenge  Will prescribe ODT Zofran  Follow-up with OB/GYN  Repeat beta-hCG in 48 hours  Discussed and reviewed strict return precautions which patient and her mother both verbalized understanding of           SAE "  Flowsheet Row Most Recent Value   SAE Initial Screen: During the past 12 months, did you:    1  Drink any alcohol (more than a few sips)? No Filed at: 05/30/2023 1230   2  Smoke any marijuana or hashish No Filed at: 05/30/2023 1230   3  Use anything else to get high? (\"anything else\" includes illegal drugs, over the counter and prescription drugs, and things that you sniff or 'bowser')? No Filed at: 05/30/2023 1230                                          Medical Decision Making  Assessment and Plan:   Differential includes hyperemesis gravidarum versus nausea/vomiting in pregnancy versus dehydration or electrolyte abnormalities due to the vomiting versus less likely miscarriage in the setting of pelvic cramping  Patient has not yet had a formal ultrasound to evaluate the pregnancy, therefore we will get an ultrasound to rule out ectopic  We will get labs to evaluate for electrolyte abnormalities, check kidney and liver function; urinalysis to evaluate for ketones for dehydration as well as a urinary tract infection; treat symptomatically  Reassess  Amount and/or Complexity of Data Reviewed  Labs: ordered  Decision-making details documented in ED Course  Radiology: ordered  Risk  Prescription drug management  Disposition  Final diagnoses:   Nausea and vomiting in pregnancy   Pelvic cramping     Time reflects when diagnosis was documented in both MDM as applicable and the Disposition within this note     Time User Action Codes Description Comment    5/30/2023  2:56 PM Vesta Halon Add [O21 9] Nausea and vomiting in pregnancy     5/30/2023  2:58 PM Vesta Halon Add [R10 2] Pelvic cramping       ED Disposition     ED Disposition   Discharge    Condition   Stable    Date/Time   Tue May 30, 2023  2:56 PM    Comment   Mike Estrada discharge to home/self care                 Follow-up Information     Follow up With Specialties Details Why Contact Info Additional Information     St  " [facility] Emergency Department Emergency Medicine Go to  As needed, If symptoms worsen, for re-evaluation [address] [place],[address] [id number]  [address] [facility] Emergency Department, [address] [place], [place]ie [address]    Your OBGYN  Schedule an appointment as soon as possible for a visit in 3 days for re-evaluation            Discharge Medication List as of 5/30/2023  2:58 PM      CONTINUE these medications which have CHANGED    Details   ondansetron (ZOFRAN-ODT) 4 mg disintegrating tablet Take 1 tablet (4 mg total) by mouth every 6 (six) hours as needed for nausea or vomiting, Starting Tue 5/30/2023, Normal         CONTINUE these medications which have NOT CHANGED    Details   !! benzonatate (TESSALON PERLES) 100 mg capsule Take 1 capsule by mouth every 8 (eight) hours, Starting Mon 12/12/2022, Historical Med      !! benzonatate (TESSALON PERLES) 100 mg capsule Take 100 mg by mouth 3 (three) times a day, Starting Mon 12/12/2022, Historical Med      HYDROcodone-acetaminophen (NORCO) 5-325 mg per tablet Take 1-2 tablets by mouth every 6 (six) hours as needed for pain for up to 5 doses Max Daily Amount: 8 tablets, Starting Tue 4/19/2022, Normal      ibuprofen (MOTRIN) 800 mg tablet Take 1 tablet (800 mg total) by mouth 3 (three) times a day, Starting Wed 4/6/2022, Normal      methylergonovine (METHERGINE) 0 2 mg tablet Take 200 mcg by mouth every 6 (six) hours, Starting Mon 10/3/2022, Historical Med      miSOPROStol (Cytotec) 200 mcg tablet Place 4 tablets (800mcgs) vaginally  , Historical Med      !! ondansetron (ZOFRAN) 4 mg tablet Take 1 tablet (4 mg total) by mouth every 8 (eight) hours as needed for nausea or vomiting, Starting Sat 4/23/2022, Normal      !! ondansetron (ZOFRAN) 4 mg tablet Take 1 tablet (4 mg total) by mouth every 6 (six) hours, Starting Wed 3/8/2023, Normal      oxybutynin (DITROPAN) 5 mg tablet Take 1 tablet (5 mg total) by mouth 3 (three) times a day as needed (bladder spasm), Starting Tue 4/19/2022, Normal      !! phenazopyridine (PYRIDIUM) 200 mg tablet Take 1 tablet (200 mg total) by mouth 3 (three) times a day as needed for bladder spasms, Starting Tue 4/19/2022, Normal      !! phenazopyridine (PYRIDIUM) 200 mg tablet Take 1 tablet (200 mg total) by mouth 3 (three) times a day, Starting Wed 3/8/2023, Normal      Prenatal Vit-Fe Fumarate-FA (PRENATAL 19 PO) Take by mouth, Historical Med      tamsulosin (FLOMAX) 0 4 mg Take 1 capsule (0 4 mg total) by mouth daily with dinner, Starting Mon 5/16/2022, Normal       !! - Potential duplicate medications found  Please discuss with provider  Outpatient Discharge Orders   hCG, quantitative   Standing Status: Future Standing Exp   Date: 05/30/24       PDMP Review       Value Time User    PDMP Reviewed  Yes 4/22/2022  2:26 PM Virgilio Obando MD          ED Provider  Electronically Signed by           Hardy Valladares DO  05/30/23 1535

## 2023-05-30 NOTE — Clinical Note
Tremaine Sarabia was seen and treated in our emergency department on 5/30/2023  No restrictions    Other - See Comments        Diagnosis:     Mark Collins  may return to work on return date  She may return on this date: 06/08/2023    Follow up with OB/GYN     If you have any questions or concerns, please don't hesitate to call        Naomi Waldron RN    ______________________________           _______________          _______________  Hospital Representative                              Date                                Time

## 2023-05-31 ENCOUNTER — TELEPHONE (OUTPATIENT)
Dept: OBGYN CLINIC | Facility: CLINIC | Age: 23
End: 2023-05-31

## 2023-05-31 ENCOUNTER — TELEPHONE (OUTPATIENT)
Dept: LABOR AND DELIVERY | Facility: HOSPITAL | Age: 23
End: 2023-05-31

## 2023-05-31 VITALS
HEART RATE: 74 BPM | OXYGEN SATURATION: 99 % | SYSTOLIC BLOOD PRESSURE: 99 MMHG | BODY MASS INDEX: 18.18 KG/M2 | DIASTOLIC BLOOD PRESSURE: 55 MMHG | TEMPERATURE: 98.3 F | RESPIRATION RATE: 16 BRPM | WEIGHT: 90 LBS

## 2023-05-31 LAB
BACTERIA UR CULT: ABNORMAL
BACTERIA UR CULT: ABNORMAL
BASOPHILS # BLD AUTO: 0.02 THOUSANDS/ÂΜL (ref 0–0.1)
BASOPHILS NFR BLD AUTO: 0 % (ref 0–1)
EOSINOPHIL # BLD AUTO: 0.17 THOUSAND/ÂΜL (ref 0–0.61)
EOSINOPHIL NFR BLD AUTO: 2 % (ref 0–6)
ERYTHROCYTE [DISTWIDTH] IN BLOOD BY AUTOMATED COUNT: 12.8 % (ref 11.6–15.1)
HCT VFR BLD AUTO: 37.1 % (ref 34.8–46.1)
HGB BLD-MCNC: 13.1 G/DL (ref 11.5–15.4)
IMM GRANULOCYTES # BLD AUTO: 0.02 THOUSAND/UL (ref 0–0.2)
IMM GRANULOCYTES NFR BLD AUTO: 0 % (ref 0–2)
LYMPHOCYTES # BLD AUTO: 2.48 THOUSANDS/ÂΜL (ref 0.6–4.47)
LYMPHOCYTES NFR BLD AUTO: 28 % (ref 14–44)
MCH RBC QN AUTO: 30.7 PG (ref 26.8–34.3)
MCHC RBC AUTO-ENTMCNC: 35.3 G/DL (ref 31.4–37.4)
MCV RBC AUTO: 87 FL (ref 82–98)
MONOCYTES # BLD AUTO: 0.96 THOUSAND/ÂΜL (ref 0.17–1.22)
MONOCYTES NFR BLD AUTO: 11 % (ref 4–12)
NEUTROPHILS # BLD AUTO: 5.23 THOUSANDS/ÂΜL (ref 1.85–7.62)
NEUTS SEG NFR BLD AUTO: 59 % (ref 43–75)
NRBC BLD AUTO-RTO: 0 /100 WBCS
PLATELET # BLD AUTO: 196 THOUSANDS/UL (ref 149–390)
PMV BLD AUTO: 9.4 FL (ref 8.9–12.7)
RBC # BLD AUTO: 4.27 MILLION/UL (ref 3.81–5.12)
WBC # BLD AUTO: 8.88 THOUSAND/UL (ref 4.31–10.16)

## 2023-05-31 RX ORDER — FAMOTIDINE 20 MG/1
20 TABLET, FILM COATED ORAL DAILY
Status: DISCONTINUED | OUTPATIENT
Start: 2023-05-31 | End: 2023-05-31 | Stop reason: HOSPADM

## 2023-05-31 RX ORDER — PENICILLIN V POTASSIUM 250 MG/1
250 TABLET ORAL 4 TIMES DAILY
Qty: 28 TABLET | Refills: 0 | Status: SHIPPED | OUTPATIENT
Start: 2023-05-31 | End: 2023-06-07

## 2023-05-31 RX ORDER — METOCLOPRAMIDE 10 MG/1
10 TABLET ORAL EVERY 6 HOURS PRN
Qty: 30 TABLET | Refills: 0 | Status: SHIPPED | OUTPATIENT
Start: 2023-05-31

## 2023-05-31 RX ORDER — ACETAMINOPHEN 325 MG/1
650 TABLET ORAL EVERY 6 HOURS PRN
Status: DISCONTINUED | OUTPATIENT
Start: 2023-05-31 | End: 2023-05-31 | Stop reason: HOSPADM

## 2023-05-31 RX ORDER — SENNOSIDES 8.6 MG
1 TABLET ORAL DAILY PRN
Status: DISCONTINUED | OUTPATIENT
Start: 2023-05-31 | End: 2023-05-31 | Stop reason: HOSPADM

## 2023-05-31 RX ADMIN — FAMOTIDINE 20 MG: 20 TABLET ORAL at 07:58

## 2023-05-31 RX ADMIN — DIPHENHYDRAMINE HYDROCHLORIDE 25 MG: 50 INJECTION, SOLUTION INTRAMUSCULAR; INTRAVENOUS at 07:58

## 2023-05-31 NOTE — ED PROVIDER NOTES
History  Chief Complaint   Patient presents with   • Vomiting During Pregnancy     Pt 6 weeks preg, seen earlier and discharged and has vomited 14 times since she left at 0         21-year-old female para 2  0010 is 6 weeks 6 days gestation by ultrasound today  She has had very frequent vomiting for the past 4 to 5 days  Unable to keep anything down  Did not improve with Unisom,, vitamin B6 and Zofran tablets  Came to the emergency room today and had labs drawn, saline and Zofran given and pelvic ultrasound done  She had normal CBC, comprehensive metabolic panel, lipase and urinalysis with hCG of 104,303  She she passed a p o  challenge and went home with prescription of Zofran dissolvable tablets  She started vomiting again at 4 PM despite Zofran and has vomited approximately 14 times  She states she feels somewhat weak but there has been no syncope  States she still is urinating but was told to come back if vomiting becomes intractable once again  She plans to see the OB[facility]GYN group at [facility]           Prior to Admission Medications   Prescriptions Last Dose Informant Patient Reported? Taking?    HYDROcodone-acetaminophen (NORCO) 5-325 mg per tablet   No No   Sig: Take 1-2 tablets by mouth every 6 (six) hours as needed for pain for up to 5 doses Max Daily Amount: 8 tablets   Patient not taking: Reported on 2022   Prenatal Vit-Fe Fumarate-FA (PRENATAL 19 PO)   Yes No   Sig: Take by mouth   benzonatate (TESSALON PERLES) 100 mg capsule   Yes No   Sig: Take 1 capsule by mouth every 8 (eight) hours   benzonatate (TESSALON PERLES) 100 mg capsule   Yes No   Sig: Take 100 mg by mouth 3 (three) times a day   ibuprofen (MOTRIN) 800 mg tablet   No No   Sig: Take 1 tablet (800 mg total) by mouth 3 (three) times a day   Patient not taking: Reported on 2022   methylergonovine (METHERGINE) 0 2 mg tablet   Yes No   Sig: Take 200 mcg by mouth every 6 (six) hours   miSOPROStol (Cytotec) 200 mcg tablet   Yes No   Sig: Place 4 tablets (800mcgs) vaginally  ondansetron (ZOFRAN) 4 mg tablet   No No   Sig: Take 1 tablet (4 mg total) by mouth every 8 (eight) hours as needed for nausea or vomiting   Patient not taking: Reported on 9/10/2022   ondansetron (ZOFRAN) 4 mg tablet   No No   Sig: Take 1 tablet (4 mg total) by mouth every 6 (six) hours   ondansetron (ZOFRAN-ODT) 4 mg disintegrating tablet   No No   Sig: Take 1 tablet (4 mg total) by mouth every 6 (six) hours as needed for nausea or vomiting   oxybutynin (DITROPAN) 5 mg tablet   No No   Sig: Take 1 tablet (5 mg total) by mouth 3 (three) times a day as needed (bladder spasm)   Patient not taking: Reported on 4/22/2022   phenazopyridine (PYRIDIUM) 200 mg tablet   No No   Sig: Take 1 tablet (200 mg total) by mouth 3 (three) times a day as needed for bladder spasms   Patient not taking: Reported on 4/22/2022   phenazopyridine (PYRIDIUM) 200 mg tablet   No No   Sig: Take 1 tablet (200 mg total) by mouth 3 (three) times a day   tamsulosin (FLOMAX) 0 4 mg   No No   Sig: Take 1 capsule (0 4 mg total) by mouth daily with dinner   Patient not taking: Reported on 9/10/2022      Facility-Administered Medications: None       Past Medical History:   Diagnosis Date   • Kidney stone    • Psychiatric disorder        Past Surgical History:   Procedure Laterality Date   • HEMANGIOMA EXCISION Right 2011    [facility]   • KIDNEY STONE SURGERY     • LITHOTRIPSY     • [place] CYSTO/URETERO W/LITHOTRIPSY &INDWELL STENT INSRT Right 04/19/2022    Procedure: CYSTOSCOPY URETEROSCOPY WITH LITHOTRIPSY HOLMIUM LASER AND INSERTION STENT URETERAL;  Surgeon: Lester Montgomery MD;  Location: [facility];  Service: Urology       Family History   Problem Relation Age of Onset   • Hypertension Mother    • Migraines Mother    • Hypertension Father    • Diabetes Father    • Nephrolithiasis Brother      I have reviewed and agree with the history as documented      E-Cigarette/Vaping   • E-Cigarette Use Never User      E-Cigarette/Vaping Substances   • Nicotine No    • THC No    • CBD No    • Flavoring No    • Other No    • Unknown No      Social History     Tobacco Use   • Smoking status: Never   • Smokeless tobacco: Never   Vaping Use   • Vaping Use: Never used   Substance Use Topics   • Alcohol use: Never   • Drug use: Never       Review of Systems   Constitutional: Negative for fever  Respiratory: Negative for cough and shortness of breath  Cardiovascular: Negative for chest pain  Gastrointestinal: Positive for nausea and vomiting  Negative for blood in stool and diarrhea  Genitourinary: Negative for dysuria and flank pain  Physical Exam  Physical Exam  Vitals and nursing note reviewed  Constitutional:       General: She is not in acute distress  Appearance: She is well-developed and normal weight  She is ill-appearing  She is not diaphoretic  HENT:      Head: Normocephalic and atraumatic  Right Ear: External ear normal       Left Ear: External ear normal       Mouth/Throat:      Mouth: Mucous membranes are moist       Pharynx: Oropharynx is clear  Eyes:      General: No scleral icterus  Conjunctiva/sclera: Conjunctivae normal       Pupils: Pupils are equal, round, and reactive to light  Cardiovascular:      Rate and Rhythm: Normal rate and regular rhythm  Pulses: Normal pulses  Heart sounds: Normal heart sounds  No murmur heard  Pulmonary:      Effort: Pulmonary effort is normal       Breath sounds: Normal breath sounds  Abdominal:      General: Bowel sounds are normal       Palpations: Abdomen is soft  There is no mass  Tenderness: There is no abdominal tenderness  There is no right CVA tenderness, left CVA tenderness, guarding or rebound  Hernia: No hernia is present  Musculoskeletal:         General: No tenderness  Normal range of motion  Cervical back: Normal range of motion and neck supple  Right lower leg: No edema  Left lower leg: No edema  Skin:     General: Skin is warm and dry  Capillary Refill: Capillary refill takes less than 2 seconds  Findings: No lesion or rash  Neurological:      General: No focal deficit present  Mental Status: She is alert and oriented to person, place, and time  Mental status is at baseline  Cranial Nerves: No cranial nerve deficit  Coordination: Coordination normal       Deep Tendon Reflexes: Reflexes are normal and symmetric  Psychiatric:         Mood and Affect: Mood normal          Behavior: Behavior normal          Vital Signs  ED Triage Vitals [05/30/23 2124]   Temperature Pulse Respirations Blood Pressure SpO2   98 3 °F (36 8 °C) 66 16 139/70 100 %      Temp src Heart Rate Source Patient Position - Orthostatic VS BP Location FiO2 (%)   -- -- -- -- --      Pain Score       --           Vitals:    05/30/23 2124   BP: 139/70   Pulse: 66         Visual Acuity      ED Medications  Medications   dextrose 5 % and sodium chloride 0 9 % infusion (has no administration in time range)   ondansetron (ZOFRAN) injection 4 mg (has no administration in time range)   metoclopramide (REGLAN) injection 10 mg (has no administration in time range)   diphenhydrAMINE (BENADRYL) injection 25 mg (has no administration in time range)   pyridoxine (VITAMIN B6) tablet 25 mg (has no administration in time range)       Diagnostic Studies  Results Reviewed     Procedure Component Value Units Date/Time    CBC and differential [[id number]] Collected: 05/30/23 2216    Lab Status: In process Specimen: Blood from Arm, Left Updated: 05/30/23 2221    Comprehensive metabolic panel [[id number]] Collected: 05/30/23 2216    Lab Status:  In process Specimen: Blood from Arm, Left Updated: 05/30/23 2221                 No orders to display              Procedures  Procedures         ED Course  ED Course as of 05/30/23 2222   Tue May 30, 2023   2205 Texted OBGYN re: OBS Medical Decision Making  Hyperemesis gravidarum rule out hypoglycemia, dehydration, JUAN LUIS  Single live intrauterine pregnancy at 6 weeks and 6 days per pelvic ultrasound performed earlier today  Patient is returning within several hours of treatment and likely will need admission to ensure proper hydration and control of her vomiting  Will discuss with OB/GYN  Amount and/or Complexity of Data Reviewed  Independent Historian: parent  External Data Reviewed: labs, radiology and notes  Labs: ordered  Risk  Prescription drug management  Disposition  Final diagnoses:   Nausea and vomiting in pregnancy     Time reflects when diagnosis was documented in both MDM as applicable and the Disposition within this note     Time User Action Codes Description Comment    5/30/2023 10:21 PM Chely Tipton Add [O21 9] Nausea and vomiting in pregnancy       ED Disposition     ED Disposition   Admit    Condition   Stable    Date/Time   Tue May 30, 2023 10:21 PM    Comment   Case was discussed with Dr Chavez Kaur and the patient's admission status was agreed to be Admission Status: observation status to the service of Dr Chavez Kaur   Follow-up Information    None         Patient's Medications   Discharge Prescriptions    No medications on file       No discharge procedures on file      PDMP Review       Value Time User    PDMP Reviewed  Yes 4/22/2022  2:26 PM Nakul Alberto MD          ED Provider  Electronically Signed by           Silvia Drummond DO  05/30/23 1221

## 2023-05-31 NOTE — TELEPHONE ENCOUNTER
Mom, Debera Gilford called from [facility] [address] ED  States Jhon Maria is 6 weeks pregnant  Has had  2 trips ED in the past 24 hours for hyperemesis  Mom stating Jhon Maria has had OB care with [facility] but wishes to transfer to [facility] due to location  She states she was told by ED to schedule appointment with [facility] in one week  Provided 1st PN visit for 6/7/2023  Mom requesting note for Jhon Maria to be [facility] until 6/7/2023  Explained unable to provide note until evaluated by [facility] provider  Recommended she discuss work status with ED physician

## 2023-05-31 NOTE — QUICK NOTE
Update:    S/    Tolerated breakfast   No N/V  O/    Alert comfortable NAD    Abdomen soft NT ND    A/P  23yo  at 7 0 with HEG   (1) HEG  Now tolerating po  Wants to go home  Precs discussed  --> Discharge home, to continue:    B6/Doxylamine    Reglan prn    Zofran prn    (2) IUP at 7 0   --> To follow up with Kat Rubio in 1-2 weeks      Hong Brandon MD

## 2023-05-31 NOTE — RESULT ENCOUNTER NOTE
Discussed with Dr Ruby sigala from OBGYN since patient 7 weeks pregnant  Recommended pen vk 250 mg QID x 7 days and he will call to tell patient as she will also need abx during labor

## 2023-05-31 NOTE — UTILIZATION REVIEW
Initial Clinical Review    Admission: Date/Time/Statement: 23 observation   Admission Orders (From admission, onward)     Ordered        23  Place in Observation  Once                      Orders Placed This Encounter   Procedures   • Place in Observation     Standing Status:   Standing     Number of Occurrences:   1     Order Specific Question:   Level of Care     Answer:   Med Surg [16]     ED Arrival Information     Expected   -    Arrival   2023 21:18    Acuity   Urgent            Means of arrival   Walk-In    Escorted by   Family Member    Service   OB/GYN    Admission type   Emergency            Arrival complaint   6 weeks pregnant- vomitting            Chief Complaint   Patient presents with   • Vomiting During Pregnancy     Pt 6 weeks preg, seen earlier and discharged and has vomited 14 times since she left at 1600  Initial Presentation: 23 y o  female from home to ED admitted to observation due to nausea and vomiting of pregnancy   at 7w5d  Presented due to frequent vomiting starting 4 to 5 days prior to arrival despite Unisom, vitamin B6 and Zofran  Earlier on day of arrival seen in ED, given IVF and Zofran, passed po challenge  Since about 1600 started vomiting despite Zofran, about 14 times,  Weak  On exam appears ill  In the ED started on IVF, given Reglan  Plan is IV Benadryl, reglan, Zofran, continue IVF  Diet when able       ED Triage Vitals   Temperature Pulse Respirations Blood Pressure SpO2   23   98 3 °F (36 8 °C) 66 16 139/70 100 %      Temp src Heart Rate Source Patient Position - Orthostatic VS BP Location FiO2 (%)   -- 23 2330 23 23323 --    Monitor Lying Right arm       Pain Score       23       No Pain          Wt Readings from Last 1 Encounters:   23 40 8 kg (90 lb)     Additional Vital Signs:   23 0600 -- 78 16 111/59 81 98 % -- --   05/31/23 0400 -- 83 -- 110/58 78 98 % None (Room air) Lying   05/31/23 0100 -- 67 -- 82/43 Abnormal   57 Abnormal  97 % None (Room air) Lying   BP: pt sleeping, easy arouse at 05/31/23 0100   05/30/23 2330 -- 75 -- 107/60 79 98 % None (Room air)      Pertinent Labs/Diagnostic Test Results:   No orders to display     5/30/23 US OB < 14 weeks with transvaginal - Single live intrauterine gestation at 6 weeks 6 days (range +/- 4 days)    JOSE of 01/17/2024    Results from last 7 days   Lab Units 05/30/23  2358 05/30/23  1249   HEMATOCRIT % 37 1 42 4   HEMOGLOBIN g/dL 13 1 14 7   NEUTROS ABS Thousands/µL 5 23 5 75   PLATELETS Thousands/uL 196 228   WBC Thousand/uL 8 88 9 00     Results from last 7 days   Lab Units 05/30/23 2216 05/30/23  1249   ANION GAP mmol/L 6 6   BUN mg/dL 13 14   CALCIUM mg/dL 9 1 9 5   CHLORIDE mmol/L 107 104   CO2 mmol/L 24 26   CREATININE mg/dL 0 67 0 74   EGFR ml/min/1 73sq m 126 116   POTASSIUM mmol/L 4 0 3 9   SODIUM mmol/L 137 136     Results from last 7 days   Lab Units 05/30/23  2216 05/30/23  1249   ALBUMIN g/dL 3 6 4 3   ALK PHOS U/L 29* 39   ALT U/L 9 11   AST U/L 16 14   TOTAL BILIRUBIN mg/dL 0 35 0 35   TOTAL PROTEIN g/dL 5 9* 7 0     Results from last 7 days   Lab Units 05/30/23  2216 05/30/23  1249   GLUCOSE RANDOM mg/dL 92 87     Results from last 7 days   Lab Units 05/30/23  2216   TSH 3RD GENERATON uIU/mL 4 035     Results from last 7 days   Lab Units 05/30/23  1249   LIPASE u/L 18     Results from last 7 days   Lab Units 05/30/23  1250   BILIRUBIN UA  Negative   BLOOD UA  Negative   CLARITY UA  Cloudy   COLOR UA  Yellow   GLUCOSE UA mg/dl Negative   KETONES UA mg/dl Negative   LEUKOCYTES UA  Negative   NITRITE UA  Negative   PH UA  7 0   PROTEIN UA mg/dl Negative   SPEC GRAV UA  1 020   UROBILINOGEN UA (BE) mg/dl <2 0       ED Treatment:   Medication Administration from 05/30/2023 2118 to 05/31/2023 1846       Date/Time Order Dose Route Action Comments     05/30/2023 2228 EDT dextrose 5 % and sodium chloride 0 9 % infusion 125 mL/hr Intravenous New Bag --     05/30/2023 2316 EDT metoclopramide (REGLAN) injection 10 mg 10 mg Intravenous Given --     05/30/2023 2314 EDT dextrose 5 % and sodium chloride 0 45 % 1,000 mL with thiamine 317 mg, folic acid 1 mg, pyridoxine 25 mg infusion 125 mL/hr Intravenous New Bag --        Past Medical History:   Diagnosis Date   • Kidney stone    • Psychiatric disorder      Present on Admission:  **None**      Admitting Diagnosis: Vomiting [R11 10]  Age/Sex: 23 y o  female  Admission Orders:  Scheduled Medications:  famotidine, 20 mg, Oral, Daily      Continuous IV Infusions:  dextrose 5 % and sodium chloride 0 45 % 1,000 mL with thiamine 279 mg, folic acid 1 mg, pyridoxine 25 mg infusion, 125 mL/hr, Intravenous, Continuous      PRN Meds:  acetaminophen, 650 mg, Oral, Q6H PRN  diphenhydrAMINE, 25 mg, Intravenous, Q6H PRN  metoclopramide, 10 mg, Intravenous, Q6H PRN x 1 5/30/23   ondansetron, 4 mg, Intravenous, Q6H PRN  senna, 1 tablet, Oral, Daily PRN      5/31/23 regular diet       Network Utilization Review Department  ATTENTION: Please call with any questions or concerns to [phone] and carefully listen to the prompts so that you are directed to the right person  All voicemails are confidential   [place] Dates all requests for admission clinical reviews, approved or denied determinations and any other requests to dedicated fax number below belonging to the campus where the patient is receiving treatment   List of dedicated fax numbers for the Facilities:  [address] DENIALS (Administrative/Medical Necessity) [phone]   [address] [place] [address] (Maternity/NICU/Pediatrics) [phone]   0 Parisa Donato [phone]   [facility]  [phone]   [address] [address], [facility] [address] [place] [address] [place] [facility] 28 [address] [facility] 310 [facility] [address] [phone]

## 2023-05-31 NOTE — TELEPHONE ENCOUNTER
Update:    UCx resulted 90-99k GBS  ED physician sending Rx for PenVK  I called and spoke with Dalton Tesfaye  I related this information to her  We further discussed the implications of GBS in pregnancy and the fact that she will need antibiotics during labor  Following our discussion, all of her questions were answered, and she stated her understanding      Herman Mckay MD

## 2023-05-31 NOTE — H&P
Triage Note - OB  Carlton Bloodgood 23 y o  female MRN: [id number]  Unit/Bed#: ED 10 Encounter: [id number]    Chief Complaint:     SUBJECTIVE    HPI: 23 y o  [place] Bowels at 7w5d by sure LMP 4/7/2023 presents with complaint of nausea and vomiting  She reports that nausea started about 7 days ago and vomiting started 4 days ago  She has been unable to keep anything down for the last day  She was seen in ED earlier in the day and was sent home with zofran odt after she was able to tolerate po  She returned a few hours later after she reports she vomited 14 times  Prior to this she was taking doxylamine at night and zofran prn  No blood in emesis  She denies pain, bleeding  No fever or diarrhea  She does report constipation         Review of Systems - Negative except as above    Patient Active Problem List   Diagnosis   • Other headache syndrome   • Cerebellar tonsillar ectopia (HCC)   • COVID-19   • Allergic rhinitis   • Nephrolithiasis   • Left flank pain   • Anxiety and depression   • Vitamin D deficiency     Past Medical History:   Diagnosis Date   • Kidney stone    • Psychiatric disorder      Past Surgical History:   Procedure Laterality Date   • HEMANGIOMA EXCISION Right 2011    [facility]   • KIDNEY STONE SURGERY     • LITHOTRIPSY     • [place] CYSTO/URETERO W/LITHOTRIPSY &INDWELL STENT INSRT Right 04/19/2022    Procedure: CYSTOSCOPY URETEROSCOPY WITH LITHOTRIPSY HOLMIUM LASER AND INSERTION STENT URETERAL;  Surgeon: Jimenez Pryor MD;  Location[facility];  Service: Urology         Social History     Socioeconomic History   • Marital status: Single     Spouse name: None   • Number of children: None   • Years of education: None   • Highest education level: None   Occupational History   • None   Tobacco Use   • Smoking status: Never   • Smokeless tobacco: Never   Vaping Use   • Vaping Use: Never used   Substance and Sexual Activity   • Alcohol use: Never   • Drug use: Never   • Sexual activity: Not Currently   Other Topics Concern   • None   Social History Narrative   • None     Social Determinants of Health     Financial Resource Strain: Not on file   Food Insecurity: No Food Insecurity (4/21/2022)    Hunger Vital Sign    • Worried About Running Out of Food in the Last Year: Never true    • Ran Out of Food in the Last Year: Never true   Transportation Needs: No Transportation Needs (4/21/2022)    PRAPARE - Transportation    • Lack of Transportation (Medical): No    • Lack of Transportation (Non-Medical): No   Physical Activity: Not on file   Stress: Not on file   Social Connections: Not on file   Intimate Partner Violence: Not At Risk (11/2/2021)    Humiliation, Afraid, Rape, and Kick questionnaire    • Fear of Current or Ex-Partner: No    • Emotionally Abused: No    • Physically Abused: No    • Sexually Abused: No   Housing Stability: Low Risk  (4/21/2022)    Housing Stability Vital Sign    • Unable to Pay for Housing in the Last Year: No    • Number of Places Lived in the Last Year: 1    • Unstable Housing in the Last Year: No     OBJECTIVE      Estimated Date of Delivery: 1/12/24    Vitals: VSS  Vitals:    05/31/23 0100   BP: (!) 82/43   Pulse: 67   Resp:    Temp:    SpO2: 97%     Body mass index is 18 18 kg/m²  Physical Exam   Physical Exam  Constitutional:       General: She is not in acute distress  Appearance: Normal appearance  HENT:      Head: Normocephalic  Pulmonary:      Effort: Pulmonary effort is normal    Abdominal:      General: There is no distension  Palpations: Abdomen is soft  Tenderness: There is no abdominal tenderness  Neurological:      Mental Status: She is alert and oriented to person, place, and time  Skin:     Coloration: Skin is not pale  Findings: No erythema     Psychiatric:         Mood and Affect: Mood normal          Behavior: Behavior normal          Labs:   Admission on 05/30/2023   Component Date Value   • WBC 05/30/2023 8 88    • RBC 05/30/2023 4 27    • Hemoglobin 2023 13 1    • Hematocrit 2023 37 1    • MCV 2023 87    • MCH 2023 30 7    • MCHC 2023 35 3    • RDW 2023 12 8    • MPV 2023 9 4    • Platelets  196    • nRBC 2023 0    • Neutrophils Relative 2023 59    • Immat GRANS % 2023 0    • Lymphocytes Relative 2023 28    • Monocytes Relative 2023 11    • Eosinophils Relative 2023 2    • Basophils Relative 2023 0    • Neutrophils Absolute 2023 5 23    • Immature Grans Absolute 2023 0 02    • Lymphocytes Absolute 2023 2 48    • Monocytes Absolute 2023 0 96    • Eosinophils Absolute 2023 0 17    • Basophils Absolute 2023 0 02    • Sodium 2023 137    • Potassium 2023 4 0    • Chloride 2023 107    • CO2 2023 24    • ANION GAP 2023 6    • BUN 2023 13    • Creatinine 2023 0 67    • Glucose 2023 92    • Calcium 2023 9 1    • AST 2023 16    • ALT 2023 9    • Alkaline Phosphatase 2023 29 (L)    • Total Protein 2023 5 9 (L)    • Albumin 2023 3 6    • Total Bilirubin 2023 0 35    • eGFR 2023 126    • TSH 3RD GENERATON 2023 4 035            A/P  23 y o  female  at 7w5d with N/V of pregnancy, unable to tolerate po     Admit for obs  IV Benadryl, reglan, zofran  IV fluids  Will advance diet when appropriate    Davide Horowitz MD   OB-GYN  2023 3:14 AM

## 2023-06-01 ENCOUNTER — TELEPHONE (OUTPATIENT)
Dept: OBGYN CLINIC | Facility: CLINIC | Age: 23
End: 2023-06-01

## 2023-06-01 NOTE — TELEPHONE ENCOUNTER
Cristal l/m she is vomiting again today  Taking new meds  Only thing that helps is the IV fluid  Return call to Thomas Flores who reports she has vomited 4 times today  Noticed improvement from the past few days  Has been taking Reglan and zofran together as instructed by ER but vomits the Reglan  States she drinks water all day long  Had one piece of toast today which she vomited  Recommended take zofran 1/2 hour prior to taking Reglan  Must increase nutritional  intake  Small frequent meals something every two hours-peanut butter crackers, nuts or whatever food she feels she can tolerate  Per previous conversation with Dr Liam moody to hold antibiotic prescribed for GBS until vomiting has improved and she can keep it down  She is not having any UTI symptoms  Call back if vomiting increases, unable to tolerate fluids or any additional questions prior to 6/7/2023 prenatal visit  Patient verbalized understanding and agreement to plan

## 2023-06-07 ENCOUNTER — TELEPHONE (OUTPATIENT)
Dept: OBGYN CLINIC | Facility: CLINIC | Age: 23
End: 2023-06-07

## 2023-06-07 ENCOUNTER — INITIAL PRENATAL (OUTPATIENT)
Dept: OBGYN CLINIC | Facility: CLINIC | Age: 23
End: 2023-06-07
Payer: COMMERCIAL

## 2023-06-07 VITALS
HEIGHT: 59 IN | BODY MASS INDEX: 18.75 KG/M2 | SYSTOLIC BLOOD PRESSURE: 118 MMHG | WEIGHT: 93 LBS | DIASTOLIC BLOOD PRESSURE: 74 MMHG

## 2023-06-07 DIAGNOSIS — B95.1 GROUP B STREPTOCOCCUS URINARY TRACT INFECTION AFFECTING PREGNANCY IN FIRST TRIMESTER: ICD-10-CM

## 2023-06-07 DIAGNOSIS — O99.341 MENTAL DISORDER AFFECTING PREGNANCY IN FIRST TRIMESTER: Primary | ICD-10-CM

## 2023-06-07 DIAGNOSIS — Z32.01 POSITIVE PREGNANCY TEST: ICD-10-CM

## 2023-06-07 DIAGNOSIS — Z3A.08 8 WEEKS GESTATION OF PREGNANCY: ICD-10-CM

## 2023-06-07 DIAGNOSIS — O21.9 NAUSEA AND VOMITING IN PREGNANCY: ICD-10-CM

## 2023-06-07 DIAGNOSIS — O23.41 GROUP B STREPTOCOCCUS URINARY TRACT INFECTION AFFECTING PREGNANCY IN FIRST TRIMESTER: ICD-10-CM

## 2023-06-07 DIAGNOSIS — Z87.448 HISTORY OF PYELONEPHRITIS: ICD-10-CM

## 2023-06-07 DIAGNOSIS — Z36.89 ENCOUNTER FOR OTHER SPECIFIED ANTENATAL SCREENING: Primary | ICD-10-CM

## 2023-06-07 DIAGNOSIS — G44.89 OTHER HEADACHE SYNDROME: ICD-10-CM

## 2023-06-07 LAB
EXTERNAL CHLAMYDIA RESULT: NOT DETECTED
N GONORRHOEA RRNA SPEC QL PROBE: NOT DETECTED
SL AMB  POCT GLUCOSE, UA: NEGATIVE
SL AMB POCT URINE PROTEIN: NEGATIVE

## 2023-06-07 PROCEDURE — 81002 URINALYSIS NONAUTO W/O SCOPE: CPT | Performed by: OBSTETRICS & GYNECOLOGY

## 2023-06-07 PROCEDURE — PNV: Performed by: OBSTETRICS & GYNECOLOGY

## 2023-06-07 PROCEDURE — NOBC: Performed by: OBSTETRICS & GYNECOLOGY

## 2023-06-07 NOTE — PATIENT INSTRUCTIONS
Congratulations!! Please review our Pregnancy Essential Guide for informative education and here is a link to take a [facility] tour of our [facility]'s Pregnancy Essentials Guide    [facility] (hn [facility])         [facility] [place] [facility] and [address] [place] [address] on Vime[employer]   Prevention of migraine:  Vitamin B2 400mg daily, Magnesium 250-400 mg daily            Pregnancy Diet   WHAT YOU NEED TO KNOW:   What is a healthy diet during pregnancy? A healthy diet during pregnancy is a meal plan that provides the amount of calories and nutrients you need during pregnancy  Your body needs extra calories and nutrients to support your growing baby  You need to gain the right amount of weight for a healthy baby and pregnancy  Babies born at a healthy weight have a lower risk of certain health problems at birth and later in life  A healthy diet may help you avoid gaining too much weight  Too much weight gain may cause problems for you during your pregnancy and delivery  What should I avoid or limit while I am pregnant? Do not drink alcohol during pregnancy  Alcohol can increase your risk of a miscarriage (losing your baby)  Your baby may also be born too small and have other health problems, such as learning problems later in life  Do not eat raw or undercooked foods  Examples include meat, poultry, eggs, fish, and shellfish (shrimp, crab, lobster)  Cook leftover foods and ready-to-eat foods such as hot dogs until they are steaming hot  Do not have anything that is not pasteurized  Pasteurization is a heating process that destroys bacteria  Do not have milk, juice, or cheese that has not been pasteurized  Cheeses include Brie, feta, Camembert, blue, and [place] cheeses  Limit caffeine to avoid possible health problems  It is not clear how caffeine affects pregnancy  Your dietitian can tell you how much caffeine is okay for you to have in a day or week   Caffeine may be found in coffee, tea, cola, sports drinks, and chocolate  Limit foods that contain mercury  Mercury is naturally found in almost all types of fish and shellfish  Some types of fish absorb higher levels of mercury that can be harmful to an unborn baby  Eat only fish and shellfish that are low in mercury  Each week, you may eat up to 12 ounces of fish or shellfish that have low levels of mercury  These include shrimp, canned light tuna, salmon, pollock, and catfish  Eat only 6 ounces of albacore (white) tuna per week  Albacore tuna has more mercury than canned tuna  Do not eat shark, swordfish, jesus mackerel, or tilefish  Which foods can I eat while I am pregnant? Eat a variety of foods from each of the food groups listed below  Eat healthy foods even if you take a prenatal vitamin every day  Your dietitian will tell you how many servings you should have from each food group each day to get enough calories  The amount of calories you need depends on your daily activity, your weight before pregnancy, and current weight gain  In the first trimester, you usually do not need extra calories  In the second and third trimesters, most women should eat about 300 extra calories each day  Fruits and vegetables:  Half of your plate should contain fruits and vegetables  Fruits:  Choose fresh, canned, or dried fruit as often as possible  1 cup of sliced, diced, cooked, or canned fruit (canned in light syrup or 100% juice)    1 large peach, orange, or banana    ½ cup of dried fruit    1 cup of fruit juice    Vegetables:  Eat more dark green, red, and orange vegetables  Dark green vegetables include broccoli, spinach, mendel lettuce, and juliet greens  Examples of orange and red vegetables are carrots, sweet potatoes, winter squash, oranges, and red peppers      1 cup of cooked or raw vegetables    1 cup of vegetable juice    2 cups of raw leafy greens    Grains:  Half of the grains you eat each day should be whole grains  Whole grains:      ½ cup of cooked brown rice or cooked oatmeal    1 cup (1 ounce) of whole-grain dry cereal    1 slice of 384% whole-wheat or rye bread    3 cups of popped popcorn    Other grains:      ½ cup of cooked white rice or pasta    ½ of an English muffin    1 small flour or corn tortilla    1 mini-bagel    Dairy foods:  Choose fat-free or low-fat pasteurized dairy foods:    1½ ounces of hard cheese (mozzarella, Swiss, cheddar)    1 cup (8 ounces) of low-fat or fat-free milk or yogurt    1 cup of low-fat frozen yogurt or pudding    Meat and other protein sources:  Choose lean meats and poultry  Bake, broil, and grill meat instead of frying it  Include a variety of mercury-free seafood in place of some meat and poultry each week  Eat a variety of protein foods:    ½ ounce of nuts (12 almonds, 24 pistachios, 7 walnut halves) or 1 tablespoon of peanut butter (1 ounce)    ¼ cup of soy tofu or tempeh (1 ounce)    1 egg    ¼ cup of cooked dried beans, peas, or lentils (1 ounce)    1 small chicken breast or 1 small trout (about 3 ounces)    1 salmon steak (4 to 6 ounces)    1 small lean hamburger (2 to 3 ounces)    Fats:  Limit saturated fats, trans fats, and cholesterol  These unhealthy fats are found in shortening, butter, stick margarine, and animal fat  Choose healthy fats such as polyunsaturated and monounsaturated fats:    1 tablespoon of canola, olive, corn, sunflower, or soybean oil    1 tablespoon of soft margarine    1 teaspoon of mayonnaise    2 tablespoons of salad dressing    ½ of an avocado    What do I need to know about vitamin and mineral supplements? Your healthcare provider will tell you if you need a supplement and the type you should take  Talk to your provider before you take any other kind of supplement, including herbal (natural) supplements  The following are general guidelines:  Folic acid is one of the most important vitamins during pregnancy    Your prenatal vitamins will also contain folic acid  Make sure you take your prenatal vitamin every day  If you forget to take your vitamin, do not take double the amount the next day  You need at least 240 mcg of folic acid each day before you get pregnant  Folic acid helps to form your baby's brain and spinal cord in early pregnancy  During pregnancy, your daily need for folic acid increases to about 600 mcg  Get folic acid each day by eating citrus fruits and juices, green leafy vegetables, liver, or dried beans  Folic acid is also added to foods such as breakfast cereals, bread products, flour, and pasta  You need about 30 mg of iron each day during pregnancy  Iron is a mineral the body needs to make hemoglobin, which is a part of red blood cells  Hemoglobin helps your blood carry oxygen from the lungs to the rest of your body  Foods that are good sources of iron are meat, poultry, fish, beans, spinach, and fortified cereals and breads  Your body will absorb iron better from non-meat sources if you have a source of vitamin C at the same time  Drink tea and coffee separately from iron-fortified foods and iron supplements  Calcium and vitamin D needs go up during pregnancy  Women who do not eat dairy products may need a calcium and vitamin D supplement  Talk to your dietitian about calcium supplements if you do not regularly eat good sources of calcium  The amount of calcium you need is about 1,300 mg if you are between 15and 25years old and 1,000 mg if you are 23to 48years old  If you cannot drink milk or eat dairy foods, try lactose-free or lactose-reduced milk or calcium-fortified soy milk  Ask your dietitian about pills you can take to help you digest milk products  Eat other drinks and foods that are fortified with calcium, such as orange juice  What diet changes may help morning sickness? Morning sickness is common during the first few months of pregnancy   You may feel nauseated, and you may vomit several times each day  To improve symptoms of morning sickness, eat small meals often instead of 3 large meals  Foods high in carbohydrate, such as crackers, dry toast, and pasta, may be easier for you to eat  Drink liquids between meals rather than with meals  What diet changes may help constipation? A high-fiber diet can improve the symptoms of constipation  Whole-grain breakfast cereals, whole-grain breads, and prune juice are high in fiber  Raw fruits and vegetables, and cooked beans are also good sources of fiber  It may also be helpful to increase your intake of fluids and get regular physical activity  Talk with your healthcare provider before you begin any exercise program        What diet changes may help heartburn? To improve the symptoms of heartburn, do not lie down right after you eat  When you do lie down, sleep with your head slightly elevated  Eat small, frequent meals instead of 3 large meals  It may also be helpful to avoid caffeine, chocolate, and spicy foods  What other healthy guidelines should I follow? Make sure you get enough protein, vitamin B12, and iron if you are a vegetarian or vegan  Some non-meat sources of these nutrients are fortified cereals, nut butters, soy products (tofu and soymilk), nuts, grains, and legumes  These nutrients are also found in eggs and milk products  Talk to your dietitian about how to manage cravings for certain foods  Foods that are high in calories, fat, and sugar should not replace healthy food choices  Some women have cravings for unusual substances such as riley, dirt, laundry starch, ice, and chalk  This condition is called pica  These may lead to health problems such as anemia and cause other health problems  When should I call my dietitian or obstetrician? You are losing weight without trying  You have cravings for substances such as riley, dirt, laundry starch, or ice      You have questions or concerns about your condition or care     CARE AGREEMENT:   You have the right to help plan your care  Discuss treatment options with your healthcare provider to decide what care you want to receive  You always have the right to refuse treatment  The above information is an  only  It is not intended as medical advice for individual conditions or treatments  Talk to your doctor, nurse or pharmacist before following any medical regimen to see if it is safe and effective for you  © Copyright [employer] 2022 Information is for End User's use only and may not be sold, redistributed or otherwise used for commercial purposes  Pregnancy   AMBULATORY CARE:   What you need to know about pregnancy:  A normal pregnancy lasts about 40 weeks  The first trimester lasts from your last period through the 12th week of pregnancy  The second trimester lasts from the 13th week through the 23rd week  The third trimester lasts from the 24th week until your baby is born  If you know the date of your last period, your healthcare provider can estimate your due date  You may give birth to your baby any time from 37 weeks to 2 weeks after your due date  Seek care immediately if:   You develop a severe headache that does not go away  You have new or increased vision changes, such as blurred or spotted vision  You have new or increased swelling in your face or hands  You have pain or cramping in your abdomen or low back  You have vaginal bleeding  Call your doctor or obstetrician if:   You have abdominal cramps, pressure, or tightening  You have a change in vaginal discharge  You cannot keep food or drinks down, and you are losing weight  You have chills or a fever  You have vaginal itching, burning, or pain  You have yellow, green, white, or foul-smelling vaginal discharge  You have pain or burning when you urinate, less urine than usual, or pink or bloody urine      You have questions or concerns about your condition or care     Prenatal care:  Prenatal care is a series of visits with your healthcare provider throughout your pregnancy  Prenatal care can help prevent problems during pregnancy and childbirth  At each prenatal visit, your provider will weigh you and check your blood pressure  He or she will also check your baby's heartbeat and growth  You may also need the following at some visits:  A pelvic exam  allows your healthcare provider to see your cervix (the bottom part of your uterus)  Your healthcare provider will use a speculum to open your vagina  He or she will check the size and shape of your uterus  At your first prenatal visit, you may also have a Pap smear  This is a test to check your cervix for abnormal cells  Blood tests  may be done to check for any of the following:     Gestational diabetes or anemia (low iron level)    Blood type or Rh factor, or certain birth defects    Immunity to certain diseases, such as chickenpox or rubella    An infection, such as a sexually transmitted infection, HIV, or hepatitis B    Hepatitis B  may need to be prevented or treated  Hepatitis B is inflammation of the liver caused by the hepatitis B virus (HBV)  HBV can spread from a mother to her baby during delivery  You will be checked for HBV as early as possible in the first trimester of each pregnancy  You need the test even if you received the hepatitis B vaccine or were tested before  You may need to have an HBV infection treated before you give birth  Urine tests  may also be done to check for sugar and protein  These can be signs of gestational diabetes or preeclampsia  Urine tests may also be done to check for signs of infection  A gestational diabetes screen  may be done  Your healthcare provider may order either a 1-step or 2-step oral glucose tolerance test (OGTT)  1-step OGTT:  Your blood sugar level will be tested after you have not eaten for 8 hours (fasting)  You will then be given a glucose drink   Your level will be tested again 1 hour and 2 hours after you finish the drink  2-step OGTT:  You do not have to fast for the first part of the test  You will have the glucose drink at any time of day  Your blood sugar level will be checked 1 hour later  If your blood sugar is higher than a certain level, another test will be ordered  You will fast and your blood sugar level will be tested  You will have the glucose drink  Your blood will be tested again 1 hour, 2 hours, and 3 hours after you finish the glucose drink  A fetal ultrasound  shows pictures of your baby inside your uterus  The pictures are used to check your baby's development, movement, and position  Genetic disorder screening tests  may be offered to you  These tests check your baby's risk for genetic disorders such as Down syndrome  A screening test may include blood tests and an ultrasound  Blood tests may be used to check your DNA or your partner's DNA  Genetic tests are not always accurate or complete  Your baby may be born with a genetic disorder that did not show up in the tests  Talk to your healthcare provider about any concerns you have with genetic testing  Body changes that may occur during your pregnancy:   Breast changes  you will experience include tenderness and tingling during the early part of your pregnancy  Your breasts will become larger  You may need to use a support bra  You may see a thin, yellow fluid, called colostrum, leak from your nipples during the second trimester  Colostrum is a liquid that changes to milk about 3 days after you give birth  Skin changes and stretch marks  may occur during your pregnancy  You may have red marks, called stretch marks, on your skin  Stretch marks will usually fade after pregnancy  Use lotion if your skin is dry and itchy  The skin on your face, around your nipples, and below your belly button may darken   Most of the time, your skin will return to its normal color after your baby is born  Morning sickness  is nausea and vomiting that can happen at any time of day  Avoid fatty and spicy foods  Eat small meals throughout the day instead of large meals  Romina may help to decrease nausea  Ask your healthcare provider about other ways of decreasing nausea and vomiting  Heartburn  may be caused by changes in your hormones during pregnancy  Your growing uterus may also push your stomach upward and force stomach acid to back up into your esophagus  Eat 4 or 5 small meals each day instead of large meals  Avoid spicy foods  Avoid eating right before bedtime  Constipation  may develop during your pregnancy  To treat constipation, eat foods high in fiber such as fiber cereals, beans, fruits, vegetables, whole-grain breads, and prune juice  Get regular exercise and drink plenty of water  Your healthcare provider may also suggest a fiber supplement to soften your bowel movements  Talk to your healthcare provider before you use any medicines to decrease constipation  Hemorrhoids  are enlarged veins in the rectal area  They may cause pain, itching, and bright red bleeding from your rectum  To decrease your risk for hemorrhoids, prevent constipation and do not strain to have a bowel movement  If you have hemorrhoids, soak in a tub of warm water to ease discomfort  Ask your healthcare provider how you can treat hemorrhoids  Leg cramps and swelling  may be caused by low calcium levels or the added weight of pregnancy  Raise your legs above the level of your heart to decrease swelling  During a leg cramp, stretch or massage the muscle that has the cramp  Heat may help decrease pain and muscle spasms  Apply heat on your muscle for 20 to 30 minutes every 2 hours for as many days as directed  Back pain  may occur as your baby grows  Do not stand for long periods of time or lift heavy items  Use good posture while you stand, squat, or bend   Wear low-heeled shoes with good support  Rest may also help to relieve back pain  Ask your healthcare provider about exercises you can do to strengthen your back muscles  Stay healthy during your pregnancy:       Eat a variety of healthy foods  Healthy foods include fruits, vegetables, whole-grain breads, low-fat dairy foods, beans, lean meats, and fish  Drink liquids as directed  Ask how much liquid to drink each day and which liquids are best for you  Limit caffeine to less than 200 milligrams each day  Limit your intake of fish to 2 servings each week  Choose fish low in mercury such as canned light tuna, shrimp, crab, salmon, cod, or tilapia  Do not  eat fish high in mercury such as swordfish, tilefish, jesus mackerel, and shark  Take prenatal vitamins as directed  Your need for certain vitamins and minerals, such as folic acid, increases during pregnancy  Prenatal vitamins provide some of the extra vitamins and minerals you need  Prenatal vitamins may also help to decrease the risk for certain birth defects  Ask how much weight you should gain during your pregnancy  Too much or too little weight gain can be unhealthy for you and your baby  Talk to your healthcare provider about exercise  Moderate exercise can help you stay fit  Your healthcare provider will help you plan an exercise program that is safe for you during pregnancy  Do not smoke  Smoking increases your risk for a miscarriage and heart and blood vessel problems  Smoking can cause your baby to be born too early or weigh less at birth  Quit smoking as soon as you think you might be pregnant  Ask your healthcare provider for information if you need help quitting  Do not drink alcohol  Alcohol passes from your body to your baby through the placenta  It can affect your baby's brain development and cause fetal alcohol syndrome (FAS)  FAS is a group of conditions that causes mental, behavior, and growth problems      Talk to your healthcare provider before you take any medicines  Many medicines may harm your baby if you take them when you are pregnant  Do not take any medicines, vitamins, herbs, or supplements without first talking to your healthcare provider  Never use illegal or street drugs (such as marijuana or cocaine) while you are pregnant  Safety tips:   Avoid hot tubs and saunas  Do not use a hot tub or sauna while you are pregnant, especially during your first trimester  Hot tubs and saunas may raise your baby's temperature and increase the risk for birth defects  Avoid toxoplasmosis  This is an infection caused by eating raw meat or being around infected cat feces  It can cause birth defects, miscarriages, and other problems  Wash your hands after you touch raw meat  Make sure any meat is well-cooked before you eat it  Avoid raw eggs and unpasteurized milk  Use gloves or ask someone else to clean your cat's litter box while you are pregnant  Ask your healthcare provider about travel  The most comfortable time to travel is during the second trimester  Ask your healthcare provider if you can travel after 36 weeks  You may not be able to travel in an airplane after 36 weeks  He or she may also recommend that you avoid long road trips  Follow up with your doctor or obstetrician as directed:  Go to all of your prenatal visits during your pregnancy  Write down your questions so you remember to ask them during your visits  © Copyright [employer] 2022 Information is for End User's use only and may not be sold, redistributed or otherwise used for commercial purposes  The above information is an  only  It is not intended as medical advice for individual conditions or treatments  Talk to your doctor, nurse or pharmacist before following any medical regimen to see if it is safe and effective for you  Nausea and Vomiting in Pregnancy   WHAT YOU NEED TO KNOW:   Nausea and vomiting can happen any time of day   These symptoms usually start before the 9th week of pregnancy, and end by the 14th week (second trimester)  Some women can have nausea and vomiting for a longer time  These symptoms can make it hard for you to do your daily activities  DISCHARGE INSTRUCTIONS:   Return to the emergency department if:   You are dizzy, cold, and thirsty, and your eyes and mouth are dry  You are urinating very little or not at all  You are dizzy or lightheaded when you stand up  You see blood or material that looks like coffee grounds in your vomit  Call your doctor if:   You vomit more than 4 times in 1 day  You have not been able to keep liquids down for more than 1 day  You lose more than 2 pounds  You have a fever  Your nausea and vomiting continue longer than 14 weeks  You have questions or concerns about your condition or care  Nutrition changes you can make to manage nausea and vomiting:   Eat smaller meals, more often  Eat a small snack, such as crackers, dry cereal, or a small sandwich before you go to bed  Eat some crackers or dry toast before you get out of bed in the morning  Get out of bed slowly  Sudden movements could cause you to get dizzy and nauseated  Eat bland foods when you feel nauseated  Examples of bland foods include dry toast, dry cereal, plain pasta, white rice, and bread  Other bland foods include saltine crackers, bananas, gelatin, and pretzels  Avoid spicy, greasy, and fried foods  Drink liquids that contain ginger  Drink ginger ale made with real ginger or ginger tea made with fresh grated ginger  Ginger capsules or ginger candies may also help to decrease nausea and vomiting  Drink liquids between meals instead of with meals  Wait at least 30 minutes after you eat to drink liquids  Drink small amounts of liquids often throughout the day to prevent dehydration  Ask how much liquid you should drink each day      Other changes you can make to manage nausea and vomiting:   Avoid smells that bother you  Strong odors may cause nausea and vomiting to start, or make it worse  Do not brush your teeth right after you eat  if it makes you nauseated  Rest when you need to  Start activity slowly and work up to your usual routine as you start to feel better  Talk to your healthcare provider about your prenatal vitamins  Prenatal vitamins can cause nausea for some women  Try taking your prenatal vitamin at night or with a snack  If this change does not help, your healthcare provider may recommend a different type of vitamin  Light to moderate exercise  may help to decrease your symptoms  It may also help you to sleep better at night  Ask your healthcare provider about the best exercise plan for you  Follow up with your doctor as directed:  Write down your questions so you remember to ask them during your visits  © Copyright [employer] 2022 Information is for End User's use only and may not be sold, redistributed or otherwise used for commercial purposes  The above information is an  only  It is not intended as medical advice for individual conditions or treatments  Talk to your doctor, nurse or pharmacist before following any medical regimen to see if it is safe and effective for you

## 2023-06-07 NOTE — ASSESSMENT & PLAN NOTE
Patient diagnosed with GBS in urine when in ER 5/30/2023  Currently finishing with antibiotics  Recom wait to do labs and repeat urine culture 1 week following finished antibiotics  Also reviewed recommendation to treat in labor for prophylaxis

## 2023-06-07 NOTE — ASSESSMENT & PLAN NOTE
Reports h/o depression in past   Not currently an issue  No medications  Reviewed some medications safe for use in pregnancy if necessary  If symptoms develop we can discuss options

## 2023-06-07 NOTE — ASSESSMENT & PLAN NOTE
Patient reports h/o migraines and headaches in past   Discussed OTC acetaminophen and caffeine 200mg daily, PRN as first-line for headache in pregnancy  Encouraged to consider initiation of magnesium 400 mg PO daily and vitamin B2 400 mg PO daily for migraine prevention

## 2023-06-07 NOTE — PROGRESS NOTES
OB INTAKE INTERVIEW  Patient is 23 y  o who presents for OB intake at 9 1wks  She is accompanied by: Sunday  The father of her baby (is)  involved in the pregnancy     Last Menstrual Period: 2023  Ultrasound: Measured 6 weeks 6 days on 2023  Estimated Date of Delivery: 2024 confirmed ultrasound  Signs/Symptoms of Pregnancy  Current pregnancy symptoms: YES  Constipation YES-start colace  Headaches YES-reviewed magnesium,vitamin B2 dosing  Cramping/spotting no  PICA cravings no    Diabetes-  Body mass index is 18 78 kg/m²  If patient has 1 or more, please order early 1 hour GTT  History of GDM no  BMI >30 no  History of PCOS or current metformin use no  History of LGA/macrosomic infant (4000g/9lbs) no    If patient has 2 or more, please order early 1 hour GTT  AMA no  First degree relative with type 2 diabetes no  History of chronic HTN, hyperlipidemia, elevated A1C no  High risk race (, , ,  or ) no    Hypertension- if you answer yes, please order preeclampsia labs (cbc, comprehensive metabolic panel, urine protein creatinine ratio, uric acid)  History of of chronic HTN no  History of gestational HTN no  History of preeclampsia, eclampsia, or HELLP syndrome no  History of diabetes no  History of lupus, autoimmune disease, kidney disease, antiphospholipid syndrome, rheumatoid arthritis, sjogren's syndrome YES-kidney disease    Thyroid- if yes order TSH with reflex T4 (Unless TSH value on file within last 4 weeks then do not need to order)  History of thyroid disease no    Bleeding Disorder or Hx of DVT-patient or first degree relative with history of  Order the following if not done previously     (Factor V, antithrombin III, prothrombin gene mutation, protein C and S Ag, lupus anticoagulant, anticardiolipin, beta-2 glycoprotein)   no    OB/GYN-  History of abnormal pap smear no  History of HPV no  History of Herpes/HSV no  History of other STI (gonorrhea, chlamydia, trich) (or current partner with Hep B, Hep C, or HIV) no  History of prior  no  History of prior  no  History of  delivery prior to 36 weeks 6 days no  History of blood transfusion no  Ok for blood transfusion YES    Substance screening-   History of tobacco use no  Currently using tobacco no  Currently using alcohol no  Presently using drugs No  Past drug use (if yes, order urine drug screening panel)  no  IV drug use (if yes, order urine drug screening panel) no  Partner drug use (if yes, order urine drug screening panel) no  Parent/Family drug use (do not order urine drug screening panel just for family hx) no    Depression Screening-  PHQ-9 Score: (4)    MRSA Screening-   Does the pt have a hx of MRSA? no  If yes- please follow MRSA protocol and obtain a nasal swab for MRSA culture at 12 week visit  Immunizations:  Influenza vaccine given this season (ask date) no  Discussed Tdap vaccine -YES  Discussed COVID Vaccine  YES,  [employer]-does not have card at today's visit    Genetic/MFM-  Do you or your partner have a history of any of the following in yourselves or first degree relatives? Cystic fibrosis no  Spinal muscular atrophy no  Hemoglobinopathy/Sickle Cell/Thalassemia no  Fragile X Intellectual Disability no    If yes, discuss carrier screening and recommend consultation with MFM/genetic counseling  If no, discuss option for carrier screening and/or genetic testing with Nuchal Ultrasound  Patient interested YES  Appointment at [place] [id number] made:NO-provided MFM referral and phone #    Interview education  [facility] Pregnancy Ess[facility] Book reviewed and discussed Brad Xiao attached to [facility]      Prenatal lab work scripts YES-[facility]  GC/Ct collected, processed for [facility]  Called [facility] for   Conf# [id number]    Extra labs ordered:  NO-following with nephrology  Mailed urinalysis order to [facility] home address   Original order entered as clinic collect    The patient has a history now or in prior pregnancy notable for: kidney stones, pylonephritis, anxiety/depression-received counseling prior to covid-currently no issues  Details that I feel the provider should be aware of: G2, prior SAB 9/2022  Lithotripsy done 4/19/2023-following with nephrology  The patient was oriented to our practice, reviewed delivering physicians and [place] in [facility] for Delivery  All questions were answered      Interviewed byJosé Manuel Romo LPN

## 2023-06-07 NOTE — ASSESSMENT & PLAN NOTE
Patient with pyelonephritis 3/2023 and h/o kidney stones  She has seen urology and nephrology and has follow up  Reviewed may be high risk for pyelonephritis in pregnancy and we can consider checking urine culture each trimester and treat even if asymptomatic

## 2023-06-07 NOTE — PROGRESS NOTES
First OB Visit  [address], [address], [facility], [address] [facility]    Assessment/Plan:  Annie Irving is a 23y o  year old  at 8w0d who presents for initial prenatal visit  Final JOSE: 2024, by Ultrasound      Supervision of normal pregnancy  - Aneuploidy screening discussed  Patient is undecided regarding aneuploidy screening   - Routine cervical cancer screening: Pap Up to date  - Routine STI Screening: GC/Chlamydia sent today  HIV/Hep B/Hep C/Syphilis ordered in prenatal panel   - Patient Education: Patient was counseled regarding diet, exercise, weight gain, foods to avoid, vaccines in pregnancy, aneuploidy screening, travel precautions to include seat belt use and VTE risk reduction  She has been provided our pregnancy packet which includes how and when to contact providers, medication recommendations, dietary suggestions, breastfeeding information as well as websites for additional information, hospital and delivery concerns  Additional Pregnancy Problems:   1  Mental disorder affecting pregnancy in first trimester  Assessment & Plan:  Reports h/o depression in past   Not currently an issue  No medications  Reviewed some medications safe for use in pregnancy if necessary  If symptoms develop we can discuss options  2  Group B Streptococcus urinary tract infection affecting pregnancy in first trimester  Assessment & Plan:  Patient diagnosed with GBS in urine when in ER 2023  Currently finishing with antibiotics  Recom wait to do labs and repeat urine culture 1 week following finished antibiotics  Also reviewed recommendation to treat in labor for prophylaxis  3  Other headache syndrome  Assessment & Plan:  Patient reports h/o migraines and headaches in past   Discussed OTC acetaminophen and caffeine 200mg daily, PRN as first-line for headache in pregnancy     Encouraged to consider initiation of magnesium 400 mg PO daily and vitamin B2 400 mg PO daily for migraine prevention  4  History of pyelonephritis  Assessment & Plan:  Patient with pyelonephritis 3/2023 and h/o kidney stones  She has seen urology and nephrology and has follow up  Reviewed may be high risk for pyelonephritis in pregnancy and we can consider checking urine culture each trimester and treat even if asymptomatic  5  Nausea and vomiting in pregnancy  Assessment & Plan:  Patient was seen in ER twice for IVF  Last 2023 - was given scripts for Zofran and Reglan  States over last day symptoms have improved and not needed Zofran and Reglan  Continue Vit B6 and unisom - has other scripts if symptoms return  6  Positive pregnancy test  -     AMB US OB < 14 weeks single or first gestation level 1    7  8 weeks gestation of pregnancy      MFM referral given for early anatomy and aneuploidy screening, due 11-13 weeks  Next OB visit: 4 weeks    Subjective:   CC:  Desires prenatal care  Freddy Castro is a 23 y o   female who presents for prenatal care  Patient's last menstrual period was 2023  Which would make her 9 weeks and 4 days pregnant today  Pregnancy ROS: no leakage of fluid, pelvic pain, or vaginal bleeding   significant nausea/vomiting - improved recently    OB History    Para Term  AB Living   2 0 0 0 1 0   SAB IAB Ectopic Multiple Live Births   1 0 0 0 0      # Outcome Date GA Lbr Micah/2nd Weight Sex Delivery Anes PTL Lv   2 Current            1 SAB 22               The following portions of the patient's history were reviewed and updated as appropriate: She  has a past medical history of Kidney stone, Migraine, Psychiatric disorder, and Pyelonephritis (2023)  She  has a past surgical history that includes Hemangioma excision (Right, ) and pr cysto/uretero w/lithotripsy &indwell stent insrt (Right, 2022)    Her family history includes Asthma in her brother and sister; Diabetes in her father; Hypertension in her father and mother; Migraines in her mother; Nephrolithiasis in her brother  She  reports that she has never smoked  She has never used smokeless tobacco  She reports that she does not drink alcohol and does not use drugs  Current Outpatient Medications   Medication Sig Dispense Refill   • Doxylamine Succinate, Sleep, (UNISOM PO) Take by mouth     • metoclopramide (Reglan) 10 mg tablet Take 1 tablet (10 mg total) by mouth every 6 (six) hours as needed (nausea) (Patient not taking: Reported on 2023) 30 tablet 0   • ondansetron (ZOFRAN-ODT) 4 mg disintegrating tablet Take 1 tablet (4 mg total) by mouth every 6 (six) hours as needed for nausea or vomiting (Patient not taking: Reported on 2023) 20 tablet 0   • penicillin V potassium (VEETID) 250 mg tablet Take 1 tablet (250 mg total) by mouth 4 (four) times a day for 7 days 28 tablet 0   • Prenatal Vit-Fe Fumarate-FA (PRENATAL 19 PO) Take 1 capsule by mouth daily     • Pyridoxine HCl (vitamin B-6) 25 MG tablet Take 25 mg by mouth daily       No current facility-administered medications for this visit  She is allergic to cephalexin and levofloxacin         Objective:  LMP 2023   Pregravid Weight/BMI: 42 2 kg (93 lb) (BMI 18 77)  Current Weight:     Total Weight Gain: 0 kg (0 lb)   Pre- Vitals    Flowsheet Row Most Recent Value   Prenatal Assessment    Fetal Heart Rate 165   Movement Absent   Prenatal Vitals    Urine Albumin/Glucose    Dilation/Effacement/Station    Cervical Dilation 0   Cervical Effacement 0   Fetal Station -4   Vaginal Drainage    Edema          General: Well appearing, no distress  Breasts: Normal bilaterally, nontender without masses, asymmetry, or nipple discharge  Abdomen: Soft, gravid, nontender  : Urethra normal  Normal labia majora and minora  Vagina normal   No vaginal bleeding  No vaginal discharge  Cervix closed  Uterus non-tender  Extremities: Warm and well perfused  Non tender  No edema      Ultrasound: ultrasound confirmed SLIUP, see report for details

## 2023-06-07 NOTE — PATIENT INSTRUCTIONS
- Call office if severe pain, bleeding or leaking of fluid  - Continue prenatal vitamin and all prescribed medications       Supplements to prevent or decrease headaches/migraines in pregnancy:   - Magnesium 400mg oral daily   - Vit B2 400mg oral daily

## 2023-06-07 NOTE — TELEPHONE ENCOUNTER
Contact Bora Sykes to inform new urinalysis order was mailed to her home   Original order indicated the specimen was already collected

## 2023-06-07 NOTE — ASSESSMENT & PLAN NOTE
Patient was seen in ER twice for IVF  Last 5/30/2023 - was given scripts for Zofran and Reglan  States over last day symptoms have improved and not needed Zofran and Reglan  Continue Vit B6 and unisom - has other scripts if symptoms return

## 2023-06-08 ENCOUNTER — PATIENT OUTREACH (OUTPATIENT)
Dept: OBGYN CLINIC | Facility: CLINIC | Age: 23
End: 2023-06-08

## 2023-06-08 NOTE — PROGRESS NOTES
BLAYNE referred for initial prenatal assessment  Patient is Sophie Vance with an JOSE of 01/17/2024  BLAYNE called patient to complete assessment - no answer  SW left VM requesting a return call  Addendum:     Patient called SW back and was agreeable to completing assessment  Patient reports this pregnancy was planned; she and andrews Lerner (FOB) are very excited  Patient reports they are well supported by her parents, family, friends, and [employer]  Patient and FOB live with patient's parents while they save up for their wedding in 2 weeks and for buying a house  Patient reports her parents are helping to support them financially; denies financial concerns at this time  Both patient and FOB are working  Denies having WIC or SNAP  Patient drives her own vehicle  Denies need for parenting or baby supply resources  Patient reports h/o depression, anxiety, and insomnia  Was seeing a therapist and taking medication but stopped during [place] when the office closed  Patient reports she has been managing well since then and denies need for resources at this time  Patient denies depressed mood this pregnancy and has been feeling well overall  Enjoys taking baths, reading, and breathing exercises for self care  Patient denies current or h/o substance use, DV/IPV, CYS involvement, and legal concerns  Patient, her family, and her fiance all have a 3-year PFA against her twin sister, Eliecer Flores, for violence against her brother  Patient reports she does not have any contact with her sister at all and does not believe she is aware patient is pregnant  BLAYNE encouraged patient to let the office know if she would like to make herself a confidential encounter at delivery for safety purposes  No other needs identified at this time  BLAYNE closing referral  Please re-refer as needed

## 2023-06-09 ENCOUNTER — TELEPHONE (OUTPATIENT)
Facility: HOSPITAL | Age: 23
End: 2023-06-09

## 2023-06-09 NOTE — TELEPHONE ENCOUNTER
Called patient to schedule MFM appointment, based on referral issued to Maternal Fetal Medicine by [facility] office  Left voicemail requesting patient to call back and schedule appointment, with office number for return call [phone]

## 2023-06-12 ENCOUNTER — TELEPHONE (OUTPATIENT)
Dept: OBGYN CLINIC | Facility: CLINIC | Age: 23
End: 2023-06-12

## 2023-06-12 ENCOUNTER — TELEPHONE (OUTPATIENT)
Facility: HOSPITAL | Age: 23
End: 2023-06-12

## 2023-06-12 NOTE — TELEPHONE ENCOUNTER
Called patient to schedule MFM appointment, based on referral issued to Maternal Fetal Medicine by [facility] office  Left voicemail requesting patient to call back and schedule appointment, with office number for return call [phone]

## 2023-06-12 NOTE — TELEPHONE ENCOUNTER
Phone call made to hospitals labs to obtain GC chlamydia results  Spoke with Kaz Headley who agrees to send report  Received result negative GC chlamydia and faxed to [facility] to scan to chart

## 2023-06-15 PROCEDURE — 99284 EMERGENCY DEPT VISIT MOD MDM: CPT

## 2023-06-16 ENCOUNTER — HOSPITAL ENCOUNTER (EMERGENCY)
Facility: HOSPITAL | Age: 23
Discharge: HOME | End: 2023-06-16
Attending: EMERGENCY MEDICINE
Payer: COMMERCIAL

## 2023-06-16 ENCOUNTER — TELEPHONE (OUTPATIENT)
Dept: OBGYN CLINIC | Facility: CLINIC | Age: 23
End: 2023-06-16

## 2023-06-16 VITALS
HEART RATE: 80 BPM | DIASTOLIC BLOOD PRESSURE: 70 MMHG | TEMPERATURE: 98.4 F | RESPIRATION RATE: 18 BRPM | BODY MASS INDEX: 18.78 KG/M2 | WEIGHT: 93 LBS | OXYGEN SATURATION: 100 % | SYSTOLIC BLOOD PRESSURE: 112 MMHG

## 2023-06-16 DIAGNOSIS — O21.9 NAUSEA/VOMITING IN PREGNANCY: Primary | ICD-10-CM

## 2023-06-16 LAB
ALBUMIN SERPL BCP-MCNC: 4.3 G/DL (ref 3.5–5)
ALP SERPL-CCNC: 32 U/L (ref 34–104)
ALT SERPL W P-5'-P-CCNC: 16 U/L (ref 7–52)
ANION GAP SERPL CALCULATED.3IONS-SCNC: 7 MMOL/L (ref 4–13)
AST SERPL W P-5'-P-CCNC: 17 U/L (ref 13–39)
BILIRUB SERPL-MCNC: 0.35 MG/DL (ref 0.2–1)
BILIRUB UR QL STRIP: NEGATIVE
BUN SERPL-MCNC: 12 MG/DL (ref 5–25)
CALCIUM SERPL-MCNC: 9.4 MG/DL (ref 8.4–10.2)
CHLORIDE SERPL-SCNC: 103 MMOL/L (ref 96–108)
CLARITY UR: CLEAR
CO2 SERPL-SCNC: 25 MMOL/L (ref 21–32)
COLOR UR: YELLOW
CREAT SERPL-MCNC: 0.66 MG/DL (ref 0.6–1.3)
GFR SERPL CREATININE-BSD FRML MDRD: 127 ML/MIN/1.73SQ M
GLUCOSE SERPL-MCNC: 90 MG/DL (ref 65–140)
GLUCOSE UR STRIP-MCNC: NEGATIVE MG/DL
HGB UR QL STRIP.AUTO: NEGATIVE
KETONES UR STRIP-MCNC: NEGATIVE MG/DL
LEUKOCYTE ESTERASE UR QL STRIP: NEGATIVE
NITRITE UR QL STRIP: NEGATIVE
PH UR STRIP.AUTO: 6.5 [PH]
POTASSIUM SERPL-SCNC: 3.6 MMOL/L (ref 3.5–5.3)
PROT SERPL-MCNC: 7 G/DL (ref 6.4–8.4)
PROT UR STRIP-MCNC: NEGATIVE MG/DL
SODIUM SERPL-SCNC: 135 MMOL/L (ref 135–147)
SP GR UR STRIP.AUTO: 1.02 (ref 1–1.03)
UROBILINOGEN UR STRIP-ACNC: <2 MG/DL

## 2023-06-16 PROCEDURE — 36415 COLL VENOUS BLD VENIPUNCTURE: CPT | Performed by: EMERGENCY MEDICINE

## 2023-06-16 PROCEDURE — 80053 COMPREHEN METABOLIC PANEL: CPT | Performed by: EMERGENCY MEDICINE

## 2023-06-16 PROCEDURE — 96374 THER/PROPH/DIAG INJ IV PUSH: CPT

## 2023-06-16 PROCEDURE — 87086 URINE CULTURE/COLONY COUNT: CPT | Performed by: EMERGENCY MEDICINE

## 2023-06-16 PROCEDURE — 81003 URINALYSIS AUTO W/O SCOPE: CPT | Performed by: EMERGENCY MEDICINE

## 2023-06-16 PROCEDURE — 96361 HYDRATE IV INFUSION ADD-ON: CPT

## 2023-06-16 RX ORDER — ONDANSETRON 2 MG/ML
4 INJECTION INTRAMUSCULAR; INTRAVENOUS ONCE
Status: COMPLETED | OUTPATIENT
Start: 2023-06-16 | End: 2023-06-16

## 2023-06-16 RX ADMIN — ONDANSETRON 4 MG: 2 INJECTION INTRAMUSCULAR; INTRAVENOUS at 00:55

## 2023-06-16 RX ADMIN — SODIUM CHLORIDE 1000 ML: 0.9 INJECTION, SOLUTION INTRAVENOUS at 00:55

## 2023-06-16 NOTE — Clinical Note
Mainor Coatsliborio was seen and treated in our emergency department on 6/15/2023  Diagnosis:     Heather Navarro  may return to work on return date  She may return on this date: 06/17/2023         If you have any questions or concerns, please don't hesitate to call        David Paul MD    ______________________________           _______________          _______________  Hospital Representative                              Date                                Time

## 2023-06-16 NOTE — TELEPHONE ENCOUNTER
I'm not familiar with this  Is there a Therapy Plan we can order? Like we do for Venofer infusions  This would be a nice resource for patient's who are diagnosed with N/V pregnancy and return to the ER more than once  Please find some more information and let Birgit Phipps and Kailey Running know we are investigating

## 2023-06-16 NOTE — TELEPHONE ENCOUNTER
"Spoke with patient and mom Devin Burgos) was also present on the phone  Informed patient an \"excuse from work\" note has been provided until her next appointment on 7/5/2023, when she will be evaluated again  If she feels better prior to that time and wishes to return to work she can  Instructed to continue medications and recommendations for ER evaluation if not keeping fluids down for >24 hours, dizzy or light headed at rest, or not urinating  Mark Collins and mom informed at her last appointment an option was given to go to the infusion room for IVF hydration as needed  Bonifacio/Cristal is questioning if an order can be placed instead of going to the ER? Please advise     *Spoke with Glenna Vasques in [place] [address] infusion room to assist with potential future need for IVF hydration  [facility] Infusion room is NOT open on the weekends, only  and [facility] are open on weekends  Ambulatory order needs to be placed, plan should include a specified day weekly,  if not needed, pt may cancel  If patient should need IVF hydration on an alternative day, she would be instructed to call the office, nurse to arrange infusion for \"same day\" appointment     "

## 2023-06-16 NOTE — ED PROVIDER NOTES
History  Chief Complaint   Patient presents with   • Vomiting During Pregnancy     Pt with vomiting x3 days  9 weeks pregnant  Here for the same recently     22 yo F approx 9 wks pregnant presents to ED with recurrent N/V  Was hospitalized a few wks ago for similar  D/c home with reglan/zofran, which she last took 8pm  She did go out to eat (pre wedding celebration)  No abd pain  No pelvic bleeding/spotting  No fevers/chills  No diarrhea  No urinary sx  Follows with [facility]  Mom bedside  Feels very fatigued  History provided by:  Patient and medical records   used: No    Vomiting  Severity:  Moderate  Timing:  Constant  Quality:  Stomach contents  Progression:  Unchanged  Chronicity:  Recurrent  Recent urination:  Normal  Relieved by:  Nothing  Worsened by:  Nothing  Ineffective treatments:  Antiemetics  Associated symptoms: no abdominal pain, no arthralgias, no chills, no cough, no diarrhea, no fever, no headaches, no myalgias, no sore throat and no URI    Risk factors: pregnant    Risk factors: no alcohol use, no diabetes, no prior abdominal surgery, no sick contacts, no suspect food intake and no travel to endemic areas        Prior to Admission Medications   Prescriptions Last Dose Informant Patient Reported? Taking?    Doxylamine Succinate, Sleep, (UNISOM PO)  Self Yes No   Sig: Take by mouth   Prenatal Vit-Fe Fumarate-FA (PRENATAL 19 PO)  Self Yes No   Sig: Take 1 capsule by mouth daily   Pyridoxine HCl (vitamin B-6) 25 MG tablet   Yes No   Sig: Take 25 mg by mouth daily   metoclopramide (Reglan) 10 mg tablet   No No   Sig: Take 1 tablet (10 mg total) by mouth every 6 (six) hours as needed (nausea)   Patient not taking: Reported on 6/7/2023   ondansetron (ZOFRAN-ODT) 4 mg disintegrating tablet   No No   Sig: Take 1 tablet (4 mg total) by mouth every 6 (six) hours as needed for nausea or vomiting   Patient not taking: Reported on 6/7/2023      Facility-Administered Medications: None Past Medical History:   Diagnosis Date   • Kidney stone     2022, 2023 - lithotripsy 2022   • Migraine     has seen neurology   • Psychiatric disorder     anxiety,depression-counseling prior to covid   • Pyelonephritis 03/11/2023    Dr Avni Galvez       Past Surgical History:   Procedure Laterality Date   • HEMANGIOMA EXCISION Right 2011    [facility]   • VA CYSTO/URETERO W/LITHOTRIPSY &INDWELL STENT INSRT Right 04/19/2022    Procedure: CYSTOSCOPY URETEROSCOPY WITH LITHOTRIPSY HOLMIUM LASER AND INSERTION STENT URETERAL;  Surgeon: Bill Tyson MD;  Location: [facility] OR;  Service: Urology       Family History   Problem Relation Age of Onset   • Hypertension Mother    • Migraines Mother    • Hypertension Father    • Diabetes Father    • Nephrolithiasis Brother    • Asthma Sister    • Asthma Brother      I have reviewed and agree with the history as documented  E-Cigarette/Vaping   • E-Cigarette Use Never User      E-Cigarette/Vaping Substances   • Nicotine No    • THC No    • CBD No    • Flavoring No    • Other No    • Unknown No      Social History     Tobacco Use   • Smoking status: Never   • Smokeless tobacco: Never   Vaping Use   • Vaping Use: Never used   Substance Use Topics   • Alcohol use: Never   • Drug use: Never       Review of Systems   Constitutional: Negative for appetite change, chills, fatigue and fever  HENT: Negative for congestion, ear pain, rhinorrhea, sore throat, trouble swallowing and voice change  Eyes: Negative for pain and visual disturbance  Respiratory: Negative for cough, chest tightness and shortness of breath  Cardiovascular: Negative for chest pain, palpitations and leg swelling  Gastrointestinal: Positive for nausea and vomiting  Negative for abdominal pain, blood in stool, constipation and diarrhea  Genitourinary: Negative for difficulty urinating, dysuria, flank pain and hematuria     Musculoskeletal: Negative for arthralgias, back pain, myalgias, neck pain and neck stiffness  Skin: Negative for rash  Neurological: Negative for dizziness, syncope, speech difficulty, light-headedness and headaches  Psychiatric/Behavioral: Negative for confusion and suicidal ideas  Physical Exam  Physical Exam  Vitals and nursing note reviewed  Constitutional:       General: She is not in acute distress  Appearance: Normal appearance  She is well-developed  She is not ill-appearing, toxic-appearing or diaphoretic  HENT:      Head: Normocephalic and atraumatic  Right Ear: External ear normal       Left Ear: External ear normal       Nose: Nose normal       Mouth/Throat:      Mouth: Mucous membranes are moist       Pharynx: Oropharynx is clear  Eyes:      General: No scleral icterus  Right eye: No discharge  Left eye: No discharge  Extraocular Movements: Extraocular movements intact  Conjunctiva/sclera: Conjunctivae normal       Pupils: Pupils are equal, round, and reactive to light  Neck:      Trachea: No tracheal deviation  Cardiovascular:      Rate and Rhythm: Normal rate and regular rhythm  Heart sounds: Normal heart sounds  No murmur heard  No friction rub  No gallop  Pulmonary:      Effort: Pulmonary effort is normal  No respiratory distress  Breath sounds: Normal breath sounds  No stridor  Chest:      Chest wall: No tenderness  Abdominal:      General: Bowel sounds are normal       Palpations: Abdomen is soft  Tenderness: There is no abdominal tenderness  There is no right CVA tenderness, left CVA tenderness, guarding or rebound  Musculoskeletal:         General: No deformity  Normal range of motion  Cervical back: Normal range of motion and neck supple  No rigidity  Lymphadenopathy:      Cervical: No cervical adenopathy  Skin:     General: Skin is warm and dry  Findings: No erythema or rash  Neurological:      General: No focal deficit present        Mental Status: She is alert and oriented to person, place, and time  Cranial Nerves: No cranial nerve deficit  Sensory: No sensory deficit        Coordination: Coordination normal    Psychiatric:         Behavior: Behavior normal          Vital Signs  ED Triage Vitals   Temperature Pulse Respirations Blood Pressure SpO2   06/16/23 0054 06/15/23 2323 06/15/23 2323 06/15/23 2323 06/15/23 2323   98 4 °F (36 9 °C) 86 16 121/72 100 %      Temp Source Heart Rate Source Patient Position - Orthostatic VS BP Location FiO2 (%)   06/16/23 0054 06/16/23 0211 06/16/23 0211 06/16/23 0211 --   Temporal Monitor Sitting Right arm       Pain Score       06/15/23 2323       6           Vitals:    06/15/23 2323 06/16/23 0211   BP: 121/72 112/70   Pulse: 86 80   Patient Position - Orthostatic VS:  Sitting         Visual Acuity      ED Medications  Medications   sodium chloride 0 9 % bolus 1,000 mL (0 mL Intravenous Stopped 6/16/23 0347)   ondansetron (ZOFRAN) injection 4 mg (4 mg Intravenous Given 6/16/23 0055)       Diagnostic Studies  Results Reviewed     Procedure Component Value Units Date/Time    Comprehensive metabolic panel [[id number]]  (Abnormal) Collected: 06/16/23 0035    Lab Status: Final result Specimen: Blood from Arm, Right Updated: 06/16/23 0057     Sodium 135 mmol/L      Potassium 3 6 mmol/L      Chloride 103 mmol/L      CO2 25 mmol/L      ANION GAP 7 mmol/L      BUN 12 mg/dL      Creatinine 0 66 mg/dL      Glucose 90 mg/dL      Calcium 9 4 mg/dL      AST 17 U/L      ALT 16 U/L      Alkaline Phosphatase 32 U/L      Total Protein 7 0 g/dL      Albumin 4 3 g/dL      Total Bilirubin 0 35 mg/dL      eGFR 127 ml/min/1 73sq m     Narrative:      [place] guidelines for Chronic Kidney Disease (CKD):   •  Stage 1 with normal or high GFR (GFR > 90 mL/min/1 73 square meters)  •  Stage 2 Mild CKD (GFR = 60-89 mL/min/1 73 square meters)  •  Stage 3A Moderate CKD (GFR = 45-59 mL/min/1 73 square meters)  •  Stage 3B Moderate CKD (GFR = 30-44 mL/min/1 73 square meters)  •  Stage 4 Severe CKD (GFR = 15-29 mL/min/1 73 square meters)  •  Stage 5 End Stage CKD (GFR <15 mL/min/1 73 square meters)  Note: GFR calculation is accurate only with a steady state creatinine    UA w Reflex to Microscopic w Reflex to Culture [[id number]] Collected: 06/16/23 0035    Lab Status: Final result Specimen: Urine, Clean Catch Updated: 06/16/23 0047     Color, UA Yellow     Clarity, UA Clear     Specific [place], UA 1 020     pH, UA 6 5     Leukocytes, UA Negative     Nitrite, UA Negative     Protein, UA Negative mg/dl      Glucose, UA Negative mg/dl      Ketones, UA Negative mg/dl      Urobilinogen, UA <2 0 mg/dl      Bilirubin, UA Negative     Occult Blood, UA Negative     URINE COMMENT --    Urine culture [[id number]] Collected: 06/16/23 0035    Lab Status: In process Specimen: Urine, Clean Catch Updated: 06/16/23 0046                 No orders to display              Procedures  Procedures         ED Course  ED Course as of 06/16/23 0445   Fri Jun 16, 2023   0257 TT to [facility]   0324 Dr Wang Vides agrees with d/c home  Discussed supportive care, small frequent meals, RTED if sx worsen  Pt feeling improved, agrees with plan  Medical Decision Making  Nausea/vomiting in pregnancy: acute illness or injury  Amount and/or Complexity of Data Reviewed  Labs: ordered  Decision-making details documented in ED Course  Risk  Prescription drug management            Disposition  Final diagnoses:   Nausea/vomiting in pregnancy     Time reflects when diagnosis was documented in both MDM as applicable and the Disposition within this note     Time User Action Codes Description Comment    6/16/2023  3:31 AM Cayla Knox Add [O21 9] Nausea/vomiting in pregnancy       ED Disposition     ED Disposition   Discharge    Condition   Stable    Date/Time   Fri Jun 16, 2023  3:26 AM    Comment   Rufus Estrada discharge to home/self care                Follow-up Information     Follow up With Specialties Details Why Contact Info Additional Carlilolvinrikki 27, DO Family Medicine Schedule an appointment as soon as possible for a visit   [address]  [address] [facility] [address] Emergency Department Emergency Medicine  If symptoms worsen [address] [place] [address]  [address] [facility] Emergency Department, [address] [facility] [address]    Marcial Santamaria 435 Radiology Call today  [address] [facility] [address]  [address] [place] [address] OB/GYN Ultrasound [facility], [address], [address], [address]  Emergency Department Emergency Medicine  If symptoms worsen [address] [place] [address]  [address] [facility] Emergency Department, [address] [facility] [address]          Discharge Medication List as of 6/16/2023  3:35 AM      CONTINUE these medications which have NOT CHANGED    Details   Doxylamine Succinate, Sleep, (UNISOM PO) Take by mouth, Historical Med      metoclopramide (Reglan) 10 mg tablet Take 1 tablet (10 mg total) by mouth every 6 (six) hours as needed (nausea), Starting Wed 5/31/2023, Normal      ondansetron (ZOFRAN-ODT) 4 mg disintegrating tablet Take 1 tablet (4 mg total) by mouth every 6 (six) hours as needed for nausea or vomiting, Starting Tue 5/30/2023, Normal      Prenatal Vit-Fe Fumarate-FA (PRENATAL 19 PO) Take 1 capsule by mouth daily, Historical Med      Pyridoxine HCl (vitamin B-6) 25 MG tablet Take 25 mg by mouth daily, Historical Med             No discharge procedures on file      PDMP Review       Value Time User    PDMP Reviewed  Yes 4/22/2022  2:26 PM Amos Vasques MD          ED Provider  Electronically Signed by           Beverly Pinedo MD  06/16/23 0686

## 2023-06-16 NOTE — TELEPHONE ENCOUNTER
Spoke with patient and informed we will discuss  with the infusion room regarding policy for IVF hydration  Reinforced if she should have recurrence of  of persistent n/v, unable to keep fluids down for >24 hours, becomes dizzy or lightheaded and not urinating to proceed to the ER over the weekend  Pt verbalized an understanding

## 2023-06-16 NOTE — Clinical Note
Unknown Hoe was seen and treated in our emergency department on 6/15/2023  Diagnosis:     Riya José  may return to work on return date  She may return on this date: 06/19/2023         If you have any questions or concerns, please don't hesitate to call        Keya Jovel MD    ______________________________           _______________          _______________  Hospital Representative                              Date                                Time

## 2023-06-16 NOTE — TELEPHONE ENCOUNTER
Patient returned call  She states she still very nauseous and vomiting today upon discharge from [facility] ED  She is taking the Reglan and Zofran and Vit B6 and Unisom with no improvement of symptoms  She states she missing a lot of days at work and they need a medical leave of absence note from her OB doctor or else they will fire her  Pt states at her visit on 06/07/23, Dr Johnetta Rinne did mention that if she need a medical leave of absence note due to nausea and vomiting in pregnancy to reach back out to Dr Johnetta Rinne  Advised pt will forward her request to Dr Johnetta Rinne  She voiced appreciation  Dr Johnetta Rinne please review and advise

## 2023-06-16 NOTE — TELEPHONE ENCOUNTER
Patient l/m yesterday night at 07:38p stating that she been throwing up a lot 3 times by far and she requesting an appointment to be seen to obtain a note for medical absence/leave for work  Upon checking her chart patient went to the [facility] ED late last night and was discharge early this morning for N/V  She did received IV fluids and was discharged with Reglan/Zofran and also a work note  Attempt to call patient to follow-up  L/m on pt's voicemail advising that we did received her voice mail and saw that she went to the [facility] ED and was evaluated and was discharge early this morning (stable condition)  Advised in message if she have any further concerns or assistance to please give us a call back

## 2023-06-16 NOTE — LETTER
June 16, 2023     Patient: Bharati Carrillo  YOB: 2000  Date of Visit: 6/16/2023      To Whom it May Concern:    Pam Lovelace is under my professional care  Birgit Phipps was seen in my office on 6/7/2023 and in the Emergency Room on 6/16/2023  Please excuse Birgit Phipps from work at this time due to medical complications of pregnancy  If she is feeling better she can return to work as she feels able  Otherwise she will be evaluated at her next appointment with us on 7/5/203 to see if she is able to return at that time  If you have any questions or concerns, please don't hesitate to call           Sincerely,          Esthela Lambert MD          CC: No Recipients

## 2023-06-17 LAB
BACTERIA UR CULT: ABNORMAL
BACTERIA UR CULT: ABNORMAL

## 2023-06-20 ENCOUNTER — LAB (OUTPATIENT)
Dept: LAB | Facility: CLINIC | Age: 23
End: 2023-06-20
Payer: COMMERCIAL

## 2023-06-20 ENCOUNTER — HOSPITAL ENCOUNTER (OUTPATIENT)
Facility: HOSPITAL | Age: 23
Setting detail: OBSERVATION
Discharge: HOME | End: 2023-06-21
Attending: OBSTETRICS & GYNECOLOGY | Admitting: OBSTETRICS & GYNECOLOGY
Payer: COMMERCIAL

## 2023-06-20 DIAGNOSIS — N20.0 NEPHROLITHIASIS: ICD-10-CM

## 2023-06-20 DIAGNOSIS — O21.0 HYPEREMESIS AFFECTING PREGNANCY, ANTEPARTUM: Primary | ICD-10-CM

## 2023-06-20 DIAGNOSIS — N39.0 URINARY TRACT INFECTION WITHOUT HEMATURIA, SITE UNSPECIFIED: ICD-10-CM

## 2023-06-20 DIAGNOSIS — Z3A.08 8 WEEKS GESTATION OF PREGNANCY: ICD-10-CM

## 2023-06-20 DIAGNOSIS — Z36.89 ENCOUNTER FOR OTHER SPECIFIED ANTENATAL SCREENING: ICD-10-CM

## 2023-06-20 DIAGNOSIS — R10.2 PELVIC CRAMPING: ICD-10-CM

## 2023-06-20 PROBLEM — Z3A.09 9 WEEKS GESTATION OF PREGNANCY: Status: ACTIVE | Noted: 2023-06-07

## 2023-06-20 LAB
ABO GROUP BLD: NORMAL
ALBUMIN SERPL BCP-MCNC: 3.9 G/DL (ref 3.5–5)
ALBUMIN SERPL BCP-MCNC: 4.1 G/DL (ref 3.5–5)
ALP SERPL-CCNC: 30 U/L (ref 34–104)
ALP SERPL-CCNC: 36 U/L (ref 46–116)
ALT SERPL W P-5'-P-CCNC: 11 U/L (ref 7–52)
ALT SERPL W P-5'-P-CCNC: 19 U/L (ref 12–78)
ANION GAP SERPL CALCULATED.3IONS-SCNC: 4 MMOL/L
ANION GAP SERPL CALCULATED.3IONS-SCNC: 6 MMOL/L
AST SERPL W P-5'-P-CCNC: 11 U/L (ref 5–45)
AST SERPL W P-5'-P-CCNC: 15 U/L (ref 13–39)
B-HCG SERPL-ACNC: ABNORMAL MIU/ML (ref 0–5)
BACTERIA UR QL AUTO: ABNORMAL /HPF
BASOPHILS # BLD AUTO: 0.04 THOUSANDS/ÂΜL (ref 0–0.1)
BASOPHILS NFR BLD AUTO: 1 % (ref 0–1)
BILIRUB SERPL-MCNC: 0.4 MG/DL (ref 0.2–1)
BILIRUB SERPL-MCNC: 0.46 MG/DL (ref 0.2–1)
BILIRUB UR QL STRIP: NEGATIVE
BLD GP AB SCN SERPL QL: NEGATIVE
BUN SERPL-MCNC: 10 MG/DL (ref 5–25)
BUN SERPL-MCNC: 13 MG/DL (ref 5–25)
CALCIUM SERPL-MCNC: 9.1 MG/DL (ref 8.3–10.1)
CALCIUM SERPL-MCNC: 9.2 MG/DL (ref 8.4–10.2)
CAOX CRY URNS QL MICRO: ABNORMAL /HPF
CHLORIDE SERPL-SCNC: 104 MMOL/L (ref 96–108)
CHLORIDE SERPL-SCNC: 108 MMOL/L (ref 96–108)
CLARITY UR: ABNORMAL
CO2 SERPL-SCNC: 23 MMOL/L (ref 21–32)
CO2 SERPL-SCNC: 24 MMOL/L (ref 21–32)
COLOR UR: YELLOW
CREAT SERPL-MCNC: 0.59 MG/DL (ref 0.6–1.3)
CREAT SERPL-MCNC: 0.68 MG/DL (ref 0.6–1.3)
EOSINOPHIL # BLD AUTO: 0.09 THOUSAND/ÂΜL (ref 0–0.61)
EOSINOPHIL NFR BLD AUTO: 1 % (ref 0–6)
ERYTHROCYTE [DISTWIDTH] IN BLOOD BY AUTOMATED COUNT: 12.7 % (ref 11.6–15.1)
GFR SERPL CREATININE-BSD FRML MDRD: 126 ML/MIN/1.73SQ M
GFR SERPL CREATININE-BSD FRML MDRD: 132 ML/MIN/1.73SQ M
GLUCOSE P FAST SERPL-MCNC: 84 MG/DL (ref 65–99)
GLUCOSE SERPL-MCNC: 84 MG/DL (ref 65–140)
GLUCOSE SERPL-MCNC: 85 MG/DL (ref 65–140)
GLUCOSE UR STRIP-MCNC: NEGATIVE MG/DL
HCT VFR BLD AUTO: 41.7 % (ref 34.8–46.1)
HGB BLD-MCNC: 14.8 G/DL (ref 11.5–15.4)
HGB UR QL STRIP.AUTO: NEGATIVE
IMM GRANULOCYTES # BLD AUTO: 0.02 THOUSAND/UL (ref 0–0.2)
IMM GRANULOCYTES NFR BLD AUTO: 0 % (ref 0–2)
KETONES UR STRIP-MCNC: NEGATIVE MG/DL
LEUKOCYTE ESTERASE UR QL STRIP: NEGATIVE
LYMPHOCYTES # BLD AUTO: 1.65 THOUSANDS/ÂΜL (ref 0.6–4.47)
LYMPHOCYTES NFR BLD AUTO: 22 % (ref 14–44)
MAGNESIUM SERPL-MCNC: 2.1 MG/DL (ref 1.6–2.6)
MCH RBC QN AUTO: 31 PG (ref 26.8–34.3)
MCHC RBC AUTO-ENTMCNC: 35.5 G/DL (ref 31.4–37.4)
MCV RBC AUTO: 87 FL (ref 82–98)
MONOCYTES # BLD AUTO: 0.7 THOUSAND/ÂΜL (ref 0.17–1.22)
MONOCYTES NFR BLD AUTO: 9 % (ref 4–12)
MUCOUS THREADS UR QL AUTO: ABNORMAL
NEUTROPHILS # BLD AUTO: 5.02 THOUSANDS/ÂΜL (ref 1.85–7.62)
NEUTS SEG NFR BLD AUTO: 67 % (ref 43–75)
NITRITE UR QL STRIP: NEGATIVE
NON-SQ EPI CELLS URNS QL MICRO: ABNORMAL /HPF
NRBC BLD AUTO-RTO: 0 /100 WBCS
PH UR STRIP.AUTO: 6 [PH]
PHOSPHATE SERPL-MCNC: 2.7 MG/DL (ref 2.7–4.5)
PLATELET # BLD AUTO: 252 THOUSANDS/UL (ref 149–390)
PMV BLD AUTO: 10 FL (ref 8.9–12.7)
POTASSIUM SERPL-SCNC: 3.5 MMOL/L (ref 3.5–5.3)
POTASSIUM SERPL-SCNC: 3.7 MMOL/L (ref 3.5–5.3)
PROT SERPL-MCNC: 6.8 G/DL (ref 6.4–8.4)
PROT SERPL-MCNC: 7.3 G/DL (ref 6.4–8.4)
PROT UR STRIP-MCNC: ABNORMAL MG/DL
PTH-INTACT SERPL-MCNC: 12.1 PG/ML (ref 12–88)
RBC # BLD AUTO: 4.78 MILLION/UL (ref 3.81–5.12)
RBC #/AREA URNS AUTO: ABNORMAL /HPF
RH BLD: POSITIVE
RUBV IGG SERPL IA-ACNC: 21.1 IU/ML
SODIUM SERPL-SCNC: 133 MMOL/L (ref 135–147)
SODIUM SERPL-SCNC: 136 MMOL/L (ref 135–147)
SP GR UR STRIP.AUTO: 1.02 (ref 1–1.03)
SPECIMEN EXPIRATION DATE: NORMAL
URATE SERPL-MCNC: 2.6 MG/DL (ref 2–7.5)
UROBILINOGEN UR STRIP-ACNC: <2 MG/DL
WBC # BLD AUTO: 7.52 THOUSAND/UL (ref 4.31–10.16)
WBC #/AREA URNS AUTO: ABNORMAL /HPF

## 2023-06-20 PROCEDURE — 86762 RUBELLA ANTIBODY: CPT

## 2023-06-20 PROCEDURE — 99222 1ST HOSP IP/OBS MODERATE 55: CPT | Performed by: OBSTETRICS & GYNECOLOGY

## 2023-06-20 PROCEDURE — 86901 BLOOD TYPING SEROLOGIC RH(D): CPT

## 2023-06-20 PROCEDURE — 87491 CHLMYD TRACH DNA AMP PROBE: CPT

## 2023-06-20 PROCEDURE — 87591 N.GONORRHOEAE DNA AMP PROB: CPT

## 2023-06-20 PROCEDURE — 83735 ASSAY OF MAGNESIUM: CPT

## 2023-06-20 PROCEDURE — 36415 COLL VENOUS BLD VENIPUNCTURE: CPT

## 2023-06-20 PROCEDURE — 87340 HEPATITIS B SURFACE AG IA: CPT

## 2023-06-20 PROCEDURE — G0379 DIRECT REFER HOSPITAL OBSERV: HCPCS

## 2023-06-20 PROCEDURE — 85025 COMPLETE CBC W/AUTO DIFF WBC: CPT

## 2023-06-20 PROCEDURE — 86900 BLOOD TYPING SEROLOGIC ABO: CPT

## 2023-06-20 PROCEDURE — 87086 URINE CULTURE/COLONY COUNT: CPT

## 2023-06-20 PROCEDURE — 86850 RBC ANTIBODY SCREEN: CPT

## 2023-06-20 PROCEDURE — 86706 HEP B SURFACE ANTIBODY: CPT

## 2023-06-20 PROCEDURE — 84100 ASSAY OF PHOSPHORUS: CPT

## 2023-06-20 PROCEDURE — 84550 ASSAY OF BLOOD/URIC ACID: CPT

## 2023-06-20 PROCEDURE — 80053 COMPREHEN METABOLIC PANEL: CPT

## 2023-06-20 PROCEDURE — 86780 TREPONEMA PALLIDUM: CPT

## 2023-06-20 PROCEDURE — 86803 HEPATITIS C AB TEST: CPT

## 2023-06-20 PROCEDURE — 87389 HIV-1 AG W/HIV-1&-2 AB AG IA: CPT

## 2023-06-20 PROCEDURE — 80053 COMPREHEN METABOLIC PANEL: CPT | Performed by: OBSTETRICS & GYNECOLOGY

## 2023-06-20 PROCEDURE — 84702 CHORIONIC GONADOTROPIN TEST: CPT

## 2023-06-20 PROCEDURE — 83970 ASSAY OF PARATHORMONE: CPT

## 2023-06-20 RX ORDER — METHYLPREDNISOLONE 4 MG/1
16 TABLET ORAL 3 TIMES DAILY
Status: DISCONTINUED | OUTPATIENT
Start: 2023-06-20 | End: 2023-06-21 | Stop reason: HOSPADM

## 2023-06-20 RX ORDER — ONDANSETRON 2 MG/ML
4 INJECTION INTRAMUSCULAR; INTRAVENOUS EVERY 4 HOURS PRN
Status: DISCONTINUED | OUTPATIENT
Start: 2023-06-20 | End: 2023-06-21

## 2023-06-20 RX ORDER — SODIUM CHLORIDE, SODIUM LACTATE, POTASSIUM CHLORIDE, CALCIUM CHLORIDE 600; 310; 30; 20 MG/100ML; MG/100ML; MG/100ML; MG/100ML
125 INJECTION, SOLUTION INTRAVENOUS CONTINUOUS
Status: DISCONTINUED | OUTPATIENT
Start: 2023-06-20 | End: 2023-06-21 | Stop reason: HOSPADM

## 2023-06-20 RX ORDER — METOCLOPRAMIDE 10 MG/1
10 TABLET ORAL 3 TIMES DAILY
Status: DISCONTINUED | OUTPATIENT
Start: 2023-06-20 | End: 2023-06-21 | Stop reason: HOSPADM

## 2023-06-20 RX ADMIN — METOCLOPRAMIDE 10 MG: 10 TABLET ORAL at 15:52

## 2023-06-20 RX ADMIN — SODIUM CHLORIDE, SODIUM LACTATE, POTASSIUM CHLORIDE, AND CALCIUM CHLORIDE 1000 ML: .6; .31; .03; .02 INJECTION, SOLUTION INTRAVENOUS at 18:00

## 2023-06-20 RX ADMIN — METHYLPREDNISOLONE 16 MG: 4 TABLET ORAL at 21:10

## 2023-06-20 RX ADMIN — METOCLOPRAMIDE 10 MG: 10 TABLET ORAL at 21:06

## 2023-06-20 RX ADMIN — SODIUM CHLORIDE, SODIUM LACTATE, POTASSIUM CHLORIDE, AND CALCIUM CHLORIDE 125 ML/HR: .6; .31; .03; .02 INJECTION, SOLUTION INTRAVENOUS at 20:00

## 2023-06-20 RX ADMIN — METHYLPREDNISOLONE 16 MG: 4 TABLET ORAL at 15:59

## 2023-06-20 RX ADMIN — ONDANSETRON 4 MG: 2 INJECTION INTRAMUSCULAR; INTRAVENOUS at 20:27

## 2023-06-20 NOTE — TELEPHONE ENCOUNTER
Patient's mother Gwendolyn Carbone called stating that her daughter still having nausea and vomiting (hyperemesis)  She states that her daughter even threw up after sipping some water  She states Mohamud Davila did take the medications as prescribed to help with the n/v but still can not keep anything down  Gwendolyn Carbone would like her daughter receive treatment now to get her through to her wedding date which is this weekend  Provided her with the option about possibly direct admitting her to the hospital for IV fluids and management but Dr Janette Joseph will have to look into it if she can direct admit Mohamud Davila or have her go back through the ER  Jairolizzycandice Carbone questioned about how long will Mohamud Davila be admitted for and if she will be discharge for her wedding; advised her that we do not know how long she will need to be admitted for and can not guarantee if she will be discharge before then  Pt's mother agreed for Mohamud Davila to be admitted to the hospital  Advised her will forward her request/decision to Dr Janette Joseph and Dr Janette Joseph can give her a call regarding the plan  Gwendolyn Carbone voiced appreciation and call back # [phone]  Dr Janette Joseph please advise

## 2023-06-20 NOTE — RESULT ENCOUNTER NOTE
Patient returned call, she is currently inpatient on labor and delivery, results available to inpatient treatment team

## 2023-06-20 NOTE — ASSESSMENT & PLAN NOTE
- no nausea or vomiting today  Tolerated regular diet for breakfast and lunch  IVF stopped  - pt wishes discharge home and I feel she is on a good regimen now to do that, will discharge home today after her next medication administration    - Vit B6 25mg tid, Zofran 4mg po tid scheduled, Reglan po tid scheduled and methlyprednisolone taper    - reviewed after starting to decrease taper if no N/V t/c spacing out or stopping antiemetics  Restart if symptoms return  - pt and mother express understanding

## 2023-06-20 NOTE — H&P
H&P Exam - Obstetrics   Gracie Martino 23 y o  female MRN: [id number]  Unit/Bed#: LD TRIAGE  Encounter: [id number]    Assessment/Plan     * Hyperemesis affecting pregnancy, antepartum  Assessment & Plan  IUP @ 9w 6d who has been to the ER 3 times for IV hydration during this pregnancy  She is currently using reglan, zofran and B6 without a significant improvement in her nausea and vomiting  She reports vomiting 6 times today and reports that she is urinating twice a day  Will give IV hydration and begin a methylprednisolone taper - 16 mg TID for 3 days then halving the dose every three days until D/C for a total of two weeks maximum  She went for her routine PN labs earlier today  Will get CMP as well        History of Present Illness   Chief Complaint: Hyperemesis    HPI:  Gracie Martino is a 23 y o   female with an JOSE of 2024, by Ultrasound at 9w6d weeks gestation who is being admitted for Hyperemesis Dehydration  Her current obstetrical history is significant for hyperemesis with 3 visits to the ER for hydration  Pregnancy complications: none      Review of Systems    Historical Information   OB History    Para Term  AB Living   2 0 0 0 1 0   SAB IAB Ectopic Multiple Live Births   1 0 0 0 0      # Outcome Date GA Lbr Micah/2nd Weight Sex Delivery Anes PTL Lv   2 Current            1 SAB 22             Baby complications/comments:   Past Medical History:   Diagnosis Date   • Kidney stone     ,  - lithotripsy    • Migraine     has seen neurology   • Psychiatric disorder     anxiety,depression-counseling prior to covid   • Pyelonephritis 2023    Dr Isabella Soria     Past Surgical History:   Procedure Laterality Date   • HEMANGIOMA EXCISION Right     [facility]   • [place] CYSTO/URETERO W/LITHOTRIPSY &INDWELL STENT INSRT Right 2022    Procedure: CYSTOSCOPY URETEROSCOPY WITH LITHOTRIPSY HOLMIUM LASER AND INSERTION STENT URETERAL;  Surgeon: Crystal Watkins MD; Location: [facility];  Service: Urology     Social History   Social History     Substance and Sexual Activity   Alcohol Use Never     Social History     Substance and Sexual Activity   Drug Use Never     Social History     Tobacco Use   Smoking Status Never   Smokeless Tobacco Never     E-Cigarette/Vaping   • E-Cigarette Use Never User      E-Cigarette/Vaping Substances   • Nicotine No    • THC No    • CBD No    • Flavoring No    • Other No    • Unknown No      Family History: non-contributory    Meds/Allergies   all medications and allergies reviewed  Allergies   Allergen Reactions   • Cephalexin Rash and Itching   • Levofloxacin Rash, Itching and Hives     Upper arm red and burning   Vein swelling in the arm that patient got levaquin   Upper arm red and burning        Objective   Vitals: Last menstrual period 04/01/2023, unknown if currently breastfeeding  There is no height or weight on file to calculate BMI  Invasive Devices     None                 Physical Exam    Prenatal Labs: I have personally reviewed pertinent reports  Imaging, EKG, Pathology, and Other Studies: I have personally reviewed pertinent reports

## 2023-06-20 NOTE — TELEPHONE ENCOUNTER
Reviewed case and talked with Dr Miranda Estrada - on call today  Best plan would be admit for observation, IVF and likely steroid taper for hyperemesis which has not responded to antiemetics  No rooms on Med Surg [facility] currently with 9 patients on hold for beds in ER  Discussed with Bhavana Santamaria nurse manager on L&D who agrees okay for pt to come to L&D and managed there given lack of beds  Call Giaamrit Ferro (patient's mom) and reviewed recommendation for observation and optimization of an outpatient regimen for her N/V  Gia Ferro asked about her being able to leave on Friday for wedding Saturday  I told her we will try our best but can make no guarentees  She understands and is thankful  Will take Chela Grullon to L&D  Dr Miranda Estrada aware

## 2023-06-21 VITALS
TEMPERATURE: 98 F | HEIGHT: 59 IN | SYSTOLIC BLOOD PRESSURE: 116 MMHG | WEIGHT: 93 LBS | DIASTOLIC BLOOD PRESSURE: 68 MMHG | BODY MASS INDEX: 18.75 KG/M2 | OXYGEN SATURATION: 95 % | RESPIRATION RATE: 16 BRPM | HEART RATE: 97 BPM

## 2023-06-21 PROBLEM — Z3A.10 10 WEEKS GESTATION OF PREGNANCY: Status: ACTIVE | Noted: 2023-06-07

## 2023-06-21 LAB
BACTERIA UR CULT: ABNORMAL
C TRACH DNA SPEC QL NAA+PROBE: NEGATIVE
HBV SURFACE AB SER-ACNC: <3 MIU/ML
HBV SURFACE AG SER QL: NORMAL
HCV AB SER QL: NORMAL
HIV 1+2 AB+HIV1 P24 AG SERPL QL IA: NORMAL
HIV 2 AB SERPL QL IA: NORMAL
HIV1 AB SERPL QL IA: NORMAL
HIV1 P24 AG SERPL QL IA: NORMAL
N GONORRHOEA DNA SPEC QL NAA+PROBE: NEGATIVE
TREPONEMA PALLIDUM IGG+IGM AB [PRESENCE] IN SERUM OR PLASMA BY IMMUNOASSAY: NORMAL

## 2023-06-21 PROCEDURE — NC001 PR NO CHARGE: Performed by: OBSTETRICS & GYNECOLOGY

## 2023-06-21 PROCEDURE — 99238 HOSP IP/OBS DSCHRG MGMT 30/<: CPT | Performed by: OBSTETRICS & GYNECOLOGY

## 2023-06-21 PROCEDURE — 99204 OFFICE O/P NEW MOD 45 MIN: CPT

## 2023-06-21 RX ORDER — METHYLPREDNISOLONE 16 MG/1
TABLET ORAL
Qty: 12 TABLET | Refills: 0 | Status: SHIPPED | OUTPATIENT
Start: 2023-06-21 | End: 2023-07-05

## 2023-06-21 RX ORDER — DOCUSATE SODIUM 100 MG/1
100 CAPSULE, LIQUID FILLED ORAL 2 TIMES DAILY
Status: DISCONTINUED | OUTPATIENT
Start: 2023-06-21 | End: 2023-06-21 | Stop reason: HOSPADM

## 2023-06-21 RX ORDER — DIPHENHYDRAMINE HYDROCHLORIDE 25 MG/1
25 CAPSULE ORAL 3 TIMES DAILY
Refills: 0
Start: 2023-06-21

## 2023-06-21 RX ORDER — ONDANSETRON 4 MG/1
4 TABLET, ORALLY DISINTEGRATING ORAL 3 TIMES DAILY
Status: DISCONTINUED | OUTPATIENT
Start: 2023-06-21 | End: 2023-06-21 | Stop reason: HOSPADM

## 2023-06-21 RX ORDER — DOCUSATE SODIUM 100 MG/1
100 CAPSULE, LIQUID FILLED ORAL 2 TIMES DAILY
Refills: 0
Start: 2023-06-21

## 2023-06-21 RX ORDER — BISACODYL 10 MG
10 SUPPOSITORY, RECTAL RECTAL DAILY PRN
Status: DISCONTINUED | OUTPATIENT
Start: 2023-06-21 | End: 2023-06-21 | Stop reason: HOSPADM

## 2023-06-21 RX ORDER — PYRIDOXINE HCL (VITAMIN B6) 50 MG
25 TABLET ORAL 3 TIMES DAILY
Status: DISCONTINUED | OUTPATIENT
Start: 2023-06-21 | End: 2023-06-21 | Stop reason: HOSPADM

## 2023-06-21 RX ORDER — METHYLPREDNISOLONE 4 MG/1
TABLET ORAL
Qty: 14 TABLET | Refills: 0 | Status: SHIPPED | OUTPATIENT
Start: 2023-06-27 | End: 2023-07-05

## 2023-06-21 RX ADMIN — ONDANSETRON 4 MG: 4 TABLET, ORALLY DISINTEGRATING ORAL at 15:24

## 2023-06-21 RX ADMIN — METHYLPREDNISOLONE 16 MG: 4 TABLET ORAL at 08:31

## 2023-06-21 RX ADMIN — METOCLOPRAMIDE 10 MG: 10 TABLET ORAL at 15:25

## 2023-06-21 RX ADMIN — METOCLOPRAMIDE 10 MG: 10 TABLET ORAL at 08:31

## 2023-06-21 RX ADMIN — SODIUM CHLORIDE, SODIUM LACTATE, POTASSIUM CHLORIDE, AND CALCIUM CHLORIDE 125 ML/HR: .6; .31; .03; .02 INJECTION, SOLUTION INTRAVENOUS at 03:47

## 2023-06-21 RX ADMIN — ONDANSETRON 4 MG: 4 TABLET, ORALLY DISINTEGRATING ORAL at 08:31

## 2023-06-21 RX ADMIN — DOCUSATE SODIUM 100 MG: 100 CAPSULE, LIQUID FILLED ORAL at 10:21

## 2023-06-21 RX ADMIN — BISACODYL 10 MG: 10 SUPPOSITORY RECTAL at 10:21

## 2023-06-21 RX ADMIN — METHYLPREDNISOLONE 16 MG: 4 TABLET ORAL at 15:25

## 2023-06-21 RX ADMIN — PYRIDOXINE HCL TAB 50 MG 25 MG: 50 TAB at 15:24

## 2023-06-21 RX ADMIN — PYRIDOXINE HCL TAB 50 MG 25 MG: 50 TAB at 08:31

## 2023-06-21 NOTE — PROGRESS NOTES
"Progress Note - OB/GYN  Physician Note   Simona Pressley 23 y o  female MRN: [id number]  Unit/Bed#: -01 Encounter: [id number]    23 y o   at 10w0d admitted for antepartum observation, hospital day 2 for hyperemesis not treated in outpatient setting  Subjective:   Patient reports feeling well today  \"I'm hungry\"  Ate pancake from breakfast recently and feeling well  Denies abdominal pain, LOF, VB  C/o constipation  No BM in a couple days  States taking po stool softener at home but unsure what it is or how much      Objective:   Vitals:    23 1623 23 0720   BP: 114/61 116/68   BP Location: Right arm Right arm   Pulse: 85 97   Resp: 18 16   Temp: 98 9 °F (37 2 °C) 98 °F (36 7 °C)   TempSrc: Temporal Oral   SpO2: 99% 95%   Weight: 42 2 kg (93 lb)    Height: 4' 11\" (1 499 m)        Intake/Output Summary (Last 24 hours) at 2023 0946  Last data filed at 2023 0347  Gross per 24 hour   Intake 972 92 ml   Output --   Net 972 92 ml       Physical Exam:  Gen: alert, no acute distress  Cards: regular rate  Resp: unlabored breathing  Abdomen: doftnon-tender, non-distended  Extremities: non-tender, no edema      Labs/Tests:  Lab Results   Component Value Date    WBC 7 52 2023    HGB 14 8 2023    HCT 41 7 2023    MCV 87 2023     2023         MEDS:   Current Facility-Administered Medications   Medication Dose Route Frequency   • bisacodyl (DULCOLAX) rectal suppository 10 mg  10 mg Rectal Daily PRN   • docusate sodium (COLACE) capsule 100 mg  100 mg Oral BID   • lactated ringers infusion  125 mL/hr Intravenous Continuous   • methylprednisolone (MEDROL) tablet 16 mg  16 mg Oral TID   • metoclopramide (REGLAN) tablet 10 mg  10 mg Oral TID   • ondansetron (ZOFRAN-ODT) dispersible tablet 4 mg  4 mg Oral TID   • pyridoxine (VITAMIN B6) tablet 25 mg  25 mg Oral TID     Invasive Devices     Peripheral Intravenous Line  Duration           Peripheral IV 23 " Left;Ventral (anterior) Hand <1 day                Assessment and Plan:  23y o  year-old  admitted for antepartum observation, hospital day 2 for hyperemesis  Hyperemesis affecting pregnancy, antepartum   - admitted yesterday for observation after failure of outpatient treatment on multiple antiemetics   - CMP normal on admission, UA no ketones   - Made NPO and started on IVF, scheduled reglan tid po, methlyprednisolone taper tid po  IV Zofran prn  Did have one episode of vomiting last evening and received IV Zofran     - this morning advanced diet and pt tolerate po   - will start Vit B6 25mg tid and Zofran 4mg po tid scheduled, continue Reglan po tid scheduled and methlyprednisolone taper    - will see how she progresses with po challenge    If doing well will d/c IVF    - t/c d/c home later today or tomorrow depending on progress    Constipation   - no BM in a few days   - offered Dulcolax suppository - she is in agreement   - will start daily Colace bid as well      Sabino Chatman MD

## 2023-06-21 NOTE — PROGRESS NOTES
"Progress Note - OB/GYN  Physician Note   Jayde Coto 23 y o  female MRN: [id number]  Unit/Bed#: -01 Encounter: [id number]    23 y o   at 10w0d admitted for antepartum observation, hospital day 2 for hyperemesis  Subjective:     Denies N/V  Tolerating regular diet  No ob complaints  Objective:   Vitals:    23 1623 23 0720   BP: 114/61 116/68   BP Location: Right arm Right arm   Pulse: 85 97   Resp: 18 16   Temp: 98 9 °F (37 2 °C) 98 °F (36 7 °C)   TempSrc: Temporal Oral   SpO2: 99% 95%   Weight: 42 2 kg (93 lb)    Height: 4' 11\" (1 499 m)        Intake/Output Summary (Last 24 hours) at 2023 1528  Last data filed at 2023 0347  Gross per 24 hour   Intake 972 92 ml   Output --   Net 972 92 ml       Physical Exam:  Gen: alert, no acute distress  Cards: regular rate  Resp: unlabored breathing  Abdomen: soft, non-tender, non-distended    Labs/Tests:  Lab Results   Component Value Date    WBC 7 52 2023    HGB 14 8 2023    HCT 41 7 2023    MCV 87 2023     2023         MEDS:   Current Facility-Administered Medications   Medication Dose Route Frequency   • bisacodyl (DULCOLAX) rectal suppository 10 mg  10 mg Rectal Daily PRN   • docusate sodium (COLACE) capsule 100 mg  100 mg Oral BID   • lactated ringers infusion  125 mL/hr Intravenous Continuous   • methylprednisolone (MEDROL) tablet 16 mg  16 mg Oral TID   • metoclopramide (REGLAN) tablet 10 mg  10 mg Oral TID   • ondansetron (ZOFRAN-ODT) dispersible tablet 4 mg  4 mg Oral TID   • pyridoxine (VITAMIN B6) tablet 25 mg  25 mg Oral TID     Invasive Devices     Peripheral Intravenous Line  Duration           Peripheral IV 23 Left;Ventral (anterior) Hand <1 day                Assessment and Plan:  23y o  year-old  admitted for antepartum observation, hospital day 2 for hyperemesis  Hyperemesis affecting pregnancy, antepartum   - no nausea or vomiting today    Tolerated regular " diet for breakfast and lunch  IVF stopped  - pt wishes discharge home and I feel she is on a good regimen now to do that, will discharge home today after her next medication administration    - Vit B6 25mg tid, Zofran 4mg po tid scheduled, Reglan po tid scheduled and methlyprednisolone taper    - reviewed after starting to decrease taper if no N/V t/c spacing out or stopping antiemetics  Restart if symptoms return  - pt and mother express understanding        Frandy Jason MD

## 2023-06-21 NOTE — UTILIZATION REVIEW
"Initial Clinical Review    Admission: Date/Time/Statement:   Admission Orders (From admission, onward)     Ordered        23 1736  Place in Observation  Once                      Orders Placed This Encounter   Procedures   • Place in Observation     Standing Status:   Standing     Number of Occurrences:   1     Order Specific Question:   Level of Care     Answer:   Med Surg [16]     ED Arrival Information     Patient not seen in ED                     Chief Complaint   Patient presents with   • Hyperemesis Gravidarum       Initial Presentation: 23 y o  female    @ 9w 6d Observation admission due to Hyperemesis affecting pregnancy; failure of outpatient treatment on multiple antiemetics  Has been to ED 3 times for IVF during this pregnancy Ussing Reglan, Zofran & B6 wo improvement; vomiting x6 today reports urinating twice a day  NPO, GIVE IVF; scheduled reglan tid po,methylprednisolone taper for today of 2 WK max    Date:  2023  Day 2:    OB MD  Patient reports feeling well today  \"I'm hungry\"  Ate pancake from breakfast recently and feeling well  Denies abdominal pain, LOF, VB  C/o constipation  No BM in a couple days  will start Vit B6 25mg tid and Zofran 4mg po tid scheduled, continue Reglan po tid scheduled and methlyprednisolone taper    - will see how she progresses with po challenge    If doing well will d/c IVF    - t/c d/c home later today or tomorrow depending on progress     Triage Vitals [23 1623]   Temperature Pulse Respirations Blood Pressure SpO2   98 9 °F (37 2 °C) 85 18 114/61 99 %      Temp Source Heart Rate Source Patient Position - Orthostatic VS BP Location FiO2 (%)   Temporal Monitor Sitting Right arm --      Pain Score       No Pain          Wt Readings from Last 1 Encounters:   23 42 2 kg (93 lb)     Additional Vital Signs:   Date/Time Temp Pulse Resp BP MAP (mmHg) SpO2 O2 Device Cardiac (WDL) Patient Position - Orthostatic VS   23 0720 98 °F (36 7 °C) " "97 16 116/68 82 95 % None (Room air) WDL Sitting   06/20/23 1623 98 9 °F (37 2 °C) 85 18 114/61 -- 99 % -- WDL Sitting     Weights (last 14 days)    Date/Time Weight Height   06/20/23 1623 42 2 kg (93 lb) 4' 11\" (1 499 m)       Pertinent Labs/Diagnostic Test Results:   No orders to display         Results from last 7 days   Lab Units 06/20/23  1244   WBC Thousand/uL 7 52   HEMOGLOBIN g/dL 14 8   HEMATOCRIT % 41 7   PLATELETS Thousands/uL 252   NEUTROS ABS Thousands/µL 5 02         Results from last 7 days   Lab Units 06/20/23  1807 06/20/23  1244 06/16/23  0035   SODIUM mmol/L 133* 136 135   POTASSIUM mmol/L 3 5 3 7 3 6   CHLORIDE mmol/L 104 108 103   CO2 mmol/L 23 24 25   ANION GAP mmol/L 6 4 7   BUN mg/dL 13 10 12   CREATININE mg/dL 0 59* 0 68 0 66   EGFR ml/min/1 73sq m 132 126 127   CALCIUM mg/dL 9 2 9 1 9 4   MAGNESIUM mg/dL  --  2 1  --    PHOSPHORUS mg/dL  --  2 7  --      Results from last 7 days   Lab Units 06/20/23  1807 06/20/23  1244 06/16/23  0035   AST U/L 15 11 17   ALT U/L 11 19 16   ALK PHOS U/L 30* 36* 32*   TOTAL PROTEIN g/dL 6 8 7 3 7 0   ALBUMIN g/dL 4 1 3 9 4 3   TOTAL BILIRUBIN mg/dL 0 40 0 46 0 35         Results from last 7 days   Lab Units 06/20/23  1807 06/20/23  1244 06/16/23  0035   GLUCOSE RANDOM mg/dL 84 85 90             No results found for: \"BETA-HYDROXYBUTYRATE\"                                                                   Results from last 7 days   Lab Units 06/20/23  1244   HEP B S AG  Non-reactive   HEP C AB  Non-reactive                     Results from last 7 days   Lab Units 06/20/23  1244 06/16/23  0035   CLARITY UA  Turbid Clear   COLOR UA  Yellow Yellow   SPEC GRAV UA  1 023 1 020   PH UA  6 0 6 5   GLUCOSE UA mg/dl Negative Negative   KETONES UA mg/dl Negative Negative   BLOOD UA  Negative Negative   PROTEIN UA mg/dl 30 (1+)* Negative   NITRITE UA  Negative Negative   BILIRUBIN UA  Negative Negative   UROBILINOGEN UA (BE) mg/dl <2 0 <2 0   LEUKOCYTES UA  Negative " Negative   WBC UA /hpf 2-4*  --    RBC UA /hpf 1-2  --    BACTERIA UA /hpf Occasional  --    EPITHELIAL CELLS WET PREP /hpf Occasional  --    MUCUS THREADS  Innumerable*  --                                  Results from last 7 days   Lab Units 06/16/23  0035   URINE CULTURE  70,000-79,000 cfu/ml Lactobacillus species*  <10,000 cfu/ml                   ED Treatment:   Medication Administration - No Administrations Displayed (No Start Event Found)     None        Past Medical History:   Diagnosis Date   • Kidney stone     2022, 2023 - lithotripsy 2022   • Migraine     has seen neurology   • Psychiatric disorder     anxiety,depression-counseling prior to covid   • Pyelonephritis 03/11/2023    Dr Sergio Wright     Present on Admission:  **None**      Admitting Diagnosis: Hyperemesis affecting pregnancy, antepartum [O21 0]  Age/Sex: 23 y o  female  Admission Orders:  Scheduled Medications:  docusate sodium, 100 mg, Oral, BID  methylprednisolone, 16 mg, Oral, TID  metoclopramide, 10 mg, Oral, TID  ondansetron, 4 mg, Oral, TID  pyridoxine, 25 mg, Oral, TID      Continuous IV Infusions:  lactated ringers, 125 mL/hr, Intravenous, Continuous      PRN Meds:  bisacodyl, 10 mg, Rectal, Daily PRN            Network Utilization Review Department  ATTENTION: Please call with any questions or concerns to [phone] and carefully listen to the prompts so that you are directed to the right person  All voicemails are confidential   Juliano Burgess all requests for admission clinical reviews, approved or denied determinations and any other requests to dedicated fax number below belonging to the campus where the patient is receiving treatment   List of dedicated fax numbers for the Facilities:  [address] DENIALS (Administrative/Medical Necessity) [phone]   [address] (Maternity/NICU/Pediatrics) [address] [facility] [address] [address] [facility] P[facility] [address] [facility] 134 815 [facility] [phone]

## 2023-06-22 ENCOUNTER — OFFICE VISIT (OUTPATIENT)
Dept: NEPHROLOGY | Facility: HOSPITAL | Age: 23
End: 2023-06-22
Payer: COMMERCIAL

## 2023-06-22 VITALS
BODY MASS INDEX: 19.15 KG/M2 | HEIGHT: 59 IN | SYSTOLIC BLOOD PRESSURE: 100 MMHG | WEIGHT: 95 LBS | DIASTOLIC BLOOD PRESSURE: 70 MMHG

## 2023-06-22 DIAGNOSIS — E87.1 HYPONATREMIA: ICD-10-CM

## 2023-06-22 DIAGNOSIS — N20.0 NEPHROLITHIASIS: Primary | ICD-10-CM

## 2023-06-22 DIAGNOSIS — E55.9 VITAMIN D DEFICIENCY: ICD-10-CM

## 2023-06-22 DIAGNOSIS — Z87.448 HISTORY OF PYELONEPHRITIS: ICD-10-CM

## 2023-06-22 DIAGNOSIS — O23.41 UTI (URINARY TRACT INFECTION) DURING PREGNANCY, FIRST TRIMESTER: Primary | ICD-10-CM

## 2023-06-22 PROCEDURE — 99213 OFFICE O/P EST LOW 20 MIN: CPT | Performed by: INTERNAL MEDICINE

## 2023-06-22 NOTE — PATIENT INSTRUCTIONS
1  Nephrolithiasis-noted on previous imaging status post right renal stent   -avoid high salt/sodium diet  Avoid canned foods, packets foods, [place] food, restaurant and fast food   -Stay well hydrated  Drink enough water to urinate 2-2-1/2 L a day  - Nov 2022 litholink showed urine volume of only 0 56L  This puts you at higher risk of kidney stones  Must drink more water  Would recommend lemon water   -last UA shows turbid urine with 1+ protein and 1-2 RBCs, 2-4 WBCs, with occasional bacteria but negative for nitrites  Innumerable calcium oxalate crystals noted    -[address] [place] [address] kidney stones for more information  -renal ultrasound performed Nov 2022 showed 2-3mm scatter right kidney stones, nonobstructing without hydronephrosis  -3/8/23 CT renal stone study showed small right sided kidney stones up to 3mm in size     2  Hyponatremia likely d/r volume depletion - sNa 133 on last bloodwork 6/20/23, repeat BMP  3  Vitamin D deficiency - takes OTC vitamin D 1000u daily, level was low at 19 as of 5/5/22     Of note, you are not anemic  RTC in 1 year

## 2023-06-22 NOTE — PROGRESS NOTES
NEPHROLOGY OUTPATIENT PROGRESS NOTE   Anabela Read 23 y o  female MRN: [id number]  DATE: 6/22/2023  Reason for visit:   Chief Complaint   Patient presents with   • Follow-up   • Nephrolithiasis        Patient Instructions   1  Nephrolithiasis-noted on previous imaging status post right renal stent   -avoid high salt/sodium diet  Avoid canned foods, packets foods, [place] food, restaurant and fast food   -Stay well hydrated  Drink enough water to urinate 2-2-1/2 L a day  - Nov 2022 litholink showed urine volume of only 0 56L  This puts you at higher risk of kidney stones  Must drink more water  Would recommend lemon water   -last UA shows turbid urine with 1+ protein and 1-2 RBCs, 2-4 WBCs, with occasional bacteria but negative for nitrites  Innumerable calcium oxalate crystals noted    -[address] [place] [address] kidney stones for more information  -renal ultrasound performed Nov 2022 showed 2-3mm scatter right kidney stones, nonobstructing without hydronephrosis  -3/8/23 CT renal stone study showed small right sided kidney stones up to 3mm in size     2  Hyponatremia likely d/r volume depletion - sNa 133 on last bloodwork 6/20/23, repeat BMP  3  Vitamin D deficiency - takes OTC vitamin D 1000u daily, level was low at 19 as of 5/5/22     Of note, you are not anemic  RTC in 1 year  Luanne Alcantara was seen today for follow-up and nephrolithiasis  Diagnoses and all orders for this visit:    Nephrolithiasis  -     UA w Reflex to Microscopic w Reflex to Culture; Future    Vitamin D deficiency    History of pyelonephritis    Hyponatremia  -     Basic metabolic panel; Future  -     UA w Reflex to Microscopic w Reflex to Culture; Future        Assessment/Plan:  1  Nephrolithiasis-noted on previous imaging status post right renal stent   -avoid high salt/sodium diet  Avoid canned foods, packets foods, [place] food, restaurant and fast food   -Stay well hydrated    Drink enough water to urinate 2-2-1/2 L a day  - Nov 2022 litholink showed urine volume of only 0 56L  This puts you at higher risk of kidney stones  Must drink more water  Would recommend lemon water   -last UA shows turbid urine with 1+ protein and 1-2 RBCs, 2-4 WBCs, with occasional bacteria but negative for nitrites  Innumerable calcium oxalate crystals noted    -[address] [place] [address] kidney stones for more information  -renal ultrasound performed Nov 2022 showed 2-3mm scatter right kidney stones, nonobstructing without hydronephrosis  -3/8/23 CT renal stone study showed small right sided kidney stones up to 3mm in size     2  Hyponatremia likely d/r volume depletion - sNa 133 on last bloodwork 6/20/23, repeat BMP       3  Vitamin D deficiency - takes OTC vitamin D 1000u daily, level was low at 19 as of 5/5/22     Of note, you are not anemic      RTC in 1 year  SUBJECTIVE / INTERVAL HISTORY:  23 y o  female presents in follow up of nephrolithiasis  Rain Garrett is currently 10 weeks pregnant  Was recently admitted and discharged yesterday for hyperemesis gravidarum  Was given IVF and a steroid  Denies NSAID use  Denies dysuria  Feels well  Had a miscarriage in Sept 2023  Review of Systems   Constitutional: Negative for chills and fever  HENT: Negative for sore throat and trouble swallowing  Eyes: Negative for visual disturbance  Respiratory: Negative for cough and shortness of breath  Cardiovascular: Negative for chest pain and leg swelling  Gastrointestinal: Negative for abdominal pain, constipation, diarrhea, nausea and vomiting  Endocrine: Negative for polyuria  Genitourinary: Negative for difficulty urinating, dysuria and hematuria  Musculoskeletal: Negative for back pain and neck pain  Leg cramps   Skin: Negative for rash  Neurological: Negative for dizziness, light-headedness and numbness  Psychiatric/Behavioral: The patient is not nervous/anxious  "      OBJECTIVE:  /70 (BP Location: Left arm, Patient Position: Sitting, Cuff Size: Large)   Ht 4' 11\" (1 499 m)   Wt 43 1 kg (95 lb)   LMP 04/01/2023   BMI 19 19 kg/m²  Body mass index is 19 19 kg/m²  Physical exam:  Physical Exam  Vitals and nursing note reviewed  Constitutional:       General: She is not in acute distress  Appearance: Normal appearance  She is well-developed  She is not diaphoretic  HENT:      Head: Normocephalic and atraumatic  Nose: Nose normal       Mouth/Throat:      Pharynx: No oropharyngeal exudate  Eyes:      General: No scleral icterus  Right eye: No discharge  Left eye: No discharge  Neck:      Thyroid: No thyromegaly  Cardiovascular:      Rate and Rhythm: Normal rate and regular rhythm  Heart sounds: No murmur heard  Pulmonary:      Effort: Pulmonary effort is normal  No respiratory distress  Breath sounds: Normal breath sounds  No wheezing  Abdominal:      General: Bowel sounds are normal  There is no distension  Palpations: Abdomen is soft  Comments: gravid   Musculoskeletal:         General: No swelling  Cervical back: Normal range of motion and neck supple  Skin:     General: Skin is warm and dry  Findings: No rash  Neurological:      General: No focal deficit present  Mental Status: She is alert  Motor: No abnormal muscle tone  Comments: awake   Psychiatric:         Mood and Affect: Mood normal          Behavior: Behavior normal          Medications:    Current Outpatient Medications:   •  docusate sodium (COLACE) 100 mg capsule, Take 1 capsule (100 mg total) by mouth 2 (two) times a day, Disp: , Rfl: 0  •  Doxylamine Succinate, Sleep, (UNISOM PO), Take by mouth, Disp: , Rfl:   •  methylprednisolone (MEDROL) 16 mg tablet, Take 16mg (1 tablet) 3x/day (6/22, 6/23), then take 8mg (half tablet) 3x/day (6/24, 6/25, 6/26)  , Disp: 12 tablet, Rfl: 0  •  [START ON 6/27/2023] methylprednisolone " "(MEDROL) 4 mg tablet, Take 4mg (1 tablet) 3x/day (6/27, 6/28, 6/29), then 2mg (half tablet) 3x/day (6/30, 7/1, 7/2) Do not start before June 27, 2023 , Disp: 14 tablet, Rfl: 0  •  metoclopramide (Reglan) 10 mg tablet, Take 1 tablet (10 mg total) by mouth every 6 (six) hours as needed (nausea), Disp: 30 tablet, Rfl: 0  •  ondansetron (ZOFRAN-ODT) 4 mg disintegrating tablet, Take 1 tablet (4 mg total) by mouth every 6 (six) hours as needed for nausea or vomiting, Disp: 20 tablet, Rfl: 0  •  Prenatal Vit-Fe Fumarate-FA (PRENATAL 19 PO), Take 1 capsule by mouth daily, Disp: , Rfl:   •  Pyridoxine HCl (vitamin B-6) 25 MG tablet, Take 1 tablet (25 mg total) by mouth 3 (three) times a day, Disp: , Rfl: 0  No current facility-administered medications for this visit  Allergies: Allergies as of 06/22/2023 - Reviewed 06/22/2023   Allergen Reaction Noted   • Cephalexin Rash and Itching 04/12/2022   • Levofloxacin Rash, Itching, and Hives 04/19/2022       The following portions of the patient's history were reviewed and updated as appropriate: past family history, past surgical history and problem list     Laboratory Results:  Lab Results   Component Value Date    SODIUM 133 (L) 06/20/2023    K 3 5 06/20/2023     06/20/2023    CO2 23 06/20/2023    BUN 13 06/20/2023    CREATININE 0 59 (L) 06/20/2023    GLUC 84 06/20/2023    CALCIUM 9 2 06/20/2023        Lab Results   Component Value Date    PTH 12 1 06/20/2023    CALCIUM 9 2 06/20/2023    PHOS 2 7 06/20/2023       Portions of the record may have been created with voice recognition software   Occasional wrong word or \"sound a like\" substitutions may have occurred due to the inherent limitations of voice recognition software   Read the chart carefully and recognize, using context, where substitutions have occurred    "

## 2023-07-04 ENCOUNTER — TELEPHONE (OUTPATIENT)
Dept: OBGYN CLINIC | Facility: CLINIC | Age: 23
End: 2023-07-04

## 2023-07-04 DIAGNOSIS — O21.9 NAUSEA AND VOMITING IN PREGNANCY: ICD-10-CM

## 2023-07-04 RX ORDER — METOCLOPRAMIDE 10 MG/1
10 TABLET ORAL EVERY 6 HOURS PRN
Qty: 30 TABLET | Refills: 1 | Status: SHIPPED | OUTPATIENT
Start: 2023-07-04

## 2023-07-04 RX ORDER — ONDANSETRON 4 MG/1
4 TABLET, ORALLY DISINTEGRATING ORAL EVERY 6 HOURS PRN
Qty: 30 TABLET | Refills: 1 | Status: SHIPPED | OUTPATIENT
Start: 2023-07-04

## 2023-07-04 RX ORDER — ONDANSETRON 4 MG/1
4 TABLET, ORALLY DISINTEGRATING ORAL EVERY 6 HOURS PRN
Qty: 20 TABLET | Refills: 0 | Status: SHIPPED | OUTPATIENT
Start: 2023-07-04 | End: 2023-07-04

## 2023-07-04 RX ORDER — PROMETHAZINE HYDROCHLORIDE 25 MG/1
25 SUPPOSITORY RECTAL EVERY 6 HOURS PRN
Qty: 12 EACH | Refills: 1 | Status: SHIPPED | OUTPATIENT
Start: 2023-07-04

## 2023-07-04 RX ORDER — METOCLOPRAMIDE 10 MG/1
10 TABLET ORAL EVERY 6 HOURS PRN
Qty: 30 TABLET | Refills: 0 | Status: SHIPPED | OUTPATIENT
Start: 2023-07-04 | End: 2023-07-04

## 2023-07-04 RX ORDER — PROMETHAZINE HYDROCHLORIDE 25 MG/1
25 SUPPOSITORY RECTAL EVERY 6 HOURS PRN
Qty: 12 EACH | Refills: 1 | Status: SHIPPED | OUTPATIENT
Start: 2023-07-04 | End: 2023-07-04

## 2023-07-04 NOTE — TELEPHONE ENCOUNTER
Patient's mother, Zaid Griffiths, called stating that Cristal's nausea and vomiting has returned. She is completely out of antiemetic medications. States she completed methylprednisolone taper yesterday. Has not taken anything for nausea today. Recommend restarting Reglan 10 mg PO q6h PRN, Zofran 4mg PO q6h PRN. Will add Phenergan supp 25 mg [place] q6h PRN refractory N/V. Rx sent. Recommend continuing B6 25 mg PO TID and doxylamine 25 mg PO qHS. Discussed adding glenna supplement and/or glenna candies. Zaid Griffiths expressed appreciation.      Jerald Hodgkins, MD  7/4/2023 1:04 PM

## 2023-07-05 ENCOUNTER — ROUTINE PRENATAL (OUTPATIENT)
Dept: OBGYN CLINIC | Facility: CLINIC | Age: 23
End: 2023-07-05
Payer: COMMERCIAL

## 2023-07-05 VITALS — WEIGHT: 100.8 LBS | BODY MASS INDEX: 20.36 KG/M2 | DIASTOLIC BLOOD PRESSURE: 54 MMHG | SYSTOLIC BLOOD PRESSURE: 100 MMHG

## 2023-07-05 DIAGNOSIS — O23.41 GROUP B STREPTOCOCCUS URINARY TRACT INFECTION AFFECTING PREGNANCY IN FIRST TRIMESTER: ICD-10-CM

## 2023-07-05 DIAGNOSIS — O99.341 MENTAL DISORDER AFFECTING PREGNANCY IN FIRST TRIMESTER: ICD-10-CM

## 2023-07-05 DIAGNOSIS — Z3A.12 12 WEEKS GESTATION OF PREGNANCY: ICD-10-CM

## 2023-07-05 DIAGNOSIS — B95.1 GROUP B STREPTOCOCCUS URINARY TRACT INFECTION AFFECTING PREGNANCY IN FIRST TRIMESTER: ICD-10-CM

## 2023-07-05 DIAGNOSIS — O21.9 NAUSEA AND VOMITING IN PREGNANCY: Primary | ICD-10-CM

## 2023-07-05 LAB
SL AMB  POCT GLUCOSE, UA: NEGATIVE
SL AMB POCT URINE PROTEIN: NEGATIVE

## 2023-07-05 PROCEDURE — 81002 URINALYSIS NONAUTO W/O SCOPE: CPT | Performed by: STUDENT IN AN ORGANIZED HEALTH CARE EDUCATION/TRAINING PROGRAM

## 2023-07-05 PROCEDURE — PNV: Performed by: STUDENT IN AN ORGANIZED HEALTH CARE EDUCATION/TRAINING PROGRAM

## 2023-07-05 NOTE — ASSESSMENT & PLAN NOTE
- Symptoms stable on B6/doxylamine, Reglan PRN, and Zofran PRN.  Has Rx Phenergan suppositories for refractory N/V.

## 2023-07-05 NOTE — PROGRESS NOTES
Routine Prenatal Visit  [address] [place] [address], [address], [facility], [address] [facility],[address]W    Assessment/Plan:  Leonardo Figueroa is a 23y.o. year old  at 12w0d who presents for routine prenatal visit. 1. Nausea and vomiting in pregnancy  Assessment & Plan:  - Symptoms stable on B6/doxylamine, Reglan PRN, and Zofran PRN. Has Rx Phenergan suppositories for refractory N/V.      2. 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 Group B Streptococcus urinary tract infection affecting pregnancy in first trimester    4. 12 weeks gestation of pregnancy  Assessment & Plan:  - Prenatal lab results reviewed. - First trimester MFM consult scheduled this week. - Problem list updated, results console reviewed and updated with pertinent prenatal labs. - PMH, PSH, medications reviewed and updated as needed. - Return to office in 4 wk(s) for routine prenatal care. Orders:  -     POCT urine dip          Subjective:   Dania Paredes is a 23 y.o. Cuca Starch who presents for routine prenatal care at 12w0d. Complaints today: No  LOF: No; VB: No; Contractions: No    Objective:  /54   Wt 45.7 kg (100 lb 12.8 oz)   LMP 2023   BMI 20.36 kg/m²     General: Well appearing, no distress  Respiratory: Unlabored breathing  Cardiovascular: Regular rate. Abdomen: Soft, gravid, nontender  Extremities: Warm and well perfused. Non tender.     Pregravid Weight/BMI: 42.2 kg (93 lb) (BMI 18.77)  Current Weight: 45.7 kg (100 lb 12.8 oz)   Total Weight Gain: 3.538 kg (7 lb 12.8 oz)     Pre-Parth Vitals    Flowsheet Row Most Recent Value   Prenatal Assessment    Fetal Heart Rate 165   Movement Absent   Prenatal Vitals    Blood Pressure 100/54   Weight - Scale 45.7 kg (100 lb 12.8 oz)   Urine Albumin/Glucose    Dilation/Effacement/Station    Vaginal Drainage    Draining Fluid No   Edema    LLE Edema None   RLE Edema None   Facial Edema None           Xi Perkins MD  2023 5:38 PM

## 2023-07-05 NOTE — ASSESSMENT & PLAN NOTE
- Prenatal lab results reviewed. - First trimester MFM consult scheduled this week. - Problem list updated, results console reviewed and updated with pertinent prenatal labs. - PMH, PSH, medications reviewed and updated as needed. - Return to office in 4 wk(s) for routine prenatal care.

## 2023-07-09 NOTE — PROGRESS NOTES
Please refer to the [facility] ultrasound report in Ob Procedures for additional information regarding today's visit

## 2023-07-10 ENCOUNTER — ROUTINE PRENATAL (OUTPATIENT)
Dept: PERINATAL CARE | Facility: OTHER | Age: 23
End: 2023-07-10
Payer: COMMERCIAL

## 2023-07-10 ENCOUNTER — APPOINTMENT (OUTPATIENT)
Dept: LAB | Facility: CLINIC | Age: 23
End: 2023-07-10
Payer: COMMERCIAL

## 2023-07-10 VITALS
HEIGHT: 59 IN | BODY MASS INDEX: 20.52 KG/M2 | WEIGHT: 101.8 LBS | SYSTOLIC BLOOD PRESSURE: 96 MMHG | DIASTOLIC BLOOD PRESSURE: 64 MMHG | HEART RATE: 89 BPM

## 2023-07-10 DIAGNOSIS — F32.A DEPRESSION COMPLICATING PREGNANCY, ANTEPARTUM, FIRST TRIMESTER: ICD-10-CM

## 2023-07-10 DIAGNOSIS — O21.0 HYPEREMESIS GRAVIDARUM: ICD-10-CM

## 2023-07-10 DIAGNOSIS — Z33.1 PREGNANT STATE, INCIDENTAL: ICD-10-CM

## 2023-07-10 DIAGNOSIS — F41.9 ANXIETY DURING PREGNANCY, ANTEPARTUM, FIRST TRIMESTER: ICD-10-CM

## 2023-07-10 DIAGNOSIS — O99.341 DEPRESSION COMPLICATING PREGNANCY, ANTEPARTUM, FIRST TRIMESTER: ICD-10-CM

## 2023-07-10 DIAGNOSIS — Z3A.12 12 WEEKS GESTATION OF PREGNANCY: ICD-10-CM

## 2023-07-10 DIAGNOSIS — O99.341 ANXIETY DURING PREGNANCY, ANTEPARTUM, FIRST TRIMESTER: ICD-10-CM

## 2023-07-10 DIAGNOSIS — Z36.82 ENCOUNTER FOR ANTENATAL SCREENING FOR NUCHAL TRANSLUCENCY: Primary | ICD-10-CM

## 2023-07-10 DIAGNOSIS — Z36.9 UNSPECIFIED ANTENATAL SCREENING: ICD-10-CM

## 2023-07-10 PROCEDURE — 76813 OB US NUCHAL MEAS 1 GEST: CPT | Performed by: OBSTETRICS & GYNECOLOGY

## 2023-07-10 PROCEDURE — 36415 COLL VENOUS BLD VENIPUNCTURE: CPT

## 2023-07-10 PROCEDURE — 99243 OFF/OP CNSLTJ NEW/EST LOW 30: CPT | Performed by: OBSTETRICS & GYNECOLOGY

## 2023-07-10 RX ORDER — ASPIRIN 81 MG/1
162 TABLET, CHEWABLE ORAL
Qty: 180 TABLET | Refills: 1 | Status: SHIPPED | OUTPATIENT
Start: 2023-07-10 | End: 2023-10-08

## 2023-07-10 NOTE — LETTER
July 10, 2023     Blessing Wing MD  [address] [place] [address]  [address] [id number]    Patient: Karen Howe   YOB: 2000   Date of Visit: 7/10/2023       Dear Dr. Willy Beck:    Thank you for referring Bennett Landrum to me for evaluation. Below are my notes for this consultation. If you have questions, please do not hesitate to call me. I look forward to following your patient along with you.          Sincerely,        Jeanette Cordova MD        CC: No Recipients    Jeanette Cordova MD  7/9/2023 12:01 PM  Sign when Signing Visit  Please refer to the [facility] ultrasound report in Ob Procedures for additional information regarding today's visit

## 2023-07-10 NOTE — PROGRESS NOTES
Patient chose to have [facility] Non-invasive Prenatal Screen with fetal sex. Patient given brochure and is aware [facility] will contact patients insurance and coordinate coverage. Patient made aware she will need to respond to text message or e-mail from [address] within 2 business days or testing will be run through insurance. Patient informed text message will come from area code  "415"  Provided [address] # [phone] and web site : [email or web address]. Blood collection tubes labeled with patient identifiers (name, date of birth)    printed [facility] lab order and test kit given with FED EX packaging (Tracking # [id number]  given to patient to take to [facility] lab for blood collection. Requested patient notify MFM ( via phone call or Rylee Pollen message)  when blood collected so office can f/u results    Copy of lab order scanned to Epic media. Maternal Fetal Medicine will have results in approximately 7-10 business days and will call patient or notify via [employer]. Patient aware viewing lab result online will reveal fetal sex If ordered. Patient verbalized understanding of all instructions and no questions at this time.

## 2023-07-22 PROCEDURE — 99284 EMERGENCY DEPT VISIT MOD MDM: CPT

## 2023-07-23 ENCOUNTER — HOSPITAL ENCOUNTER (EMERGENCY)
Facility: HOSPITAL | Age: 23
Discharge: HOME | End: 2023-07-23
Attending: EMERGENCY MEDICINE
Payer: COMMERCIAL

## 2023-07-23 VITALS
SYSTOLIC BLOOD PRESSURE: 117 MMHG | TEMPERATURE: 98.7 F | HEART RATE: 75 BPM | DIASTOLIC BLOOD PRESSURE: 75 MMHG | OXYGEN SATURATION: 99 % | WEIGHT: 101 LBS | RESPIRATION RATE: 18 BRPM | BODY MASS INDEX: 20.36 KG/M2 | HEIGHT: 59 IN

## 2023-07-23 DIAGNOSIS — O21.9 NAUSEA AND VOMITING IN PREGNANCY: Primary | ICD-10-CM

## 2023-07-23 DIAGNOSIS — R10.9 ABDOMINAL CRAMPING AFFECTING PREGNANCY: ICD-10-CM

## 2023-07-23 DIAGNOSIS — O26.899 ABDOMINAL CRAMPING AFFECTING PREGNANCY: ICD-10-CM

## 2023-07-23 LAB
ALBUMIN SERPL BCP-MCNC: 4.5 G/DL (ref 3.5–5)
ALP SERPL-CCNC: 34 U/L (ref 34–104)
ALT SERPL W P-5'-P-CCNC: 51 U/L (ref 7–52)
ANION GAP SERPL CALCULATED.3IONS-SCNC: 9 MMOL/L
AST SERPL W P-5'-P-CCNC: 21 U/L (ref 13–39)
BASOPHILS # BLD AUTO: 0.03 THOUSANDS/ÂΜL (ref 0–0.1)
BASOPHILS NFR BLD AUTO: 0 % (ref 0–1)
BILIRUB SERPL-MCNC: 0.41 MG/DL (ref 0.2–1)
BUN SERPL-MCNC: 13 MG/DL (ref 5–25)
CALCIUM SERPL-MCNC: 9.7 MG/DL (ref 8.4–10.2)
CHLORIDE SERPL-SCNC: 106 MMOL/L (ref 96–108)
CO2 SERPL-SCNC: 21 MMOL/L (ref 21–32)
CREAT SERPL-MCNC: 0.6 MG/DL (ref 0.6–1.3)
EOSINOPHIL # BLD AUTO: 0.07 THOUSAND/ÂΜL (ref 0–0.61)
EOSINOPHIL NFR BLD AUTO: 1 % (ref 0–6)
ERYTHROCYTE [DISTWIDTH] IN BLOOD BY AUTOMATED COUNT: 13.7 % (ref 11.6–15.1)
GFR SERPL CREATININE-BSD FRML MDRD: 131 ML/MIN/1.73SQ M
GLUCOSE SERPL-MCNC: 85 MG/DL (ref 65–140)
HCT VFR BLD AUTO: 41.5 % (ref 34.8–46.1)
HGB BLD-MCNC: 14.6 G/DL (ref 11.5–15.4)
IMM GRANULOCYTES # BLD AUTO: 0.04 THOUSAND/UL (ref 0–0.2)
IMM GRANULOCYTES NFR BLD AUTO: 0 % (ref 0–2)
LIPASE SERPL-CCNC: 8 U/L (ref 11–82)
LYMPHOCYTES # BLD AUTO: 1.93 THOUSANDS/ÂΜL (ref 0.6–4.47)
LYMPHOCYTES NFR BLD AUTO: 19 % (ref 14–44)
MCH RBC QN AUTO: 31.5 PG (ref 26.8–34.3)
MCHC RBC AUTO-ENTMCNC: 35.2 G/DL (ref 31.4–37.4)
MCV RBC AUTO: 89 FL (ref 82–98)
MONOCYTES # BLD AUTO: 0.85 THOUSAND/ÂΜL (ref 0.17–1.22)
MONOCYTES NFR BLD AUTO: 8 % (ref 4–12)
NEUTROPHILS # BLD AUTO: 7.18 THOUSANDS/ÂΜL (ref 1.85–7.62)
NEUTS SEG NFR BLD AUTO: 72 % (ref 43–75)
NRBC BLD AUTO-RTO: 0 /100 WBCS
PLATELET # BLD AUTO: 281 THOUSANDS/UL (ref 149–390)
PMV BLD AUTO: 9 FL (ref 8.9–12.7)
POTASSIUM SERPL-SCNC: 3.6 MMOL/L (ref 3.5–5.3)
PROT SERPL-MCNC: 7.6 G/DL (ref 6.4–8.4)
RBC # BLD AUTO: 4.64 MILLION/UL (ref 3.81–5.12)
SODIUM SERPL-SCNC: 136 MMOL/L (ref 135–147)
WBC # BLD AUTO: 10.1 THOUSAND/UL (ref 4.31–10.16)

## 2023-07-23 PROCEDURE — 36415 COLL VENOUS BLD VENIPUNCTURE: CPT

## 2023-07-23 PROCEDURE — 96361 HYDRATE IV INFUSION ADD-ON: CPT

## 2023-07-23 PROCEDURE — 83690 ASSAY OF LIPASE: CPT | Performed by: EMERGENCY MEDICINE

## 2023-07-23 PROCEDURE — 85025 COMPLETE CBC W/AUTO DIFF WBC: CPT | Performed by: EMERGENCY MEDICINE

## 2023-07-23 PROCEDURE — 96374 THER/PROPH/DIAG INJ IV PUSH: CPT

## 2023-07-23 PROCEDURE — 80053 COMPREHEN METABOLIC PANEL: CPT | Performed by: EMERGENCY MEDICINE

## 2023-07-23 RX ORDER — ONDANSETRON 4 MG/1
4 TABLET, ORALLY DISINTEGRATING ORAL EVERY 6 HOURS PRN
Qty: 30 TABLET | Refills: 0 | Status: SHIPPED | OUTPATIENT
Start: 2023-07-23

## 2023-07-23 RX ORDER — METOCLOPRAMIDE 10 MG/1
10 TABLET ORAL EVERY 6 HOURS PRN
Qty: 30 TABLET | Refills: 0 | Status: SHIPPED | OUTPATIENT
Start: 2023-07-23

## 2023-07-23 RX ORDER — ONDANSETRON 4 MG/1
4 TABLET, ORALLY DISINTEGRATING ORAL ONCE
Status: DISCONTINUED | OUTPATIENT
Start: 2023-07-23 | End: 2023-07-23 | Stop reason: HOSPADM

## 2023-07-23 RX ORDER — ONDANSETRON 2 MG/ML
4 INJECTION INTRAMUSCULAR; INTRAVENOUS ONCE
Status: COMPLETED | OUTPATIENT
Start: 2023-07-23 | End: 2023-07-23

## 2023-07-23 RX ADMIN — ONDANSETRON HYDROCHLORIDE 4 MG: 2 INJECTION, SOLUTION INTRAMUSCULAR; INTRAVENOUS at 02:57

## 2023-07-23 RX ADMIN — SODIUM CHLORIDE 1000 ML: 0.9 INJECTION, SOLUTION INTRAVENOUS at 02:58

## 2023-07-23 NOTE — ED PROVIDER NOTES
History  Chief Complaint   Patient presents with   • Vomiting During Pregnancy     Vomiting and cramping entire pregnancy, tonight inability to keep anything down, just finished zofran/reglan prescription from OB, 15 weeks pregnant JOSE 1/17/24, last dose of both zofran/reglan were 7/21 at [address]; denies bleeding/abnormal discharge     22 yo female who is 15 weeks 1 day pregnant and presents with ongoing issues with nausea/vomiting and pelvic cramping. Out of zofran and reglan. Appears mildly dehydrated, very benign abd exam.        History provided by:  Patient and parent   used: No    Vomiting  Severity:  Moderate  Timing:  Constant  Quality:  Stomach contents and bilious material  Progression:  Unchanged  Chronicity:  Recurrent  Recent urination:  Normal  Relieved by:  Nothing  Worsened by:  Nothing  Ineffective treatments:  None tried  Associated symptoms: no arthralgias, no chills, no cough, no diarrhea, no fever, no headaches and no sore throat  Abdominal pain: cramping. Risk factors: pregnant        Prior to Admission Medications   Prescriptions Last Dose Informant Patient Reported? Taking?    Doxylamine Succinate, Sleep, (UNISOM PO)  Self Yes No   Sig: Take by mouth   Prenatal Vit-Fe Fumarate-FA (PRENATAL 19 PO)  Self Yes No   Sig: Take 1 capsule by mouth daily   Pyridoxine HCl (vitamin B-6) 25 MG tablet   No No   Sig: Take 1 tablet (25 mg total) by mouth 3 (three) times a day   aspirin 81 mg chewable tablet   No No   Sig: Chew 2 tablets (162 mg total) daily at bedtime   docusate sodium (COLACE) 100 mg capsule   No No   Sig: Take 1 capsule (100 mg total) by mouth 2 (two) times a day   metoclopramide (Reglan) 10 mg tablet   No No   Sig: Take 1 tablet (10 mg total) by mouth every 6 (six) hours as needed (nausea)   metoclopramide (Reglan) 10 mg tablet   No Yes   Sig: Take 1 tablet (10 mg total) by mouth every 6 (six) hours as needed (nausea)   ondansetron (ZOFRAN-ODT) 4 mg disintegrating tablet   No No   Sig: Take 1 tablet (4 mg total) by mouth every 6 (six) hours as needed for nausea or vomiting   ondansetron (ZOFRAN-ODT) 4 mg disintegrating tablet   No Yes   Sig: Take 1 tablet (4 mg total) by mouth every 6 (six) hours as needed for nausea or vomiting   promethazine (PHENERGAN) 25 mg suppository   No No   Sig: Insert 1 suppository (25 mg total) into the rectum every 6 (six) hours as needed for nausea or vomiting   Patient not taking: Reported on 7/10/2023      Facility-Administered Medications: None       Past Medical History:   Diagnosis Date   • Kidney stone     2022, 2023 - lithotripsy 2022   • Migraine     has seen neurology   • Premature baby     26 weeks   • Psychiatric disorder     anxiety,depression-counseling prior to covid   • Pyelonephritis 03/11/2023    Dr. Heladio Mcgee       Past Surgical History:   Procedure Laterality Date   • HEMANGIOMA EXCISION Right 2011    [facility]   • [place] CYSTO/URETERO W/LITHOTRIPSY &INDWELL STENT INSRT Right 04/19/2022    Procedure: CYSTOSCOPY URETEROSCOPY WITH LITHOTRIPSY HOLMIUM LASER AND INSERTION STENT URETERAL;  Surgeon: Rufino Willis MD;  [facility];  Service: Urology       Family History   Problem Relation Age of Onset   • Hypertension Mother    • Migraines Mother    • Hypertension Father    • Diabetes Father    • Nephrolithiasis Brother    • Asthma Sister    • Asthma Brother      I have reviewed and agree with the history as documented. E-Cigarette/Vaping   • E-Cigarette Use Never User      E-Cigarette/Vaping Substances   • Nicotine No    • THC No    • CBD No    • Flavoring No    • Other No    • Unknown No      Social History     Tobacco Use   • Smoking status: Never   • Smokeless tobacco: Never   Vaping Use   • Vaping Use: Never used   Substance Use Topics   • Alcohol use: Never   • Drug use: Never       Review of Systems   Constitutional: Positive for appetite change. Negative for chills and fever.    HENT: Negative for ear pain and sore throat. Eyes: Negative for pain and visual disturbance. Respiratory: Negative for cough and shortness of breath. Cardiovascular: Negative for chest pain and palpitations. Gastrointestinal: Positive for nausea and vomiting. Negative for diarrhea. Abdominal pain: cramping. Genitourinary: Negative for dysuria and hematuria. Musculoskeletal: Negative for arthralgias and back pain. Skin: Negative for color change and rash. Neurological: Negative for seizures, syncope and headaches. All other systems reviewed and are negative. Physical Exam  Physical Exam  Vitals and nursing note reviewed. Constitutional:       General: She is not in acute distress. Appearance: She is well-developed. HENT:      Head: Normocephalic and atraumatic. Mouth/Throat:      Mouth: Mucous membranes are moist.      Comments: MM tachy  Eyes:      Extraocular Movements: Extraocular movements intact. Conjunctiva/sclera: Conjunctivae normal.   Cardiovascular:      Rate and Rhythm: Normal rate and regular rhythm. Heart sounds: No murmur heard. Pulmonary:      Effort: Pulmonary effort is normal. No respiratory distress. Breath sounds: Normal breath sounds. Abdominal:      General: Abdomen is flat. Bowel sounds are normal.      Palpations: Abdomen is soft. Tenderness: There is no abdominal tenderness. Comments:    Musculoskeletal:         General: No swelling. Cervical back: Neck supple. Skin:     General: Skin is warm and dry. Capillary Refill: Capillary refill takes less than 2 seconds. Neurological:      Mental Status: She is alert.    Psychiatric:         Mood and Affect: Mood normal.         Vital Signs  ED Triage Vitals [07/23/23 0010]   Temperature Pulse Respirations Blood Pressure SpO2   98.7 °F (37.1 °C) 75 18 117/75 99 %      Temp Source Heart Rate Source Patient Position - Orthostatic VS BP Location FiO2 (%)   Oral Monitor Sitting Left arm --      Pain Score 7           Vitals:    07/23/23 0010   BP: 117/75   Pulse: 75   Patient Position - Orthostatic VS: Sitting         Visual Acuity      ED Medications  Medications   ondansetron (ZOFRAN-ODT) dispersible tablet 4 mg (has no administration in time range)   sodium chloride 0.9 % bolus 1,000 mL (0 mL Intravenous Stopped 7/23/23 0358)   ondansetron (ZOFRAN) injection 4 mg (4 mg Intravenous Given 7/23/23 0257)       Diagnostic Studies  Results Reviewed     Procedure Component Value Units Date/Time    Comprehensive metabolic panel [[id number]] Collected: 07/23/23 0025    Lab Status: Final result Specimen: Blood from Hand, Left Updated: 07/23/23 0050     Sodium 136 mmol/L      Potassium 3.6 mmol/L      Chloride 106 mmol/L      CO2 21 mmol/L      ANION GAP 9 mmol/L      BUN 13 mg/dL      Creatinine 0.60 mg/dL      Glucose 85 mg/dL      Calcium 9.7 mg/dL      AST 21 U/L      ALT 51 U/L      Alkaline Phosphatase 34 U/L      Total Protein 7.6 g/dL      Albumin 4.5 g/dL      Total Bilirubin 0.41 mg/dL      eGFR 131 ml/min/1.73sq m     Narrative:      [place] guidelines for Chronic Kidney Disease (CKD):   •  Stage 1 with normal or high GFR (GFR > 90 mL/min/1.73 square meters)  •  Stage 2 Mild CKD (GFR = 60-89 mL/min/1.73 square meters)  •  Stage 3A Moderate CKD (GFR = 45-59 mL/min/1.73 square meters)  •  Stage 3B Moderate CKD (GFR = 30-44 mL/min/1.73 square meters)  •  Stage 4 Severe CKD (GFR = 15-29 mL/min/1.73 square meters)  •  Stage 5 End Stage CKD (GFR <15 mL/min/1.73 square meters)  Note: GFR calculation is accurate only with a steady state creatinine    Lipase [[id number]]  (Abnormal) Collected: 07/23/23 0025    Lab Status: Final result Specimen: Blood from Hand, Left Updated: 07/23/23 0050     Lipase 8 u/L     CBC and differential [[id number]] Collected: 07/23/23 0025    Lab Status: Final result Specimen: Blood from Hand, Left Updated: 07/23/23 0033     WBC 10.10 Thousand/uL      RBC 4.64 Million/uL      Hemoglobin 14.6 g/dL      Hematocrit 41.5 %      MCV 89 fL      MCH 31.5 pg      MCHC 35.2 g/dL      RDW 13.7 %      MPV 9.0 fL      Platelets 189 Thousands/uL      nRBC 0 /100 WBCs      Neutrophils Relative 72 %      Immat GRANS % 0 %      Lymphocytes Relative 19 %      Monocytes Relative 8 %      Eosinophils Relative 1 %      Basophils Relative 0 %      Neutrophils Absolute 7.18 Thousands/µL      Immature Grans Absolute 0.04 Thousand/uL      Lymphocytes Absolute 1.93 Thousands/µL      Monocytes Absolute 0.85 Thousand/µL      Eosinophils Absolute 0.07 Thousand/µL      Basophils Absolute 0.03 Thousands/µL     UA w Reflex to Microscopic w Reflex to Culture [[id number]]     Lab Status: No result Specimen: Urine                  No orders to display              Procedures  Procedures         ED Course  ED Course as of 07/23/23 0624   Sun Jul 23, 2023   0246 Pt seen and examined. 24 yo female who is 15 weeks 1 day pregnant and presents with ongoing issues with nausea/vomiting and pelvic cramping. Out of zofran and reglan. Appears mildly dehydrated, very benign abd exam.  Will check urine dip, assess FHT's and give 1L NS and zofran. [address] Pt feels better and is able to keep water down. Requesting to go home. Will send scripts for zofran and reglan to pharmacy. Pt to f/u with her OBGYN as needed.                                              MDM    Disposition  Final diagnoses:   Nausea and vomiting in pregnancy   Abdominal cramping affecting pregnancy     Time reflects when diagnosis was documented in both MDM as applicable and the Disposition within this note     Time User Action Codes Description Comment    7/23/2023  4:26 AM Keke DAVIS Add [O21.9] Nausea and vomiting in pregnancy     7/23/2023  4:26 AM Maverick Stahl Add [O26.899,  R10.9] Abdominal cramping affecting pregnancy       ED Disposition     ED Disposition   Discharge    Condition   Stable    Date/Time   Sun Jul 23, 2023  4:26 AM    Comment   Brigid Estrada discharge to home/self care. Follow-up Information     Follow up With Specialties Details Why [address], DO Family Medicine  As needed [address]  [phone]      your OBGYN   As needed           Discharge Medication List as of 7/23/2023  4:27 AM      CONTINUE these medications which have CHANGED    Details   metoclopramide (Reglan) 10 mg tablet Take 1 tablet (10 mg total) by mouth every 6 (six) hours as needed (nausea), Starting Sun 7/23/2023, Normal      ondansetron (ZOFRAN-ODT) 4 mg disintegrating tablet Take 1 tablet (4 mg total) by mouth every 6 (six) hours as needed for nausea or vomiting, Starting Sun 7/23/2023, Normal         CONTINUE these medications which have NOT CHANGED    Details   aspirin 81 mg chewable tablet Chew 2 tablets (162 mg total) daily at bedtime, Starting Mon 7/10/2023, Until Sun 10/8/2023, Normal      docusate sodium (COLACE) 100 mg capsule Take 1 capsule (100 mg total) by mouth 2 (two) times a day, Starting Wed 6/21/2023, No Print      Doxylamine Succinate, Sleep, (UNISOM PO) Take by mouth, Historical Med      Prenatal Vit-Fe Fumarate-FA (PRENATAL 19 PO) Take 1 capsule by mouth daily, Historical Med      promethazine (PHENERGAN) 25 mg suppository Insert 1 suppository (25 mg total) into the rectum every 6 (six) hours as needed for nausea or vomiting, Starting Tue 7/4/2023, Normal      Pyridoxine HCl (vitamin B-6) 25 MG tablet Take 1 tablet (25 mg total) by mouth 3 (three) times a day, Starting Wed 6/21/2023, No Print             No discharge procedures on file.     PDMP Review       Value Time User    PDMP Reviewed  Yes 4/22/2022  2:26 PM Claudeen Dubonnet, MD          ED Provider  Electronically Signed by           Lex Leiva DO  07/23/23 5346

## 2023-07-25 ENCOUNTER — HOSPITAL ENCOUNTER (EMERGENCY)
Facility: HOSPITAL | Age: 23
Discharge: HOME | End: 2023-07-25
Attending: EMERGENCY MEDICINE | Admitting: EMERGENCY MEDICINE
Payer: COMMERCIAL

## 2023-07-25 VITALS
SYSTOLIC BLOOD PRESSURE: 110 MMHG | HEIGHT: 59 IN | OXYGEN SATURATION: 97 % | HEART RATE: 104 BPM | WEIGHT: 101 LBS | BODY MASS INDEX: 20.36 KG/M2 | RESPIRATION RATE: 18 BRPM | DIASTOLIC BLOOD PRESSURE: 69 MMHG | TEMPERATURE: 98.4 F

## 2023-07-25 DIAGNOSIS — S61.210A LACERATION OF RIGHT INDEX FINGER: Primary | ICD-10-CM

## 2023-07-25 PROCEDURE — 90715 TDAP VACCINE 7 YRS/> IM: CPT | Performed by: PHYSICIAN ASSISTANT

## 2023-07-25 RX ORDER — GINSENG 100 MG
1 CAPSULE ORAL ONCE
Status: COMPLETED | OUTPATIENT
Start: 2023-07-25 | End: 2023-07-25

## 2023-07-25 RX ORDER — LIDOCAINE HYDROCHLORIDE 10 MG/ML
5 INJECTION, SOLUTION EPIDURAL; INFILTRATION; INTRACAUDAL; PERINEURAL ONCE
Status: COMPLETED | OUTPATIENT
Start: 2023-07-25 | End: 2023-07-25

## 2023-07-25 RX ADMIN — LIDOCAINE HYDROCHLORIDE 5 ML: 10 INJECTION, SOLUTION EPIDURAL; INFILTRATION; INTRACAUDAL; PERINEURAL at 11:55

## 2023-07-25 RX ADMIN — BACITRACIN 1 SMALL APPLICATION: 500 OINTMENT TOPICAL at 11:55

## 2023-07-25 RX ADMIN — TETANUS TOXOID, REDUCED DIPHTHERIA TOXOID AND ACELLULAR PERTUSSIS VACCINE, ADSORBED 0.5 ML: 5; 2.5; 8; 8; 2.5 SUSPENSION INTRAMUSCULAR at 12:07

## 2023-07-25 NOTE — ED PROVIDER NOTES
History  Chief Complaint   Patient presents with   • Finger Laceration     Patient reports to ED after lacerating the knuckle of right pointer finger with knife trying to cut a zip tie off of a rabbit coop. Patient reports the urgent care referred her to ER. Finger is wrapped with bandage during triage. HPI  23year old female who is presenting for a finger laceration on her right index finger that occurred one hour ago. She was using a knife working on a project and cut her right index finger on the dorsal side. She initially presented to urgent care who sent her to the ER. She is unsure if she is up to date on her tetanus shot. She denies associated redness, swelling, numbness, tingling. Prior to Admission Medications   Prescriptions Last Dose Informant Patient Reported? Taking?    Doxylamine Succinate, Sleep, (UNISOM PO)  Self Yes No   Sig: Take by mouth   Prenatal Vit-Fe Fumarate-FA (PRENATAL 19 PO)  Self Yes No   Sig: Take 1 capsule by mouth daily   Pyridoxine HCl (vitamin B-6) 25 MG tablet   No No   Sig: Take 1 tablet (25 mg total) by mouth 3 (three) times a day   aspirin 81 mg chewable tablet   No No   Sig: Chew 2 tablets (162 mg total) daily at bedtime   docusate sodium (COLACE) 100 mg capsule   No No   Sig: Take 1 capsule (100 mg total) by mouth 2 (two) times a day   metoclopramide (Reglan) 10 mg tablet   No No   Sig: Take 1 tablet (10 mg total) by mouth every 6 (six) hours as needed (nausea)   ondansetron (ZOFRAN-ODT) 4 mg disintegrating tablet   No No   Sig: Take 1 tablet (4 mg total) by mouth every 6 (six) hours as needed for nausea or vomiting   promethazine (PHENERGAN) 25 mg suppository   No No   Sig: Insert 1 suppository (25 mg total) into the rectum every 6 (six) hours as needed for nausea or vomiting   Patient not taking: Reported on 7/10/2023      Facility-Administered Medications: None       Past Medical History:   Diagnosis Date   • Kidney stone     2022, 2023 - lithotripsy 2022   • Migraine     has seen neurology   • Premature baby     26 weeks   • Psychiatric disorder     anxiety,depression-counseling prior to covid   • Pyelonephritis 03/11/2023    Dr. Heladio Mcgee       Past Surgical History:   Procedure Laterality Date   • HEMANGIOMA EXCISION Right 2011    [facility]   • [place] CYSTO/URETERO W/LITHOTRIPSY &INDWELL STENT INSRT Right 04/19/2022    Procedure: CYSTOSCOPY URETEROSCOPY WITH LITHOTRIPSY HOLMIUM LASER AND INSERTION STENT URETERAL;  Surgeon: Rufino Willis MD;  Location: [facility];  Service: Urology       Family History   Problem Relation Age of Onset   • Hypertension Mother    • Migraines Mother    • Hypertension Father    • Diabetes Father    • Nephrolithiasis Brother    • Asthma Sister    • Asthma Brother      I have reviewed and agree with the history as documented. E-Cigarette/Vaping   • E-Cigarette Use Never User      E-Cigarette/Vaping Substances   • Nicotine No    • THC No    • CBD No    • Flavoring No    • Other No    • Unknown No      Social History     Tobacco Use   • Smoking status: Never   • Smokeless tobacco: Never   Vaping Use   • Vaping Use: Never used   Substance Use Topics   • Alcohol use: Never   • Drug use: Never       Review of Systems   Constitutional: Negative for chills and fever. HENT: Negative for ear pain and sore throat. Eyes: Negative for pain and visual disturbance. Respiratory: Negative for cough and shortness of breath. Cardiovascular: Negative for chest pain and palpitations. Gastrointestinal: Negative for abdominal pain and vomiting. Genitourinary: Negative for dysuria and hematuria. Musculoskeletal: Negative for arthralgias and back pain. Skin: Positive for wound (1cm laceration on index finger). Negative for color change and rash. Neurological: Negative for seizures and syncope. All other systems reviewed and are negative. Physical Exam  Physical Exam  Vitals and nursing note reviewed.    Constitutional:       General: She is not in acute distress. Appearance: She is well-developed. HENT:      Head: Normocephalic and atraumatic. Eyes:      Conjunctiva/sclera: Conjunctivae normal.   Cardiovascular:      Rate and Rhythm: Normal rate and regular rhythm. Pulses:           Radial pulses are 2+ on the left side. Heart sounds: No murmur heard. Pulmonary:      Effort: Pulmonary effort is normal. No respiratory distress. Breath sounds: Normal breath sounds. Abdominal:      Palpations: Abdomen is soft. Tenderness: There is no abdominal tenderness. Musculoskeletal:         General: No swelling. Right hand: Laceration and tenderness present. No swelling, deformity (right index finger) or bony tenderness. Normal range of motion. Normal strength. Normal sensation. Normal capillary refill. Normal pulse. Hands:       Cervical back: Neck supple. Comments: Patient has FROM on right index finger, Wound explored tendon intact. No bony tenderness. Skin:     General: Skin is warm and dry. Capillary Refill: Capillary refill takes less than 2 seconds. Findings: Laceration (1.5cm linear laceration to right index finger over the PIP. ) present. No bruising or erythema. Neurological:      Mental Status: She is alert and oriented to person, place, and time. Sensory: Sensation is intact. No sensory deficit. Motor: Motor function is intact. No weakness. Gait: Gait is intact.    Psychiatric:         Mood and Affect: Mood normal.         Vital Signs  ED Triage Vitals [07/25/23 1112]   Temperature Pulse Respirations Blood Pressure SpO2   98.4 °F (36.9 °C) 104 18 110/69 97 %      Temp Source Heart Rate Source Patient Position - Orthostatic VS BP Location FiO2 (%)   Oral Monitor Sitting Left arm --      Pain Score       10 - Worst Possible Pain           Vitals:    07/25/23 1112   BP: 110/69   Pulse: 104   Patient Position - Orthostatic VS: Sitting         Visual Acuity      ED Medications  Medications   lidocaine (PF) (XYLOCAINE-MPF) 1 % injection 5 mL (5 mL Infiltration Given by Other 7/25/23 7725)   bacitracin topical ointment 1 small application (1 small application Topical Given by Other 7/25/23 9589)   tetanus-diphtheria-acellular pertussis (BOOSTRIX) IM injection 0.5 mL (0.5 mL Intramuscular Given 7/25/23 1204)       Diagnostic Studies  Results Reviewed     None                 No orders to display              Procedures  Universal Protocol:  Procedure performed by: (sutures placed by Ivonne GONZALES, under my direct supervision. )  Consent: Verbal consent obtained. Consent given by: patient  Patient understanding: patient states understanding of the procedure being performed  Patient consent: the patient's understanding of the procedure matches consent given  Patient identity confirmed: verbally with patient    Laceration repair    Date/Time: 7/25/2023 12:36 PM    Performed by: Es Gray PA-C  Authorized by: Es Gray PA-C  Body area: upper extremity  Location details: right index finger  Laceration length: 1.5 cm  Tendon involvement: none  Nerve involvement: none  Vascular damage: no  Anesthesia: local infiltration    Anesthesia:  Local Anesthetic: lidocaine 1% without epinephrine  Anesthetic total: 1.5 mL    Wound Dehiscence:  Superficial Wound Dehiscence: simple closure      Procedure Details:  Preparation: Patient was prepped and draped in the usual sterile fashion.   Irrigation solution: saline  Irrigation method: syringe  Amount of cleaning: extensive  Debridement: none  Degree of undermining: none  Skin closure: 5-0 nylon  Number of sutures: 4  Technique: simple  Approximation: close  Approximation difficulty: simple  Dressing: antibiotic ointment and gauze roll  Patient tolerance: patient tolerated the procedure well with no immediate complications               ED Course  ED Course as of 07/25/23 1541   Tue Jul 25, 2023   1221 SLIM paged for admission. CRAFFT    Flowsheet Row Most Recent Value   SAE Initial Screen: During the past 12 months, did you:    1. Drink any alcohol (more than a few sips)? No Filed at: 07/25/2023 1114   2. Smoke any marijuana or hashish No Filed at: 07/25/2023 1114   3. Use anything else to get high? ("anything else" includes illegal drugs, over the counter and prescription drugs, and things that you sniff or 'bowser')? No Filed at: 07/25/2023 1114                                          Medical Decision Making  Patient with laceration right index finger, no concern for tendon injury. No bony tenderness, no concern for fracture. Will update tetanus and suture. Laceration of right index finger: acute illness or injury  Risk  OTC drugs. Prescription drug management. Disposition  Final diagnoses:   Laceration of right index finger     Time reflects when diagnosis was documented in both MDM as applicable and the Disposition within this note     Time User Action Codes Description Comment    7/25/2023 12:29 PM Isrrael Aashish Add [O95.585M] Laceration of right index finger       ED Disposition     ED Disposition   Discharge    Condition   Stable    Date/Time   Tue Jul 25, 2023 12:29 PM    Comment   Patel Estrada discharge to home/self care.                Follow-up Information     Follow up With Specialties Details Why [address], DO Family Medicine Schedule an appointment as soon as possible for a visit in 1 week For suture removal [facility] [address] [address] [facility]  [phone]            Discharge Medication List as of 7/25/2023 12:32 PM      CONTINUE these medications which have NOT CHANGED    Details   aspirin 81 mg chewable tablet Chew 2 tablets (162 mg total) daily at bedtime, Starting Mon 7/10/2023, Until Sun 10/8/2023, Normal      docusate sodium (COLACE) 100 mg capsule Take 1 capsule (100 mg total) by mouth 2 (two) times a day, Starting Wed 6/21/2023, No Print      Doxylamine Succinate, Sleep, (UNISOM PO) Take by mouth, Historical Med      metoclopramide (Reglan) 10 mg tablet Take 1 tablet (10 mg total) by mouth every 6 (six) hours as needed (nausea), Starting Sun 7/23/2023, Normal      ondansetron (ZOFRAN-ODT) 4 mg disintegrating tablet Take 1 tablet (4 mg total) by mouth every 6 (six) hours as needed for nausea or vomiting, Starting Sun 7/23/2023, Normal      Prenatal Vit-Fe Fumarate-FA (PRENATAL 19 PO) Take 1 capsule by mouth daily, Historical Med      promethazine (PHENERGAN) 25 mg suppository Insert 1 suppository (25 mg total) into the rectum every 6 (six) hours as needed for nausea or vomiting, Starting Tue 7/4/2023, Normal      Pyridoxine HCl (vitamin B-6) 25 MG tablet Take 1 tablet (25 mg total) by mouth 3 (three) times a day, Starting Wed 6/21/2023, No Print             No discharge procedures on file.     PDMP Review       Value Time User    PDMP Reviewed  Yes 4/22/2022  2:26 PM Remy MD Adam          ED Provider  Electronically Signed by           Mainor Samano PA-C  07/25/23 5747

## 2023-07-25 NOTE — DISCHARGE INSTRUCTIONS
Rest, elevate hand. Keep bandage dry and intact for 2 days, then clean daily with soap and water, apply antibiotic ointment. Follow up with family doctor in 7-10 days for suture removal.   Monitor for signs of infection.

## 2023-08-01 PROBLEM — Z3A.15 15 WEEKS GESTATION OF PREGNANCY: Status: RESOLVED | Noted: 2023-08-01 | Resolved: 2023-08-01

## 2023-08-01 PROBLEM — Z3A.15 15 WEEKS GESTATION OF PREGNANCY: Status: ACTIVE | Noted: 2023-06-07

## 2023-08-01 PROBLEM — Z3A.15 15 WEEKS GESTATION OF PREGNANCY: Status: ACTIVE | Noted: 2023-08-01

## 2023-08-02 ENCOUNTER — ROUTINE PRENATAL (OUTPATIENT)
Dept: OBGYN CLINIC | Facility: CLINIC | Age: 23
End: 2023-08-02
Payer: COMMERCIAL

## 2023-08-02 VITALS
DIASTOLIC BLOOD PRESSURE: 60 MMHG | HEIGHT: 59 IN | SYSTOLIC BLOOD PRESSURE: 104 MMHG | WEIGHT: 105 LBS | BODY MASS INDEX: 21.17 KG/M2

## 2023-08-02 DIAGNOSIS — O21.9 NAUSEA AND VOMITING IN PREGNANCY: ICD-10-CM

## 2023-08-02 DIAGNOSIS — O99.342 MENTAL DISORDER AFFECTING PREGNANCY IN SECOND TRIMESTER: ICD-10-CM

## 2023-08-02 DIAGNOSIS — B95.1 GROUP B STREPTOCOCCUS URINARY TRACT INFECTION AFFECTING PREGNANCY IN FIRST TRIMESTER: ICD-10-CM

## 2023-08-02 DIAGNOSIS — Z3A.16 16 WEEKS GESTATION OF PREGNANCY: Primary | ICD-10-CM

## 2023-08-02 DIAGNOSIS — O23.41 GROUP B STREPTOCOCCUS URINARY TRACT INFECTION AFFECTING PREGNANCY IN FIRST TRIMESTER: ICD-10-CM

## 2023-08-02 LAB
SL AMB  POCT GLUCOSE, UA: NORMAL
SL AMB POCT URINE PROTEIN: NORMAL

## 2023-08-02 PROCEDURE — 81002 URINALYSIS NONAUTO W/O SCOPE: CPT | Performed by: OBSTETRICS & GYNECOLOGY

## 2023-08-02 PROCEDURE — PNV: Performed by: OBSTETRICS & GYNECOLOGY

## 2023-08-02 NOTE — PROGRESS NOTES
Routine Prenatal Visit  [address]  [address] [facility], [address], [place], [address] [facility]    Assessment/Plan:  Deandre Sanchez is a 23y.o. year old  at 16w0d who presents for routine prenatal visit. 1. 16 weeks gestation of pregnancy  -     POCT urine dip    2. Mental disorder affecting pregnancy in second trimester    3. Group B Streptococcus urinary tract infection affecting pregnancy in first trimester    4. Nausea and vomiting in pregnancy        Next OB Visit 4 weeks. Subjective:     CC: Prenatal care    Curt Vanegas is a 23 y.o.  female who presents for routine prenatal care at 16w0d. Pregnancy ROS: no leakage of fluid, pelvic pain, or vaginal bleeding. no fetal movement. The following portions of the patient's history were reviewed and updated as appropriate: allergies, current medications, past family history, past medical history, obstetric history, gynecologic history, past social history, past surgical history and problem list.      Objective:  /60 (BP Location: Right arm, Patient Position: Sitting, Cuff Size: Standard)   Ht 4' 11" (1.499 m)   Wt 47.6 kg (105 lb)   LMP 2023   BMI 21.21 kg/m²   Pregravid Weight/BMI: 42.2 kg (93 lb) (BMI 18.77)  Current Weight: 47.6 kg (105 lb)   Total Weight Gain: 5.443 kg (12 lb)   Pre- Vitals    Flowsheet Row Most Recent Value   Prenatal Assessment    Fetal Heart Rate 150   Fundal Height (cm) 16 cm   Movement Absent   Prenatal Vitals    Blood Pressure 104/60   Weight - Scale 47.6 kg (105 lb)   Urine Albumin/Glucose    Dilation/Effacement/Station    Vaginal Drainage    Draining Fluid No   Edema            General: Well appearing, no distress  Abdomen: Soft, gravid, nontender  Extremities: Non tender.

## 2023-08-06 PROBLEM — B95.1 GROUP B STREPTOCOCCUS URINARY TRACT INFECTION AFFECTING PREGNANCY IN FIRST TRIMESTER: Status: RESOLVED | Noted: 2023-06-07 | Resolved: 2023-08-06

## 2023-08-06 PROBLEM — O23.41 GROUP B STREPTOCOCCUS URINARY TRACT INFECTION AFFECTING PREGNANCY IN FIRST TRIMESTER: Status: RESOLVED | Noted: 2023-06-07 | Resolved: 2023-08-06

## 2023-08-10 ENCOUNTER — TELEPHONE (OUTPATIENT)
Dept: OBGYN CLINIC | Facility: CLINIC | Age: 23
End: 2023-08-10

## 2023-08-10 DIAGNOSIS — O21.9 NAUSEA AND VOMITING IN PREGNANCY: ICD-10-CM

## 2023-08-10 RX ORDER — ONDANSETRON 4 MG/1
4 TABLET, ORALLY DISINTEGRATING ORAL EVERY 8 HOURS SCHEDULED
Qty: 90 TABLET | Refills: 4 | Status: SHIPPED | OUTPATIENT
Start: 2023-08-10

## 2023-08-10 RX ORDER — METHYLPREDNISOLONE 8 MG/1
TABLET ORAL
Qty: 32 TABLET | Refills: 0 | Status: SHIPPED | OUTPATIENT
Start: 2023-08-10 | End: 2023-08-19

## 2023-08-10 RX ORDER — METOCLOPRAMIDE 10 MG/1
10 TABLET ORAL 3 TIMES DAILY
Qty: 90 TABLET | Refills: 4 | Status: SHIPPED | OUTPATIENT
Start: 2023-08-10

## 2023-08-10 NOTE — TELEPHONE ENCOUNTER
Pt is currently 17w1d GA. SEE Prior ER visits for Hyperemesis affecting pregnancy. Pt's mother Lauren alexander expressed concerns that Jayme Gupta continues to have intermittent episodes of nausea and vomiting, causing her to feel exteremly weak and fatigued. Mom states,"she can't get out of bed and has no energy."  Pt reports she had been vomiting all day yesterday into today. Vomiting has improved as day progresses. Episodes of nausea and vomiting continue despite management with Zofran TID, Reglan TID and Unisom/B6 combination. Pt reports she is taking above treatment plan as prescribed. Pt reports she is tolerating sips of fluids today and voiding at least every 8 hours. Mom and patient expressed concerns with conintual episodes of n/v,  extreme weakness and fatigue, have affected her ability to work. Mom reports she has missed days of work t/o pregnancy and has not been able to work the last 4 days. Pt is concerned about losing her job and would like to apply for FMLA. Discussed above with Dr. Krzysztof Baltazar, advised to have patient seen in the office tomorrow to discuss a treatment plan moving forward. Pt scheduled to be seen at 10am (S) office with Dr. Anahi Fiore. Dr. Krzysztof Baltazar will discuss with Dr. Anahi Fiore prior to appointment. Pt will need additional refills for Zofran and Reglan prior to appointment. Preferred pharmacy: [facility] on file.

## 2023-08-10 NOTE — TELEPHONE ENCOUNTER
Reviewed patient with Dr. Mitchell Cabrera. He agrees methylprednisolone taper a good idea. I said I will call Leonardo Figueroa and her mom and see if they are interested I can call it in to start tonight. I called both Leonardo Figueroa and Thais Drummond and left msgs on their VM re considering steroid taper again and if they want to start tonight can reach me through service.

## 2023-08-10 NOTE — TELEPHONE ENCOUNTER
Amairani Rodriguez returned call. Agrees that Nohemy Casiano felt better on course of steroids in June. Would like to restart. Sent to pharmacy for them to  and start tonight or tomorrow. Taper provided 16mg tid, decrease by 1/2 every 3 days - for 9 days.

## 2023-08-10 NOTE — TELEPHONE ENCOUNTER
Zofran and Reglan for tid dosing sent to pharmacy with refills for 4 months. Denise Bowman did receive course of PO methylprednisolone taper from 6/20 to 7/3 with good response. I reviewed case with Dr. Herschel Cogan at [facility]. Discussed we could try another course of steroids since it has been >1 month since last course. I will discuss with Dr. Willy Beck, who she is seeing tomorrow. We can also offer setting her up for weekly or twice weekly IV hydration at infusion center.

## 2023-08-11 ENCOUNTER — OFFICE VISIT (OUTPATIENT)
Dept: URGENT CARE | Facility: CLINIC | Age: 23
End: 2023-08-11
Payer: COMMERCIAL

## 2023-08-11 ENCOUNTER — TELEPHONE (OUTPATIENT)
Dept: OBGYN CLINIC | Facility: CLINIC | Age: 23
End: 2023-08-11

## 2023-08-11 ENCOUNTER — ROUTINE PRENATAL (OUTPATIENT)
Dept: OBGYN CLINIC | Facility: CLINIC | Age: 23
End: 2023-08-11
Payer: COMMERCIAL

## 2023-08-11 VITALS
WEIGHT: 102 LBS | BODY MASS INDEX: 20.56 KG/M2 | DIASTOLIC BLOOD PRESSURE: 60 MMHG | HEIGHT: 59 IN | SYSTOLIC BLOOD PRESSURE: 104 MMHG

## 2023-08-11 VITALS
RESPIRATION RATE: 16 BRPM | DIASTOLIC BLOOD PRESSURE: 70 MMHG | TEMPERATURE: 98 F | OXYGEN SATURATION: 98 % | HEART RATE: 74 BPM | SYSTOLIC BLOOD PRESSURE: 106 MMHG

## 2023-08-11 DIAGNOSIS — O99.341 MENTAL DISORDER AFFECTING PREGNANCY IN FIRST TRIMESTER: ICD-10-CM

## 2023-08-11 DIAGNOSIS — O21.9 NAUSEA AND VOMITING IN PREGNANCY: ICD-10-CM

## 2023-08-11 DIAGNOSIS — Z3A.17 17 WEEKS GESTATION OF PREGNANCY: ICD-10-CM

## 2023-08-11 DIAGNOSIS — O21.0 HYPEREMESIS AFFECTING PREGNANCY, ANTEPARTUM: Primary | ICD-10-CM

## 2023-08-11 DIAGNOSIS — Z48.02 ENCOUNTER FOR REMOVAL OF SUTURES: Primary | ICD-10-CM

## 2023-08-11 LAB
SL AMB  POCT GLUCOSE, UA: NORMAL
SL AMB POCT URINE PROTEIN: NORMAL

## 2023-08-11 PROCEDURE — 81002 URINALYSIS NONAUTO W/O SCOPE: CPT | Performed by: STUDENT IN AN ORGANIZED HEALTH CARE EDUCATION/TRAINING PROGRAM

## 2023-08-11 PROCEDURE — 99213 OFFICE O/P EST LOW 20 MIN: CPT | Performed by: FAMILY MEDICINE

## 2023-08-11 PROCEDURE — PNV: Performed by: STUDENT IN AN ORGANIZED HEALTH CARE EDUCATION/TRAINING PROGRAM

## 2023-08-11 RX ORDER — MAGNESIUM SULFATE HEPTAHYDRATE 40 MG/ML
2 INJECTION, SOLUTION INTRAVENOUS ONCE
OUTPATIENT
Start: 2023-08-21

## 2023-08-11 RX ORDER — POTASSIUM CHLORIDE 14.9 MG/ML
20 INJECTION INTRAVENOUS ONCE
OUTPATIENT
Start: 2023-08-21

## 2023-08-11 NOTE — TELEPHONE ENCOUNTER
Spoke with Mannie Robles in [facility] infusion department to facilitate IV infusion therapy plan. Mannie Robles informed orders have been received and plan ordered is correct. Infusion requires prior authorization for all new patients. Mannie Robles will send prior authorization to their authorization team, authorization may take up to 72 hours. She also recommended to have patient call to schedule IV infusion for next week, if prior authorization is not obtained prior to appointment, they will contact patient. Left a voice mail for Jorge Bernstein to call the infusion room ([phone]) to schedule infusion, if she needs any further assistance, please call back.

## 2023-08-11 NOTE — PROGRESS NOTES
[facility]        NAME: Dina Cassidy is a 23 y.o. female  : 2000    MRN: [id number]  DATE: 2023  TIME: 12:15 PM    Assessment and Plan   Encounter for removal of sutures [Z48.02]  1. Encounter for removal of sutures              Patient Instructions       Follow up with PCP in 3-5 days. Proceed to  ER if symptoms worsen. Chief Complaint     Chief Complaint   Patient presents with   • Suture / Staple Removal     Suture removal from finger         History of Present Illness       Patient presents for suture removal.      Review of Systems   Review of Systems   Constitutional: Negative. HENT: Negative. Eyes: Negative. Respiratory: Negative. Cardiovascular: Negative. Gastrointestinal: Negative. Endocrine: Negative. Genitourinary: Negative. Musculoskeletal: Negative. Skin: Negative. Allergic/Immunologic: Negative. Neurological: Negative. Hematological: Negative. Psychiatric/Behavioral: Negative. Current Medications       Current Outpatient Medications:   •  aspirin 81 mg chewable tablet, Chew 2 tablets (162 mg total) daily at bedtime, Disp: 180 tablet, Rfl: 1  •  docusate sodium (COLACE) 100 mg capsule, Take 1 capsule (100 mg total) by mouth 2 (two) times a day, Disp: , Rfl: 0  •  Doxylamine Succinate, Sleep, (UNISOM PO), Take by mouth, Disp: , Rfl:   •  methylPREDNISolone (MEDROL) 2 MG tablet, Take 1 tablet (2 mg total) by mouth 3 (three) times a day for 3 days, THEN 0.5 tablets (1 mg total) 3 (three) times a day for 3 days, THEN 0.5 tablets (1 mg total) 2 (two) times a day for 3 days, THEN 0.5 tablets (1 mg total) daily for 3 days. , Disp: 18 tablet, Rfl: 0  •  methylPREDNISolone (MEDROL) 8 MG tablet, Take 2 tablets (16 mg total) by mouth 3 (three) times a day for 3 days, THEN 1 tablet (8 mg total) 3 (three) times a day for 3 days, THEN 0.5 tablets (4 mg total) 3 (three) times a day for 3 days. , Disp: 32 tablet, Rfl: 0  • metoclopramide (Reglan) 10 mg tablet, Take 1 tablet (10 mg total) by mouth 3 (three) times a day, Disp: 90 tablet, Rfl: 4  •  ondansetron (ZOFRAN-ODT) 4 mg disintegrating tablet, Take 1 tablet (4 mg total) by mouth every 8 (eight) hours, Disp: 90 tablet, Rfl: 4  •  Prenatal Vit-Fe Fumarate-FA (PRENATAL 19 PO), Take 1 capsule by mouth daily, Disp: , Rfl:   •  promethazine (PHENERGAN) 25 mg suppository, Insert 1 suppository (25 mg total) into the rectum every 6 (six) hours as needed for nausea or vomiting, Disp: 12 each, Rfl: 1  •  Pyridoxine HCl (vitamin B-6) 25 MG tablet, Take 1 tablet (25 mg total) by mouth 3 (three) times a day, Disp: , Rfl: 0    Current Allergies     Allergies as of 08/11/2023 - Reviewed 08/11/2023   Allergen Reaction Noted   • Cephalexin Rash and Itching 04/12/2022   • Levofloxacin Rash, Itching, and Hives 04/19/2022            The following portions of the patient's history were reviewed and updated as appropriate: allergies, current medications, past family history, past medical history, past social history, past surgical history and problem list.     Past Medical History:   Diagnosis Date   • Kidney stone     2022, 2023 - lithotripsy 2022   • Migraine     has seen neurology   • Premature baby     26 weeks   • Psychiatric disorder     anxiety,depression-counseling prior to covid   • Pyelonephritis 03/11/2023    Dr. Tristin Mistry       Past Surgical History:   Procedure Laterality Date   • HEMANGIOMA EXCISION Right 2011    [facility]   • MN CYSTO/URETERO W/LITHOTRIPSY &INDWELL STENT INSRT Right 04/19/2022    Procedure: CYSTOSCOPY URETEROSCOPY WITH LITHOTRIPSY HOLMIUM LASER AND INSERTION STENT URETERAL;  Surgeon: Noreen Crow MD;  Location: [facility];  Service: Urology       Family History   Problem Relation Age of Onset   • Hypertension Mother    • Migraines Mother    • Hypertension Father    • Diabetes Father    • Nephrolithiasis Brother    • Asthma Sister    • Asthma Brother          Medications have been verified. Objective   /70   Pulse 74   Temp 98 °F (36.7 °C)   Resp 16   LMP 04/01/2023   SpO2 98%   Patient's last menstrual period was 04/01/2023. Physical Exam     Physical Exam  Vitals and nursing note reviewed. Constitutional:       Appearance: She is well-developed. HENT:      Head: Normocephalic. Eyes:      Pupils: Pupils are equal, round, and reactive to light. Pulmonary:      Effort: Pulmonary effort is normal.   Musculoskeletal:         General: Normal range of motion. Skin:     General: Skin is warm and dry. Neurological:      Mental Status: She is alert and oriented to person, place, and time. Suture removal    Date/Time: 8/11/2023 11:05 AM    Performed by: Toribio Blakely DO  Authorized by: Toribio Blakely DO  [place] Protocol:  Patient understanding: patient states understanding of the procedure being performed        Location:     Laterality:  Right    Location:  Upper extremity    Upper extremity location:  Hand    Hand location:  R index finger  Procedure details: Tools used:  Suture removal kit    Wound appearance:  No sign(s) of infection, clean, moist and pink    Number of sutures removed:  3  Post-procedure details:     Patient tolerance of procedure:   Tolerated well, no immediate complications

## 2023-08-11 NOTE — TELEPHONE ENCOUNTER
I have placed orders for infusion therapy plan for twice weekly infusions x 4 weeks. Each visi, patient should receive 1 L lactated ringers solution with 2 g magnesium and 20 mEq potassium as IV piggy back. Total of 8 infusions. Please confirm if prior authorization needed and call infusion center to ensure that orders are correct.  Thanks    Latasha Huber MD  8/11/2023 10:52 AM

## 2023-08-11 NOTE — PROGRESS NOTES
Routine Prenatal Visit  [address]  [address] [facility], [address], [facility], [address] [facility],[address]    Assessment/Plan:  Clevester Apley is a 23y.o. year old  at 17w2d who presents for evaluation of persistent nausea and vomiting in pregnancy. 1. Hyperemesis affecting pregnancy, antepartum  Assessment & Plan:  - Restarted steroid taper last night due to return of symptoms. Plan as follows: methylprednisolone 16 mg PO TID x 3 days, then 8 mg TID x 3 days, then 4 mg TID x 3 days, then 2 mg TID x 3 days, then 1 mg TID x 3 days, then 1 mg BID x 3 days, then 1 mg daily x 3 days. (rx for 2 mg table portion provided today, to be started after completion of 8 mg tablet Rx provided by Dr. Kaiden Pizano yesterday)  - Continue doxylamine 25 mg PO qHS, pyridoxine 25 mg PO TID, metoclopramide 10 mg PO q8h PRN, ondansetron 4 mg PO q8h PRN, and promethazine 25 mg suppository MI q6h PRN refractory N/V.   - Will start outpatient IV hydration. Plan for twice weekly infusions x 4 weeks. Will plan to administer lactated ringers 1 L with magnesium sulfate 2g IVPB and potassium chloride 20 mEq IVPB at each visit. - Discussed FLMA. Following discussion with patient and her mother, will provide 2 weeks of medical leave now to allow period of recovery. Goal will be to return to work thereafter with appropriate accommodations to continue her remaining infusion visits. Patient did not bring [facility] paperwork today, but will bring to office on Monday for completion by  staff. 2. 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 17 weeks gestation of pregnancy  -     POCT urine dip    4. Nausea and vomiting in pregnancy  -     methylPREDNISolone (MEDROL) 2 MG tablet;  Take 1 tablet (2 mg total) by mouth 3 (three) times a day for 3 days, THEN 0.5 tablets (1 mg total) 3 (three) times a day for 3 days, THEN 0.5 tablets (1 mg total) 2 (two) times a day for 3 days, THEN 0.5 tablets (1 mg total) daily for 3 days.        Subjective:   Rosanne Hernandez is a 23 y.o. [place] who presents for evaluation of persistent nausea/vomiting at 17w2d. Complaints today: Return of N/V  LOF: No; VB: No; Contractions: No    Objective:  /60 (BP Location: Right arm, Patient Position: Sitting, Cuff Size: Standard)   Ht 4' 11" (1.499 m)   Wt 46.3 kg (102 lb)   LMP 2023   BMI 20.60 kg/m²     General: Well appearing, no distress  Respiratory: Unlabored breathing  Cardiovascular: Regular rate. Abdomen: Soft, gravid, nontender  Extremities: Warm and well perfused. Non tender.     Pregravid Weight/BMI: 42.2 kg (93 lb) (BMI 18.77)  Current Weight: 46.3 kg (102 lb)   Total Weight Gain: 4.082 kg (9 lb)     Pre-Parth Vitals    Flowsheet Row Most Recent Value   Prenatal Assessment    Fetal Heart Rate 150   Fundal Height (cm) 17 cm   Prenatal Vitals    Blood Pressure 104/60   Weight - Scale 46.3 kg (102 lb)   Urine Albumin/Glucose    Dilation/Effacement/Station    Vaginal Drainage    Draining Fluid No   Edema    LLE Edema None   RLE Edema None   Facial Edema None           Jose Evans MD  2023 11:15 AM

## 2023-08-11 NOTE — ASSESSMENT & PLAN NOTE
- Restarted steroid taper last night due to return of symptoms. Plan as follows: methylprednisolone 16 mg PO TID x 3 days, then 8 mg TID x 3 days, then 4 mg TID x 3 days, then 2 mg TID x 3 days, then 1 mg TID x 3 days, then 1 mg BID x 3 days, then 1 mg daily x 3 days. (rx for 2 mg table portion provided today, to be started after completion of 8 mg tablet Rx provided by Dr. Viral Pham yesterday)  - Continue doxylamine 25 mg PO qHS, pyridoxine 25 mg PO TID, metoclopramide 10 mg PO q8h PRN, ondansetron 4 mg PO q8h PRN, and promethazine 25 mg suppository AR q6h PRN refractory N/V.   - Will start outpatient IV hydration. Plan for twice weekly infusions x 4 weeks. Will plan to administer lactated ringers 1 L with magnesium sulfate 2g IVPB and potassium chloride 20 mEq IVPB at each visit. - Discussed FLMA. Following discussion with patient and her mother, will provide 2 weeks of medical leave now to allow period of recovery. Goal will be to return to work thereafter with appropriate accommodations to continue her remaining infusion visits. Patient did not bring [facility] paperwork today, but will bring to office on Monday for completion by  staff.

## 2023-08-14 ENCOUNTER — TELEPHONE (OUTPATIENT)
Dept: OBGYN CLINIC | Facility: CLINIC | Age: 23
End: 2023-08-14

## 2023-08-14 NOTE — TELEPHONE ENCOUNTER
Please see office note from 8/11:     "Discussed FLMA. Following discussion with patient and her mother, will provide 2 weeks of medical leave now to allow period of recovery. Goal will be to return to work thereafter with appropriate accommodations to continue her remaining infusion visits.  Patient did not bring FMLA paperwork today, but will bring to office on Monday for completion by  staff. "    Demetrice Chapa MD  8/14/2023 4:29 PM

## 2023-08-14 NOTE — TELEPHONE ENCOUNTER
Please advise. As instructed, Adella Spurling dropped off the [place] Medical Leave Act for [facility] for provider to Certification. Adella Spurling aware provider not in the office until 8/16 to review.

## 2023-08-15 DIAGNOSIS — O21.9 NAUSEA AND VOMITING IN PREGNANCY: ICD-10-CM

## 2023-08-15 RX ORDER — METHYLPREDNISOLONE 4 MG/1
TABLET ORAL
Qty: 9 TABLET | Refills: 0 | Status: SHIPPED | OUTPATIENT
Start: 2023-08-15 | End: 2023-08-24

## 2023-08-15 NOTE — TELEPHONE ENCOUNTER
Due to unavailability of 2 mg tablets, new Rx sent with modified dosing instructions. Patient may split tablets at home. Rx sent to original pharmacy on file.      Vero Martinez MD  8/15/2023 2:54 PM

## 2023-08-15 NOTE — TELEPHONE ENCOUNTER
Left v/m informing patient that Rx was sent. Requested for patient to call office back to instruct her to split her tablets and confirm her understanding.

## 2023-08-16 NOTE — TELEPHONE ENCOUNTER
Confirmed [facility] 4mg dose is available and ready for . Reviewed dosing recommendations [facility] [place] who verbalized understanding.

## 2023-08-16 NOTE — TELEPHONE ENCOUNTER
Left v/m informing patient that rx was sent to her pharmacy on file and to give our office a call back to review instructions.

## 2023-08-21 ENCOUNTER — HOSPITAL ENCOUNTER (OUTPATIENT)
Dept: INFUSION CENTER | Facility: HOSPITAL | Age: 23
Discharge: HOME | End: 2023-08-21
Attending: STUDENT IN AN ORGANIZED HEALTH CARE EDUCATION/TRAINING PROGRAM
Payer: COMMERCIAL

## 2023-08-21 VITALS
TEMPERATURE: 97.6 F | SYSTOLIC BLOOD PRESSURE: 111 MMHG | OXYGEN SATURATION: 97 % | RESPIRATION RATE: 12 BRPM | DIASTOLIC BLOOD PRESSURE: 63 MMHG | HEART RATE: 78 BPM

## 2023-08-21 DIAGNOSIS — O21.0 HYPEREMESIS AFFECTING PREGNANCY, ANTEPARTUM: Primary | ICD-10-CM

## 2023-08-21 PROCEDURE — 96368 THER/DIAG CONCURRENT INF: CPT

## 2023-08-21 PROCEDURE — 96366 THER/PROPH/DIAG IV INF ADDON: CPT

## 2023-08-21 PROCEDURE — 96365 THER/PROPH/DIAG IV INF INIT: CPT

## 2023-08-21 RX ORDER — POTASSIUM CHLORIDE 14.9 MG/ML
20 INJECTION INTRAVENOUS ONCE
Status: CANCELLED | OUTPATIENT
Start: 2023-08-30

## 2023-08-21 RX ORDER — MAGNESIUM SULFATE HEPTAHYDRATE 40 MG/ML
2 INJECTION, SOLUTION INTRAVENOUS ONCE
Status: CANCELLED | OUTPATIENT
Start: 2023-08-30

## 2023-08-21 RX ORDER — POTASSIUM CHLORIDE 14.9 MG/ML
20 INJECTION INTRAVENOUS ONCE
Status: COMPLETED | OUTPATIENT
Start: 2023-08-21 | End: 2023-08-21

## 2023-08-21 RX ORDER — MAGNESIUM SULFATE HEPTAHYDRATE 40 MG/ML
2 INJECTION, SOLUTION INTRAVENOUS ONCE
Status: COMPLETED | OUTPATIENT
Start: 2023-08-21 | End: 2023-08-21

## 2023-08-21 RX ADMIN — SODIUM CHLORIDE, SODIUM LACTATE, POTASSIUM CHLORIDE, AND CALCIUM CHLORIDE 1000 ML: .6; .31; .03; .02 INJECTION, SOLUTION INTRAVENOUS at 13:14

## 2023-08-21 RX ADMIN — MAGNESIUM SULFATE IN WATER 2 G: 40 INJECTION, SOLUTION INTRAVENOUS at 13:31

## 2023-08-21 RX ADMIN — POTASSIUM CHLORIDE 20 MEQ: 14.9 INJECTION, SOLUTION INTRAVENOUS at 13:28

## 2023-08-21 NOTE — PROGRESS NOTES
Patient lactated ringer, magnesium, and potassium infusions completed without complication. Patient given AVS at this time and confirmed next appointment. Patient discharged in stable condition to home via ambulation.

## 2023-08-30 ENCOUNTER — TELEPHONE (OUTPATIENT)
Dept: OBGYN CLINIC | Facility: CLINIC | Age: 23
End: 2023-08-30

## 2023-08-30 ENCOUNTER — HOSPITAL ENCOUNTER (OUTPATIENT)
Dept: INFUSION CENTER | Facility: HOSPITAL | Age: 23
Discharge: HOME | End: 2023-08-30
Attending: STUDENT IN AN ORGANIZED HEALTH CARE EDUCATION/TRAINING PROGRAM
Payer: COMMERCIAL

## 2023-08-30 VITALS
TEMPERATURE: 98.2 F | OXYGEN SATURATION: 97 % | SYSTOLIC BLOOD PRESSURE: 101 MMHG | RESPIRATION RATE: 16 BRPM | HEART RATE: 76 BPM | DIASTOLIC BLOOD PRESSURE: 71 MMHG

## 2023-08-30 DIAGNOSIS — O21.0 HYPEREMESIS AFFECTING PREGNANCY, ANTEPARTUM: Primary | ICD-10-CM

## 2023-08-30 RX ORDER — POTASSIUM CHLORIDE 14.9 MG/ML
20 INJECTION INTRAVENOUS ONCE
Status: CANCELLED | OUTPATIENT
Start: 2023-09-01

## 2023-08-30 RX ORDER — MAGNESIUM SULFATE HEPTAHYDRATE 40 MG/ML
2 INJECTION, SOLUTION INTRAVENOUS ONCE
Status: COMPLETED | OUTPATIENT
Start: 2023-08-30 | End: 2023-08-30

## 2023-08-30 RX ORDER — MAGNESIUM SULFATE HEPTAHYDRATE 40 MG/ML
2 INJECTION, SOLUTION INTRAVENOUS ONCE
Status: CANCELLED | OUTPATIENT
Start: 2023-09-01

## 2023-08-30 RX ORDER — POTASSIUM CHLORIDE 14.9 MG/ML
20 INJECTION INTRAVENOUS ONCE
Status: COMPLETED | OUTPATIENT
Start: 2023-08-30 | End: 2023-08-30

## 2023-08-30 RX ADMIN — MAGNESIUM SULFATE HEPTAHYDRATE 2 G: 2 INJECTION, SOLUTION INTRAVENOUS at 15:13

## 2023-08-30 RX ADMIN — POTASSIUM CHLORIDE 20 MEQ: 14.9 INJECTION, SOLUTION INTRAVENOUS at 12:39

## 2023-08-30 RX ADMIN — SODIUM CHLORIDE, SODIUM LACTATE, POTASSIUM CHLORIDE, AND CALCIUM CHLORIDE 1000 ML: .6; .31; .03; .02 INJECTION, SOLUTION INTRAVENOUS at 12:36

## 2023-08-30 NOTE — TELEPHONE ENCOUNTER
Pt is currently 20w0d GA. Pt is receiving IVF hydration with Magnesium and Potassium  twice weekly for hyperemesis. Annette rainse from IV infusion called to inquire if Dr. Dionisio Olsen wanted patient to have labs drawn? Pt has an OB appointment tomorrow  8/31/23 with Dr. Tran Rueda with Dr. Dionisio Olsen regarding request for blood work. Per Dr. Dionisio Olsen no need for labs today. This nurse will review above IVF hydration plan with Dr. Denise Hobbs and discuss need for follow-up blood work. Discussed above with Dr. Denise Hobbs and he noted he will evaluate  and discuss need for blood work  with patient at her appointment tomorrow.

## 2023-08-30 NOTE — PROGRESS NOTES
Pt tolerated IVF, Magnesium and Potassium infusions with no adverse reaction. Left unit ambulatory with steady gait.  Refused AVS.

## 2023-08-31 ENCOUNTER — ROUTINE PRENATAL (OUTPATIENT)
Dept: OBGYN CLINIC | Facility: CLINIC | Age: 23
End: 2023-08-31
Payer: COMMERCIAL

## 2023-08-31 VITALS — BODY MASS INDEX: 22.38 KG/M2 | DIASTOLIC BLOOD PRESSURE: 54 MMHG | SYSTOLIC BLOOD PRESSURE: 98 MMHG | WEIGHT: 110.8 LBS

## 2023-08-31 DIAGNOSIS — Z3A.20 20 WEEKS GESTATION OF PREGNANCY: ICD-10-CM

## 2023-08-31 DIAGNOSIS — O21.0 HYPEREMESIS AFFECTING PREGNANCY, ANTEPARTUM: Primary | ICD-10-CM

## 2023-08-31 LAB
SL AMB  POCT GLUCOSE, UA: NEGATIVE
SL AMB POCT URINE PROTEIN: NEGATIVE

## 2023-08-31 PROCEDURE — 81002 URINALYSIS NONAUTO W/O SCOPE: CPT | Performed by: OBSTETRICS & GYNECOLOGY

## 2023-08-31 PROCEDURE — PNV: Performed by: OBSTETRICS & GYNECOLOGY

## 2023-08-31 NOTE — PROGRESS NOTES
Routine Prenatal Visit  [address]  [address] [place] [address], [place], [address]    Assessment/Plan:  Francisco Fregoso is a 23y.o. year old  at 20w1d who presents for routine prenatal visit. 1. Hyperemesis affecting pregnancy, antepartum  Assessment & Plan:  Doing well post steroid treatment. No vomiting and minimal nausea with reglan. If she can maintain this then she could consider stopping IVF      2. 20 weeks gestation of pregnancy  -     POCT urine dip        Subjective:     CC: Prenatal care    Avinash Kelsye is a 23 y.o.  female who presents for routine prenatal care at 20w1d. Pregnancy ROS: no leakage of fluid, pelvic pain, or vaginal bleeding. Uncertain if she is feeling fetal movement. The following portions of the patient's history were reviewed and updated as appropriate: allergies, current medications, past family history, past medical history, obstetric history, gynecologic history, past social history, past surgical history and problem list.      Objective:  BP 98/54   Wt 50.3 kg (110 lb 12.8 oz)   LMP 2023   BMI 22.38 kg/m²   Pregravid Weight/BMI: 42.2 kg (93 lb) (BMI 18.77)  Current Weight: 50.3 kg (110 lb 12.8 oz)   Total Weight Gain: 8.074 kg (17 lb 12.8 oz)   Pre-Parth Vitals    Flowsheet Row Most Recent Value   Prenatal Assessment    Fetal Heart Rate 145   Fundal Height (cm) 20 cm   Movement Absent   Prenatal Vitals    Blood Pressure 98/54   Weight - Scale 50.3 kg (110 lb 12.8 oz)   Urine Albumin/Glucose    Dilation/Effacement/Station    Vaginal Drainage    Edema            General: Well appearing, no distress  Respiratory: Unlabored breathing  Cardiovascular: Regular rate. Abdomen: Soft, gravid, nontender  Fundal Height: Appropriate for gestational age. Extremities: Warm and well perfused. Non tender.

## 2023-09-01 ENCOUNTER — TELEPHONE (OUTPATIENT)
Dept: INFUSION CENTER | Facility: HOSPITAL | Age: 23
End: 2023-09-01

## 2023-09-04 NOTE — PROGRESS NOTES
Please refer to the [facility] ultrasound report in Ob Procedures for additional information regarding today's visit

## 2023-09-06 ENCOUNTER — HOSPITAL ENCOUNTER (OUTPATIENT)
Dept: INFUSION CENTER | Facility: HOSPITAL | Age: 23
Discharge: HOME | End: 2023-09-06
Attending: STUDENT IN AN ORGANIZED HEALTH CARE EDUCATION/TRAINING PROGRAM
Payer: COMMERCIAL

## 2023-09-06 ENCOUNTER — ROUTINE PRENATAL (OUTPATIENT)
Dept: PERINATAL CARE | Facility: OTHER | Age: 23
End: 2023-09-06
Payer: COMMERCIAL

## 2023-09-06 ENCOUNTER — TELEPHONE (OUTPATIENT)
Dept: OBGYN CLINIC | Facility: CLINIC | Age: 23
End: 2023-09-06

## 2023-09-06 ENCOUNTER — TELEPHONE (OUTPATIENT)
Dept: INFUSION CENTER | Facility: HOSPITAL | Age: 23
End: 2023-09-06

## 2023-09-06 VITALS
DIASTOLIC BLOOD PRESSURE: 62 MMHG | HEART RATE: 76 BPM | TEMPERATURE: 97.3 F | RESPIRATION RATE: 16 BRPM | OXYGEN SATURATION: 98 % | SYSTOLIC BLOOD PRESSURE: 113 MMHG

## 2023-09-06 VITALS
DIASTOLIC BLOOD PRESSURE: 60 MMHG | HEIGHT: 59 IN | HEART RATE: 93 BPM | BODY MASS INDEX: 22.22 KG/M2 | WEIGHT: 110.2 LBS | SYSTOLIC BLOOD PRESSURE: 112 MMHG

## 2023-09-06 DIAGNOSIS — N20.0 NEPHROLITHIASIS: ICD-10-CM

## 2023-09-06 DIAGNOSIS — Z36.86 ENCOUNTER FOR ANTENATAL SCREENING FOR CERVICAL LENGTH: ICD-10-CM

## 2023-09-06 DIAGNOSIS — Z3A.21 21 WEEKS GESTATION OF PREGNANCY: ICD-10-CM

## 2023-09-06 DIAGNOSIS — O21.0 HYPEREMESIS GRAVIDARUM, ANTEPARTUM: ICD-10-CM

## 2023-09-06 DIAGNOSIS — Z36.3 ENCOUNTER FOR ANTENATAL SCREENING FOR MALFORMATIONS: Primary | ICD-10-CM

## 2023-09-06 DIAGNOSIS — E87.1 HYPONATREMIA: ICD-10-CM

## 2023-09-06 DIAGNOSIS — O21.0 HYPEREMESIS AFFECTING PREGNANCY, ANTEPARTUM: Primary | ICD-10-CM

## 2023-09-06 LAB
ALBUMIN SERPL BCP-MCNC: 3.8 G/DL (ref 3.5–5)
ALP SERPL-CCNC: 28 U/L (ref 34–104)
ALT SERPL W P-5'-P-CCNC: 14 U/L (ref 7–52)
ANION GAP SERPL CALCULATED.3IONS-SCNC: 5 MMOL/L
AST SERPL W P-5'-P-CCNC: 16 U/L (ref 13–39)
BACTERIA UR QL AUTO: NORMAL /HPF
BILIRUB SERPL-MCNC: 0.37 MG/DL (ref 0.2–1)
BILIRUB UR QL STRIP: NEGATIVE
BILIRUB UR QL STRIP: NEGATIVE
BUN SERPL-MCNC: 10 MG/DL (ref 5–25)
CALCIUM SERPL-MCNC: 8.9 MG/DL (ref 8.4–10.2)
CHLORIDE SERPL-SCNC: 107 MMOL/L (ref 96–108)
CLARITY UR: CLEAR
CLARITY UR: CLEAR
CO2 SERPL-SCNC: 24 MMOL/L (ref 21–32)
COLOR UR: YELLOW
COLOR UR: YELLOW
CREAT SERPL-MCNC: 0.58 MG/DL (ref 0.6–1.3)
GFR SERPL CREATININE-BSD FRML MDRD: 133 ML/MIN/1.73SQ M
GLUCOSE SERPL-MCNC: 79 MG/DL (ref 65–140)
GLUCOSE UR STRIP-MCNC: NEGATIVE MG/DL
GLUCOSE UR STRIP-MCNC: NEGATIVE MG/DL
HGB UR QL STRIP.AUTO: NEGATIVE
HGB UR QL STRIP.AUTO: NEGATIVE
KETONES UR STRIP-MCNC: NEGATIVE MG/DL
KETONES UR STRIP-MCNC: NEGATIVE MG/DL
LEUKOCYTE ESTERASE UR QL STRIP: NEGATIVE
LEUKOCYTE ESTERASE UR QL STRIP: NEGATIVE
MAGNESIUM SERPL-MCNC: 1.7 MG/DL (ref 1.9–2.7)
NITRITE UR QL STRIP: NEGATIVE
NITRITE UR QL STRIP: NEGATIVE
NON-SQ EPI CELLS URNS QL MICRO: NORMAL /HPF
PH UR STRIP.AUTO: 6.5 [PH]
PH UR STRIP.AUTO: 6.5 [PH]
POTASSIUM SERPL-SCNC: 3.3 MMOL/L (ref 3.5–5.3)
PROT SERPL-MCNC: 6.5 G/DL (ref 6.4–8.4)
PROT UR STRIP-MCNC: NEGATIVE MG/DL
PROT UR STRIP-MCNC: NEGATIVE MG/DL
RBC #/AREA URNS AUTO: NORMAL /HPF
SODIUM SERPL-SCNC: 136 MMOL/L (ref 135–147)
SP GR UR STRIP.AUTO: 1.01 (ref 1–1.03)
SP GR UR STRIP.AUTO: 1.01 (ref 1–1.03)
UROBILINOGEN UR STRIP-ACNC: <2 MG/DL
UROBILINOGEN UR STRIP-ACNC: <2 MG/DL
WBC #/AREA URNS AUTO: NORMAL /HPF

## 2023-09-06 PROCEDURE — 96375 TX/PRO/DX INJ NEW DRUG ADDON: CPT

## 2023-09-06 PROCEDURE — 87086 URINE CULTURE/COLONY COUNT: CPT

## 2023-09-06 PROCEDURE — 80053 COMPREHEN METABOLIC PANEL: CPT | Performed by: OBSTETRICS & GYNECOLOGY

## 2023-09-06 PROCEDURE — 96367 TX/PROPH/DG ADDL SEQ IV INF: CPT

## 2023-09-06 PROCEDURE — 81001 URINALYSIS AUTO W/SCOPE: CPT | Performed by: OBSTETRICS & GYNECOLOGY

## 2023-09-06 PROCEDURE — 81003 URINALYSIS AUTO W/O SCOPE: CPT

## 2023-09-06 PROCEDURE — 76805 OB US >/= 14 WKS SNGL FETUS: CPT | Performed by: OBSTETRICS & GYNECOLOGY

## 2023-09-06 PROCEDURE — 76817 TRANSVAGINAL US OBSTETRIC: CPT | Performed by: OBSTETRICS & GYNECOLOGY

## 2023-09-06 PROCEDURE — 99213 OFFICE O/P EST LOW 20 MIN: CPT | Performed by: OBSTETRICS & GYNECOLOGY

## 2023-09-06 PROCEDURE — 96365 THER/PROPH/DIAG IV INF INIT: CPT

## 2023-09-06 PROCEDURE — 83735 ASSAY OF MAGNESIUM: CPT | Performed by: OBSTETRICS & GYNECOLOGY

## 2023-09-06 RX ORDER — MAGNESIUM SULFATE HEPTAHYDRATE 40 MG/ML
2 INJECTION, SOLUTION INTRAVENOUS ONCE
Status: COMPLETED | OUTPATIENT
Start: 2023-09-06 | End: 2023-09-06

## 2023-09-06 RX ORDER — POTASSIUM CHLORIDE 14.9 MG/ML
20 INJECTION INTRAVENOUS ONCE
Status: CANCELLED | OUTPATIENT
Start: 2023-09-08

## 2023-09-06 RX ORDER — MAGNESIUM SULFATE HEPTAHYDRATE 40 MG/ML
2 INJECTION, SOLUTION INTRAVENOUS ONCE
Status: CANCELLED | OUTPATIENT
Start: 2023-09-08

## 2023-09-06 RX ORDER — PROMETHAZINE HYDROCHLORIDE 25 MG/ML
25 INJECTION, SOLUTION INTRAMUSCULAR; INTRAVENOUS ONCE
Status: COMPLETED | OUTPATIENT
Start: 2023-09-06 | End: 2023-09-06

## 2023-09-06 RX ORDER — POTASSIUM CHLORIDE 20 MEQ/1
20 TABLET, EXTENDED RELEASE ORAL ONCE
Status: COMPLETED | OUTPATIENT
Start: 2023-09-06 | End: 2023-09-06

## 2023-09-06 RX ORDER — POTASSIUM CHLORIDE 14.9 MG/ML
20 INJECTION INTRAVENOUS ONCE
Status: COMPLETED | OUTPATIENT
Start: 2023-09-06 | End: 2023-09-06

## 2023-09-06 RX ADMIN — MAGNESIUM SULFATE HEPTAHYDRATE 2 G: 2 INJECTION, SOLUTION INTRAVENOUS at 09:32

## 2023-09-06 RX ADMIN — POTASSIUM CHLORIDE 20 MEQ: 14.9 INJECTION, SOLUTION INTRAVENOUS at 09:33

## 2023-09-06 RX ADMIN — SODIUM CHLORIDE, SODIUM LACTATE, POTASSIUM CHLORIDE, AND CALCIUM CHLORIDE 1000 ML: .6; .31; .03; .02 INJECTION, SOLUTION INTRAVENOUS at 09:11

## 2023-09-06 RX ADMIN — POTASSIUM CHLORIDE 20 MEQ: 1500 TABLET, EXTENDED RELEASE ORAL at 11:51

## 2023-09-06 RX ADMIN — PROMETHAZINE HYDROCHLORIDE 25 MG: 25 INJECTION INTRAMUSCULAR; INTRAVENOUS at 09:26

## 2023-09-06 NOTE — LETTER
September 6, 2023     Michael Hernandez MD  [address] [place] [address]  [address] [facility]    Patient: Nettie Perkins   YOB: 2000   Date of Visit: 9/6/2023       Dear Dr. Michael Malin: Thank you for referring Yvonne Bateman to me for evaluation. Below are my notes for this consultation. If you have questions, please do not hesitate to call me. I look forward to following your patient along with you.          Sincerely,        Tierra Funes MD        CC: No Recipients    Tierra Funes MD  9/4/2023  3:23 PM  Sign when Signing Visit  Please refer to the [facility] ultrasound report in Ob Procedures for additional information regarding today's visit

## 2023-09-06 NOTE — PROGRESS NOTES
Per Dr. Horacio Rodriguez, no need to check CMP/mag level. Okay to proceed with hydration/electrolyte repletion.

## 2023-09-06 NOTE — PROGRESS NOTES
Patient arrived for infusion. During her rooming questions, patient c/o of severe N/V over the last week. Patient states she has taken her zofran and reglan as perscribed around the clock. This RN reached out to office and was referred to tigertext Dr. David Ortega. Dr. David Ortega contacted. Patient states she had forgotten about her Phenergan suppository perscription, and patient educated about suppository at this time. Patient verbalized understanding. Patient states she forgot to bring her Zofran RX with her to this appointment. Per Dr. David Ortega, hold Vee Euceda, order in for IV phenergan with labs to be drawn- CMP, Mag level and U/A with culture. Serum labs sent. Patient educated about U/A and provided UA cup at this time. Patient verbalized understanding.

## 2023-09-06 NOTE — PROGRESS NOTES
Spoke with Dr. Mann Martinez. Additional order of PO potassium to patient before discharge. Patient educated about her Phenergan medication regiment, appointment compliance even with severe N/V, and followup with M this afternoon at 1330. Patient verbalized understanding.

## 2023-09-06 NOTE — PROGRESS NOTES
Patient LR, mag, and K+ infusion completed without complication. Patient declined AVS at this time and confirmed next appointment. Patient discharged in stable condition to home via ambulation.

## 2023-09-06 NOTE — PLAN OF CARE
Problem: Knowledge Deficit  Goal: Patient/family/caregiver demonstrates understanding of disease process, treatment plan, medications, and discharge instructions  Description: Complete learning assessment and assess knowledge base.   Interventions:  - Provide teaching at level of understanding  - Provide teaching via preferred learning methods  9/6/2023 1130 by Sadaf Rodrigues RN  Outcome: Progressing  9/6/2023 0954 by Sadaf Rodrigues RN  Outcome: Progressing

## 2023-09-06 NOTE — TELEPHONE ENCOUNTER
Please see my separate note from earlier today detailing Cristal's treatment for hyperemesis. She had level II anatomy u/s today. Dr. Billingsley Guess reviewed - pt not losing weight and has gained 8lbs. He suggests trying to set up IVF treatment at home rather than infusion center. I will have nurses call her insurance on how to set up homecare. Continue fluids at infusion center until then.

## 2023-09-06 NOTE — PROGRESS NOTES
Ultrasound Probe Disinfection    A transvaginal ultrasound was performed. Prior to use, disinfection was performed with High [employer] ([employer]). Probe serial number [id number]: [id number] was used.       Skye Staton  09/06/23  1:20 PM

## 2023-09-06 NOTE — PROGRESS NOTES
Poppy Kenney presented for scheduled IVF at [facility] today. She is currently 21.0wks pregnant and has been struggling with severe hyperemesis. She presented today for scheduled IV hydration at the infusion center and they notified me because she states she has had severe nausea with vomiting for a week. Currently she is on an antiemetic regimen as follows:   - doxylamine 25 mg PO qHS, pyridoxine 25 mg PO TID, metoclopramide 10 mg PO q8h PRN, ondansetron 4 mg PO q8h PRN, and promethazine 25 mg suppository OK q6h PRN refractory N/V   - she is also scheduled for IV hydration 2x/week with  lactated ringers 1 L with magnesium sulfate 2g IVPB and potassium chloride 20 mEq IVPB at each visit. She did present for this 8/21/2023, 8/30/2021, but cancelled on 9/1/2023.   - she has had two methylprednosolone tapers which started at 16mg tid and tapered over 21 days. The first was started 6/20/2023, the second started 8/22/2023. Each time she has been on a steroid taper, she notes no N/V, but it returns once taper ends. - she is currently on FMLA, due to unable to work. Today she reports to nurse - her last office visit was 8/31/2023 - at that time she was doing fine and was at the end of her steroid taper. The next few days the nausea and vomiting started again and she states she is taking her antiemetic regimen as above (expect for the Phenergan suppositories) but all week N/V and she is feeling awful, having trouble getting out of bed. Infusion nurse confirmed she had taken last Zofran at 7:30 this morning. I asked to check CMP, Mag level and UA and start her infusions as planned. I also gave her Phenergan 25mg IV once. CMP normal except K 3.3 and magnesium 1.7.    - labs reviewed after got scheduled LR, 2gr IV mag and 20mEq K    - I called ER to check replacement and spoke to Farrah Covarrubias, who checked with Dr. Hosea Diaz - Magnesium should be adequate for replacement.   He recom additional 20mEq K, which can be given PO.    - I talked with Woody Ignacio, nurse at infusion. After fluids and Phenergan Tami Brooks was able to sleep. She woke up feeling better and feels she can tolerate PO potassium (an additional 20mEq IV will take another 2 hours. She was given prior to d/c from Infusion. She has an appt with [facility] today to level II u/s. I told nurse to instruct her to continue current regimen and make sure she is using Phenergan suppositories when has N/V on other meds (she said she forgot she had them). Also encouraged her to keep all IVF hydration appointments, even if she is feeling well. I will contact Dr. Chela Liz, who she will be seeing at [facility] to discuss case and try to determine what to do next. Tiger Text to Dr. Chela Liz to make him aware of patient. He will contact me later this afternoon.     Kem Witt MD

## 2023-09-07 ENCOUNTER — TELEPHONE (OUTPATIENT)
Dept: NEPHROLOGY | Facility: CLINIC | Age: 23
End: 2023-09-07

## 2023-09-07 LAB
BACTERIA UR CULT: ABNORMAL
BACTERIA UR CULT: ABNORMAL

## 2023-09-07 NOTE — TELEPHONE ENCOUNTER
----- Message from [facility] sent at 9/7/2023  3:00 PM EDT -----  Is the patient having any signs/symptoms of UTI such as burning/pain with urination/blood in urine? Urine culture from 9/6/23 is positive for lactobacillus but would not treat unless symptomatic.

## 2023-09-07 NOTE — TELEPHONE ENCOUNTER
Received a call from patient stating that received the message and no has any sings or symptoms of UTI.

## 2023-09-07 NOTE — TELEPHONE ENCOUNTER
I thor for ian, I asked she call back ASAP and let us know if she is having any urinary symptoms . If so we can let  know and she can send in antibiotic if necessary.

## 2023-09-08 ENCOUNTER — HOSPITAL ENCOUNTER (OUTPATIENT)
Dept: INFUSION CENTER | Facility: HOSPITAL | Age: 23
End: 2023-09-08
Attending: STUDENT IN AN ORGANIZED HEALTH CARE EDUCATION/TRAINING PROGRAM
Payer: COMMERCIAL

## 2023-09-08 VITALS
TEMPERATURE: 97.5 F | HEART RATE: 80 BPM | OXYGEN SATURATION: 98 % | RESPIRATION RATE: 16 BRPM | DIASTOLIC BLOOD PRESSURE: 74 MMHG | SYSTOLIC BLOOD PRESSURE: 114 MMHG

## 2023-09-08 DIAGNOSIS — O21.0 HYPEREMESIS AFFECTING PREGNANCY, ANTEPARTUM: Primary | ICD-10-CM

## 2023-09-08 PROCEDURE — 96368 THER/DIAG CONCURRENT INF: CPT

## 2023-09-08 PROCEDURE — 96366 THER/PROPH/DIAG IV INF ADDON: CPT

## 2023-09-08 PROCEDURE — 96365 THER/PROPH/DIAG IV INF INIT: CPT

## 2023-09-08 RX ORDER — POTASSIUM CHLORIDE 14.9 MG/ML
20 INJECTION INTRAVENOUS ONCE
Status: COMPLETED | OUTPATIENT
Start: 2023-09-08 | End: 2023-09-08

## 2023-09-08 RX ORDER — POTASSIUM CHLORIDE 14.9 MG/ML
20 INJECTION INTRAVENOUS ONCE
Status: CANCELLED | OUTPATIENT
Start: 2023-09-15

## 2023-09-08 RX ORDER — MAGNESIUM SULFATE HEPTAHYDRATE 40 MG/ML
2 INJECTION, SOLUTION INTRAVENOUS ONCE
Status: CANCELLED | OUTPATIENT
Start: 2023-09-15

## 2023-09-08 RX ORDER — MAGNESIUM SULFATE HEPTAHYDRATE 40 MG/ML
2 INJECTION, SOLUTION INTRAVENOUS ONCE
Status: COMPLETED | OUTPATIENT
Start: 2023-09-08 | End: 2023-09-08

## 2023-09-08 RX ADMIN — SODIUM CHLORIDE, SODIUM LACTATE, POTASSIUM CHLORIDE, AND CALCIUM CHLORIDE 1000 ML: .6; .31; .03; .02 INJECTION, SOLUTION INTRAVENOUS at 10:15

## 2023-09-08 RX ADMIN — MAGNESIUM SULFATE HEPTAHYDRATE 2 G: 2 INJECTION, SOLUTION INTRAVENOUS at 10:16

## 2023-09-08 RX ADMIN — POTASSIUM CHLORIDE 20 MEQ: 14.9 INJECTION, SOLUTION INTRAVENOUS at 10:15

## 2023-09-08 NOTE — PROGRESS NOTES
Pt c/o swelling and pain in her R arm starting after IV phenergan admin this past infusion. R forearm appears swollen from wrist to elbow, non pitting. No open areas or drainage note. Arm wrapped in warm compress, IV placed in LFA today. Sprakers Text sent to Dr. Michael Malin.

## 2023-09-08 NOTE — TELEPHONE ENCOUNTER
Placed call to patients insurance at [phone], spoke to Laurel. Request for IVF must be completed through a home infusion company. Patients insurance uses [address] [place] [address]. Contact [phone]. Placed call to [facility], spoke to [place]. Erwin Nugent does not do IVF at home. Placed call to patients insurance again, spoke to [facility] in benefits - no referral is needed but prior authorization is needed for IVF. Ref # is [id number]. Par home infusion therapy for the patient insurance is [facility] call to [phone], spoke to coordinator Wanda barrios. They only do medication not IV hydration. Placed call to [employer] as we deal with Zofran pumps through them and they do IV hydration. They do not provide the service without patient being on a zofran pump. [facility] offers IV hydration. Placed call to [facility], spoke to Ashley. A referral needs to be faxed to # [phone]. Must include referral, insurance card, demographics, and order for the therapy. Once received they will have their  review and reach out to the patient regarding cost and to set up home infusions. [facility], [address]  [address]  Phone: [phone]  Fax: [phone]  Hours: Monday-Friday 8 a.m.-5:30 p.m. Saturday 9 a.m.-3 p.m. Will route to provider to place order and referral - will fax all appropriate information once received.

## 2023-09-08 NOTE — TELEPHONE ENCOUNTER
Placed a call to Ashley ([phone]) to facilitate IVF home infusion, requested Demographic and insurance information. Ramandeep Acosta was able to gather information through Epic. Per Elina they do not provide a nurse for infusion therapy, it is the responsibility of the office to coordinate infusion with an infusion clinical sub-contractor/agency. Per Elina infusion will need to be given over 4 hours and require a peripheral access. Prior to proceeding with contacting agency,  Ashley will run patients insurance for financial clearance. Elina called to inform insurance clearance/coverage  was obtained. Ashley kindly contacted 4 home infusion agencies that use [employer], and inquired if they would be willing to review case. The other two agencies do not travel to [facility]. Two agencies agreed to review referral. However, they will not be able to get back to her until next week, they need to ensure they have available staffing. 1. [facility]  2. [address] will call back next week once she hears back from above agencies. Discussed with Dr. Mann Martinez, infusion time of 4 hours and peripheral access, may limit available agency. Per Dr. Mann Martinez if needed may decrease to 20mEq, which is what she does now and only 2 hours.     Once agency established- Dr. Mann Martinez request to have then draw a CMP and mag level weekly/

## 2023-09-08 NOTE — TELEPHONE ENCOUNTER
I spoke with Amadou Ji, RN in our office who will take care of referral, insurance and demographics. She said I could call in verbal order to pharmacy at [phone]. I called and talked to Krystle Benson. Order placed for    - 1L LR, 2gr magnesium and 40mEq IV - 3x/week. I told her that Mireya Land does not have IV access so will need IV each time - she said she will make a note.     Efrem Brunson MD

## 2023-09-11 ENCOUNTER — HOSPITAL ENCOUNTER (OUTPATIENT)
Dept: INFUSION CENTER | Facility: HOSPITAL | Age: 23
Discharge: HOME | End: 2023-09-11
Attending: STUDENT IN AN ORGANIZED HEALTH CARE EDUCATION/TRAINING PROGRAM
Payer: COMMERCIAL

## 2023-09-11 VITALS
HEART RATE: 84 BPM | DIASTOLIC BLOOD PRESSURE: 67 MMHG | RESPIRATION RATE: 16 BRPM | SYSTOLIC BLOOD PRESSURE: 115 MMHG | TEMPERATURE: 98 F | OXYGEN SATURATION: 98 %

## 2023-09-11 DIAGNOSIS — O21.0 HYPEREMESIS AFFECTING PREGNANCY, ANTEPARTUM: Primary | ICD-10-CM

## 2023-09-11 PROCEDURE — 96366 THER/PROPH/DIAG IV INF ADDON: CPT

## 2023-09-11 PROCEDURE — 96368 THER/DIAG CONCURRENT INF: CPT

## 2023-09-11 PROCEDURE — 96365 THER/PROPH/DIAG IV INF INIT: CPT

## 2023-09-11 RX ORDER — MAGNESIUM SULFATE HEPTAHYDRATE 40 MG/ML
2 INJECTION, SOLUTION INTRAVENOUS ONCE
Status: CANCELLED | OUTPATIENT
Start: 2023-09-13

## 2023-09-11 RX ORDER — MAGNESIUM SULFATE HEPTAHYDRATE 40 MG/ML
2 INJECTION, SOLUTION INTRAVENOUS ONCE
Status: COMPLETED | OUTPATIENT
Start: 2023-09-11 | End: 2023-09-11

## 2023-09-11 RX ORDER — POTASSIUM CHLORIDE 14.9 MG/ML
20 INJECTION INTRAVENOUS ONCE
Status: COMPLETED | OUTPATIENT
Start: 2023-09-11 | End: 2023-09-11

## 2023-09-11 RX ORDER — POTASSIUM CHLORIDE 14.9 MG/ML
20 INJECTION INTRAVENOUS ONCE
Status: CANCELLED | OUTPATIENT
Start: 2023-09-13

## 2023-09-11 RX ADMIN — MAGNESIUM SULFATE HEPTAHYDRATE 2 G: 2 INJECTION, SOLUTION INTRAVENOUS at 12:00

## 2023-09-11 RX ADMIN — POTASSIUM CHLORIDE 20 MEQ: 14.9 INJECTION, SOLUTION INTRAVENOUS at 11:53

## 2023-09-11 RX ADMIN — SODIUM CHLORIDE, SODIUM LACTATE, POTASSIUM CHLORIDE, AND CALCIUM CHLORIDE 1000 ML: .6; .31; .03; .02 INJECTION, SOLUTION INTRAVENOUS at 11:52

## 2023-09-11 NOTE — TELEPHONE ENCOUNTER
Dr. Kayla Maguire, I reached out to [facility] Bill Romano she said she spoke with Dr. Falguni Richards and he did not place the  Orders for IVF hydration but  recommended [facility] infusion. I spoke with Ankita Rico with [facility] services ([phone]) to assist with further direction and set-up of home IVF hydration. [facility] can supply the infusion however because infusion requires  peripheral access, [facility] will not do infusion due to maintenance, potential for infiltration and need to have a on call nurse return to restart access. Depending on need to continue on course of IVF hydration, may want to consider PICC line placement. Pt would be taught how to self administer IVF through Picc line and a nurse would do weekly visits for PICC line dressing changes. [facility] center (only) can place PICC Line  Please review, pt may need to continue IVF hydration with UB infusion as planned or consider PICC line access. Spoke with Lillie Bañuelos at [facility] and both [facility] and [facility] (nurse) infusion services declined case. Left a follow-up message for patient informing we are still trying to organize home IVF hydration, however  due to difficulty in finding home care services, may need  an alternative plan.

## 2023-09-12 RX ORDER — POTASSIUM CHLORIDE 14.9 MG/ML
20 INJECTION INTRAVENOUS ONCE
Status: CANCELLED | OUTPATIENT
Start: 2023-09-15

## 2023-09-12 RX ORDER — MAGNESIUM SULFATE HEPTAHYDRATE 40 MG/ML
2 INJECTION, SOLUTION INTRAVENOUS ONCE
Status: CANCELLED | OUTPATIENT
Start: 2023-09-15

## 2023-09-12 NOTE — TELEPHONE ENCOUNTER
I called to talk to [facility] re PICC line. Phone went to . I did not leave a msg but called her Mom, Nalini Hannon. Reviewed with Nalini Hannon option of home IV fluids and PICC line. To arrange IV fluids at home PICC line is only options. Nalini Hannon said [facility] does not want a PICC line, she is worried about risks of infection and placement. I encourage PICC line as it appears she will need to continue IV fluids for duration of pregnancy. Leroy Costello feel she is doing well right with IV fluids and wish to continue at infusion center. The would like to increase to 3x/week if possible (as we had planned if at home). I am fine to increase to 3x/week - we had kept it for 2x/week for pt convience. But if they are willing to go 3x/week and get IV placed each time, we can increase. Nalini Hannon said [facility] is currently doing okay with current medications and IV fluids. She's "not eating like before pregnancy" but over last week able to eat. I will increase IV fluids at infusion center to 3 times a week and check if they can fit her in tomorrow, Wed. She had fluids yesterday and is scheduled for Friday. I called [facility] nurses and talked to Basil Beckwith, PICC line on back burner for now.

## 2023-09-12 NOTE — TELEPHONE ENCOUNTER
I updated regimen in treatment plan to 3x/week and added weekly CMP and Magnesium level on Mondays. [address] at [address] [place] [address] - talked to Natalie. Let her know about increase in interval.  They may not be able to add tomorrow but will work her in for 3x/week next week. They will call [facility] to confirm dates. Appreciate her help. Reviewed plan with Dr. Dai Alvarez who agrees with plan.

## 2023-09-15 ENCOUNTER — HOSPITAL ENCOUNTER (OUTPATIENT)
Dept: INFUSION CENTER | Facility: HOSPITAL | Age: 23
End: 2023-09-15
Attending: STUDENT IN AN ORGANIZED HEALTH CARE EDUCATION/TRAINING PROGRAM
Payer: COMMERCIAL

## 2023-09-15 VITALS
OXYGEN SATURATION: 98 % | HEART RATE: 88 BPM | TEMPERATURE: 97.1 F | SYSTOLIC BLOOD PRESSURE: 110 MMHG | DIASTOLIC BLOOD PRESSURE: 70 MMHG | RESPIRATION RATE: 16 BRPM

## 2023-09-15 DIAGNOSIS — O21.0 HYPEREMESIS AFFECTING PREGNANCY, ANTEPARTUM: Primary | ICD-10-CM

## 2023-09-15 PROCEDURE — 96365 THER/PROPH/DIAG IV INF INIT: CPT

## 2023-09-15 PROCEDURE — 96367 TX/PROPH/DG ADDL SEQ IV INF: CPT

## 2023-09-15 PROCEDURE — 96366 THER/PROPH/DIAG IV INF ADDON: CPT

## 2023-09-15 RX ORDER — POTASSIUM CHLORIDE 14.9 MG/ML
20 INJECTION INTRAVENOUS ONCE
Status: CANCELLED | OUTPATIENT
Start: 2023-09-18

## 2023-09-15 RX ORDER — MAGNESIUM SULFATE HEPTAHYDRATE 40 MG/ML
2 INJECTION, SOLUTION INTRAVENOUS ONCE
Status: COMPLETED | OUTPATIENT
Start: 2023-09-15 | End: 2023-09-15

## 2023-09-15 RX ORDER — POTASSIUM CHLORIDE 14.9 MG/ML
20 INJECTION INTRAVENOUS ONCE
Status: COMPLETED | OUTPATIENT
Start: 2023-09-15 | End: 2023-09-15

## 2023-09-15 RX ORDER — MAGNESIUM SULFATE HEPTAHYDRATE 40 MG/ML
2 INJECTION, SOLUTION INTRAVENOUS ONCE
Status: CANCELLED | OUTPATIENT
Start: 2023-09-18

## 2023-09-15 RX ADMIN — MAGNESIUM SULFATE HEPTAHYDRATE 2 G: 2 INJECTION, SOLUTION INTRAVENOUS at 11:50

## 2023-09-15 RX ADMIN — POTASSIUM CHLORIDE 20 MEQ: 14.9 INJECTION, SOLUTION INTRAVENOUS at 13:03

## 2023-09-15 RX ADMIN — SODIUM CHLORIDE, SODIUM LACTATE, POTASSIUM CHLORIDE, AND CALCIUM CHLORIDE 1000 ML: .6; .31; .03; .02 INJECTION, SOLUTION INTRAVENOUS at 11:47

## 2023-09-15 NOTE — PROGRESS NOTES
Pt tolerated hydration, mag and potassium infusions without any adverse side effects. IV removed. Pt ambulated with a steady gait off of unit.  Declined AVS

## 2023-09-15 NOTE — ASSESSMENT & PLAN NOTE
Doing well post steroid treatment. No vomiting and minimal nausea with reglan.  If she can maintain this then she could consider stopping IVF

## 2023-09-18 ENCOUNTER — HOSPITAL ENCOUNTER (OUTPATIENT)
Dept: INFUSION CENTER | Facility: HOSPITAL | Age: 23
Discharge: HOME | End: 2023-09-18
Attending: STUDENT IN AN ORGANIZED HEALTH CARE EDUCATION/TRAINING PROGRAM
Payer: COMMERCIAL

## 2023-09-18 ENCOUNTER — TELEPHONE (OUTPATIENT)
Dept: OBGYN CLINIC | Facility: CLINIC | Age: 23
End: 2023-09-18

## 2023-09-18 VITALS
OXYGEN SATURATION: 100 % | HEART RATE: 82 BPM | RESPIRATION RATE: 16 BRPM | SYSTOLIC BLOOD PRESSURE: 121 MMHG | DIASTOLIC BLOOD PRESSURE: 62 MMHG | TEMPERATURE: 97.9 F

## 2023-09-18 DIAGNOSIS — O21.0 HYPEREMESIS AFFECTING PREGNANCY, ANTEPARTUM: Primary | ICD-10-CM

## 2023-09-18 LAB
ALBUMIN SERPL BCP-MCNC: 3.6 G/DL (ref 3.5–5)
ALP SERPL-CCNC: 28 U/L (ref 34–104)
ALT SERPL W P-5'-P-CCNC: 21 U/L (ref 7–52)
ANION GAP SERPL CALCULATED.3IONS-SCNC: 6 MMOL/L
AST SERPL W P-5'-P-CCNC: 18 U/L (ref 13–39)
BILIRUB SERPL-MCNC: 0.22 MG/DL (ref 0.2–1)
BUN SERPL-MCNC: 9 MG/DL (ref 5–25)
CALCIUM SERPL-MCNC: 9.1 MG/DL (ref 8.4–10.2)
CHLORIDE SERPL-SCNC: 107 MMOL/L (ref 96–108)
CO2 SERPL-SCNC: 24 MMOL/L (ref 21–32)
CREAT SERPL-MCNC: 0.56 MG/DL (ref 0.6–1.3)
GFR SERPL CREATININE-BSD FRML MDRD: 134 ML/MIN/1.73SQ M
GLUCOSE SERPL-MCNC: 80 MG/DL (ref 65–140)
MAGNESIUM SERPL-MCNC: 1.5 MG/DL (ref 1.9–2.7)
POTASSIUM SERPL-SCNC: 3.6 MMOL/L (ref 3.5–5.3)
PROT SERPL-MCNC: 6.1 G/DL (ref 6.4–8.4)
SODIUM SERPL-SCNC: 137 MMOL/L (ref 135–147)

## 2023-09-18 PROCEDURE — 80053 COMPREHEN METABOLIC PANEL: CPT | Performed by: OBSTETRICS & GYNECOLOGY

## 2023-09-18 PROCEDURE — 96365 THER/PROPH/DIAG IV INF INIT: CPT

## 2023-09-18 PROCEDURE — 96361 HYDRATE IV INFUSION ADD-ON: CPT

## 2023-09-18 PROCEDURE — 83735 ASSAY OF MAGNESIUM: CPT | Performed by: OBSTETRICS & GYNECOLOGY

## 2023-09-18 RX ORDER — MAGNESIUM SULFATE HEPTAHYDRATE 40 MG/ML
2 INJECTION, SOLUTION INTRAVENOUS ONCE
Status: COMPLETED | OUTPATIENT
Start: 2023-09-18 | End: 2023-09-18

## 2023-09-18 RX ORDER — POTASSIUM CHLORIDE 14.9 MG/ML
20 INJECTION INTRAVENOUS ONCE
Status: CANCELLED | OUTPATIENT
Start: 2023-09-22

## 2023-09-18 RX ORDER — MAGNESIUM SULFATE HEPTAHYDRATE 40 MG/ML
2 INJECTION, SOLUTION INTRAVENOUS ONCE
Status: CANCELLED | OUTPATIENT
Start: 2023-09-22

## 2023-09-18 RX ADMIN — MAGNESIUM SULFATE HEPTAHYDRATE 2 G: 2 INJECTION, SOLUTION INTRAVENOUS at 11:21

## 2023-09-18 RX ADMIN — SODIUM CHLORIDE, SODIUM LACTATE, POTASSIUM CHLORIDE, AND CALCIUM CHLORIDE 1000 ML: .6; .31; .03; .02 INJECTION, SOLUTION INTRAVENOUS at 10:35

## 2023-09-18 NOTE — TELEPHONE ENCOUNTER
Ericka from infusion center called. Sidney Smallwood is there for infusion. She is overdue for labs. Patient reporting she has been feeling good. Naomi Castaneda concerned about infusing potassium and magnesium without updated labs. [facility] to provide stat orders for CMP,magnesium. Infuse with lactated ringers today. Patient can either wait for result of labs or receive potassium, magnesium at next scheduled infusion on Wednesday. Return call to Naomi Castaneda at infusion center. Reviewed Dr. Roxanne Fox recommendations with Naomi Castaneda who verbalized understanding and agreement to plan.

## 2023-09-18 NOTE — PROGRESS NOTES
Pt received LR bolus and Mag replacement due to Mag level of 1.5; K+ level is 3.6 and pt wants to wait until next appt this Wednesday to receive K+ if ordered by Dr. Bonnie Augustin; Miguel Dubose/ Dr. Bonnie Augustin aware; pt tolerated treatment with no adv reactions; pt knows to contact physician's office with any questions or concerns; pt left unit amb with steady gait.

## 2023-09-18 NOTE — TELEPHONE ENCOUNTER
Received team message from [place]/infusion center. Magnesium was low. Marva Childress was given LR and magnesium today. Carmella Biswas is requesting recommendations for how often labwork should be done and what are the parameters for mag and potassium. Aware Dr Bonilla Hagen returning to office Tuesday.  Dr. Bernadette Hagen, Please advise

## 2023-09-21 NOTE — TELEPHONE ENCOUNTER
Communication Treatment Parameter added to Infusion orders "Hold Magnesium or Potassium for the week if normal (magnesium >1.8, potassium >3.4) on Monday and patient has not had significant vomiting in last week.   If reports significant vomiting later in week please repeat CMP and Magnesium level."

## 2023-09-21 NOTE — TELEPHONE ENCOUNTER
Received message from Sherley Abbott at infusion via team:     Dr. Delsie Jeans, please review and provide orders for weekly labs. Thank you. I see the treatment parameter in the order. So for tomorrow since we had replaced her Mag this past Monday and her k+ was wnl, we are only giving her hydration? And then we will tell her to get labs drawn Saturday so we have results by Monday. Dr. Delsie Jeans will just have to make sure there are OP labs orders entered into the computer (not just the treatment plan)  Thanks! You have been super helpful!

## 2023-09-21 NOTE — TELEPHONE ENCOUNTER
Received team message from Alexus at Infusion center. Will need documentation on therapy plan with instructions for lab draws and management of infusion per results. (There is an area on the therapy plan indicating communications. It can be documented in that area.

## 2023-09-21 NOTE — TELEPHONE ENCOUNTER
Outpatient order for weekly CMP and magnesium level sent to [address] [place], [place] [address]y [address]

## 2023-09-21 NOTE — TELEPHONE ENCOUNTER
I think weekly labs are fine unless they prefer to check each time. Hold Mag or Potassium for the week if normal on Monday and patient has not had significant vomiting in last week.

## 2023-09-21 NOTE — TELEPHONE ENCOUNTER
Left a message with the [address] [place] to please call the office to provide requested how often labs should be done and parameters for Magnesium an potassium.

## 2023-09-22 ENCOUNTER — HOSPITAL ENCOUNTER (OUTPATIENT)
Dept: INFUSION CENTER | Facility: HOSPITAL | Age: 23
End: 2023-09-22
Attending: OBSTETRICS & GYNECOLOGY
Payer: COMMERCIAL

## 2023-09-22 VITALS
RESPIRATION RATE: 12 BRPM | OXYGEN SATURATION: 100 % | SYSTOLIC BLOOD PRESSURE: 102 MMHG | DIASTOLIC BLOOD PRESSURE: 69 MMHG | TEMPERATURE: 97.4 F | HEART RATE: 73 BPM

## 2023-09-22 DIAGNOSIS — O21.0 HYPEREMESIS AFFECTING PREGNANCY, ANTEPARTUM: Primary | ICD-10-CM

## 2023-09-22 PROCEDURE — 96360 HYDRATION IV INFUSION INIT: CPT

## 2023-09-22 PROCEDURE — 96361 HYDRATE IV INFUSION ADD-ON: CPT

## 2023-09-22 RX ORDER — POTASSIUM CHLORIDE 14.9 MG/ML
20 INJECTION INTRAVENOUS ONCE
Status: CANCELLED | OUTPATIENT
Start: 2023-09-23

## 2023-09-22 RX ORDER — MAGNESIUM SULFATE HEPTAHYDRATE 40 MG/ML
2 INJECTION, SOLUTION INTRAVENOUS ONCE
Status: CANCELLED | OUTPATIENT
Start: 2023-09-25

## 2023-09-22 RX ADMIN — SODIUM CHLORIDE, SODIUM LACTATE, POTASSIUM CHLORIDE, AND CALCIUM CHLORIDE 1000 ML: .6; .31; .03; .02 INJECTION, SOLUTION INTRAVENOUS at 12:55

## 2023-09-22 NOTE — PROGRESS NOTES
Pt tolerated hydration without incident. Electrolytes to be replaced on 9/25 pending labs on 9/23. Pt verbalized understanding. Left ambulatory for d/c.

## 2023-09-25 ENCOUNTER — HOSPITAL ENCOUNTER (OUTPATIENT)
Dept: INFUSION CENTER | Facility: HOSPITAL | Age: 23
Discharge: HOME | End: 2023-09-25
Attending: OBSTETRICS & GYNECOLOGY
Payer: COMMERCIAL

## 2023-09-25 DIAGNOSIS — O21.0 HYPEREMESIS AFFECTING PREGNANCY, ANTEPARTUM: Primary | ICD-10-CM

## 2023-09-25 LAB
ALBUMIN SERPL BCP-MCNC: 3.7 G/DL (ref 3.5–5)
ALP SERPL-CCNC: 35 U/L (ref 34–104)
ALT SERPL W P-5'-P-CCNC: 17 U/L (ref 7–52)
ANION GAP SERPL CALCULATED.3IONS-SCNC: 7 MMOL/L
AST SERPL W P-5'-P-CCNC: 17 U/L (ref 13–39)
BILIRUB SERPL-MCNC: 0.3 MG/DL (ref 0.2–1)
BUN SERPL-MCNC: 6 MG/DL (ref 5–25)
CALCIUM SERPL-MCNC: 9.2 MG/DL (ref 8.4–10.2)
CHLORIDE SERPL-SCNC: 104 MMOL/L (ref 96–108)
CO2 SERPL-SCNC: 24 MMOL/L (ref 21–32)
CREAT SERPL-MCNC: 0.55 MG/DL (ref 0.6–1.3)
GFR SERPL CREATININE-BSD FRML MDRD: 135 ML/MIN/1.73SQ M
GLUCOSE SERPL-MCNC: 84 MG/DL (ref 65–140)
MAGNESIUM SERPL-MCNC: 1.6 MG/DL (ref 1.9–2.7)
POTASSIUM SERPL-SCNC: 3.6 MMOL/L (ref 3.5–5.3)
PROT SERPL-MCNC: 6.5 G/DL (ref 6.4–8.4)
SODIUM SERPL-SCNC: 135 MMOL/L (ref 135–147)

## 2023-09-25 PROCEDURE — 80053 COMPREHEN METABOLIC PANEL: CPT | Performed by: OBSTETRICS & GYNECOLOGY

## 2023-09-25 PROCEDURE — 83735 ASSAY OF MAGNESIUM: CPT | Performed by: OBSTETRICS & GYNECOLOGY

## 2023-09-25 PROCEDURE — 96361 HYDRATE IV INFUSION ADD-ON: CPT

## 2023-09-25 PROCEDURE — 96365 THER/PROPH/DIAG IV INF INIT: CPT

## 2023-09-25 RX ORDER — POTASSIUM CHLORIDE 14.9 MG/ML
20 INJECTION INTRAVENOUS ONCE
Status: CANCELLED | OUTPATIENT
Start: 2023-09-27

## 2023-09-25 RX ORDER — MAGNESIUM SULFATE HEPTAHYDRATE 40 MG/ML
2 INJECTION, SOLUTION INTRAVENOUS ONCE
Status: CANCELLED | OUTPATIENT
Start: 2023-09-29

## 2023-09-25 RX ORDER — MAGNESIUM SULFATE HEPTAHYDRATE 40 MG/ML
2 INJECTION, SOLUTION INTRAVENOUS ONCE
Status: COMPLETED | OUTPATIENT
Start: 2023-09-25 | End: 2023-09-25

## 2023-09-25 RX ADMIN — SODIUM CHLORIDE, SODIUM LACTATE, POTASSIUM CHLORIDE, AND CALCIUM CHLORIDE 1000 ML: .6; .31; .03; .02 INJECTION, SOLUTION INTRAVENOUS at 12:47

## 2023-09-25 RX ADMIN — MAGNESIUM SULFATE IN WATER 2 G: 40 INJECTION, SOLUTION INTRAVENOUS at 13:23

## 2023-09-25 NOTE — PROGRESS NOTES
Pt tolerated LR and magnesium infusion without any adverse side effects. IV removed. Pt ambulated with a steady gait off of unit.  Declined AVS

## 2023-09-25 NOTE — PROGRESS NOTES
Rec'd pt with c/o mild nausea and fatigue. Pt reports that she didn't sleep well last night due to abdominal discomfort related to her baby's active movement. PIV easily obtained. Stat labs drawn. Magnesium result met treatment parameters for replacement. Hydration and Mag Sulfate presently infusing without problems. Pt appears comfortable.

## 2023-09-27 ENCOUNTER — ROUTINE PRENATAL (OUTPATIENT)
Dept: OBGYN CLINIC | Facility: CLINIC | Age: 23
End: 2023-09-27
Payer: COMMERCIAL

## 2023-09-27 VITALS
HEIGHT: 59 IN | WEIGHT: 112 LBS | BODY MASS INDEX: 22.58 KG/M2 | SYSTOLIC BLOOD PRESSURE: 100 MMHG | DIASTOLIC BLOOD PRESSURE: 60 MMHG

## 2023-09-27 DIAGNOSIS — O21.0 HYPEREMESIS AFFECTING PREGNANCY, ANTEPARTUM: Primary | ICD-10-CM

## 2023-09-27 DIAGNOSIS — O99.341 MENTAL DISORDER AFFECTING PREGNANCY IN FIRST TRIMESTER: ICD-10-CM

## 2023-09-27 DIAGNOSIS — Z36.89 ENCOUNTER FOR OTHER SPECIFIED ANTENATAL SCREENING: ICD-10-CM

## 2023-09-27 DIAGNOSIS — Z3A.24 24 WEEKS GESTATION OF PREGNANCY: ICD-10-CM

## 2023-09-27 LAB
SL AMB  POCT GLUCOSE, UA: NORMAL
SL AMB POCT URINE PROTEIN: NORMAL

## 2023-09-27 PROCEDURE — PNV: Performed by: STUDENT IN AN ORGANIZED HEALTH CARE EDUCATION/TRAINING PROGRAM

## 2023-09-27 PROCEDURE — 81002 URINALYSIS NONAUTO W/O SCOPE: CPT | Performed by: STUDENT IN AN ORGANIZED HEALTH CARE EDUCATION/TRAINING PROGRAM

## 2023-09-27 NOTE — ASSESSMENT & PLAN NOTE
- PTL/PPROM/Bleeding precautions given. - MFM consult report from level II ultrasound reviewed. Repeat US is scheduled in 3rd trimester, per [facility] recommendations. - 28 week labs ordered, lab requisition provided to patient. Patient is Rh positive. - Problem list updated, results console reviewed and updated with pertinent prenatal labs. - PMH, PSH, medications reviewed and updated as needed.   - Return to office in 4 wk(s) for routine prenatal care

## 2023-09-27 NOTE — PROGRESS NOTES
Routine Prenatal Visit  [address] [place] [address], [address], [facility], [address] [facility],8W    Assessment/Plan:  Francisco Fregoso is a 23y.o. year old  at 18w0d who presents for routine prenatal visit. 1. Hyperemesis affecting pregnancy, antepartum  Assessment & Plan:  - Patient clinically stable. Symptoms persistent, but continuing to gain weight. Continue oral anti-emetics as prescribed and IV hydration three times weekly with once weekly labs. 2. 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 24 weeks gestation of pregnancy  Assessment & Plan:  - PTL/PPROM/Bleeding precautions given. - MFM consult report from level II ultrasound reviewed. Repeat US is scheduled in 3rd trimester, per MFM recommendations. - 28 week labs ordered, lab requisition provided to patient. Patient is Rh positive. - Problem list updated, results console reviewed and updated with pertinent prenatal labs. - PMH, PSH, medications reviewed and updated as needed. - Return to office in 4 wk(s) for routine prenatal care      Orders:  -     POCT urine dip    4. Encounter for other specified  screening  -     Glucose, 1H PG; Future  -     CBC; Future  -     RPR-Syphilis Screening (Total Syphilis IGG/IGM); Future        Subjective:   Avinash Sleeper is a 23 y.o.  who presents for routine prenatal care at 24w0d. Complaints today: Persistent nausea/vomiting, but stable from prior. Has good days and bad days. LOF: No; VB: No; Contractions: No; FM: Present    Objective:  /60 (BP Location: Left arm, Patient Position: Sitting, Cuff Size: Standard)   Ht 4' 11" (1.499 m)   Wt 50.8 kg (112 lb)   LMP 2023   BMI 22.62 kg/m²     General: Well appearing, no distress  Respiratory: Unlabored breathing  Cardiovascular: Regular rate. Abdomen: Soft, gravid, nontender  Extremities: Warm and well perfused. Non tender.     Pregravid Weight/BMI: 42.2 kg (93 lb) (BMI 18.77)  Current Weight: 50.8 kg (112 lb)   Total Weight Gain: 8.618 kg (19 lb)     Pre- Vitals    Flowsheet Row Most Recent Value   Prenatal Assessment    Fetal Heart Rate 155   Fundal Height (cm) 24 cm   Movement Present   Prenatal Vitals    Blood Pressure 100/60   Weight - Scale 50.8 kg (112 lb)   Urine Albumin/Glucose    Dilation/Effacement/Station    Vaginal Drainage    Draining Fluid No   Edema    LLE Edema None   RLE Edema None   Facial Edema None           Seb Carl MD  2023 10:23 AM

## 2023-09-29 ENCOUNTER — APPOINTMENT (OUTPATIENT)
Dept: LAB | Facility: HOSPITAL | Age: 23
End: 2023-09-29
Payer: COMMERCIAL

## 2023-09-29 ENCOUNTER — HOSPITAL ENCOUNTER (OUTPATIENT)
Dept: INFUSION CENTER | Facility: HOSPITAL | Age: 23
End: 2023-09-29
Attending: OBSTETRICS & GYNECOLOGY
Payer: COMMERCIAL

## 2023-09-29 VITALS
DIASTOLIC BLOOD PRESSURE: 58 MMHG | HEART RATE: 83 BPM | RESPIRATION RATE: 14 BRPM | TEMPERATURE: 96.9 F | SYSTOLIC BLOOD PRESSURE: 109 MMHG | OXYGEN SATURATION: 98 %

## 2023-09-29 DIAGNOSIS — O21.0 HYPEREMESIS AFFECTING PREGNANCY, ANTEPARTUM: Primary | ICD-10-CM

## 2023-09-29 DIAGNOSIS — Z36.89 ENCOUNTER FOR OTHER SPECIFIED ANTENATAL SCREENING: ICD-10-CM

## 2023-09-29 DIAGNOSIS — O21.0 HYPEREMESIS AFFECTING PREGNANCY, ANTEPARTUM: ICD-10-CM

## 2023-09-29 LAB
ALBUMIN SERPL BCP-MCNC: 3.5 G/DL (ref 3.5–5)
ALP SERPL-CCNC: 36 U/L (ref 34–104)
ALT SERPL W P-5'-P-CCNC: 24 U/L (ref 7–52)
ANION GAP SERPL CALCULATED.3IONS-SCNC: 6 MMOL/L
AST SERPL W P-5'-P-CCNC: 20 U/L (ref 13–39)
BILIRUB SERPL-MCNC: 0.27 MG/DL (ref 0.2–1)
BUN SERPL-MCNC: 8 MG/DL (ref 5–25)
CALCIUM SERPL-MCNC: 9 MG/DL (ref 8.4–10.2)
CHLORIDE SERPL-SCNC: 104 MMOL/L (ref 96–108)
CO2 SERPL-SCNC: 26 MMOL/L (ref 21–32)
CREAT SERPL-MCNC: 0.54 MG/DL (ref 0.6–1.3)
GFR SERPL CREATININE-BSD FRML MDRD: 136 ML/MIN/1.73SQ M
GLUCOSE SERPL-MCNC: 92 MG/DL (ref 65–140)
MAGNESIUM SERPL-MCNC: 1.8 MG/DL (ref 1.9–2.7)
POTASSIUM SERPL-SCNC: 3.8 MMOL/L (ref 3.5–5.3)
PROT SERPL-MCNC: 6.2 G/DL (ref 6.4–8.4)
SODIUM SERPL-SCNC: 136 MMOL/L (ref 135–147)

## 2023-09-29 PROCEDURE — 80053 COMPREHEN METABOLIC PANEL: CPT

## 2023-09-29 PROCEDURE — 36415 COLL VENOUS BLD VENIPUNCTURE: CPT

## 2023-09-29 PROCEDURE — 83735 ASSAY OF MAGNESIUM: CPT

## 2023-09-29 PROCEDURE — 96360 HYDRATION IV INFUSION INIT: CPT

## 2023-09-29 PROCEDURE — 96361 HYDRATE IV INFUSION ADD-ON: CPT

## 2023-09-29 RX ORDER — MAGNESIUM SULFATE HEPTAHYDRATE 40 MG/ML
2 INJECTION, SOLUTION INTRAVENOUS ONCE
Status: CANCELLED | OUTPATIENT
Start: 2023-10-06

## 2023-09-29 RX ORDER — POTASSIUM CHLORIDE 14.9 MG/ML
20 INJECTION INTRAVENOUS ONCE
Status: CANCELLED | OUTPATIENT
Start: 2023-09-30

## 2023-09-29 RX ADMIN — SODIUM CHLORIDE, SODIUM LACTATE, POTASSIUM CHLORIDE, AND CALCIUM CHLORIDE 1000 ML: .6; .31; .03; .02 INJECTION, SOLUTION INTRAVENOUS at 09:52

## 2023-10-02 ENCOUNTER — TELEPHONE (OUTPATIENT)
Dept: OBGYN CLINIC | Facility: CLINIC | Age: 23
End: 2023-10-02

## 2023-10-02 ENCOUNTER — APPOINTMENT (OUTPATIENT)
Dept: ULTRASOUND IMAGING | Facility: HOSPITAL | Age: 23
End: 2023-10-02
Payer: COMMERCIAL

## 2023-10-02 ENCOUNTER — HOSPITAL ENCOUNTER (OUTPATIENT)
Dept: INFUSION CENTER | Facility: HOSPITAL | Age: 23
Discharge: HOME | End: 2023-10-02
Attending: OBSTETRICS & GYNECOLOGY
Payer: COMMERCIAL

## 2023-10-02 ENCOUNTER — HOSPITAL ENCOUNTER (OUTPATIENT)
Facility: HOSPITAL | Age: 23
Discharge: HOME | End: 2023-10-02
Attending: OBSTETRICS & GYNECOLOGY | Admitting: OBSTETRICS & GYNECOLOGY
Payer: COMMERCIAL

## 2023-10-02 VITALS
RESPIRATION RATE: 14 BRPM | DIASTOLIC BLOOD PRESSURE: 84 MMHG | SYSTOLIC BLOOD PRESSURE: 114 MMHG | OXYGEN SATURATION: 98 % | TEMPERATURE: 97.6 F | HEART RATE: 97 BPM

## 2023-10-02 VITALS
OXYGEN SATURATION: 98 % | SYSTOLIC BLOOD PRESSURE: 111 MMHG | HEART RATE: 106 BPM | DIASTOLIC BLOOD PRESSURE: 72 MMHG | RESPIRATION RATE: 16 BRPM

## 2023-10-02 DIAGNOSIS — O21.0 HYPEREMESIS AFFECTING PREGNANCY, ANTEPARTUM: Primary | ICD-10-CM

## 2023-10-02 LAB
BACTERIA UR QL AUTO: ABNORMAL /HPF
BILIRUB UR QL STRIP: NEGATIVE
CLARITY UR: CLEAR
COLOR UR: YELLOW
GLUCOSE UR STRIP-MCNC: NEGATIVE MG/DL
HGB UR QL STRIP.AUTO: ABNORMAL
KETONES UR STRIP-MCNC: NEGATIVE MG/DL
LEUKOCYTE ESTERASE UR QL STRIP: NEGATIVE
MUCOUS THREADS UR QL AUTO: ABNORMAL
NITRITE UR QL STRIP: NEGATIVE
NON-SQ EPI CELLS URNS QL MICRO: ABNORMAL /HPF
PH UR STRIP.AUTO: 8 [PH]
PROT UR STRIP-MCNC: ABNORMAL MG/DL
RBC #/AREA URNS AUTO: ABNORMAL /HPF
SP GR UR STRIP.AUTO: 1.02 (ref 1–1.03)
UROBILINOGEN UR STRIP-ACNC: <2 MG/DL
WBC #/AREA URNS AUTO: ABNORMAL /HPF

## 2023-10-02 PROCEDURE — 81001 URINALYSIS AUTO W/SCOPE: CPT | Performed by: OBSTETRICS & GYNECOLOGY

## 2023-10-02 PROCEDURE — 99215 OFFICE O/P EST HI 40 MIN: CPT

## 2023-10-02 PROCEDURE — 96365 THER/PROPH/DIAG IV INF INIT: CPT

## 2023-10-02 PROCEDURE — 76817 TRANSVAGINAL US OBSTETRIC: CPT | Performed by: OBSTETRICS & GYNECOLOGY

## 2023-10-02 PROCEDURE — 76815 OB US LIMITED FETUS(S): CPT | Performed by: OBSTETRICS & GYNECOLOGY

## 2023-10-02 PROCEDURE — NC001 PR NO CHARGE: Performed by: OBSTETRICS & GYNECOLOGY

## 2023-10-02 PROCEDURE — 76775 US EXAM ABDO BACK WALL LIM: CPT

## 2023-10-02 PROCEDURE — 96361 HYDRATE IV INFUSION ADD-ON: CPT

## 2023-10-02 RX ORDER — MAGNESIUM SULFATE HEPTAHYDRATE 40 MG/ML
2 INJECTION, SOLUTION INTRAVENOUS ONCE
OUTPATIENT
Start: 2023-10-04

## 2023-10-02 RX ORDER — POTASSIUM CHLORIDE 14.9 MG/ML
20 INJECTION INTRAVENOUS ONCE
OUTPATIENT
Start: 2023-10-04

## 2023-10-02 RX ORDER — MAGNESIUM SULFATE HEPTAHYDRATE 40 MG/ML
2 INJECTION, SOLUTION INTRAVENOUS ONCE
Status: COMPLETED | OUTPATIENT
Start: 2023-10-02 | End: 2023-10-02

## 2023-10-02 RX ADMIN — SODIUM CHLORIDE, SODIUM LACTATE, POTASSIUM CHLORIDE, AND CALCIUM CHLORIDE 1000 ML: .6; .31; .03; .02 INJECTION, SOLUTION INTRAVENOUS at 11:54

## 2023-10-02 RX ADMIN — MAGNESIUM SULFATE HEPTAHYDRATE 2 G: 2 INJECTION, SOLUTION INTRAVENOUS at 11:54

## 2023-10-02 NOTE — PROGRESS NOTES
LR hydration and 2gm magnesium tolerated well without complications. No complaints offered. AVS declined. Left unit in stable condition.

## 2023-10-02 NOTE — TELEPHONE ENCOUNTER
Cristal l/m she is having cramping. Return call to [facility], she was in this morning for hydration infusion. Keeping fluids/nutrition down today. Reports she had one day over weekend struggling with vomiting. Denies vaginal bleeding/spotting/LOF. +FM. Advised hydration is important to reduce pelvic cramping during pregnancy. Current hydration sub optimal due to n/v.  Recommend continue with attempting to hydrate at home and continue IV hydration as ordered. Report any increased pain, decreased fetal movement, bleeding or spotting. [facility] verbalized understanding and agreement to plan.

## 2023-10-02 NOTE — PLAN OF CARE
Problem: METABOLIC, FLUID AND ELECTROLYTES - ADULT  Goal: Electrolytes maintained within normal limits  Description: INTERVENTIONS:  - Monitor labs and assess patient for signs and symptoms of electrolyte imbalances  - Administer electrolyte replacement as ordered  - Monitor response to electrolyte replacements, including repeat lab results as appropriate  - Instruct patient on fluid and nutrition as appropriate  Outcome: Progressing  Goal: Fluid balance maintained  Description: INTERVENTIONS:  - Monitor labs   - Instruct patient on fluid and nutrition as appropriate  - Assess for signs & symptoms of volume excess or deficit  - administer IV hydration as ordered  Outcome: Progressing

## 2023-10-03 ENCOUNTER — TELEPHONE (OUTPATIENT)
Dept: OBGYN CLINIC | Facility: CLINIC | Age: 23
End: 2023-10-03

## 2023-10-03 NOTE — TELEPHONE ENCOUNTER
Called patient to discuss. She states pain is constant, right-sided, and area is painful to the touch. Patient states that she has a "low pain tolerance." Reviewed description is more consistent with musculoskeletal pain based on constant nature and tender to the touch. Discussed findings of imaging, which show small non-obstructing stone within the kidney. No stone in ureter. Suspect incidental finding unrelated to her current pain. Recommend heating pad in addition to continuing acetaminophen. Instructed to call if pain worsening. Patient agrees.      Shantel Suazo MD  10/3/2023 11:38 AM

## 2023-10-03 NOTE — TELEPHONE ENCOUNTER
Marva Bon called reporting she developed back pain late yesterday afternoon and went to ED. She was diagnosed with small non -obstructing kidney stones. States tylenol is ineffective for pain relief. Requesting recommendation from [facility] for additional pain medication. Dr. Estefania Sarabia, .3cm non obstructing renal calculi (right). Please advise.

## 2023-10-03 NOTE — PROCEDURES
Jose Antonio Henderson, a  at 24w6d with an JOSE of 2024, by Ultrasound, was seen at [address] for the following procedure(s): $Procedure Type: US - Transvaginal]         4 Quadrant ARABELLA  ARABELLA Q1 (cm): 4.1 cm  ARABELLA Q2 (cm): 3 cm  ARABELLA Q3 (cm): 2.9 cm  ARABELLA Q4 (cm): 2.9 cm  ARABELLA TOTAL (cm): 12.9 cm         Ultrasound Other  Fetal Presentation: Vertex  Cervical Length: 3.91  Funnel: No  Debris: No       Anne-Marie Poon MD  10/03/23  6:18 AM

## 2023-10-03 NOTE — H&P
Ob/Gyn History and Physical  Angela Garner 23 y.o. female MRN: [id number]  Unit/Bed#: LD TRIAGE 3- Encounter: [id number]    A/P. 23 y.o.  at 24w5d, with kidney stones. (1)  cramps. Not PTL. Cervix closed, and CL > 3cm. Discussed with her.  --> PTL precs. (2) Nephrolithiasis. Small nonobstructing stones right kidney. Mild right hydro, likely physiologic. Has suffered with kidney stones in the past.  --> Precs, symptom surveillance. --> Consider outpatient urology referral.    (3) Fetal status reassuring. Reactive NST, appropriate for gestational age, normal ARABELLA, appropriately grown. --> Continue fetal movement surveillance, routine prenatal care. Dilshad Ramírez MD  10/02/23  Case discussed with on-call physician for [facility], Dr. Gera Tyler.  -------------------------------------------------------------------------------------------------------  CC/    "I am cramping"    HPI/    3 days of cramps, uncertain frequency. No VB/LoF, (+)FM. Today also with LBP on right, comes and goes, no exac, no allev. No urinary symptoms. Has baseline N/V, no change. No vomiting today.     Pregnancy complications/      Patient Active Problem List   Diagnosis    Other headache syndrome    Cerebellar tonsillar ectopia (HCC)    COVID-19    Allergic rhinitis    Nephrolithiasis    Left flank pain    Anxiety and depression    Vitamin D deficiency    Mental disorder affecting pregnancy in first trimester    Group B Streptococcus urinary tract infection affecting pregnancy in first trimester    Nausea and vomiting in pregnancy    24 weeks gestation of pregnancy    History of pyelonephritis    Hyperemesis affecting pregnancy, antepartum    Encounter for removal of sutures       Allergies/      Allergies as of 10/02/2023 - Reviewed 10/02/2023   Allergen Reaction Noted    Cephalexin Rash and Itching 2022    Levofloxacin Rash, Itching, and Hives 2022       Rx/      Current Facility-Administered Medications on File Prior to Encounter   Medication Dose Route Frequency Provider Last Rate Last Admin    alteplase (CATHFLO) injection 2 mg  2 mg Intracatheter Q1MIN PRN Michael Hernandez MD        [COMPLETED] lactated ringers bolus 1,000 mL  1,000 mL Intravenous Once Michael Hernandez MD   Stopped at 10/02/23 1354    [COMPLETED] magnesium sulfate 2 g/50 mL IVPB (premix) 2 g  2 g Intravenous Once Michael Hernandez MD   Stopped at 10/02/23 1254    [DISCONTINUED] alteplase (CATHFLO) injection 2 mg  2 mg Lang Just PRN Michael Hernandez MD         Current Outpatient Medications on File Prior to Encounter   Medication Sig Dispense Refill    aspirin 81 mg chewable tablet Chew 2 tablets (162 mg total) daily at bedtime 180 tablet 1    docusate sodium (COLACE) 100 mg capsule Take 1 capsule (100 mg total) by mouth 2 (two) times a day  0    Doxylamine Succinate, Sleep, (UNISOM PO) Take by mouth      metoclopramide (Reglan) 10 mg tablet Take 1 tablet (10 mg total) by mouth 3 (three) times a day 90 tablet 4    ondansetron (ZOFRAN-ODT) 4 mg disintegrating tablet Take 1 tablet (4 mg total) by mouth every 8 (eight) hours 90 tablet 4    Prenatal Vit-Fe Fumarate-FA (PRENATAL 19 PO) Take 1 capsule by mouth daily      promethazine (PHENERGAN) 25 mg suppository Insert 1 suppository (25 mg total) into the rectum every 6 (six) hours as needed for nausea or vomiting 12 each 1    Pyridoxine HCl (vitamin B-6) 25 MG tablet Take 1 tablet (25 mg total) by mouth 3 (three) times a day  0       PMH/      Past Medical History:   Diagnosis Date    Kidney stone     2022, 2023 - lithotripsy 2022    Migraine     has seen neurology    Premature baby     26 weeks    Psychiatric disorder     anxiety,depression-counseling prior to covid    Pyelonephritis 03/11/2023    Dr. Abel Wood       [facility]/      Past Surgical History:   Procedure Laterality Date    HEMANGIOMA EXCISION Right 2011    [facility]    [place] CYSTO/URETERO W/LITHOTRIPSY &INDWELL STENT INSRT Right 2022    Procedure: CYSTOSCOPY URETEROSCOPY WITH LITHOTRIPSY HOLMIUM LASER AND INSERTION STENT URETERAL;  Surgeon: Troy Traylor MD;  Location: [facility];  Service: Urology       ObHx/    :      OB History    Para Term  AB Living   2 0 0 0 1 0   SAB IAB Ectopic Multiple Live Births   1 0 0 0 0      # Outcome Date GA Lbr Micah/2nd Weight Sex Delivery Anes PTL Lv   2 Current            1 SAB 22               GynHx/    . There is no history of sexually-transmitted infections. Last intercourse months ago. FH/    Family History   Problem Relation Age of Onset    Hypertension Mother     Migraines Mother     Hypertension Father     Diabetes Father     Nephrolithiasis Brother     Asthma Sister     Asthma Brother        SH/    She is . She does not use tobacco, alcohol, or illicit drugs. Social History     Socioeconomic History    Marital status: /Civil Union     Spouse name: Not on file    Number of children: Not on file    Years of education: Not on file    Highest education level: Not on file   Occupational History    Not on file   Tobacco Use    Smoking status: Never     Passive exposure: Never    Smokeless tobacco: Never   Vaping Use    Vaping Use: Never used   Substance and Sexual Activity    Alcohol use: Never    Drug use: Never    Sexual activity: Yes     Partners: Male     Birth control/protection: Condom Male   Other Topics Concern    Not on file   Social History Narrative    Not on file     Social Determinants of Health     Financial Resource Strain: Not on file   Food Insecurity: No Food Insecurity (2022)    Hunger Vital Sign     Worried About Running Out of Food in the Last Year: Never true     Ran Out of Food in the Last Year: Never true   Transportation Needs: No Transportation Needs (2022)    PRAPARE - Transportation     Lack of Transportation (Medical): No     Lack of Transportation (Non-Medical):  No Physical Activity: Not on file   Stress: Not on file   Social Connections: Not on file   Intimate Partner Violence: Not At Risk (11/2/2021)    Humiliation, Afraid, Rape, and Kick questionnaire     Fear of Current or Ex-Partner: No     Emotionally Abused: No     Physically Abused: No     Sexually Abused: No   Housing Stability: Low Risk  (4/21/2022)    Housing Stability Vital Sign     Unable to Pay for Housing in the Last Year: No     Number of [address] in the Last Year: 1     Unstable Housing in the Last Year: No       RoS/    Constitutional: Negative    CV: Negative    Pulm: Negative    GI: Negative    Urinary: Negative    Neuro: Negative    Musculoskeletal: Negative    O/  /72 (BP Location: Left arm)   Pulse (!) 106   Resp 16   LMP 04/01/2023   SpO2 98%     Alert, comfortable, no acute distress    Regular rate and rhythm    Clear to auscultation bilaterally    Abdomen soft, nontender, nondistended. No CVAT    Mild TTP paraspinous muscles in lumbar region and right hip    Fundus nontender, size consistent with dates    Gyn/      Normal female external genitalia. Vault well-estrogenized, well-supported. Pelvis adequate/gynecoid. Cervix:           Dilation: Closed         Effacement: 0%         Station: Floating  Consistency: Firm         Position: Posterior   Presentation:      FHR: 140 moderate variability.  (+) accels 10x10 (1721, 1722, 1723, 1803, 1811), intermittent brief shallow variable decels.     Jerome:  No distinct contractions    Ultrasound:      Single live IUP, breech    ARABELLA:      Q1: 4.1cm      Q2: 3.01cm      Q3: 2.87cm      Q4: 2.93cm      Total: 12.9cm    BPD 24.4 weeks    HC 24.6 weeks    AC 24.5 weeks    FL 24.0 weeks    AGA 24.4 weeks, EFW 703grams      CL 3.91cm (3.91, 3.93, 4.17)    Prenatal labs/  Lab Results   Component Value Date    ABO B 06/20/2023    RH Positive 06/20/2023    ABS Negative 06/20/2023    HGB 14.6 07/23/2023     07/23/2023    HEPBSAG Non-reactive 06/20/2023    HEPCAB Non-reactive 06/20/2023    GC NOT DETECTED 06/07/2023     Imaging/  Renal ultrasound: FINDINGS:     KIDNEYS:  Symmetric and normal size. Right kidney:  10.2 x 4.9 x 4.5 cm. Volume 116.8 mL  Left kidney:  9.6 x 5.0 x 3.5 cm. Volume 87.3 mL     Right kidney  Normal echogenicity and contour. No mass is identified. Mild hydronephrosis  Possible 0.3 cm nonobstructing lower pole calculus. Additional possible 0.4 cm nonobstructing interpolar calculus. No perinephric fluid collections. Left kidney  Normal echogenicity and contour. No mass is identified. No hydronephrosis. No shadowing calculi. No perinephric fluid collections. URETERS:  Nonvisualized. BLADDER:  Normally distended. No focal thickening or mass lesions. Bilateral ureteral jets detected. IMPRESSION:     Mild right hydronephrosis. No obstructing stone identified on this ultrasound this can be physiologic in the setting of pregnancy. A right ureteral jet is identified. If further evaluation is warranted, cross-sectional imaging or follow-up ultrasound may   be helpful.      Small nonobstructing right renal calculi

## 2023-10-04 ENCOUNTER — HOSPITAL ENCOUNTER (OUTPATIENT)
Facility: HOSPITAL | Age: 23
Setting detail: OBSERVATION
Discharge: HOME | End: 2023-10-06
Attending: OBSTETRICS & GYNECOLOGY | Admitting: OBSTETRICS & GYNECOLOGY
Payer: COMMERCIAL

## 2023-10-04 LAB
ALBUMIN SERPL BCP-MCNC: 3.5 G/DL (ref 3.5–5)
ALP SERPL-CCNC: 36 U/L (ref 34–104)
ALT SERPL W P-5'-P-CCNC: 16 U/L (ref 7–52)
ANION GAP SERPL CALCULATED.3IONS-SCNC: 10 MMOL/L
AST SERPL W P-5'-P-CCNC: 18 U/L (ref 13–39)
BACTERIA UR QL AUTO: ABNORMAL /HPF
BASOPHILS # BLD AUTO: 0.04 THOUSANDS/ÂΜL (ref 0–0.1)
BASOPHILS NFR BLD AUTO: 0 % (ref 0–1)
BILIRUB SERPL-MCNC: 0.45 MG/DL (ref 0.2–1)
BILIRUB UR QL STRIP: NEGATIVE
BUN SERPL-MCNC: 9 MG/DL (ref 5–25)
CALCIUM SERPL-MCNC: 9 MG/DL (ref 8.4–10.2)
CHLORIDE SERPL-SCNC: 101 MMOL/L (ref 96–108)
CLARITY UR: ABNORMAL
CO2 SERPL-SCNC: 20 MMOL/L (ref 21–32)
COLOR UR: YELLOW
CREAT SERPL-MCNC: 0.63 MG/DL (ref 0.6–1.3)
EOSINOPHIL # BLD AUTO: 0.01 THOUSAND/ÂΜL (ref 0–0.61)
EOSINOPHIL NFR BLD AUTO: 0 % (ref 0–6)
ERYTHROCYTE [DISTWIDTH] IN BLOOD BY AUTOMATED COUNT: 12.6 % (ref 11.6–15.1)
GFR SERPL CREATININE-BSD FRML MDRD: 129 ML/MIN/1.73SQ M
GLUCOSE SERPL-MCNC: 95 MG/DL (ref 65–140)
GLUCOSE UR STRIP-MCNC: NEGATIVE MG/DL
HCT VFR BLD AUTO: 31.4 % (ref 34.8–46.1)
HGB BLD-MCNC: 11 G/DL (ref 11.5–15.4)
HGB UR QL STRIP.AUTO: ABNORMAL
HYALINE CASTS #/AREA URNS LPF: ABNORMAL /LPF
IMM GRANULOCYTES # BLD AUTO: 0.1 THOUSAND/UL (ref 0–0.2)
IMM GRANULOCYTES NFR BLD AUTO: 1 % (ref 0–2)
KETONES UR STRIP-MCNC: ABNORMAL MG/DL
LEUKOCYTE ESTERASE UR QL STRIP: ABNORMAL
LYMPHOCYTES # BLD AUTO: 1.01 THOUSANDS/ÂΜL (ref 0.6–4.47)
LYMPHOCYTES NFR BLD AUTO: 6 % (ref 14–44)
MCH RBC QN AUTO: 32.2 PG (ref 26.8–34.3)
MCHC RBC AUTO-ENTMCNC: 35 G/DL (ref 31.4–37.4)
MCV RBC AUTO: 92 FL (ref 82–98)
MONOCYTES # BLD AUTO: 1.93 THOUSAND/ÂΜL (ref 0.17–1.22)
MONOCYTES NFR BLD AUTO: 11 % (ref 4–12)
MUCOUS THREADS UR QL AUTO: ABNORMAL
NEUTROPHILS # BLD AUTO: 14.23 THOUSANDS/ÂΜL (ref 1.85–7.62)
NEUTS SEG NFR BLD AUTO: 82 % (ref 43–75)
NITRITE UR QL STRIP: NEGATIVE
NON-SQ EPI CELLS URNS QL MICRO: ABNORMAL /HPF
NRBC BLD AUTO-RTO: 0 /100 WBCS
PH UR STRIP.AUTO: 6 [PH]
PLATELET # BLD AUTO: 179 THOUSANDS/UL (ref 149–390)
PMV BLD AUTO: 9.6 FL (ref 8.9–12.7)
POTASSIUM SERPL-SCNC: 3.5 MMOL/L (ref 3.5–5.3)
PROT SERPL-MCNC: 6.6 G/DL (ref 6.4–8.4)
PROT UR STRIP-MCNC: ABNORMAL MG/DL
RBC # BLD AUTO: 3.42 MILLION/UL (ref 3.81–5.12)
RBC #/AREA URNS AUTO: ABNORMAL /HPF
SODIUM SERPL-SCNC: 131 MMOL/L (ref 135–147)
SP GR UR STRIP.AUTO: >=1.03 (ref 1–1.03)
UROBILINOGEN UR STRIP-ACNC: <2 MG/DL
WBC # BLD AUTO: 17.32 THOUSAND/UL (ref 4.31–10.16)
WBC #/AREA URNS AUTO: ABNORMAL /HPF

## 2023-10-04 PROCEDURE — 99222 1ST HOSP IP/OBS MODERATE 55: CPT | Performed by: OBSTETRICS & GYNECOLOGY

## 2023-10-04 PROCEDURE — 59025 FETAL NON-STRESS TEST: CPT | Performed by: OBSTETRICS & GYNECOLOGY

## 2023-10-04 PROCEDURE — 80053 COMPREHEN METABOLIC PANEL: CPT | Performed by: OBSTETRICS & GYNECOLOGY

## 2023-10-04 PROCEDURE — 83735 ASSAY OF MAGNESIUM: CPT | Performed by: OBSTETRICS & GYNECOLOGY

## 2023-10-04 PROCEDURE — 85025 COMPLETE CBC W/AUTO DIFF WBC: CPT | Performed by: OBSTETRICS & GYNECOLOGY

## 2023-10-04 PROCEDURE — 87086 URINE CULTURE/COLONY COUNT: CPT | Performed by: OBSTETRICS & GYNECOLOGY

## 2023-10-04 PROCEDURE — 81001 URINALYSIS AUTO W/SCOPE: CPT | Performed by: OBSTETRICS & GYNECOLOGY

## 2023-10-04 RX ORDER — AZTREONAM 1 G/1
1000 INJECTION, POWDER, LYOPHILIZED, FOR SOLUTION INTRAMUSCULAR; INTRAVENOUS EVERY 8 HOURS
Status: DISCONTINUED | OUTPATIENT
Start: 2023-10-04 | End: 2023-10-04

## 2023-10-04 RX ORDER — DIPHENHYDRAMINE HYDROCHLORIDE 50 MG/ML
25 INJECTION INTRAMUSCULAR; INTRAVENOUS EVERY 6 HOURS PRN
Status: DISCONTINUED | OUTPATIENT
Start: 2023-10-04 | End: 2023-10-06 | Stop reason: HOSPADM

## 2023-10-04 RX ORDER — HYDROMORPHONE HCL/PF 1 MG/ML
1 SYRINGE (ML) INJECTION EVERY 4 HOURS PRN
Status: DISCONTINUED | OUTPATIENT
Start: 2023-10-04 | End: 2023-10-06 | Stop reason: HOSPADM

## 2023-10-04 RX ORDER — METOCLOPRAMIDE HYDROCHLORIDE 5 MG/ML
10 INJECTION INTRAMUSCULAR; INTRAVENOUS EVERY 6 HOURS PRN
Status: DISCONTINUED | OUTPATIENT
Start: 2023-10-04 | End: 2023-10-06 | Stop reason: HOSPADM

## 2023-10-04 RX ORDER — CALCIUM CARBONATE 500 MG/1
500 TABLET, CHEWABLE ORAL DAILY PRN
Status: DISCONTINUED | OUTPATIENT
Start: 2023-10-04 | End: 2023-10-06 | Stop reason: HOSPADM

## 2023-10-04 RX ORDER — SODIUM CHLORIDE, SODIUM LACTATE, POTASSIUM CHLORIDE, CALCIUM CHLORIDE 600; 310; 30; 20 MG/100ML; MG/100ML; MG/100ML; MG/100ML
125 INJECTION, SOLUTION INTRAVENOUS CONTINUOUS
Status: DISCONTINUED | OUTPATIENT
Start: 2023-10-04 | End: 2023-10-06 | Stop reason: HOSPADM

## 2023-10-04 RX ORDER — OXYCODONE HYDROCHLORIDE AND ACETAMINOPHEN 5; 325 MG/1; MG/1
1 TABLET ORAL EVERY 4 HOURS PRN
Status: DISCONTINUED | OUTPATIENT
Start: 2023-10-04 | End: 2023-10-04

## 2023-10-04 RX ADMIN — DIPHENHYDRAMINE HYDROCHLORIDE 25 MG: 50 INJECTION, SOLUTION INTRAMUSCULAR; INTRAVENOUS at 21:29

## 2023-10-04 RX ADMIN — CALCIUM CARBONATE (ANTACID) CHEW TAB 500 MG 500 MG: 500 CHEW TAB at 21:43

## 2023-10-04 RX ADMIN — OXYCODONE HYDROCHLORIDE AND ACETAMINOPHEN 1 TABLET: 5; 325 TABLET ORAL at 19:33

## 2023-10-04 RX ADMIN — AZTREONAM 2000 MG: 2 INJECTION, POWDER, LYOPHILIZED, FOR SOLUTION INTRAMUSCULAR; INTRAVENOUS at 21:34

## 2023-10-04 RX ADMIN — SODIUM CHLORIDE, SODIUM LACTATE, POTASSIUM CHLORIDE, AND CALCIUM CHLORIDE 125 ML/HR: .6; .31; .03; .02 INJECTION, SOLUTION INTRAVENOUS at 20:14

## 2023-10-04 RX ADMIN — METOCLOPRAMIDE 10 MG: 5 INJECTION, SOLUTION INTRAMUSCULAR; INTRAVENOUS at 21:28

## 2023-10-04 RX ADMIN — SODIUM CHLORIDE, SODIUM LACTATE, POTASSIUM CHLORIDE, AND CALCIUM CHLORIDE 1000 ML: .6; .31; .03; .02 INJECTION, SOLUTION INTRAVENOUS at 18:46

## 2023-10-05 ENCOUNTER — APPOINTMENT (OUTPATIENT)
Dept: ULTRASOUND IMAGING | Facility: HOSPITAL | Age: 23
End: 2023-10-05
Payer: COMMERCIAL

## 2023-10-05 PROBLEM — O23.02 PYELONEPHRITIS AFFECTING PREGNANCY IN SECOND TRIMESTER: Status: ACTIVE | Noted: 2023-10-05

## 2023-10-05 LAB
BASOPHILS # BLD AUTO: 0.01 THOUSANDS/ÂΜL (ref 0–0.1)
BASOPHILS NFR BLD AUTO: 0 % (ref 0–1)
EOSINOPHIL # BLD AUTO: 0.05 THOUSAND/ÂΜL (ref 0–0.61)
EOSINOPHIL NFR BLD AUTO: 0 % (ref 0–6)
ERYTHROCYTE [DISTWIDTH] IN BLOOD BY AUTOMATED COUNT: 12.9 % (ref 11.6–15.1)
HCT VFR BLD AUTO: 28.8 % (ref 34.8–46.1)
HGB BLD-MCNC: 9.9 G/DL (ref 11.5–15.4)
IMM GRANULOCYTES # BLD AUTO: 0.05 THOUSAND/UL (ref 0–0.2)
IMM GRANULOCYTES NFR BLD AUTO: 0 % (ref 0–2)
LYMPHOCYTES # BLD AUTO: 1.17 THOUSANDS/ÂΜL (ref 0.6–4.47)
LYMPHOCYTES NFR BLD AUTO: 10 % (ref 14–44)
MAGNESIUM SERPL-MCNC: 1.5 MG/DL (ref 1.9–2.7)
MCH RBC QN AUTO: 32 PG (ref 26.8–34.3)
MCHC RBC AUTO-ENTMCNC: 34.4 G/DL (ref 31.4–37.4)
MCV RBC AUTO: 93 FL (ref 82–98)
MONOCYTES # BLD AUTO: 1.44 THOUSAND/ÂΜL (ref 0.17–1.22)
MONOCYTES NFR BLD AUTO: 12 % (ref 4–12)
NEUTROPHILS # BLD AUTO: 9.23 THOUSANDS/ÂΜL (ref 1.85–7.62)
NEUTS SEG NFR BLD AUTO: 78 % (ref 43–75)
NRBC BLD AUTO-RTO: 0 /100 WBCS
PLATELET # BLD AUTO: 140 THOUSANDS/UL (ref 149–390)
PMV BLD AUTO: 9.5 FL (ref 8.9–12.7)
RBC # BLD AUTO: 3.09 MILLION/UL (ref 3.81–5.12)
WBC # BLD AUTO: 11.95 THOUSAND/UL (ref 4.31–10.16)

## 2023-10-05 PROCEDURE — 99232 SBSQ HOSP IP/OBS MODERATE 35: CPT | Performed by: OBSTETRICS & GYNECOLOGY

## 2023-10-05 PROCEDURE — G0379 DIRECT REFER HOSPITAL OBSERV: HCPCS

## 2023-10-05 PROCEDURE — 99213 OFFICE O/P EST LOW 20 MIN: CPT

## 2023-10-05 PROCEDURE — 76775 US EXAM ABDO BACK WALL LIM: CPT

## 2023-10-05 PROCEDURE — 59025 FETAL NON-STRESS TEST: CPT | Performed by: OBSTETRICS & GYNECOLOGY

## 2023-10-05 PROCEDURE — 85025 COMPLETE CBC W/AUTO DIFF WBC: CPT | Performed by: OBSTETRICS & GYNECOLOGY

## 2023-10-05 RX ORDER — PYRIDOXINE HCL (VITAMIN B6) 50 MG
25 TABLET ORAL 3 TIMES DAILY
Status: DISCONTINUED | OUTPATIENT
Start: 2023-10-05 | End: 2023-10-06 | Stop reason: HOSPADM

## 2023-10-05 RX ORDER — PROMETHAZINE HYDROCHLORIDE 25 MG/1
25 SUPPOSITORY RECTAL EVERY 6 HOURS PRN
Status: DISCONTINUED | OUTPATIENT
Start: 2023-10-05 | End: 2023-10-05

## 2023-10-05 RX ORDER — MAGNESIUM SULFATE HEPTAHYDRATE 40 MG/ML
2 INJECTION, SOLUTION INTRAVENOUS ONCE
Status: COMPLETED | OUTPATIENT
Start: 2023-10-05 | End: 2023-10-05

## 2023-10-05 RX ORDER — ONDANSETRON 4 MG/1
4 TABLET, ORALLY DISINTEGRATING ORAL EVERY 8 HOURS SCHEDULED
Status: DISCONTINUED | OUTPATIENT
Start: 2023-10-05 | End: 2023-10-06 | Stop reason: HOSPADM

## 2023-10-05 RX ORDER — METOCLOPRAMIDE 10 MG/1
10 TABLET ORAL 3 TIMES DAILY
Status: DISCONTINUED | OUTPATIENT
Start: 2023-10-05 | End: 2023-10-06 | Stop reason: HOSPADM

## 2023-10-05 RX ORDER — DOCUSATE SODIUM 100 MG/1
100 CAPSULE, LIQUID FILLED ORAL 2 TIMES DAILY
Status: DISCONTINUED | OUTPATIENT
Start: 2023-10-05 | End: 2023-10-06 | Stop reason: HOSPADM

## 2023-10-05 RX ORDER — ACETAMINOPHEN 325 MG/1
650 TABLET ORAL EVERY 6 HOURS PRN
Status: DISCONTINUED | OUTPATIENT
Start: 2023-10-05 | End: 2023-10-06 | Stop reason: HOSPADM

## 2023-10-05 RX ORDER — LANOLIN ALCOHOL/MO/W.PET/CERES
400 CREAM (GRAM) TOPICAL 2 TIMES DAILY
Status: DISCONTINUED | OUTPATIENT
Start: 2023-10-05 | End: 2023-10-06 | Stop reason: HOSPADM

## 2023-10-05 RX ORDER — OXYCODONE HYDROCHLORIDE 5 MG/1
5 TABLET ORAL EVERY 4 HOURS PRN
Status: DISCONTINUED | OUTPATIENT
Start: 2023-10-05 | End: 2023-10-06 | Stop reason: HOSPADM

## 2023-10-05 RX ORDER — PROMETHAZINE HYDROCHLORIDE 25 MG/1
25 SUPPOSITORY RECTAL EVERY 6 HOURS PRN
Status: DISCONTINUED | OUTPATIENT
Start: 2023-10-05 | End: 2023-10-06 | Stop reason: HOSPADM

## 2023-10-05 RX ORDER — ASPIRIN 81 MG/1
162 TABLET, CHEWABLE ORAL
Status: DISCONTINUED | OUTPATIENT
Start: 2023-10-05 | End: 2023-10-06 | Stop reason: HOSPADM

## 2023-10-05 RX ADMIN — METOCLOPRAMIDE 10 MG: 10 TABLET ORAL at 15:58

## 2023-10-05 RX ADMIN — PYRIDOXINE HCL TAB 50 MG 25 MG: 50 TAB at 09:34

## 2023-10-05 RX ADMIN — MAGNESIUM OXIDE TAB 400 MG (241.3 MG ELEMENTAL MG) 400 MG: 400 (241.3 MG) TAB at 17:50

## 2023-10-05 RX ADMIN — SODIUM CHLORIDE, SODIUM LACTATE, POTASSIUM CHLORIDE, AND CALCIUM CHLORIDE 125 ML/HR: .6; .31; .03; .02 INJECTION, SOLUTION INTRAVENOUS at 13:00

## 2023-10-05 RX ADMIN — SODIUM CHLORIDE, SODIUM LACTATE, POTASSIUM CHLORIDE, AND CALCIUM CHLORIDE 125 ML/HR: .6; .31; .03; .02 INJECTION, SOLUTION INTRAVENOUS at 21:55

## 2023-10-05 RX ADMIN — MAGNESIUM SULFATE HEPTAHYDRATE 2 G: 2 INJECTION, SOLUTION INTRAVENOUS at 17:14

## 2023-10-05 RX ADMIN — ASPIRIN 81 MG CHEWABLE TABLET 162 MG: 81 TABLET CHEWABLE at 21:28

## 2023-10-05 RX ADMIN — OXYCODONE HYDROCHLORIDE 5 MG: 5 TABLET ORAL at 12:05

## 2023-10-05 RX ADMIN — ONDANSETRON 4 MG: 4 TABLET, ORALLY DISINTEGRATING ORAL at 09:34

## 2023-10-05 RX ADMIN — AZTREONAM 2000 MG: 2 INJECTION, POWDER, LYOPHILIZED, FOR SOLUTION INTRAMUSCULAR; INTRAVENOUS at 21:29

## 2023-10-05 RX ADMIN — DOXYLAMINE SUCCINATE 25 MG: 25 TABLET ORAL at 21:33

## 2023-10-05 RX ADMIN — DIPHENHYDRAMINE HYDROCHLORIDE 25 MG: 50 INJECTION, SOLUTION INTRAMUSCULAR; INTRAVENOUS at 07:56

## 2023-10-05 RX ADMIN — DOCUSATE SODIUM 100 MG: 100 CAPSULE, LIQUID FILLED ORAL at 17:50

## 2023-10-05 RX ADMIN — ONDANSETRON 4 MG: 4 TABLET, ORALLY DISINTEGRATING ORAL at 14:28

## 2023-10-05 RX ADMIN — ONDANSETRON 4 MG: 4 TABLET, ORALLY DISINTEGRATING ORAL at 22:48

## 2023-10-05 RX ADMIN — AZTREONAM 2000 MG: 2 INJECTION, POWDER, LYOPHILIZED, FOR SOLUTION INTRAMUSCULAR; INTRAVENOUS at 05:12

## 2023-10-05 RX ADMIN — ACETAMINOPHEN 650 MG: 325 TABLET, FILM COATED ORAL at 17:50

## 2023-10-05 RX ADMIN — METOCLOPRAMIDE 10 MG: 5 INJECTION, SOLUTION INTRAMUSCULAR; INTRAVENOUS at 07:56

## 2023-10-05 RX ADMIN — PROMETHAZINE HYDROCHLORIDE 25 MG: 25 SUPPOSITORY RECTAL at 18:07

## 2023-10-05 RX ADMIN — PYRIDOXINE HCL TAB 50 MG 25 MG: 50 TAB at 15:58

## 2023-10-05 RX ADMIN — AZTREONAM 2000 MG: 2 INJECTION, POWDER, LYOPHILIZED, FOR SOLUTION INTRAMUSCULAR; INTRAVENOUS at 13:00

## 2023-10-05 RX ADMIN — DOCUSATE SODIUM 100 MG: 100 CAPSULE, LIQUID FILLED ORAL at 09:34

## 2023-10-05 RX ADMIN — SODIUM CHLORIDE, SODIUM LACTATE, POTASSIUM CHLORIDE, AND CALCIUM CHLORIDE 125 ML/HR: .6; .31; .03; .02 INJECTION, SOLUTION INTRAVENOUS at 05:09

## 2023-10-05 RX ADMIN — CALCIUM CARBONATE (ANTACID) CHEW TAB 500 MG 500 MG: 500 CHEW TAB at 14:28

## 2023-10-05 NOTE — H&P
History & Physical - OB/GYN   Mely Poag 23 y.o. female MRN: [id number]  Unit/Bed#:  211-01 Encounter: [id number]    23 y.o. yo  at 19w0d with JOSE of 2024, by Ultrasound. She is a patient of [address] . Chief complaint:  I have fever and chills and back pain on my right side    HPI:    Patient presents with new onset of fever and chills that started today. Patient states her temperature at home was 100.7 prior to taking Tylenol today. She also developed right sided flank pain. Patient has h/o non-obstructing kidney stones on right side for which she has been following up with Urologist.  Patient denies having abdomen or pelvic discomfort, no LOF or vaginal discharge or bleeding. Contractions:  no  Fetal movement:  yes  Vaginal bleeding:   no  Leaking of fluid:  no      Pregnancy Complications:  G2 Problems (from 23 to present)     Problem Noted Resolved    Mental disorder affecting pregnancy in first trimester 2023 by Donnie Olmedo MD No    Overview Signed 2023  2:10 PM by Donnie Olmedo MD     H/o depression in past.  No current medications. Reports doing well. Group B Streptococcus urinary tract infection affecting pregnancy in first trimester 2023 by Donnie Olmedo MD No    Overview Signed 2023  4:33 PM by Donnie Olmedo MD     2023 first trimester - GBS in urine - treated. [  ] treat in labor         Nausea and vomiting in pregnancy 2023 by Donnie Olmedo MD No    Overview Addendum 2023 11:50 AM by Donnie Olmedo MD     2023 in ER for N/V pregnancy - given Zofran and Reglan to add to Vit B6 and unisom. 2023 - after 3rd ER trip, pt asked for note to stop working until symptoms improve.       - provided with plan for reevaluation 2023 which is next OB visit         24 weeks gestation of pregnancy 2023 by Donnie Olmedo MD No        PMH:  Past Medical History:   Diagnosis Date   • Kidney stone     ,  - lithotripsy    • Migraine     has seen neurology   • Premature baby     26 weeks   • Psychiatric disorder     anxiety,depression-counseling prior to covid   • Pyelonephritis 2023    Dr. Cadence Rodriguez     PSH:  Past Surgical History:   Procedure Laterality Date   • HEMANGIOMA EXCISION Right     [facility]   • [place] CYSTO/URETERO W/LITHOTRIPSY &INDWELL STENT INSRT Right 2022    Procedure: CYSTOSCOPY URETEROSCOPY WITH LITHOTRIPSY HOLMIUM LASER AND INSERTION STENT URETERAL;  Surgeon: Roberta Monsalve MD;  Location: [facility];  Service: Urology       Social Hx:  Social History     Tobacco Use   • Smoking status: Never     Passive exposure: Never   • Smokeless tobacco: Never   Vaping Use   • Vaping Use: Never used   Substance Use Topics   • Alcohol use: Never   • Drug use: Never          OB Hx:  OB History    Para Term  AB Living   2 0 0 0 1 0   SAB IAB Ectopic Multiple Live Births   1 0 0 0 0      # Outcome Date GA Lbr Micah/2nd Weight Sex Delivery Anes PTL Lv   2 Current            1 SAB 22               Meds:  Current Facility-Administered Medications on File Prior to Encounter   Medication Dose Route Frequency Provider Last Rate Last Admin   • alteplase (CATHFLO) injection 2 mg  2 mg Intracatheter Q1MIN PRN Josh Plasencia MD         Current Outpatient Medications on File Prior to Encounter   Medication Sig Dispense Refill   • aspirin 81 mg chewable tablet Chew 2 tablets (162 mg total) daily at bedtime 180 tablet 1   • docusate sodium (COLACE) 100 mg capsule Take 1 capsule (100 mg total) by mouth 2 (two) times a day  0   • Doxylamine Succinate, Sleep, (UNISOM PO) Take by mouth     • metoclopramide (Reglan) 10 mg tablet Take 1 tablet (10 mg total) by mouth 3 (three) times a day 90 tablet 4   • ondansetron (ZOFRAN-ODT) 4 mg disintegrating tablet Take 1 tablet (4 mg total) by mouth every 8 (eight) hours 90 tablet 4   • Prenatal Vit-Fe Fumarate-FA (PRENATAL 19 PO) Take 1 capsule by mouth daily     • promethazine (PHENERGAN) 25 mg suppository Insert 1 suppository (25 mg total) into the rectum every 6 (six) hours as needed for nausea or vomiting 12 each 1   • Pyridoxine HCl (vitamin B-6) 25 MG tablet Take 1 tablet (25 mg total) by mouth 3 (three) times a day  0       Allergies: Allergies   Allergen Reactions   • Cephalexin Rash and Itching   • Levofloxacin Rash, Itching and Hives     Upper arm red and burning   Vein swelling in the arm that patient got levaquin   Upper arm red and burning        Labs:  ABO Grouping   Date Value Ref Range Status   2023 B  Final      Rh Factor   Date Value Ref Range Status   2023 Positive  Final      Rh Factor   Date Value Ref Range Status   2023 Positive  Final     No results found for: "HIVAGAB"  Hepatitis B Surface Ag   Date Value Ref Range Status   2023 Non-reactive Non-Reactive Final     Hepatitis C Ab   Date Value Ref Range Status   2023 Non-reactive Non-Reactive Final     No results found for: "RPR"  Rubella IgG Quant   Date Value Ref Range Status   2023 21.1 >14.9 IU/mL Final     No results found for: "[id number]"  No results found for: "GUILHRJ1WE"  No results found for: "STREPGRPB"    Physical Exam:  /68 (BP Location: Right arm)   Pulse (!) 125   Temp 98.2 °F (36.8 °C) (Oral)   Resp 18   LMP 2023   SpO2 96%     Gen: NAD/ uncomfortable with right flank pain  Heart: RRR  Lungs: CTA b/l. Back:  Right flank pain, NT on left  Abdomen: gravid, non-tender, non-distended  Extremities: non-tender, no edema    SVE:   deferred    FHT:  Baseline Rate: 150 bpm  Variability: Moderate 6-25 bpm  Accelerations: 10 x 10 (<32 weeks), At variable times  Decelerations: None    TOCO:   Contraction Frequency (minutes): 0  Contraction Quality: Not applicable    Membranes: intact      Assessment/Plan:  23 y.o.  at 25w0d weeks presents with pyelonephritis    1. Admit to L&D  2. Place IV and IV fluids  3. Labs: CBC, CMP, UA C&S  4. KUB U/S tomorrow  5. Analgesia PRN   6. Aztreonam 2 gm I.V. q 8 hours until afebrile for 24 hours.     (2) Nephrolithiasis. Small nonobstructing stones right kidney. Mild right hydro, likely physiologic. Has suffered with kidney stones in the past.  -->follows with urology as outpatient.     (3) Fetal status reassuring. Reactive NST, appropriate for gestational age. --> normal ARABELLA on 10/2/23, appropriately grown. --> Continue fetal movement surveillance, routine prenatal care.

## 2023-10-05 NOTE — ASSESSMENT & PLAN NOTE
- admitted last evening with suspected pyelonephritis - pt with right flank pain, reported fever 100.7 at home, UA with blood, leukocytes and some bacteria, patient with prior h/o pyelonephritis   - due to Cephalosporin allergy she was started on Aztreonam 2gr IV q8 hours and continuous IVF     - she has been afebrile since admission when Tmax was 100.0, but continues to have flank pain, for which she is taking Tylenol and occasional Mercedez   - Urine culture is pending   - WBC 11.95 today decreased from 17.39 on admission   - u/s kidney today - compared to 10/2/203 "IMPRESSION: Unchanged mild to moderate right hydronephrosis. Mild fullness on the left unchanged. Nonobstructive calculi in the right kidney. "     - given still flank pain will continue antibiotics overnight - will be 24 hours since start at 21:00 tonight   - continue IVF   - analgesia prn for pain, encouraged patient to maximize tylenol for pain before using narcotics   - Yvrose Chiu already has established nephrology care with Dr. Sandra Reyes, if there are any questions about treatment we will consult her tomorrow

## 2023-10-05 NOTE — PROGRESS NOTES
Progress Note - OB/GYN  Physician Note   Christopher Camargo 23 y.o. female MRN: [id number]  Unit/Bed#:  211-01 Encounter: [id number]    23 y.o. [place] Naranjo at 25w1d admitted for antepartum observation, hospital day 2 for pyelonephritis. Subjective:   Yvrose Chiu reports feeling overall better today. Pain not as bad as yesterday. She notes + FM, no contractions, VB or LOF. Still c/o pain at left flank, states it's constant. Improved with Mercedez 5mg po today.     Objective:   Vitals:    10/05/23 0245 10/05/23 0744 10/05/23 1104 10/05/23 1719   BP:  112/55 109/59 118/73   BP Location:  Right arm Right arm Right arm   Pulse:  (!) 110 103 (!) 117   Resp:  18 19 18   Temp: 98.1 °F (36.7 °C) 98.6 °F (37 °C) 97.6 °F (36.4 °C) 98.2 °F (36.8 °C)   TempSrc: Oral Oral Temporal Oral   SpO2:  99% 96% 99%       Intake/Output Summary (Last 24 hours) at 10/5/2023 1750  Last data filed at 10/4/2023 1846  Gross per 24 hour   Intake 1000 ml   Output --   Net 1000 ml       Physical Exam:  Gen: alert, no acute distress  Cards: regular rate  Resp: unlabored breathing  Abdomen: gravid, non-tender, non-distended  Back: +CVA tenderness right, no CVA tenderness on left  Extremities: non-tender, no edema    FHT:  Baseline Rate: 145 bpm  Variability: Moderate 6-25 bpm  Accelerations: 15 x 15  Decelerations: None  TOCO:   Contraction Frequency (minutes): 0  Contraction Duration (seconds): 0  Contraction Quality: Not applicable        Labs/Tests:  Lab Results   Component Value Date    WBC 11.95 (H) 10/05/2023    HGB 9.9 (L) 10/05/2023    HCT 28.8 (L) 10/05/2023    MCV 93 10/05/2023     (L) 10/05/2023         MEDS:   Current Facility-Administered Medications   Medication Dose Route Frequency    acetaminophen (TYLENOL) tablet 650 mg  650 mg Oral Q6H PRN    aspirin chewable tablet 162 mg  162 mg Oral HS    aztreonam (AZACTAM) 2,000 mg in sodium chloride 0.9 % 100 mL IVPB  2,000 mg Intravenous Q8H    calcium carbonate (TUMS) chewable tablet 500 mg 500 mg Oral Daily PRN    diphenhydrAMINE (BENADRYL) injection 25 mg  25 mg Intravenous Q6H PRN    docusate sodium (COLACE) capsule 100 mg  100 mg Oral BID    doxylamine (UNISOM) tablet 25 mg  25 mg Oral HS    HYDROmorphone (DILAUDID) injection 1 mg  1 mg Intravenous Q4H PRN    lactated ringers infusion  125 mL/hr Intravenous Continuous    magnesium Oxide (MAG-OX) tablet 400 mg  400 mg Oral BID    magnesium sulfate 2 g/50 mL IVPB (premix) 2 g  2 g Intravenous Once    metoclopramide (REGLAN) injection 10 mg  10 mg Intravenous Q6H PRN    metoclopramide (REGLAN) tablet 10 mg  10 mg Oral TID    ondansetron (ZOFRAN-ODT) dispersible tablet 4 mg  4 mg Oral Q8H RACHEAL    oxyCODONE (ROXICODONE) IR tablet 5 mg  5 mg Oral Q4H PRN    promethazine (PHENERGAN) rectal suppository 25 mg  25 mg Rectal Q6H PRN    pyridoxine (VITAMIN B6) tablet 25 mg  25 mg Oral TID     Invasive Devices       Peripheral Intravenous Line  Duration             Peripheral IV 10/04/23 Left Hand <1 day                    Assessment and Plan:  23y.o. year-old  admitted for antepartum observation, hospital day 2 for pyelonephritis      Pyelonephritis affecting pregnancy in second trimester   - admitted last evening with suspected pyelonephritis - pt with right flank pain, reported fever 100.7 at home, UA with blood, leukocytes and some bacteria, patient with prior h/o pyelonephritis   - due to Cephalosporin allergy she was started on Aztreonam 2gr IV q8 hours and continuous IVF     - she has been afebrile since admission when Tmax was 100.0, but continues to have flank pain, for which she is taking Tylenol and occasional Mercedez   - Urine culture is pending   - WBC 11.95 today decreased from 17.39 on admission   - u/s kidney today - compared to 10/2/203 "IMPRESSION: Unchanged mild to moderate right hydronephrosis. Mild fullness on the left unchanged. Nonobstructive calculi in the right kidney. "     - given still flank pain will continue antibiotics overnight - will be 24 hours since start at 21:00 tonight   - continue IVF   - analgesia prn for pain, encouraged patient to maximize tylenol for pain before using narcotics   - Keegan Hanna already has established nephrology care with Dr. Naren Caballero, if there are any questions about treatment we will consult her tomorrow    Hyperemesis affecting pregnancy, antepartum   - Keegan Hanna has been struggling with N/V during her whole pregnancy and has received two steroid courses already   - currently she is managed on scheduled Zofran, Reglan, Vit B6 and received IVF infusions 3x/week at infusion center with supplemental potassium and magnesium when needed. She has phenergan suppositories when has break through vomiting.   She states she uses them 2-3x/week     - continuing outpatient regimen of doxylamine 25 mg PO qHS, pyridoxine 25 mg PO TID, metoclopramide 10 mg PO q8h, ondansetron 4 mg PO q8h, and promethazine 25 mg suppository NE q6h PRN refractory N/V.    - magnesium level checked and low at 1.5 - will give 2gr IV over 2 hours now and start on PO magnesium 400mg bid, since magnesium level has been chronically low      Aspen Marcos MD

## 2023-10-05 NOTE — ASSESSMENT & PLAN NOTE
Francisco Fregoso has been struggling with N/V during her whole pregnancy and has received two steroid courses already   - currently she is managed on scheduled Zofran, Reglan, Vit B6 and received IVF infusions 3x/week at infusion center with supplemental potassium and magnesium when needed. She has phenergan suppositories when has break through vomiting.   She states she uses them 2-3x/week     - continuing outpatient regimen of doxylamine 25 mg PO qHS, pyridoxine 25 mg PO TID, metoclopramide 10 mg PO q8h, ondansetron 4 mg PO q8h, and promethazine 25 mg suppository NH q6h PRN refractory N/V.    - magnesium level checked and low at 1.5 - will give 2gr IV over 2 hours now and start on PO magnesium 400mg bid, since magnesium level has been chronically low

## 2023-10-06 ENCOUNTER — HOSPITAL ENCOUNTER (OUTPATIENT)
Dept: INFUSION CENTER | Facility: HOSPITAL | Age: 23
Discharge: HOME | End: 2023-10-06
Attending: OBSTETRICS & GYNECOLOGY

## 2023-10-06 VITALS
TEMPERATURE: 97.2 F | HEART RATE: 99 BPM | RESPIRATION RATE: 20 BRPM | SYSTOLIC BLOOD PRESSURE: 108 MMHG | DIASTOLIC BLOOD PRESSURE: 59 MMHG | OXYGEN SATURATION: 97 %

## 2023-10-06 LAB
ALBUMIN SERPL BCP-MCNC: 2.8 G/DL (ref 3.5–5)
ALP SERPL-CCNC: 34 U/L (ref 34–104)
ALT SERPL W P-5'-P-CCNC: 13 U/L (ref 7–52)
ANION GAP SERPL CALCULATED.3IONS-SCNC: 6 MMOL/L
AST SERPL W P-5'-P-CCNC: 12 U/L (ref 13–39)
BASOPHILS # BLD AUTO: 0.02 THOUSANDS/ÂΜL (ref 0–0.1)
BASOPHILS NFR BLD AUTO: 0 % (ref 0–1)
BILIRUB SERPL-MCNC: 0.29 MG/DL (ref 0.2–1)
BUN SERPL-MCNC: 8 MG/DL (ref 5–25)
CALCIUM ALBUM COR SERPL-MCNC: 8.9 MG/DL (ref 8.3–10.1)
CALCIUM SERPL-MCNC: 7.9 MG/DL (ref 8.4–10.2)
CHLORIDE SERPL-SCNC: 108 MMOL/L (ref 96–108)
CO2 SERPL-SCNC: 25 MMOL/L (ref 21–32)
CREAT SERPL-MCNC: 0.54 MG/DL (ref 0.6–1.3)
EOSINOPHIL # BLD AUTO: 0.13 THOUSAND/ÂΜL (ref 0–0.61)
EOSINOPHIL NFR BLD AUTO: 2 % (ref 0–6)
ERYTHROCYTE [DISTWIDTH] IN BLOOD BY AUTOMATED COUNT: 13 % (ref 11.6–15.1)
GFR SERPL CREATININE-BSD FRML MDRD: 136 ML/MIN/1.73SQ M
GLUCOSE SERPL-MCNC: 76 MG/DL (ref 65–140)
HCT VFR BLD AUTO: 28.2 % (ref 34.8–46.1)
HGB BLD-MCNC: 9.7 G/DL (ref 11.5–15.4)
IMM GRANULOCYTES # BLD AUTO: 0.05 THOUSAND/UL (ref 0–0.2)
IMM GRANULOCYTES NFR BLD AUTO: 1 % (ref 0–2)
LYMPHOCYTES # BLD AUTO: 1.15 THOUSANDS/ÂΜL (ref 0.6–4.47)
LYMPHOCYTES NFR BLD AUTO: 13 % (ref 14–44)
MAGNESIUM SERPL-MCNC: 1.7 MG/DL (ref 1.9–2.7)
MCH RBC QN AUTO: 32.3 PG (ref 26.8–34.3)
MCHC RBC AUTO-ENTMCNC: 34.4 G/DL (ref 31.4–37.4)
MCV RBC AUTO: 94 FL (ref 82–98)
MONOCYTES # BLD AUTO: 1 THOUSAND/ÂΜL (ref 0.17–1.22)
MONOCYTES NFR BLD AUTO: 12 % (ref 4–12)
NEUTROPHILS # BLD AUTO: 6.23 THOUSANDS/ÂΜL (ref 1.85–7.62)
NEUTS SEG NFR BLD AUTO: 72 % (ref 43–75)
NRBC BLD AUTO-RTO: 0 /100 WBCS
PLATELET # BLD AUTO: 146 THOUSANDS/UL (ref 149–390)
PMV BLD AUTO: 9.6 FL (ref 8.9–12.7)
POTASSIUM SERPL-SCNC: 3.5 MMOL/L (ref 3.5–5.3)
PROT SERPL-MCNC: 5.1 G/DL (ref 6.4–8.4)
RBC # BLD AUTO: 3 MILLION/UL (ref 3.81–5.12)
SODIUM SERPL-SCNC: 139 MMOL/L (ref 135–147)
WBC # BLD AUTO: 8.58 THOUSAND/UL (ref 4.31–10.16)

## 2023-10-06 PROCEDURE — 83735 ASSAY OF MAGNESIUM: CPT | Performed by: OBSTETRICS & GYNECOLOGY

## 2023-10-06 PROCEDURE — NC001 PR NO CHARGE: Performed by: OBSTETRICS & GYNECOLOGY

## 2023-10-06 PROCEDURE — 99238 HOSP IP/OBS DSCHRG MGMT 30/<: CPT | Performed by: OBSTETRICS & GYNECOLOGY

## 2023-10-06 PROCEDURE — 80053 COMPREHEN METABOLIC PANEL: CPT | Performed by: OBSTETRICS & GYNECOLOGY

## 2023-10-06 PROCEDURE — 85025 COMPLETE CBC W/AUTO DIFF WBC: CPT | Performed by: OBSTETRICS & GYNECOLOGY

## 2023-10-06 RX ADMIN — ONDANSETRON 4 MG: 4 TABLET, ORALLY DISINTEGRATING ORAL at 14:46

## 2023-10-06 RX ADMIN — METOCLOPRAMIDE 10 MG: 10 TABLET ORAL at 00:27

## 2023-10-06 RX ADMIN — ACETAMINOPHEN 650 MG: 325 TABLET, FILM COATED ORAL at 09:13

## 2023-10-06 RX ADMIN — METOCLOPRAMIDE 10 MG: 10 TABLET ORAL at 14:46

## 2023-10-06 RX ADMIN — AZTREONAM 2000 MG: 2 INJECTION, POWDER, LYOPHILIZED, FOR SOLUTION INTRAMUSCULAR; INTRAVENOUS at 05:30

## 2023-10-06 RX ADMIN — DOCUSATE SODIUM 100 MG: 100 CAPSULE, LIQUID FILLED ORAL at 09:06

## 2023-10-06 RX ADMIN — SODIUM CHLORIDE, SODIUM LACTATE, POTASSIUM CHLORIDE, AND CALCIUM CHLORIDE 125 ML/HR: .6; .31; .03; .02 INJECTION, SOLUTION INTRAVENOUS at 05:33

## 2023-10-06 RX ADMIN — MAGNESIUM OXIDE TAB 400 MG (241.3 MG ELEMENTAL MG) 400 MG: 400 (241.3 MG) TAB at 09:05

## 2023-10-06 RX ADMIN — METOCLOPRAMIDE 10 MG: 10 TABLET ORAL at 09:05

## 2023-10-06 RX ADMIN — ONDANSETRON 4 MG: 4 TABLET, ORALLY DISINTEGRATING ORAL at 09:06

## 2023-10-06 RX ADMIN — PYRIDOXINE HCL TAB 50 MG 25 MG: 50 TAB at 00:27

## 2023-10-06 RX ADMIN — PYRIDOXINE HCL TAB 50 MG 25 MG: 50 TAB at 09:05

## 2023-10-06 NOTE — PROGRESS NOTES
Progress Note - OB/GYN  Post-Partum Physician Note   Lashaun Armas 23 y.o. female MRN: [id number]  Unit/Bed#:  211-01 Encounter: [id number]    Patient is  23 y.o. Emily Castro at 25w1d admitted for antepartum observation, hospital day 3 for pyelonephritis.     Pain: right flank pain resolved  Tolerating Oral Intake: yes  Voiding: yes  Flatus: yes  BM- no  Ambulating: yes  Breastfeeding: well  Chest Pain: no  Shortness of Breath: no  Leg Pain/Discomfort: no      Objective:   Vitals:    10/06/23 1132   BP: 108/59   Pulse: 99   Resp: 20   Temp: (!) 97.2 °F (36.2 °C)   SpO2:      No intake or output data in the 24 hours ending 10/06/23 1440    Physical Exam:  General: awake alert oriented  Cardiovascular: RRR  Lungs:clear  Abdomen:non-tender, gravid  Back: +CVA tenderness right, no CVA tenderness on left  Extremities: non-tender, no edema       FHT:  Baseline Rate: 145 bpm  Variability: Moderate 6-25 bpm  Accelerations: 15 x 15  Decelerations: None  TOCO:   Contraction Frequency (minutes): 0  Contraction Duration (seconds): 0  Contraction Quality: Not applicable    MEDS:   Current Facility-Administered Medications   Medication Dose Route Frequency    acetaminophen (TYLENOL) tablet 650 mg  650 mg Oral Q6H PRN    aspirin chewable tablet 162 mg  162 mg Oral HS    aztreonam (AZACTAM) 2,000 mg in sodium chloride 0.9 % 100 mL IVPB  2,000 mg Intravenous Q8H    calcium carbonate (TUMS) chewable tablet 500 mg  500 mg Oral Daily PRN    diphenhydrAMINE (BENADRYL) injection 25 mg  25 mg Intravenous Q6H PRN    docusate sodium (COLACE) capsule 100 mg  100 mg Oral BID    doxylamine (UNISOM) tablet 25 mg  25 mg Oral HS    HYDROmorphone (DILAUDID) injection 1 mg  1 mg Intravenous Q4H PRN    lactated ringers infusion  125 mL/hr Intravenous Continuous    magnesium Oxide (MAG-OX) tablet 400 mg  400 mg Oral BID    metoclopramide (REGLAN) injection 10 mg  10 mg Intravenous Q6H PRN    metoclopramide (REGLAN) tablet 10 mg  10 mg Oral TID ondansetron (ZOFRAN-ODT) dispersible tablet 4 mg  4 mg Oral Q8H RACHEAL    oxyCODONE (ROXICODONE) IR tablet 5 mg  5 mg Oral Q4H PRN    promethazine (PHENERGAN) rectal suppository 25 mg  25 mg Rectal Q6H PRN    pyridoxine (VITAMIN B6) tablet 25 mg  25 mg Oral TID     Invasive Devices       Peripheral Intravenous Line  Duration             Peripheral IV 10/04/23 Left Hand 1 day                    Assessment and Plan:  23y.o. year-old  admitted for antepartum observation, hospital day 3 for pyelonephritis      Pyelonephritis affecting pregnancy in second trimester   - admitted with suspected pyelonephritis - pt with right flank pain, reported fever 100.7 at home, UA with blood, leukocytes and some bacteria, patient with prior h/o pyelonephritis   - due to Cephalosporin allergy she was started on Aztreonam 2gr IV q8 hours and continuous IVF    - she has been afebrile since admission when Tmax was 100.0, but continues to have flank pain, for which she is taking Tylenol and occasional Mercedez   - Urine culture is pending   - WBC 8.58 today decreased from 17.39 on admission   - u/s kidney today - compared to 10/2/203 "IMPRESSION: Unchanged mild to moderate right hydronephrosis. Mild fullness on the left unchanged. Nonobstructive calculi in the right kidney. "      - given still flank pain will continue oral antibiotics     - analgesia prn for pain, encouraged patient to maximize tylenol for pain before using narcotics   - Jae Kay already has established nephrology care with Dr. Ansley Fisher, called about oral antibiotics treatment      Hyperemesis affecting pregnancy, antepartum   - Jae Kay has been struggling with N/V during her whole pregnancy and has received two steroid courses already   - currently she is managed on scheduled Zofran, Reglan, Vit B6 and received IVF infusions 3x/week at infusion center with supplemental potassium and magnesium when needed. She has phenergan suppositories when has break through vomiting.   She states she uses them 2-3x/week      - continuing outpatient regimen of doxylamine 25 mg PO qHS, pyridoxine 25 mg PO TID, metoclopramide 10 mg PO q8h, ondansetron 4 mg PO q8h, and promethazine 25 mg suppository TX q6h PRN refractory N/V.    - magnesium level checked and low at 1.5 - will give 2gr IV over 2 hours now and start on PO magnesium 400mg bid, since magnesium level has been chronically low    Geraldine Fiore

## 2023-10-06 NOTE — DISCHARGE SUMMARY
Discharge Summary - Karla Correa 23 y.o. female MRN: [id number]    Unit/Bed#: -01 Encounter: [id number]    Admission Date:   Admission Orders (From admission, onward)       Ordered        10/05/23 0001  Place in Observation  Once                            Admitting Diagnosis: Pyelonephritis    HPI: Presented with right flank pain, fever/chills and elevated WBC with left shift    Procedures Performed: No orders of the defined types were placed in this encounter. Summary of Hospital Course: afebrile since start of Abx    Significant Findings, Care, Treatment and Services Provided: IV fluids, analgesics, antibioitcs    Complications: none    Discharge Diagnosis: IUP at 25w2d with history of pyelonephritis    Medical Problems       Resolved Problems  Date Reviewed: 8/2/2023   None         Condition at Discharge: good         Discharge instructions/Information to patient and family:   See after visit summary for information provided to patient and family. Provisions for Follow-Up Care:  See after visit summary for information related to follow-up care and any pertinent home health orders. PCP: Rio Melgar DO    Disposition: Home    Planned Readmission: No      Discharge Statement   I spent 30 minutes discharging the patient. This time was spent on the day of discharge. I had direct contact with the patient on the day of discharge. Additional documentation is required if more than 30 minutes were spent on discharge. Discharge Medications:  See after visit summary for reconciled discharge medications provided to patient and family.

## 2023-10-06 NOTE — UTILIZATION REVIEW
Initial Clinical Review    Admission: Date/Time/Statement:   Admission Orders (From admission, onward)     Ordered        10/05/23 0001  Place in Observation  Once                      Orders Placed This Encounter   Procedures   • Place in Observation     Standing Status:   Standing     Number of Occurrences:   1     Order Specific Question:   Level of Care     Answer:   Med Surg [16]     ED Arrival Information     Patient not seen in ED                     Chief Complaint   Patient presents with   • Fever   • Pain       Initial Presentation: 23 y.o. female  at 20w6d, with HX of non obstructing kidney stones followed OP by Urology presents w fever, chills back pain on R flank side. EXAM  SVE deferred, TOCO no contractions, reactive NST; leukocytosis w  abn UA; US kidney/ bladder unchanged mild to mod hydronephrosis, non obstructive stones R kidney     Observation admission due to Pyelonephritis, nephrolithiasis  IVF, IV antibx, pain management; KUB in AM; fetal monitoring    Date: 10/5/2023   Day 2:   OB Attending    Pain improving but remains constant in flank . She notes + FM, no contractions, VB or LOF. Urine culture is pending, UA w blood, leuks, some bacteria, HX of Pyelonephritis. On IV Aztreonam IV Q 8 & IVF. Continues w c/o  Flank pain. Improved with Mercedez 5mg po today . given still flank pain will continue antibiotics overnight - will be 24 hours since start at 21:00 tonight   continue IVF. analgesia prn for pain, encouraged to maximize tylenol for pain before using narcotics   Struggled w N/V thru out pregnancy. managed on scheduled Zofran, Reglan, Vit B6 and received IVF infusions 3x/week at infusion center with supplemental potassium and magnesium when needed. She has phenergan suppositories when has break through vomiting.   She states she uses them 2-3x/week   continuing outpatient regimen of doxylamine 25 mg PO qHS, pyridoxine 25 mg PO TID, metoclopramide 10 mg PO q8h, ondansetron 4 mg PO q8h, and promethazine 25 mg suppository LA q6h PRN refractory N/V.    magnesium  low at 1.5 - will give 2gr IV over 2 hours now and start on PO magnesium 400mg bid         Triage Vitals [10/04/23 1813]   Temperature Pulse Respirations Blood Pressure SpO2   100 °F (37.8 °C) (!) 125 18 118/68 96 %      Temp Source Heart Rate Source Patient Position - Orthostatic VS BP Location FiO2 (%)   Oral Monitor Lying Right arm --      Pain Score       10 - Worst Possible Pain          Wt Readings from Last 1 Encounters:   09/27/23 50.8 kg (112 lb)     Additional Vital Signs:   Date/Time Temp Pulse Resp BP MAP (mmHg) SpO2 O2 Device Cardiac (WDL) Patient Position - Orthostatic VS   10/05/23 2300 -- -- -- -- -- -- None (Room air) WDL --   10/05/23 2247 97.7 °F (36.5 °C) 84 16 109/55 -- 97 % -- -- Lying   10/05/23 1719 98.2 °F (36.8 °C) 117 Abnormal  18 118/73 -- 99 % -- -- Sitting   10/05/23 1104 97.6 °F (36.4 °C) 103 19 109/59 80 96 % None (Room air) -- Sitting   10/05/23 0744 98.6 °F (37 °C) 110 Abnormal  18 112/55 -- 99 % -- -- Sitting   10/05/23 0245 98.1 °F (36.7 °C) -- -- -- -- -- -- -- --   10/04/23 2333 96.1 °F (35.6 °C) Abnormal   87 17 116/63 -- 99 % -- -- Lying   Temp: dr. Aldo Carter aware at 10/04/23 2333   10/04/23 1946 98.2 °F (36.8 °C) -- -- -- -- -- -- -- --   10/04/23 1813 100 °F (37.8 °C) 125 Abnormal  18 118/68 -- 96 % -- -- Lying       Pertinent Labs/Diagnostic Test Results:   US kidney and bladder   Final Result by Francesca Gilliland MD (10/05 1521)   Unchanged mild to moderate right hydronephrosis. Mild fullness on the left unchanged. .   Nonobstructive calculi in the right kidney.       Workstation performed: [id number]               Results from last 7 days   Lab Units 10/05/23  1045 10/04/23  1849   WBC Thousand/uL 11.95* 17.32*   HEMOGLOBIN g/dL 9.9* 11.0*   HEMATOCRIT % 28.8* 31.4*   PLATELETS Thousands/uL 140* 179   NEUTROS ABS Thousands/µL 9.23* 14.23*         Results from last 7 days   Lab Units 10/04/23  1849 09/29/23  1229   SODIUM mmol/L 131* 136   POTASSIUM mmol/L 3.5 3.8   CHLORIDE mmol/L 101 104   CO2 mmol/L 20* 26   ANION GAP mmol/L 10 6   BUN mg/dL 9 8   CREATININE mg/dL 0.63 0.54*   EGFR ml/min/1.73sq m 129 136   CALCIUM mg/dL 9.0 9.0   MAGNESIUM mg/dL 1.5* 1.8*     Results from last 7 days   Lab Units 10/04/23  1849 09/29/23  1229   AST U/L 18 20   ALT U/L 16 24   ALK PHOS U/L 36 36   TOTAL PROTEIN g/dL 6.6 6.2*   ALBUMIN g/dL 3.5 3.5   TOTAL BILIRUBIN mg/dL 0.45 0.27         Results from last 7 days   Lab Units 10/04/23  1849 09/29/23  1229   GLUCOSE RANDOM mg/dL 95 92             No results found for: "BETA-HYDROXYBUTYRATE"                                                                                       Results from last 7 days   Lab Units 10/04/23  2126 10/02/23  1703   CLARITY UA  Cloudy Clear   COLOR UA  Yellow Yellow   SPEC GRAV UA  >=1.030 1.020   PH UA  6.0 8.0   GLUCOSE UA mg/dl Negative Negative   KETONES UA mg/dl 150 (4+)* Negative   BLOOD UA  Large* Moderate*   PROTEIN UA mg/dl 100 (2+)* Trace*   NITRITE UA  Negative Negative   BILIRUBIN UA  Negative Negative   UROBILINOGEN UA (BE) mg/dl <2.0 <2.0   LEUKOCYTES UA  Small* Negative   WBC UA /hpf 4-10* 0-1   RBC UA /hpf 10-20* 4-10*   BACTERIA UA /hpf Occasional Occasional   EPITHELIAL CELLS WET PREP /hpf Innumerable* Occasional   MUCUS THREADS  Occasional* None Seen                                                   ED Treatment:   Medication Administration - No Administrations Displayed (No Start Event Found)     None        Past Medical History:   Diagnosis Date   • Kidney stone     2022, 2023 - lithotripsy 2022   • Migraine     has seen neurology   • Premature baby     26 weeks   • Psychiatric disorder     anxiety,depression-counseling prior to covid   • Pyelonephritis 03/11/2023    Dr. Sandra Reyes     Present on Admission:  • Hyperemesis affecting pregnancy, antepartum      Admitting Diagnosis: No admission diagnoses are documented for this encounter. Age/Sex: 23 y.o. female  Admission Orders:  Continuous external fetal monitoring  NST TID  I/O      Scheduled Medications:  aspirin, 162 mg, Oral, HS  aztreonam, 2,000 mg, Intravenous, Q8H  docusate sodium, 100 mg, Oral, BID  doxylamine, 25 mg, Oral, HS  magnesium Oxide, 400 mg, Oral, BID  metoclopramide, 10 mg, Oral, TID  ondansetron, 4 mg, Oral, Q8H RACHEAL  vitamin B-6, 25 mg, Oral, TID      Continuous IV Infusions:  lactated ringers, 125 mL/hr, Intravenous, Continuous      PRN Meds:  acetaminophen, 650 mg, Oral, Q6H PRN  calcium carbonate, 500 mg, Oral, Daily PRN  diphenhydrAMINE, 25 mg, Intravenous, Q6H PRN  HYDROmorphone, 1 mg, Intravenous, Q4H PRN  metoclopramide, 10 mg, Intravenous, Q6H PRN  oxyCODONE, 5 mg, Oral, Q4H PRN  promethazine, 25 mg, Rectal, Q6H PRN            Network Utilization Review Department  ATTENTION: Please call with any questions or concerns to [phone] and carefully listen to the prompts so that you are directed to the right person. All voicemails are confidential.   For Discharge needs, contact Care Management DC Support Team at [phone] opt. [phone]  Send all requests for admission clinical reviews, approved or denied determinations and any other requests to dedicated fax number below belonging to the campus where the patient is receiving treatment.  List of dedicated fax numbers for the Facilities:  [place] DENIALS (Administrative/Medical Necessity) [phone]   DISCHARGE SUPPORT TEAM (NETWORK) [id number] (Maternity/NICU/Pediatrics) [phone]   [email or web address] [place] [address] [phone] [facility] [address] [phone]   [address] [place] [address]  [facility] [phone]

## 2023-10-07 LAB
BACTERIA UR CULT: ABNORMAL
BACTERIA UR CULT: ABNORMAL

## 2023-10-08 DIAGNOSIS — O23.02 PYELONEPHRITIS AFFECTING PREGNANCY IN SECOND TRIMESTER: Primary | ICD-10-CM

## 2023-10-08 RX ORDER — SULFAMETHOXAZOLE AND TRIMETHOPRIM 800; 160 MG/1; MG/1
1 TABLET ORAL EVERY 12 HOURS SCHEDULED
Qty: 18 TABLET | Refills: 0 | Status: SHIPPED | OUTPATIENT
Start: 2023-10-08 | End: 2023-10-17

## 2023-10-08 RX ORDER — NITROFURANTOIN 25; 75 MG/1; MG/1
100 CAPSULE ORAL
Qty: 30 CAPSULE | Refills: 4 | Status: SHIPPED | OUTPATIENT
Start: 2023-10-08

## 2023-10-10 ENCOUNTER — HOSPITAL ENCOUNTER (OUTPATIENT)
Dept: INFUSION CENTER | Facility: HOSPITAL | Age: 23
Discharge: HOME | End: 2023-10-10
Attending: OBSTETRICS & GYNECOLOGY
Payer: COMMERCIAL

## 2023-10-10 VITALS
TEMPERATURE: 97.4 F | OXYGEN SATURATION: 98 % | HEART RATE: 84 BPM | RESPIRATION RATE: 18 BRPM | SYSTOLIC BLOOD PRESSURE: 103 MMHG | DIASTOLIC BLOOD PRESSURE: 71 MMHG

## 2023-10-10 DIAGNOSIS — O21.0 HYPEREMESIS AFFECTING PREGNANCY, ANTEPARTUM: Primary | ICD-10-CM

## 2023-10-10 PROCEDURE — 96361 HYDRATE IV INFUSION ADD-ON: CPT

## 2023-10-10 PROCEDURE — 96365 THER/PROPH/DIAG IV INF INIT: CPT

## 2023-10-10 RX ORDER — POTASSIUM CHLORIDE 14.9 MG/ML
20 INJECTION INTRAVENOUS ONCE
Status: CANCELLED | OUTPATIENT
Start: 2023-10-11

## 2023-10-10 RX ORDER — MAGNESIUM SULFATE HEPTAHYDRATE 40 MG/ML
2 INJECTION, SOLUTION INTRAVENOUS ONCE
Status: COMPLETED | OUTPATIENT
Start: 2023-10-10 | End: 2023-10-10

## 2023-10-10 RX ORDER — MAGNESIUM SULFATE HEPTAHYDRATE 40 MG/ML
2 INJECTION, SOLUTION INTRAVENOUS ONCE
Status: CANCELLED | OUTPATIENT
Start: 2023-10-13

## 2023-10-10 RX ADMIN — SODIUM CHLORIDE, SODIUM LACTATE, POTASSIUM CHLORIDE, AND CALCIUM CHLORIDE 1000 ML: .6; .31; .03; .02 INJECTION, SOLUTION INTRAVENOUS at 10:32

## 2023-10-10 RX ADMIN — MAGNESIUM SULFATE HEPTAHYDRATE 2 G: 2 INJECTION, SOLUTION INTRAVENOUS at 10:48

## 2023-10-13 ENCOUNTER — HOSPITAL ENCOUNTER (OUTPATIENT)
Dept: INFUSION CENTER | Facility: HOSPITAL | Age: 23
End: 2023-10-13
Attending: OBSTETRICS & GYNECOLOGY
Payer: COMMERCIAL

## 2023-10-13 VITALS
HEART RATE: 78 BPM | DIASTOLIC BLOOD PRESSURE: 65 MMHG | OXYGEN SATURATION: 97 % | SYSTOLIC BLOOD PRESSURE: 106 MMHG | TEMPERATURE: 96.9 F | RESPIRATION RATE: 18 BRPM

## 2023-10-13 DIAGNOSIS — O21.0 HYPEREMESIS AFFECTING PREGNANCY, ANTEPARTUM: Primary | ICD-10-CM

## 2023-10-13 LAB
ALBUMIN SERPL BCP-MCNC: 3.8 G/DL (ref 3.5–5)
ALP SERPL-CCNC: 44 U/L (ref 34–104)
ALT SERPL W P-5'-P-CCNC: 41 U/L (ref 7–52)
ANION GAP SERPL CALCULATED.3IONS-SCNC: 9 MMOL/L
AST SERPL W P-5'-P-CCNC: 33 U/L (ref 13–39)
BILIRUB SERPL-MCNC: 0.31 MG/DL (ref 0.2–1)
BUN SERPL-MCNC: 10 MG/DL (ref 5–25)
CALCIUM SERPL-MCNC: 9.1 MG/DL (ref 8.4–10.2)
CHLORIDE SERPL-SCNC: 102 MMOL/L (ref 96–108)
CO2 SERPL-SCNC: 22 MMOL/L (ref 21–32)
CREAT SERPL-MCNC: 0.69 MG/DL (ref 0.6–1.3)
GFR SERPL CREATININE-BSD FRML MDRD: 125 ML/MIN/1.73SQ M
GLUCOSE SERPL-MCNC: 93 MG/DL (ref 65–140)
MAGNESIUM SERPL-MCNC: 1.8 MG/DL (ref 1.9–2.7)
POTASSIUM SERPL-SCNC: 3.9 MMOL/L (ref 3.5–5.3)
PROT SERPL-MCNC: 6.9 G/DL (ref 6.4–8.4)
SODIUM SERPL-SCNC: 133 MMOL/L (ref 135–147)

## 2023-10-13 PROCEDURE — 80053 COMPREHEN METABOLIC PANEL: CPT

## 2023-10-13 PROCEDURE — 83735 ASSAY OF MAGNESIUM: CPT

## 2023-10-13 RX ORDER — MAGNESIUM SULFATE HEPTAHYDRATE 40 MG/ML
2 INJECTION, SOLUTION INTRAVENOUS ONCE
Status: CANCELLED | OUTPATIENT
Start: 2023-10-16

## 2023-10-13 RX ORDER — POTASSIUM CHLORIDE 14.9 MG/ML
20 INJECTION INTRAVENOUS ONCE
Status: CANCELLED | OUTPATIENT
Start: 2023-10-14

## 2023-10-13 RX ORDER — MAGNESIUM SULFATE HEPTAHYDRATE 40 MG/ML
2 INJECTION, SOLUTION INTRAVENOUS ONCE
Status: COMPLETED | OUTPATIENT
Start: 2023-10-13 | End: 2023-10-13

## 2023-10-13 RX ADMIN — SODIUM CHLORIDE, SODIUM LACTATE, POTASSIUM CHLORIDE, AND CALCIUM CHLORIDE 1000 ML: .6; .31; .03; .02 INJECTION, SOLUTION INTRAVENOUS at 08:39

## 2023-10-13 RX ADMIN — MAGNESIUM SULFATE IN WATER 2 G: 40 INJECTION, SOLUTION INTRAVENOUS at 09:07

## 2023-10-13 NOTE — PROGRESS NOTES
Reported nausea and vomiting throughout the day yesterday. CMP and Mag levels repeated. Mag 1.8. Per order set May repleated 2 grams. WCM.

## 2023-10-13 NOTE — PROGRESS NOTES
R and Magnesium infused without difficulty. Pt aware to have labs repeated tomorrow 10/14 prior to appt on Monday 10/16. Pt verbalized understanding. Left ambulatory for d/c.

## 2023-10-16 ENCOUNTER — HOSPITAL ENCOUNTER (OUTPATIENT)
Dept: INFUSION CENTER | Facility: HOSPITAL | Age: 23
Discharge: HOME | End: 2023-10-16
Attending: OBSTETRICS & GYNECOLOGY
Payer: COMMERCIAL

## 2023-10-16 VITALS
HEART RATE: 71 BPM | TEMPERATURE: 96.2 F | RESPIRATION RATE: 16 BRPM | SYSTOLIC BLOOD PRESSURE: 107 MMHG | OXYGEN SATURATION: 96 % | DIASTOLIC BLOOD PRESSURE: 61 MMHG

## 2023-10-16 DIAGNOSIS — O21.0 HYPEREMESIS AFFECTING PREGNANCY, ANTEPARTUM: Primary | ICD-10-CM

## 2023-10-16 LAB
ALBUMIN SERPL BCP-MCNC: 3.9 G/DL (ref 3.5–5)
ALP SERPL-CCNC: 45 U/L (ref 34–104)
ALT SERPL W P-5'-P-CCNC: 37 U/L (ref 7–52)
ANION GAP SERPL CALCULATED.3IONS-SCNC: 9 MMOL/L
AST SERPL W P-5'-P-CCNC: 26 U/L (ref 13–39)
BILIRUB SERPL-MCNC: 0.36 MG/DL (ref 0.2–1)
BUN SERPL-MCNC: 11 MG/DL (ref 5–25)
CALCIUM SERPL-MCNC: 9.3 MG/DL (ref 8.4–10.2)
CHLORIDE SERPL-SCNC: 100 MMOL/L (ref 96–108)
CO2 SERPL-SCNC: 24 MMOL/L (ref 21–32)
CREAT SERPL-MCNC: 0.72 MG/DL (ref 0.6–1.3)
GFR SERPL CREATININE-BSD FRML MDRD: 120 ML/MIN/1.73SQ M
GLUCOSE SERPL-MCNC: 73 MG/DL (ref 65–140)
MAGNESIUM SERPL-MCNC: 1.8 MG/DL (ref 1.9–2.7)
POTASSIUM SERPL-SCNC: 3.8 MMOL/L (ref 3.5–5.3)
PROT SERPL-MCNC: 6.8 G/DL (ref 6.4–8.4)
SODIUM SERPL-SCNC: 133 MMOL/L (ref 135–147)

## 2023-10-16 PROCEDURE — 83735 ASSAY OF MAGNESIUM: CPT | Performed by: OBSTETRICS & GYNECOLOGY

## 2023-10-16 PROCEDURE — 80053 COMPREHEN METABOLIC PANEL: CPT | Performed by: OBSTETRICS & GYNECOLOGY

## 2023-10-16 RX ORDER — POTASSIUM CHLORIDE 14.9 MG/ML
20 INJECTION INTRAVENOUS ONCE
Status: CANCELLED | OUTPATIENT
Start: 2023-10-17

## 2023-10-16 RX ORDER — MAGNESIUM SULFATE HEPTAHYDRATE 40 MG/ML
2 INJECTION, SOLUTION INTRAVENOUS ONCE
Status: COMPLETED | OUTPATIENT
Start: 2023-10-16 | End: 2023-10-16

## 2023-10-16 RX ORDER — MAGNESIUM SULFATE HEPTAHYDRATE 40 MG/ML
2 INJECTION, SOLUTION INTRAVENOUS ONCE
Status: CANCELLED | OUTPATIENT
Start: 2023-10-19

## 2023-10-16 RX ADMIN — MAGNESIUM SULFATE HEPTAHYDRATE 2 G: 2 INJECTION, SOLUTION INTRAVENOUS at 12:50

## 2023-10-16 RX ADMIN — SODIUM CHLORIDE, SODIUM LACTATE, POTASSIUM CHLORIDE, AND CALCIUM CHLORIDE 1000 ML: .6; .31; .03; .02 INJECTION, SOLUTION INTRAVENOUS at 11:02

## 2023-10-19 ENCOUNTER — HOSPITAL ENCOUNTER (OUTPATIENT)
Dept: INFUSION CENTER | Facility: HOSPITAL | Age: 23
Discharge: HOME | End: 2023-10-19
Attending: OBSTETRICS & GYNECOLOGY
Payer: COMMERCIAL

## 2023-10-19 VITALS
TEMPERATURE: 96.3 F | OXYGEN SATURATION: 96 % | DIASTOLIC BLOOD PRESSURE: 71 MMHG | HEART RATE: 77 BPM | SYSTOLIC BLOOD PRESSURE: 106 MMHG | RESPIRATION RATE: 16 BRPM

## 2023-10-19 DIAGNOSIS — O21.0 HYPEREMESIS AFFECTING PREGNANCY, ANTEPARTUM: Primary | ICD-10-CM

## 2023-10-19 PROCEDURE — 96360 HYDRATION IV INFUSION INIT: CPT

## 2023-10-19 PROCEDURE — 96361 HYDRATE IV INFUSION ADD-ON: CPT

## 2023-10-19 RX ORDER — MAGNESIUM SULFATE HEPTAHYDRATE 40 MG/ML
2 INJECTION, SOLUTION INTRAVENOUS ONCE
Status: CANCELLED | OUTPATIENT
Start: 2023-10-23

## 2023-10-19 RX ORDER — POTASSIUM CHLORIDE 14.9 MG/ML
20 INJECTION INTRAVENOUS ONCE
Status: CANCELLED | OUTPATIENT
Start: 2023-10-20

## 2023-10-19 RX ADMIN — SODIUM CHLORIDE, SODIUM LACTATE, POTASSIUM CHLORIDE, AND CALCIUM CHLORIDE 1000 ML: .6; .31; .03; .02 INJECTION, SOLUTION INTRAVENOUS at 11:48

## 2023-10-19 NOTE — PROGRESS NOTES
Pt here for hydration tolerated well no adverse reactions declined AVS and left ambulatory with steady gait.

## 2023-10-20 ENCOUNTER — APPOINTMENT (OUTPATIENT)
Dept: LAB | Facility: HOSPITAL | Age: 23
End: 2023-10-20
Payer: COMMERCIAL

## 2023-10-20 DIAGNOSIS — O21.0 HYPEREMESIS AFFECTING PREGNANCY, ANTEPARTUM: ICD-10-CM

## 2023-10-20 LAB
ALBUMIN SERPL BCP-MCNC: 4 G/DL (ref 3.5–5)
ALP SERPL-CCNC: 50 U/L (ref 34–104)
ALT SERPL W P-5'-P-CCNC: 26 U/L (ref 7–52)
ANION GAP SERPL CALCULATED.3IONS-SCNC: 9 MMOL/L
AST SERPL W P-5'-P-CCNC: 22 U/L (ref 13–39)
BILIRUB SERPL-MCNC: 0.29 MG/DL (ref 0.2–1)
BUN SERPL-MCNC: 6 MG/DL (ref 5–25)
CALCIUM SERPL-MCNC: 9.3 MG/DL (ref 8.4–10.2)
CHLORIDE SERPL-SCNC: 103 MMOL/L (ref 96–108)
CO2 SERPL-SCNC: 23 MMOL/L (ref 21–32)
CREAT SERPL-MCNC: 0.67 MG/DL (ref 0.6–1.3)
GFR SERPL CREATININE-BSD FRML MDRD: 126 ML/MIN/1.73SQ M
GLUCOSE P FAST SERPL-MCNC: 78 MG/DL (ref 65–99)
MAGNESIUM SERPL-MCNC: 1.8 MG/DL (ref 1.9–2.7)
POTASSIUM SERPL-SCNC: 3.7 MMOL/L (ref 3.5–5.3)
PROT SERPL-MCNC: 7 G/DL (ref 6.4–8.4)
SODIUM SERPL-SCNC: 135 MMOL/L (ref 135–147)

## 2023-10-20 PROCEDURE — 83735 ASSAY OF MAGNESIUM: CPT

## 2023-10-20 PROCEDURE — 80053 COMPREHEN METABOLIC PANEL: CPT

## 2023-10-20 PROCEDURE — 36415 COLL VENOUS BLD VENIPUNCTURE: CPT

## 2023-10-23 ENCOUNTER — HOSPITAL ENCOUNTER (OUTPATIENT)
Dept: INFUSION CENTER | Facility: HOSPITAL | Age: 23
Discharge: HOME | End: 2023-10-23
Attending: OBSTETRICS & GYNECOLOGY

## 2023-10-24 ENCOUNTER — ROUTINE PRENATAL (OUTPATIENT)
Dept: OBGYN CLINIC | Facility: CLINIC | Age: 23
End: 2023-10-24
Payer: COMMERCIAL

## 2023-10-24 VITALS
BODY MASS INDEX: 23.51 KG/M2 | WEIGHT: 116.6 LBS | DIASTOLIC BLOOD PRESSURE: 64 MMHG | SYSTOLIC BLOOD PRESSURE: 116 MMHG | HEIGHT: 59 IN

## 2023-10-24 DIAGNOSIS — O23.41 GROUP B STREPTOCOCCUS URINARY TRACT INFECTION AFFECTING PREGNANCY IN FIRST TRIMESTER: ICD-10-CM

## 2023-10-24 DIAGNOSIS — B95.1 GROUP B STREPTOCOCCUS URINARY TRACT INFECTION AFFECTING PREGNANCY IN FIRST TRIMESTER: ICD-10-CM

## 2023-10-24 DIAGNOSIS — O99.341 MENTAL DISORDER AFFECTING PREGNANCY IN FIRST TRIMESTER: ICD-10-CM

## 2023-10-24 DIAGNOSIS — O23.02 PYELONEPHRITIS AFFECTING PREGNANCY IN SECOND TRIMESTER: ICD-10-CM

## 2023-10-24 DIAGNOSIS — Z36.89 ENCOUNTER FOR OTHER SPECIFIED ANTENATAL SCREENING: ICD-10-CM

## 2023-10-24 DIAGNOSIS — Z3A.27 27 WEEKS GESTATION OF PREGNANCY: ICD-10-CM

## 2023-10-24 DIAGNOSIS — O21.0 HYPEREMESIS AFFECTING PREGNANCY, ANTEPARTUM: Primary | ICD-10-CM

## 2023-10-24 DIAGNOSIS — O21.9 NAUSEA AND VOMITING IN PREGNANCY: ICD-10-CM

## 2023-10-24 LAB
SL AMB  POCT GLUCOSE, UA: NORMAL
SL AMB POCT URINE PROTEIN: NORMAL

## 2023-10-24 PROCEDURE — 81002 URINALYSIS NONAUTO W/O SCOPE: CPT | Performed by: PHYSICIAN ASSISTANT

## 2023-10-24 PROCEDURE — PNV: Performed by: PHYSICIAN ASSISTANT

## 2023-10-24 NOTE — PROGRESS NOTES
Routine Prenatal Visit  [address]  [address] [place] [address], [place], [address]    Assessment/Plan:  Jyoti Cuenca is a 23y.o. year old  at 30w7d who presents for routine prenatal visit. 1. Hyperemesis affecting pregnancy, antepartum  Assessment & Plan:  Note given to extend leave until at least next visit secondary to continued weakness and hyperemesis. Continue regular IV fluids. Continue Magnesium supplementation OTC. Has growth ultrasound scheduled for tomorrow. 2. 27 weeks gestation of pregnancy  Assessment & Plan:  Has script for 28 week labs. Reviewed with patient comfort of completing 1hr GTT with hyperemesis. Reviewed option of fingerstick glucose measurements. Patient reports may be more challenging secondary to having to take 1-2 hour after meal and difficult with eating at certain times. Would like to try the glucola. Continue routine prenatal care. Return to office for ob check in 2 weeks. Orders:  -     POCT urine dip    3. Encounter for other specified  screening    4. Mental disorder affecting pregnancy in first trimester    5. Group B Streptococcus urinary tract infection affecting pregnancy in first trimester  Assessment & Plan: Will plan to treat at delivery. 6. Nausea and vomiting in pregnancy    7. Pyelonephritis affecting pregnancy in second trimester  Assessment & Plan:  Continue Macrobid 100mg daily for duration of pregnancy. Next OB Visit 2 weeks. Subjective:     CC: Prenatal care    Mayte Manjarrez is a 23 y.o.  female who presents for routine prenatal care at 27w6d. Pregnancy ROS: Reports nausea and vomiting improving in the last few days. No vomiting yet today. Averaging 2-3 times a day. Able to keep fluids down better the past few days. Good FM, No N/V, HA, cramping, VB, LOF, edema, domestic violence, or smoking. Tolerating PNV.       The following portions of the patient's history were reviewed and updated as appropriate: allergies, current medications, past family history, past medical history, obstetric history, gynecologic history, past social history, past surgical history and problem list.      Objective:  /64 (BP Location: Right arm, Patient Position: Sitting, Cuff Size: Standard)   Ht 4' 11" (1.499 m)   Wt 52.9 kg (116 lb 9.6 oz)   LMP 2023   BMI 23.55 kg/m²   Pregravid Weight/BMI: 42.2 kg (93 lb) (BMI 18.77)  Current Weight: 52.9 kg (116 lb 9.6 oz)   Total Weight Gain: 10.7 kg (23 lb 9.6 oz)   Pre-Parth Vitals      Flowsheet Row Most Recent Value   Prenatal Assessment    Fetal Heart Rate 150   Fundal Height (cm) 27 cm   Movement Present   Prenatal Vitals    Blood Pressure 116/64   Weight - Scale 52.9 kg (116 lb 9.6 oz)   Urine Albumin/Glucose    Dilation/Effacement/Station    Vaginal Drainage    Edema    LLE Edema None   RLE Edema None   Facial Edema None             General: Well appearing, no distress  Abdomen: Soft, gravid, nontender  Fundal Height: Appropriate for gestational age. Extremities: Warm and well perfused. Non tender.

## 2023-10-24 NOTE — ASSESSMENT & PLAN NOTE
Note given to extend leave until at least next visit secondary to continued weakness and hyperemesis. Continue regular IV fluids. Continue Magnesium supplementation OTC. Has growth ultrasound scheduled for tomorrow.

## 2023-10-24 NOTE — ASSESSMENT & PLAN NOTE
Has script for 28 week labs. Reviewed with patient comfort of completing 1hr GTT with hyperemesis. Reviewed option of fingerstick glucose measurements. Patient reports may be more challenging secondary to having to take 1-2 hour after meal and difficult with eating at certain times. Would like to try the glucola. Continue routine prenatal care. Return to office for ob check in 2 weeks.

## 2023-10-24 NOTE — LETTER
October 24, 2023     Patient: Lashaun Armas  YOB: 2000  Date of Visit: 10/24/2023      To Whom it May Concern:    Carina Marcial is under my professional care. Hazel Alonso was seen in my office on 10/24/2023. Please excuse Hazel Alonso from work at this time due to medical complications of pregnancy. If she is feeling better she can return to work as she feels able. Otherwise she will be evaluated at her next appointment with us on 11/15/2023 to see if she is able to return at that time. If you have any questions or concerns, please don't hesitate to call.          Sincerely,          [facility]

## 2023-10-25 ENCOUNTER — ULTRASOUND (OUTPATIENT)
Dept: PERINATAL CARE | Facility: OTHER | Age: 23
End: 2023-10-25
Payer: COMMERCIAL

## 2023-10-25 ENCOUNTER — APPOINTMENT (OUTPATIENT)
Dept: LAB | Facility: HOSPITAL | Age: 23
End: 2023-10-25
Payer: COMMERCIAL

## 2023-10-25 VITALS
WEIGHT: 114.8 LBS | BODY MASS INDEX: 23.14 KG/M2 | DIASTOLIC BLOOD PRESSURE: 64 MMHG | HEIGHT: 59 IN | SYSTOLIC BLOOD PRESSURE: 108 MMHG | HEART RATE: 95 BPM

## 2023-10-25 DIAGNOSIS — O21.0 HYPEREMESIS AFFECTING PREGNANCY, ANTEPARTUM: Primary | ICD-10-CM

## 2023-10-25 DIAGNOSIS — Z3A.28 28 WEEKS GESTATION OF PREGNANCY: ICD-10-CM

## 2023-10-25 LAB
ERYTHROCYTE [DISTWIDTH] IN BLOOD BY AUTOMATED COUNT: 13.6 % (ref 11.6–15.1)
GLUCOSE 1H P 50 G GLC PO SERPL-MCNC: 147 MG/DL (ref 40–134)
HCT VFR BLD AUTO: 32.4 % (ref 34.8–46.1)
HGB BLD-MCNC: 10.6 G/DL (ref 11.5–15.4)
MCH RBC QN AUTO: 31.1 PG (ref 26.8–34.3)
MCHC RBC AUTO-ENTMCNC: 32.7 G/DL (ref 31.4–37.4)
MCV RBC AUTO: 95 FL (ref 82–98)
PLATELET # BLD AUTO: 241 THOUSANDS/UL (ref 149–390)
PMV BLD AUTO: 9.6 FL (ref 8.9–12.7)
RBC # BLD AUTO: 3.41 MILLION/UL (ref 3.81–5.12)
WBC # BLD AUTO: 9.05 THOUSAND/UL (ref 4.31–10.16)

## 2023-10-25 PROCEDURE — 99212 OFFICE O/P EST SF 10 MIN: CPT | Performed by: OBSTETRICS & GYNECOLOGY

## 2023-10-25 PROCEDURE — 76816 OB US FOLLOW-UP PER FETUS: CPT | Performed by: OBSTETRICS & GYNECOLOGY

## 2023-10-25 PROCEDURE — 86780 TREPONEMA PALLIDUM: CPT

## 2023-10-25 PROCEDURE — 82950 GLUCOSE TEST: CPT

## 2023-10-25 PROCEDURE — 85027 COMPLETE CBC AUTOMATED: CPT

## 2023-10-25 NOTE — PROGRESS NOTES
[address] [facility]: Girma Means was seen today for fetal growth assessment ultrasound. See ultrasound report under "OB Procedures" tab. The time spent on this established patient on the encounter date included 5 minutes previsit service time reviewing records and precharting, 5 minutes face-to-face service time counseling regarding results and coordinating care, and  4 minutes charting, totalling 14 minutes.   Please don't hesitate to contact our office with any concerns or questions.  -Linda Guevara MD

## 2023-10-25 NOTE — PATIENT INSTRUCTIONS
Thank you for choosing us for your  care today. If you have any questions about your ultrasound or care, please do not hesitate to contact us or your primary obstetrician. Some general instructions for your pregnancy are:    Protect against coronavirus: get vaccinated - pregnant women are increased risk of severe COVID. Notify your primary care doctor if you have any symptoms. Exercise: Aim for 22 minutes per day (150 minutes per week) of regular exercise. Walking is great! Nutrition: aim for calcium-rich and iron-rich foods as well as healthy sources of protein. Learn about Preeclampsia: preeclampsia is a common, serious high blood pressure complication in pregnancy. A blood pressure of 858KRXK (systolic or top number) or 45ZWYJ (diastolic or bottom number) is not normal and needs evaluation by your doctor. Aspirin is sometimes prescribed in early pregnancy to prevent preeclampsia in women with risk factors - ask your obstetrician if you should be on this medication. If you smoke, try to reduce how many cigarettes you smoke or try to quit completely. Do not vape. Other warning signs to watch out for in pregnancy or postpartum: chest pain, obstructed breathing or shortness of breath, seizures, thoughts of hurting yourself or your baby, bleeding, a painful or swollen leg, fever, or headache (see [facility] POST-BIRTH Warning Signs campaign). If these happen call 911. Itching is also not normal in pregnancy and if you experience this, especially over your hands and feet, potentially worse at night, notify your doctors.

## 2023-10-26 LAB — TREPONEMA PALLIDUM IGG+IGM AB [PRESENCE] IN SERUM OR PLASMA BY IMMUNOASSAY: NORMAL

## 2023-10-27 ENCOUNTER — HOSPITAL ENCOUNTER (OUTPATIENT)
Dept: INFUSION CENTER | Facility: HOSPITAL | Age: 23
Discharge: HOME | End: 2023-10-27
Attending: OBSTETRICS & GYNECOLOGY

## 2023-10-27 PROBLEM — O99.810 ABNORMAL GLUCOSE TOLERANCE TEST (GTT) DURING PREGNANCY, ANTEPARTUM: Status: ACTIVE | Noted: 2023-10-27

## 2023-10-27 PROBLEM — O99.019 ANTEPARTUM ANEMIA: Status: ACTIVE | Noted: 2023-10-27

## 2023-10-30 ENCOUNTER — HOSPITAL ENCOUNTER (OUTPATIENT)
Dept: INFUSION CENTER | Facility: HOSPITAL | Age: 23
Discharge: HOME | End: 2023-10-30
Attending: OBSTETRICS & GYNECOLOGY

## 2023-10-30 DIAGNOSIS — O99.810 ABNORMAL GLUCOSE AFFECTING PREGNANCY: Primary | ICD-10-CM

## 2023-11-01 ENCOUNTER — HOSPITAL ENCOUNTER (OUTPATIENT)
Dept: INFUSION CENTER | Facility: HOSPITAL | Age: 23
Discharge: HOME | End: 2023-11-01
Attending: OBSTETRICS & GYNECOLOGY
Payer: COMMERCIAL

## 2023-11-01 VITALS
SYSTOLIC BLOOD PRESSURE: 114 MMHG | TEMPERATURE: 96.6 F | RESPIRATION RATE: 16 BRPM | HEART RATE: 86 BPM | OXYGEN SATURATION: 97 % | DIASTOLIC BLOOD PRESSURE: 61 MMHG

## 2023-11-01 DIAGNOSIS — O21.0 HYPEREMESIS AFFECTING PREGNANCY, ANTEPARTUM: Primary | ICD-10-CM

## 2023-11-01 LAB
ALBUMIN SERPL BCP-MCNC: 3.6 G/DL (ref 3.5–5)
ALP SERPL-CCNC: 48 U/L (ref 34–104)
ALT SERPL W P-5'-P-CCNC: 21 U/L (ref 7–52)
ANION GAP SERPL CALCULATED.3IONS-SCNC: 7 MMOL/L
AST SERPL W P-5'-P-CCNC: 19 U/L (ref 13–39)
BILIRUB SERPL-MCNC: 0.27 MG/DL (ref 0.2–1)
BUN SERPL-MCNC: 8 MG/DL (ref 5–25)
CALCIUM SERPL-MCNC: 8.9 MG/DL (ref 8.4–10.2)
CHLORIDE SERPL-SCNC: 106 MMOL/L (ref 96–108)
CO2 SERPL-SCNC: 24 MMOL/L (ref 21–32)
CREAT SERPL-MCNC: 0.56 MG/DL (ref 0.6–1.3)
GFR SERPL CREATININE-BSD FRML MDRD: 134 ML/MIN/1.73SQ M
GLUCOSE SERPL-MCNC: 97 MG/DL (ref 65–140)
MAGNESIUM SERPL-MCNC: 1.7 MG/DL (ref 1.9–2.7)
POTASSIUM SERPL-SCNC: 3.7 MMOL/L (ref 3.5–5.3)
PROT SERPL-MCNC: 6.4 G/DL (ref 6.4–8.4)
SODIUM SERPL-SCNC: 137 MMOL/L (ref 135–147)

## 2023-11-01 PROCEDURE — 96365 THER/PROPH/DIAG IV INF INIT: CPT

## 2023-11-01 PROCEDURE — 80053 COMPREHEN METABOLIC PANEL: CPT

## 2023-11-01 PROCEDURE — 96361 HYDRATE IV INFUSION ADD-ON: CPT

## 2023-11-01 PROCEDURE — 83735 ASSAY OF MAGNESIUM: CPT

## 2023-11-01 RX ORDER — POTASSIUM CHLORIDE 14.9 MG/ML
20 INJECTION INTRAVENOUS ONCE
OUTPATIENT
Start: 2023-11-02

## 2023-11-01 RX ORDER — MAGNESIUM SULFATE HEPTAHYDRATE 40 MG/ML
2 INJECTION, SOLUTION INTRAVENOUS ONCE
Status: COMPLETED | OUTPATIENT
Start: 2023-11-01 | End: 2023-11-01

## 2023-11-01 RX ORDER — MAGNESIUM SULFATE HEPTAHYDRATE 40 MG/ML
2 INJECTION, SOLUTION INTRAVENOUS ONCE
OUTPATIENT
Start: 2023-11-03

## 2023-11-01 RX ADMIN — SODIUM CHLORIDE, SODIUM LACTATE, POTASSIUM CHLORIDE, AND CALCIUM CHLORIDE 1000 ML: .6; .31; .03; .02 INJECTION, SOLUTION INTRAVENOUS at 12:23

## 2023-11-01 RX ADMIN — MAGNESIUM SULFATE HEPTAHYDRATE 2 G: 2 INJECTION, SOLUTION INTRAVENOUS at 13:11

## 2023-11-01 NOTE — PROGRESS NOTES
Patient tolerated treatment today with no adverse reactions. Pt educated to notify MD with concerns Patient gave verbal understanding. Patient then d/cd home ambulatory with steady gait.

## 2023-11-01 NOTE — PROGRESS NOTES
Magnesium 1.7.  patient will receive magnesium infusion today.   Potassium normal at 3.7 no need to replace

## 2023-11-01 NOTE — PLAN OF CARE
Problem: Knowledge Deficit  Goal: Patient/family/caregiver demonstrates understanding of disease process, treatment plan, medications, and discharge instructions  Description: Complete learning assessment and assess knowledge base.   Interventions:  - Provide teaching at level of understanding  - Provide teaching via preferred learning methods  11/1/2023 1227 by Allan Campos RN  Outcome: Progressing  11/1/2023 1227 by Allan Campos RN  Outcome: Progressing

## 2023-11-02 ENCOUNTER — APPOINTMENT (OUTPATIENT)
Dept: LAB | Facility: HOSPITAL | Age: 23
End: 2023-11-02
Payer: COMMERCIAL

## 2023-11-02 DIAGNOSIS — O99.810 ABNORMAL GLUCOSE AFFECTING PREGNANCY: ICD-10-CM

## 2023-11-02 LAB
GLUCOSE 1H P 100 G GLC PO SERPL-MCNC: 156 MG/DL (ref 65–179)
GLUCOSE 2H P 100 G GLC PO SERPL-MCNC: 119 MG/DL (ref 65–154)
GLUCOSE 3H P 100 G GLC PO SERPL-MCNC: 106 MG/DL (ref 65–139)
GLUCOSE P FAST SERPL-MCNC: 83 MG/DL (ref 65–94)

## 2023-11-02 PROCEDURE — 36415 COLL VENOUS BLD VENIPUNCTURE: CPT

## 2023-11-02 PROCEDURE — 82951 GLUCOSE TOLERANCE TEST (GTT): CPT

## 2023-11-02 PROCEDURE — 82952 GTT-ADDED SAMPLES: CPT

## 2023-11-15 ENCOUNTER — ROUTINE PRENATAL (OUTPATIENT)
Dept: OBGYN CLINIC | Facility: CLINIC | Age: 23
End: 2023-11-15
Payer: COMMERCIAL

## 2023-11-15 ENCOUNTER — HOSPITAL ENCOUNTER (OUTPATIENT)
Dept: INFUSION CENTER | Facility: HOSPITAL | Age: 23
Discharge: HOME | End: 2023-11-15
Attending: OBSTETRICS & GYNECOLOGY

## 2023-11-15 VITALS
BODY MASS INDEX: 24.44 KG/M2 | WEIGHT: 121.2 LBS | DIASTOLIC BLOOD PRESSURE: 60 MMHG | HEIGHT: 59 IN | SYSTOLIC BLOOD PRESSURE: 108 MMHG

## 2023-11-15 DIAGNOSIS — O99.019 ANTEPARTUM ANEMIA: ICD-10-CM

## 2023-11-15 DIAGNOSIS — O99.810 ABNORMAL GLUCOSE TOLERANCE TEST (GTT) DURING PREGNANCY, ANTEPARTUM: ICD-10-CM

## 2023-11-15 DIAGNOSIS — O21.0 HYPEREMESIS AFFECTING PREGNANCY, ANTEPARTUM: Primary | ICD-10-CM

## 2023-11-15 DIAGNOSIS — O99.343 MENTAL DISORDER AFFECTING PREGNANCY IN THIRD TRIMESTER: ICD-10-CM

## 2023-11-15 DIAGNOSIS — B95.1 GROUP B STREPTOCOCCUS URINARY TRACT INFECTION AFFECTING PREGNANCY IN FIRST TRIMESTER: ICD-10-CM

## 2023-11-15 DIAGNOSIS — Z3A.31 31 WEEKS GESTATION OF PREGNANCY: ICD-10-CM

## 2023-11-15 DIAGNOSIS — O23.41 GROUP B STREPTOCOCCUS URINARY TRACT INFECTION AFFECTING PREGNANCY IN FIRST TRIMESTER: ICD-10-CM

## 2023-11-15 PROBLEM — O23.03 PYELONEPHRITIS AFFECTING PREGNANCY IN THIRD TRIMESTER: Status: ACTIVE | Noted: 2023-10-05

## 2023-11-15 LAB
SL AMB  POCT GLUCOSE, UA: NEGATIVE
SL AMB POCT URINE PROTEIN: NEGATIVE

## 2023-11-15 PROCEDURE — PNV: Performed by: OBSTETRICS & GYNECOLOGY

## 2023-11-15 PROCEDURE — 81002 URINALYSIS NONAUTO W/O SCOPE: CPT | Performed by: OBSTETRICS & GYNECOLOGY

## 2023-11-15 RX ORDER — ASPIRIN 81 MG/1
162 TABLET, CHEWABLE ORAL DAILY
COMMUNITY

## 2023-11-15 NOTE — PROGRESS NOTES
Routine Prenatal Visit  [address] [place] [address], [address], [facility], [address] [facility],8W    Assessment/Plan:  Yvrose Chiu is a 23y.o. year old  at 31w0d who presents for routine prenatal visit. 1. Hyperemesis affecting pregnancy, antepartum  Assessment & Plan:  Yvrose Chiu has been doing better and not presented for IVF since 2023. She feels no longer needed. Cancelled future appts. Continue po regimen. She still states she has good days and bad, but overall able to eat without vomiting most days. Patient has gained 28 lbs today and 5lbs since last visit 2 weeks ago. FH today 2cm below GA but normal growth u/s 10/25/2023 and repeat scheduled 2023.      2. Mental disorder affecting pregnancy in third trimester  Assessment & Plan:  No medications. Pt reports doing well. 3. Group B Streptococcus urinary tract infection affecting pregnancy in first trimester  Assessment & Plan: Will treat in labor with prophylaxis. 4. Abnormal glucose tolerance test (GTT) during pregnancy, antepartum  Assessment & Plan:  3hr GTT normal - no GDM. 5. Antepartum anemia  Assessment & Plan: Tolerating PO iron. 6. 31 weeks gestation of pregnancy  -     POCT urine dip        Next OB Visit 2 weeks. Subjective:     CC: Prenatal care    Christopher Camargo is a 23 y.o.  female who presents for routine prenatal care at 31w0d. Pregnancy ROS: no leakage of fluid, pelvic pain, or vaginal bleeding. normal fetal movement.     The following portions of the patient's history were reviewed and updated as appropriate: allergies, current medications, past family history, past medical history, obstetric history, gynecologic history, past social history, past surgical history and problem list.      Objective:  /60   Ht 4' 11" (1.499 m)   Wt 55 kg (121 lb 3.2 oz)   LMP 2023   BMI 24.48 kg/m²   Pregravid Weight/BMI: 42.2 kg (93 lb) (BMI 18.77)  Current Weight: 55 kg (121 lb 3.2 oz) Total Weight Gain: 12.8 kg (28 lb 3.2 oz)   Pre- Vitals      Flowsheet Row Most Recent Value   Prenatal Assessment    Fetal Heart Rate 140   Fundal Height (cm) 29 cm   Movement Present   Prenatal Vitals    Blood Pressure 108/60   Weight - Scale 55 kg (121 lb 3.2 oz)   Urine Albumin/Glucose    Dilation/Effacement/Station    Vaginal Drainage    Edema    LLE Edema None   RLE Edema None   Facial Edema None             General: Well appearing, no distress  Abdomen: Soft, gravid, nontender  Extremities: Non tender.

## 2023-11-15 NOTE — ASSESSMENT & PLAN NOTE
Janene Reed has been doing better and not presented for IVF since 11/1/2023. She feels no longer needed. Cancelled future appts. Continue po regimen. She still states she has good days and bad, but overall able to eat without vomiting most days. Patient has gained 28 lbs today and 5lbs since last visit 2 weeks ago. FH today 2cm below GA but normal growth u/s 10/25/2023 and repeat scheduled 11/27/2023.

## 2023-11-24 NOTE — PROGRESS NOTES
Please refer to the [facility] ultrasound report in Ob Procedures for additional information regarding today's visit

## 2023-11-26 ENCOUNTER — HOSPITAL ENCOUNTER (OUTPATIENT)
Facility: HOSPITAL | Age: 23
Discharge: HOME | End: 2023-11-26
Attending: OBSTETRICS & GYNECOLOGY | Admitting: OBSTETRICS & GYNECOLOGY
Payer: COMMERCIAL

## 2023-11-26 VITALS
SYSTOLIC BLOOD PRESSURE: 120 MMHG | HEART RATE: 89 BPM | RESPIRATION RATE: 18 BRPM | DIASTOLIC BLOOD PRESSURE: 75 MMHG | TEMPERATURE: 98.1 F

## 2023-11-26 PROBLEM — O23.41 GROUP B STREPTOCOCCUS URINARY TRACT INFECTION AFFECTING PREGNANCY IN FIRST TRIMESTER: Status: RESOLVED | Noted: 2023-06-07 | Resolved: 2023-11-26

## 2023-11-26 PROBLEM — B95.1 GROUP B STREPTOCOCCUS URINARY TRACT INFECTION AFFECTING PREGNANCY IN FIRST TRIMESTER: Status: RESOLVED | Noted: 2023-06-07 | Resolved: 2023-11-26

## 2023-11-26 LAB
BACTERIA UR QL AUTO: ABNORMAL /HPF
BILIRUB UR QL STRIP: NEGATIVE
CLARITY UR: ABNORMAL
COLOR UR: YELLOW
GLUCOSE UR STRIP-MCNC: NEGATIVE MG/DL
HGB UR QL STRIP.AUTO: ABNORMAL
KETONES UR STRIP-MCNC: NEGATIVE MG/DL
LEUKOCYTE ESTERASE UR QL STRIP: NEGATIVE
NITRITE UR QL STRIP: NEGATIVE
NON-SQ EPI CELLS URNS QL MICRO: ABNORMAL /HPF
PH UR STRIP.AUTO: 7 [PH]
PROT UR STRIP-MCNC: ABNORMAL MG/DL
RBC #/AREA URNS AUTO: ABNORMAL /HPF
SP GR UR STRIP.AUTO: 1.01 (ref 1–1.03)
UROBILINOGEN UR STRIP-ACNC: <2 MG/DL
WBC #/AREA URNS AUTO: ABNORMAL /HPF

## 2023-11-26 PROCEDURE — 76815 OB US LIMITED FETUS(S): CPT | Performed by: OBSTETRICS & GYNECOLOGY

## 2023-11-26 PROCEDURE — 87086 URINE CULTURE/COLONY COUNT: CPT | Performed by: OBSTETRICS & GYNECOLOGY

## 2023-11-26 PROCEDURE — 99213 OFFICE O/P EST LOW 20 MIN: CPT

## 2023-11-26 PROCEDURE — NC001 PR NO CHARGE: Performed by: OBSTETRICS & GYNECOLOGY

## 2023-11-26 PROCEDURE — 76817 TRANSVAGINAL US OBSTETRIC: CPT | Performed by: OBSTETRICS & GYNECOLOGY

## 2023-11-26 PROCEDURE — 59025 FETAL NON-STRESS TEST: CPT | Performed by: OBSTETRICS & GYNECOLOGY

## 2023-11-26 PROCEDURE — 81001 URINALYSIS AUTO W/SCOPE: CPT | Performed by: OBSTETRICS & GYNECOLOGY

## 2023-11-26 RX ORDER — CALCIUM CARBONATE 500 MG/1
1000 TABLET, CHEWABLE ORAL ONCE
Status: COMPLETED | OUTPATIENT
Start: 2023-11-26 | End: 2023-11-26

## 2023-11-26 RX ADMIN — CALCIUM CARBONATE (ANTACID) CHEW TAB 500 MG 1000 MG: 500 CHEW TAB at 22:02

## 2023-11-27 ENCOUNTER — ULTRASOUND (OUTPATIENT)
Dept: PERINATAL CARE | Facility: OTHER | Age: 23
End: 2023-11-27
Payer: COMMERCIAL

## 2023-11-27 VITALS
BODY MASS INDEX: 24.76 KG/M2 | HEART RATE: 81 BPM | DIASTOLIC BLOOD PRESSURE: 64 MMHG | HEIGHT: 59 IN | SYSTOLIC BLOOD PRESSURE: 102 MMHG | WEIGHT: 122.8 LBS

## 2023-11-27 DIAGNOSIS — Z36.4 ULTRASOUND FOR ANTENATAL SCREENING FOR FETAL GROWTH RESTRICTION: Primary | ICD-10-CM

## 2023-11-27 DIAGNOSIS — Z3A.32 32 WEEKS GESTATION OF PREGNANCY: ICD-10-CM

## 2023-11-27 PROCEDURE — 76816 OB US FOLLOW-UP PER FETUS: CPT | Performed by: OBSTETRICS & GYNECOLOGY

## 2023-11-27 PROCEDURE — 99213 OFFICE O/P EST LOW 20 MIN: CPT | Performed by: OBSTETRICS & GYNECOLOGY

## 2023-11-27 NOTE — PATIENT INSTRUCTIONS
Kick Counts in Pregnancy   WHAT YOU NEED TO KNOW:   What do I need to know about kick counts? Kick counts measure how much your baby is moving in your womb. A kick from your baby can be felt as a twist, turn, swish, roll, or jab. It is common to feel your baby kicking at 26 to 28 weeks of pregnancy. You may feel your baby kick as early as 20 weeks of pregnancy. You may want to start counting at 28 weeks. Why should I measure kick counts? Your baby's movement may provide information about your baby's health. He or she may move less, or not at all, if there are problems. Your baby may move less if he or she is not getting enough oxygen or nutrition from the placenta. Do not smoke while you are pregnant. Smoking decreases the amount of oxygen that gets to your baby. Talk to your healthcare provider if you need help to quit smoking. Tell your healthcare provider as soon as you feel a change in your baby's movements. When do I measure kick counts? Measure kick counts at the same time every day. Measure kick counts when your baby is awake and most active. Your baby may be most active in the evening. How do I measure kick counts? Check that your baby is awake before you measure kick counts. You can wake up your baby by lightly pushing on your belly, walking, or drinking something cold. Your healthcare provider may tell you different ways to measure kick counts. You may be told to do the following:  Use a chart or clock to keep track of the time you start and finish counting. Sit in a chair or lie on your left side. Place your hands on the largest part of your belly. Count until you reach 10 kicks. Write down how much time it takes to count 10 kicks. It may take 30 minutes to 2 hours to count 10 kicks. It should not take more than 2 hours to count 10 kicks. When should I contact my doctor? You feel a change in the number of kicks or movements of your baby.      You feel fewer than 10 kicks within 2 hours. You have questions or concerns about your baby's movements. CARE AGREEMENT:   You have the right to help plan your care. Learn about your health condition and how it may be treated. Discuss treatment options with your healthcare providers to decide what care you want to receive. You always have the right to refuse treatment. The above information is an  only. It is not intended as medical advice for individual conditions or treatments. Talk to your doctor, nurse or pharmacist before following any medical regimen to see if it is safe and effective for you. © Copyright [employer] [facility] 2023 Information is for End User's use only and may not be sold, redistributed or otherwise used for commercial purposes.

## 2023-11-27 NOTE — PROGRESS NOTES
Triage Note - OB  William Lozano 26 y.o. female MRN: [id number]  Unit/Bed#: LD TRIAGE  Encounter: [id number]    Chief Complaint:     SUBJECTIVE    HPI: 26 y.o.  at 32w4d with c/o cramping for 1 day. She reports that she feels it in lower abdomen and it comes and goes. It sometimes lasts for a few seconds and other times a few minutes. She denies fever, dysuria. neg vaginal bleeding   neg loss of fluid   pos fetal movement    Pt's last coitus: 2 days ago      OBJECTIVE    Complications of pregnancy:   Patient Active Problem List   Diagnosis    Other headache syndrome    Cerebellar tonsillar ectopia (HCC)    COVID-19    Allergic rhinitis    Nephrolithiasis    Left flank pain    Anxiety and depression    Vitamin D deficiency    Mental disorder affecting pregnancy in third trimester    Nausea and vomiting in pregnancy    27 weeks gestation of pregnancy    History of pyelonephritis    Hyperemesis affecting pregnancy, antepartum    Encounter for removal of sutures    Pyelonephritis affecting pregnancy in third trimester    Abnormal glucose tolerance test (GTT) during pregnancy, antepartum    Antepartum anemia       GBS: +  Blood type: B+    Estimated Date of Delivery: 24    Vitals: [facility]  Vitals:    23 2110   BP: 120/75   Pulse: 89   Resp: 18   Temp: 98.1 °F (36.7 °C)     There is no height or weight on file to calculate BMI. FHR: 130s, reactive  [place]: irregular      Physical Exam  Constitutional:       General: She is not in acute distress. Appearance: Normal appearance. She is not ill-appearing or toxic-appearing. Genitourinary:      Vulva normal.      No vaginal discharge. No cervical discharge. Cervical exam comments: Appears closed/long. HENT:      Head: Normocephalic and atraumatic. Pulmonary:      Effort: Pulmonary effort is normal.   Abdominal:      Palpations: Abdomen is soft. There is no mass. Tenderness: There is no abdominal tenderness.  There is no right CVA tenderness, left CVA tenderness, guarding or rebound. Musculoskeletal:         General: Normal range of motion. Neurological:      Mental Status: She is alert and oriented to person, place, and time. Skin:     General: Skin is warm and dry. Psychiatric:         Mood and Affect: Mood normal.         Behavior: Behavior normal.         Thought Content: Thought content normal.         Judgment: Judgment normal.          U/S (see procedure note)  CL 3.6  Vertex  LVP (cm): 4.9 cm       Labs:   Admission on 2023   Component Date Value    Color, UA 2023 Yellow     Clarity, UA 2023 Cloudy     Specific Gravity, UA 2023 1.015     pH, UA 2023 7.0     Leukocytes, UA 2023 Negative     Nitrite, UA 2023 Negative     Protein, UA 2023 30 (1+) (A)     Glucose, UA 2023 Negative     Ketones, UA 2023 Negative     Urobilinogen, UA 2023 <2.0     Bilirubin, UA 2023 Negative     Occult Blood, UA 2023 Large (A)     RBC, UA 2023 30-50 (A)     WBC, UA 2023 0-1 (A)     Epithelial Cells 2023 Occasional     Bacteria, UA 2023 None Seen          A/P  26 y.o. female  at 32w4d with c/o cramping x 1 day. No signs of PTL. Urine with blood. Follows with nephrology for hx of kidney stones. Currently no signs of UTI or nephrolithiasis. Will send urine for culture. Keep appt with [facility] for tomorrow.        Angella Boss MD   OB-GYN  2023 10:17 PM

## 2023-11-27 NOTE — LETTER
November 27, 2023     Amadou Barragan MD  [address]  [address] [id number]    Patient: Luma Lozano   YOB: 2000   Date of Visit: 11/27/2023       Dear Dr. Grant Salmeron: Thank you for referring Johny Salamanca to me for evaluation. Below are my notes for this consultation. If you have questions, please do not hesitate to call me. I look forward to following your patient along with you.          Sincerely,        Bryan Klein MD        CC: No Recipients    Bryan Klein MD  11/27/2023  2:39 PM  Sign when Signing Visit  Please refer to the [facility] ultrasound report in Ob Procedures for additional information regarding today's visit

## 2023-11-27 NOTE — PROCEDURES
Tanya La, a  at 32w4d with an JOSE of 2024, by Ultrasound, was seen at [address] for the following procedure(s): $Procedure Type: US - Transvaginal, US - abdominal, ARABELLA, NST]    Nonstress Test  Variability: Moderate  Decelerations: None  Accelerations: Yes  Acoustic Stimulator: No  Baseline: 130 BPM  Uterine Irritability: Yes  Contractions: Irregular    4 Quadrant ARABELLA  LVP (cm): 4.9 cm         Ultrasound Other  Fetal Presentation: Vertex  Cervical Length: 3.6  Funnel: No  Debris: No  Placenta Location: Anterior    Interpretation  Nonstress Test Interpretation: Reactive  Overall Impression: Reassuring    Ultrasound Probe Disinfection    A transvaginal ultrasound was performed.    Prior to use, disinfection was performed with High Level Disinfection Process (Trophon)  Probe serial number  [id number]  was used    Harvey Suresh MD  23  9:59 PM

## 2023-11-28 PROBLEM — Z3A.33 33 WEEKS GESTATION OF PREGNANCY: Status: ACTIVE | Noted: 2023-06-07

## 2023-11-28 LAB
BACTERIA UR CULT: ABNORMAL
BACTERIA UR CULT: ABNORMAL

## 2023-12-11 ENCOUNTER — ROUTINE PRENATAL (OUTPATIENT)
Dept: OBGYN CLINIC | Facility: CLINIC | Age: 23
End: 2023-12-11
Payer: COMMERCIAL

## 2023-12-11 ENCOUNTER — TELEPHONE (OUTPATIENT)
Dept: OBGYN CLINIC | Facility: CLINIC | Age: 23
End: 2023-12-11

## 2023-12-11 VITALS
SYSTOLIC BLOOD PRESSURE: 110 MMHG | BODY MASS INDEX: 25.2 KG/M2 | WEIGHT: 125 LBS | DIASTOLIC BLOOD PRESSURE: 64 MMHG | HEIGHT: 59 IN

## 2023-12-11 DIAGNOSIS — O99.019 ANTEPARTUM ANEMIA: ICD-10-CM

## 2023-12-11 DIAGNOSIS — O21.9 NAUSEA AND VOMITING IN PREGNANCY: ICD-10-CM

## 2023-12-11 DIAGNOSIS — O99.343 MENTAL DISORDER AFFECTING PREGNANCY IN THIRD TRIMESTER: ICD-10-CM

## 2023-12-11 DIAGNOSIS — Z23 FLU VACCINE NEED: ICD-10-CM

## 2023-12-11 DIAGNOSIS — Z3A.34 34 WEEKS GESTATION OF PREGNANCY: Primary | ICD-10-CM

## 2023-12-11 LAB
SL AMB  POCT GLUCOSE, UA: NORMAL
SL AMB POCT URINE PROTEIN: NORMAL

## 2023-12-11 PROCEDURE — 81002 URINALYSIS NONAUTO W/O SCOPE: CPT | Performed by: OBSTETRICS & GYNECOLOGY

## 2023-12-11 PROCEDURE — 90686 IIV4 VACC NO PRSV 0.5 ML IM: CPT

## 2023-12-11 PROCEDURE — 90471 IMMUNIZATION ADMIN: CPT

## 2023-12-11 PROCEDURE — PNV: Performed by: OBSTETRICS & GYNECOLOGY

## 2023-12-11 NOTE — PROGRESS NOTES
Routine Prenatal Visit  [address] [place] [address], [address], [facility], [address] [facility],8W    Assessment/Plan:  Janice Escudero is a 26y.o. year old  at 34w5d who presents for routine prenatal visit. 1. 34 weeks gestation of pregnancy  -     POCT urine dip    2. Mental disorder affecting pregnancy in third trimester    3. Nausea and vomiting in pregnancy    4. Antepartum anemia    5. Flu vaccine need  -     influenza vaccine, quadrivalent, 0.5 mL, preservative-free, for adult and pediatric patients 6 mos+ (AFLURIA, FLUARIX, FLULAVAL, FLUZONE)        Next OB Visit 2 weeks. Subjective:     CC: Prenatal care    Lizet Diallo is a 26 y.o.  female who presents for routine prenatal care at 34w5d. Pregnancy ROS: no leakage of fluid, pelvic pain, or vaginal bleeding. normal fetal movement. The following portions of the patient's history were reviewed and updated as appropriate: allergies, current medications, past family history, past medical history, obstetric history, gynecologic history, past social history, past surgical history and problem list.      Objective:  /64 (BP Location: Left arm, Patient Position: Sitting, Cuff Size: Standard)   Ht 4' 11" (1.499 m)   Wt 56.7 kg (125 lb)   LMP 2023   BMI 25.25 kg/m²   Pregravid Weight/BMI: 42.2 kg (93 lb) (BMI 18.77)  Current Weight: 56.7 kg (125 lb)   Total Weight Gain: 14.5 kg (32 lb)   Pre- Vitals      Flowsheet Row Most Recent Value   Prenatal Assessment    Prenatal Vitals    Blood Pressure 110/64   Weight - Scale 56.7 kg (125 lb)   Urine Albumin/Glucose    Dilation/Effacement/Station    Vaginal Drainage    Edema              General: Well appearing, no distress  Abdomen: Soft, gravid, nontender  Extremities: Non tender.

## 2023-12-11 NOTE — TELEPHONE ENCOUNTER
Noel Siddiqi was seen by Dr. Wendy Sanchez today. Greg Arreola, pts mother left a message having a question about Induction date at 43 weeks. Called to speak with Noel Siddiqi, but her mother answered Cristal's phone. Noel Siddiqi did get on phone & informed her ,will speak to her about today's OB appt. Noel Siddiqi said Dr. Wendy Sanchez had informed about Induction at 39 weeks. Informed Noel Siddiqi of 39 weeks, is the earliest to schedule Induction. Dr. Wendy Sanchez did not note anything about her needing an Induction at 39 weeks. Dr. Wendy Sanchez was providing general information. As speaking on phone with Scott Him interrupted into conversation without nurses notice. Reiterated same information to Greg Arreola.

## 2023-12-12 NOTE — TELEPHONE ENCOUNTER
I received msg from  yesterday that [address], Greg Arreola, had called and asked to talk to me. I called Noel Siddiqi and got her cell phone and left a message I was going to call her mom. Drew Peralta and talked with her. She said she was not at appt yesterday with Noel Devang and Dr. Wendy Sanchez had mentioned induction at 39 weeks. They did not understand why. I reviewed chart and note - there is no indication for induction at 39 weeks. I explained that anyone can be induced at 39 weeks and likely Noel Siddiqi had said she was uncomfortable in pregnancy and Dr. Wendy Sanchez had offered at 39 weeks we could induce if she was still pregnant. Reassured no current indication for induction. Greg Maxwell thanked me for call. She then mentioned Noel Siddiqi was having leg cramps. I recommended she take Magnesium 400mg daily po if not already taking to help with cramps.

## 2023-12-17 ENCOUNTER — HOSPITAL ENCOUNTER (OUTPATIENT)
Facility: HOSPITAL | Age: 23
Setting detail: OBSERVATION
Discharge: HOME | End: 2023-12-18
Attending: OBSTETRICS & GYNECOLOGY | Admitting: OBSTETRICS & GYNECOLOGY
Payer: COMMERCIAL

## 2023-12-18 VITALS
OXYGEN SATURATION: 96 % | SYSTOLIC BLOOD PRESSURE: 120 MMHG | RESPIRATION RATE: 18 BRPM | TEMPERATURE: 98.2 F | DIASTOLIC BLOOD PRESSURE: 76 MMHG | HEART RATE: 88 BPM

## 2023-12-18 PROBLEM — O47.9 BRAXTON HICKS CONTRACTIONS: Status: ACTIVE | Noted: 2023-12-18

## 2023-12-18 PROCEDURE — NC001 PR NO CHARGE: Performed by: OBSTETRICS & GYNECOLOGY

## 2023-12-18 PROCEDURE — 99212 OFFICE O/P EST SF 10 MIN: CPT

## 2023-12-18 PROCEDURE — G0379 DIRECT REFER HOSPITAL OBSERV: HCPCS

## 2023-12-18 RX ORDER — MULTIVITAMIN WITH IRON
250 TABLET ORAL ONCE
COMMUNITY

## 2023-12-18 NOTE — PROGRESS NOTES
L&D Triage Note - OB/GYN  Cristal Estrada 23 y.o. female MRN: [id number]  Unit/Bed#:  TRIAGE 2 Encounter: [id number]    Cristal Estrada is a patient of [facility]    SUBJECTIVE    JOSE: Estimated Date of Delivery: 24    HPI:  23 y.o.  35w5d presents with complaint of contractions.     Contractions: mild, every 5-8 mins  Leakage of fluid: none  Vaginal Bleeding: none  Fetal movement: present yes    Her current obstetrical history is significant for anemia      ROS:  Constitutional: Negative for fever/chills/headache  Respiratory: Negative for cough/cold  Cardiovascular: Negative for palpitations/lower extremity edema    Gastrointestinal: Negative for nausea/vomit/diarrhea    OBJECTIVE:  /76 (BP Location: Right arm)   Pulse 88   Temp 98.2 °F (36.8 °C) (Temporal)   Resp 18   LMP 2023   SpO2 96%   There is no height or weight on file to calculate BMI.    Physical Exam  Constitutional:       General: She is not in acute distress.     Appearance: Normal appearance. She is normal weight. She is not ill-appearing, toxic-appearing or diaphoretic.   HENT:      Head: Normocephalic.      Nose: Nose normal.      Mouth/Throat:      Mouth: Mucous membranes are moist.   Eyes:      General: No scleral icterus.  Cardiovascular:      Rate and Rhythm: Normal rate.      Pulses: Normal pulses.   Pulmonary:      Effort: Pulmonary effort is normal. No respiratory distress.      Breath sounds: No wheezing.   Abdominal:      General: Bowel sounds are normal. There is no distension.      Palpations: Abdomen is soft. There is mass (gravid uterus).      Tenderness: There is no abdominal tenderness. There is no guarding or rebound.   Genitourinary:     General: Normal vulva.      Vagina: No vaginal discharge.   Musculoskeletal:      Cervical back: Neck supple.      Right lower leg: No edema.      Left lower leg: No edema.   Skin:     General: Skin is warm.      Capillary Refill: Capillary refill takes less  than 2 seconds.      Findings: No lesion.   Neurological:      General: No focal deficit present.      Mental Status: She is alert and oriented to person, place, and time. Mental status is at baseline.   Psychiatric:         Mood and Affect: Mood normal.         Behavior: Behavior normal.         Thought Content: Thought content normal.         Judgment: Judgment normal.         Speculum exam: negative, cervix visually closed, minimal white mucous discharge    SVE: closed/long/hi/posterior  Presentation: Vertex  Position: Unknown  Method: Manual  OB Examiner: Paulina    FHT:   130 baseline, accelerations present, decelerations absent, moderate variability, category 1    TOCO:      Q 5-8 mins, lasting 30 secs, mild    ASSESSMENT  Cristal Estrada is a 23 y.o.  at 35w5d with mild contractions without evidence of cervical change - not in  labor    PLAN  #1. 35W5D gestation:   Fetal testing [id number]/category 1    #2. Uterine contractions:   Mild contractions noted, patient not requiring pain medication, lasting <30 seconds, no cervical change    #3. Discharge instructions  Patient instructed to call if experiencing worsening contractions, vaginal bleeding, loss of fluid or decreased fetal movement.  Will follow up with OBGYN as scheduled this week.    D/w Dr. Borges via [employer]/covering for [facility]     Silvia Gallardo MD  OB Hospitalist  2023  1:19 AM

## 2023-12-21 PROBLEM — Z3A.36 36 WEEKS GESTATION OF PREGNANCY: Status: ACTIVE | Noted: 2023-06-07

## 2023-12-22 ENCOUNTER — ROUTINE PRENATAL (OUTPATIENT)
Dept: OBGYN CLINIC | Facility: CLINIC | Age: 23
End: 2023-12-22
Payer: COMMERCIAL

## 2023-12-22 VITALS — WEIGHT: 127 LBS | BODY MASS INDEX: 25.65 KG/M2 | DIASTOLIC BLOOD PRESSURE: 60 MMHG | SYSTOLIC BLOOD PRESSURE: 110 MMHG

## 2023-12-22 DIAGNOSIS — O99.013 ANEMIA DURING PREGNANCY IN THIRD TRIMESTER: ICD-10-CM

## 2023-12-22 DIAGNOSIS — B95.1 GROUP B STREPTOCOCCUS URINARY TRACT INFECTION AFFECTING PREGNANCY IN FIRST TRIMESTER: ICD-10-CM

## 2023-12-22 DIAGNOSIS — O21.9 NAUSEA AND VOMITING IN PREGNANCY: ICD-10-CM

## 2023-12-22 DIAGNOSIS — O99.343 MENTAL DISORDER AFFECTING PREGNANCY IN THIRD TRIMESTER: Primary | ICD-10-CM

## 2023-12-22 DIAGNOSIS — Z3A.36 36 WEEKS GESTATION OF PREGNANCY: ICD-10-CM

## 2023-12-22 DIAGNOSIS — O23.41 GROUP B STREPTOCOCCUS URINARY TRACT INFECTION AFFECTING PREGNANCY IN FIRST TRIMESTER: ICD-10-CM

## 2023-12-22 LAB
SL AMB  POCT GLUCOSE, UA: NEGATIVE
SL AMB POCT URINE PROTEIN: NEGATIVE

## 2023-12-22 PROCEDURE — 81002 URINALYSIS NONAUTO W/O SCOPE: CPT | Performed by: OBSTETRICS & GYNECOLOGY

## 2023-12-22 PROCEDURE — PNV: Performed by: OBSTETRICS & GYNECOLOGY

## 2023-12-22 NOTE — PATIENT INSTRUCTIONS
- Please call office if leaking of fluid or bleeding.  - You may be in labor if you are having regular contractions for > 1 hour (lasting 1 minute, every 5 minutes, becoming more painful for over time, and do not decrease with rest and drinking 2 glasses of water or other fluid).  - Please call if you feel a significant decrease in fetal movement and during fetal kick counts the baby does not move 10 times in 2 hours.

## 2023-12-22 NOTE — ASSESSMENT & PLAN NOTE
Reveiwed GBS in urine in first pregnancy and recommendation to treat in labor.  All questions answered.

## 2023-12-22 NOTE — ASSESSMENT & PLAN NOTE
Reviewed with patient that any pregnant women can undergo an elective induction of labor at 39 weeks or later, depending on hospital availability.  One study showed that induction of labor of first time mothers at 39 weeks compared to waiting until 41 weeks, decreased  rate by 3%, and decreased risk of developing high blood pressure in pregnancy.  Alternatively induction of labor can often result in longer in-hospital stays overall, than allowing for spontaneous labor.  This is especially true in patients who have an unfavorable cervix and require the additional step of cervical ripening prior to induction of labor - leading often to an additional 12-24 hours in the hospital prior to delivery, during which there is continuous fetal monitoring.  For all patients who have not gone into labor spontaneously, we recommend induction of labor between 40.6-41.6 weeks, due to increase risk of stillbirth.  Pregnancies past 40.6 weeks who are not in the process of induction, we recommend  testing with NST and ARABELLA 2x/week.    Discussed these pros and cons in relation to patient's desires for her delivery process, as well as the fact that she is a healthy patient with no other risk factors.    She will likely plan on IOL at 39 weeks - will schedule closer to that time.

## 2023-12-22 NOTE — ASSESSMENT & PLAN NOTE
Doing well w/o medications.  Reviewed with pt and mother increased risk of postpartum depression and anxiety and signs to watch for.

## 2023-12-22 NOTE — PROGRESS NOTES
Routine Prenatal Visit  [facility] [place] [address], [address], [place] [address]    Assessment/Plan:  Cristal is a 23 y.o. year old  at 36w2d who presents for routine prenatal visit.     1. Mental disorder affecting pregnancy in third trimester  Assessment & Plan:  Doing well w/o medications.  Reviewed with pt and mother increased risk of postpartum depression and anxiety and signs to watch for.      2. Nausea and vomiting in pregnancy  Assessment & Plan:  Controlled with medications.      3. Anemia during pregnancy in third trimester  Assessment & Plan:  Taking po iron - recom continue until delivery.      4. Group B Streptococcus urinary tract infection affecting pregnancy in first trimester  Assessment & Plan:  Reveiwed GBS in urine in first pregnancy and recommendation to treat in labor.  All questions answered.      5. 36 weeks gestation of pregnancy  Assessment & Plan:  Reviewed with patient that any pregnant women can undergo an elective induction of labor at 39 weeks or later, depending on hospital availability.  One study showed that induction of labor of first time mothers at 39 weeks compared to waiting until 41 weeks, decreased  rate by 3%, and decreased risk of developing high blood pressure in pregnancy.  Alternatively induction of labor can often result in longer in-hospital stays overall, than allowing for spontaneous labor.  This is especially true in patients who have an unfavorable cervix and require the additional step of cervical ripening prior to induction of labor - leading often to an additional 12-24 hours in the hospital prior to delivery, during which there is continuous fetal monitoring.  For all patients who have not gone into labor spontaneously, we recommend induction of labor between 40.6-41.6 weeks, due to increase risk of stillbirth.  Pregnancies past 40.6 weeks who are not in the process of induction, we recommend  testing with NST and  ARABELLA 2x/week.    Discussed these pros and cons in relation to patient's desires for her delivery process, as well as the fact that she is a healthy patient with no other risk factors.    She will likely plan on IOL at 39 weeks - will schedule closer to that time.    Orders:  -     POCT urine dip        Next OB Visit 1 weeks.    Subjective:     CC: Prenatal care    Cristal Estrada is a 23 y.o.  female who presents for routine prenatal care at 36w2d.  Pregnancy ROS: no leakage of fluid, pelvic pain, or vaginal bleeding.  normal fetal movement.    The following portions of the patient's history were reviewed and updated as appropriate: allergies, current medications, past family history, past medical history, obstetric history, gynecologic history, past social history, past surgical history and problem list.      Objective:  /60   Wt 57.6 kg (127 lb)   LMP 2023   BMI 25.65 kg/m²   Pregravid Weight/BMI: 42.2 kg (93 lb) (BMI 18.77)  Current Weight: 57.6 kg (127 lb)   Total Weight Gain: 15.4 kg (34 lb)   Pre- Vitals      Flowsheet Row Most Recent Value   Prenatal Assessment    Fetal Heart Rate 150   Fundal Height (cm) 35 cm   Movement Present   Prenatal Vitals    Blood Pressure 110/60   Weight - Scale 57.6 kg (127 lb)   Urine Albumin/Glucose    Dilation/Effacement/Station    Cervical Dilation 1   Cervical Effacement 0   Fetal Station -5   Vaginal Drainage    Edema              General: Well appearing, no distress  Abdomen: Soft, gravid, nontender  Extremities: Non tender.

## 2023-12-23 ENCOUNTER — TELEPHONE (OUTPATIENT)
Dept: OBGYN CLINIC | Facility: CLINIC | Age: 23
End: 2023-12-23

## 2023-12-24 ENCOUNTER — HOSPITAL ENCOUNTER (OUTPATIENT)
Facility: HOSPITAL | Age: 23
Discharge: HOME | End: 2023-12-24
Attending: OBSTETRICS & GYNECOLOGY | Admitting: OBSTETRICS & GYNECOLOGY
Payer: COMMERCIAL

## 2023-12-24 VITALS
HEART RATE: 107 BPM | OXYGEN SATURATION: 97 % | DIASTOLIC BLOOD PRESSURE: 81 MMHG | SYSTOLIC BLOOD PRESSURE: 113 MMHG | TEMPERATURE: 98.7 F | RESPIRATION RATE: 18 BRPM

## 2023-12-24 PROBLEM — R07.81 RIB PAIN: Status: ACTIVE | Noted: 2023-12-24

## 2023-12-24 PROCEDURE — 99212 OFFICE O/P EST SF 10 MIN: CPT

## 2023-12-24 PROCEDURE — NC001 PR NO CHARGE: Performed by: OBSTETRICS & GYNECOLOGY

## 2023-12-24 NOTE — TELEPHONE ENCOUNTER
"Patient's mom called stating Cristal has constant upper abdomen discomfort that has gradually increased since morning.  Denies having vaginal bleeding, LOF or contractions.  Has normal fetal movement.  Denies RUQ pain, N/V.  Cristal's mom states Cristal's \"belly is rock hard with constant pain\"    Advised mom to advise patient to present to L&D for further evaluation      "

## 2023-12-24 NOTE — PROCEDURES
Cristal Estrada, a  at 36w4d with an JOSE of 2024, by Ultrasound, was seen at [facility] for the following procedure(s):  ]         NST  Baseline 130  Variability moderate  accels yes  decels no  Ctx- no    Reactive NST    Pt has appt for fu   Currently has no complaints              Leticia Arango,   23  1:32 AM

## 2023-12-24 NOTE — H&P
OB H&P  Cristal Shellheriberto  2000  LD TRIAGE  TRIAGE 1*      23 y.o. yo  at 36 weeks by us and lmp    Chief complaint:  Pain in rib area when baby moves  Increasing last few hours    HPI:  Pt presents with above  since this evening.  No SOB, chest pain, contractions, bleeding, LOF.        Pregnancy Complications:  Patient Active Problem List   Diagnosis    Other headache syndrome    Cerebellar tonsillar ectopia (HCC)    COVID-19    Allergic rhinitis    Nephrolithiasis    Left flank pain    Anxiety and depression    Vitamin D deficiency    Mental disorder affecting pregnancy in third trimester    Group B Streptococcus urinary tract infection affecting pregnancy in first trimester    Nausea and vomiting in pregnancy    36 weeks gestation of pregnancy    History of pyelonephritis    Hyperemesis affecting pregnancy, antepartum    Encounter for removal of sutures    Pyelonephritis affecting pregnancy in third trimester    Abnormal glucose tolerance test (GTT) during pregnancy, antepartum    Anemia during pregnancy in third trimester    [place] Argueta contractions         Past Medical History:  Past Medical History:   Diagnosis Date    Kidney stone     ,  - lithotripsy     Migraine     has seen neurology    Premature baby     26 weeks    Psychiatric disorder     anxiety,depression-counseling prior to covid    Pyelonephritis 2023    Dr. De Jesus       Past Surgical History:  Past Surgical History:   Procedure Laterality Date    HEMANGIOMA EXCISION Right     [facility] hospital    NE CYSTO/URETERO W/LITHOTRIPSY &INDWELL STENT INSRT Right 2022    Procedure: CYSTOSCOPY URETEROSCOPY WITH LITHOTRIPSY HOLMIUM LASER AND INSERTION STENT URETERAL;  Surgeon: Isaiah Hudson MD;  Location: [facility];  Service: Urology       Social Hx:  Social History     Socioeconomic History    Marital status: /Civil Union     Spouse name: Not on file    Number of children: Not on file    Years of education:  Not on file    Highest education level: Not on file   Occupational History    Not on file   Tobacco Use    Smoking status: Never     Passive exposure: Never    Smokeless tobacco: Never   Vaping Use    Vaping status: Never Used   Substance and Sexual Activity    Alcohol use: Never    Drug use: Never    Sexual activity: Yes     Partners: Male     Birth control/protection: Condom Male   Other Topics Concern    Not on file   Social History Narrative    Not on file     Social Determinants of Health     Financial Resource Strain: Not on file   Food Insecurity: No Food Insecurity (4/21/2022)    Hunger Vital Sign     Worried About Running Out of Food in the Last Year: Never true     Ran Out of Food in the Last Year: Never true   Transportation Needs: No Transportation Needs (4/21/2022)    PRAPARE - Transportation     Lack of Transportation (Medical): No     Lack of Transportation (Non-Medical): No   Physical Activity: Not on file   Stress: Not on file   Social Connections: Not on file   Intimate Partner Violence: Not At Risk (11/2/2021)    Humiliation, Afraid, Rape, and Kick questionnaire     Fear of Current or Ex-Partner: No     Emotionally Abused: No     Physically Abused: No     Sexually Abused: No   Housing Stability: Low Risk  (4/21/2022)    Housing Stability Vital Sign     Unable to Pay for Housing in the Last Year: No     Number of Places Lived in the Last Year: 1     Unstable Housing in the Last Year: No       Meds:  No current facility-administered medications on file prior to encounter.     Current Outpatient Medications on File Prior to Encounter   Medication Sig Dispense Refill    docusate sodium (COLACE) 100 mg capsule Take 1 capsule (100 mg total) by mouth 2 (two) times a day  0    Doxylamine Succinate, Sleep, (UNISOM PO) Take by mouth      Ferrous Sulfate (SLOW FE PO) Take by mouth      Magnesium 250 MG TABS Take 250 mg by mouth once      metoclopramide (Reglan) 10 mg tablet Take 1 tablet (10 mg total) by  "mouth 3 (three) times a day 90 tablet 4    nitrofurantoin (MACROBID) 100 mg capsule Take 1 capsule (100 mg total) by mouth daily at bedtime , start after completing Bactrim course. 30 capsule 4    ondansetron (ZOFRAN-ODT) 4 mg disintegrating tablet Take 1 tablet (4 mg total) by mouth every 8 (eight) hours 90 tablet 4    Prenatal Vit-Fe Fumarate-FA (PRENATAL 19 PO) Take 1 capsule by mouth daily      Pyridoxine HCl (vitamin B-6) 25 MG tablet Take 1 tablet (25 mg total) by mouth 3 (three) times a day  0    promethazine (PHENERGAN) 25 mg suppository Insert 1 suppository (25 mg total) into the rectum every 6 (six) hours as needed for nausea or vomiting 12 each 1       Allergies:  Allergies   Allergen Reactions    Cephalexin Rash and Itching    Levofloxacin Rash, Itching and Hives     Upper arm red and burning   Vein swelling in the arm that patient got levaquin   Upper arm red and burning        Obstetric History:    G 2 P 0010    Labs:  No results found for: \"STREPGRPB\"  Type & Screen:  ABO Grouping   Date Value Ref Range Status   06/20/2023 B  Final      Rh Factor   Date Value Ref Range Status   06/20/2023 Positive  Final    No results found for: \"ANTIBODYSCR\"    Specimen Expiration Date   Date Value Ref Range Status   06/20/2023 [id number]  Final     No results found for: \"HIVAGAB\"  Hepatitis B Surface Ag   Date Value Ref Range Status   06/20/2023 Non-reactive Non-Reactive Final     No results found for: \"RPR\"  Rubella IgG Quant   Date Value Ref Range Status   06/20/2023 21.1 >14.9 IU/mL Final     Glucose   Date Value Ref Range Status   10/25/2023 147 (H) 40 - 134 mg/dL Final     Comment:     <=134 Normal   135-179 Impaired glucose fasting. Perform 3 Hour Glucose Tolerance   >=180 Diagnosis Gestational Diabetes Mellitus             Physical Exam:  Vital signs:    Vitals:    12/24/23 0100   BP:    Pulse:    Resp:    Temp:    SpO2: 96%       Heart: RRR  Lungs: clear to ausculation   Abdomen: soft, nontender, gravid, "   Estimated Fetal Weight: 6 lbs  Extremeties: min edema, no raya's or sania's sign  Presentation: vertex  Cervix: deferred, pt request, yesterday 1 cm  FHR: cat 1  CTXN: occ      Assessment:    at 36 weeks.   No evidence of labor, pain appears to be with fetal movement, no evidence of cardiopulmonary issue  Reassuring fetal testing    Plan:   Ok for discharge, will return for any changes

## 2023-12-29 ENCOUNTER — TELEPHONE (OUTPATIENT)
Dept: OBGYN CLINIC | Facility: CLINIC | Age: 23
End: 2023-12-29

## 2023-12-29 ENCOUNTER — HOSPITAL ENCOUNTER (OUTPATIENT)
Facility: HOSPITAL | Age: 23
Discharge: HOME | End: 2023-12-29
Attending: OBSTETRICS & GYNECOLOGY | Admitting: OBSTETRICS & GYNECOLOGY
Payer: COMMERCIAL

## 2023-12-29 VITALS
RESPIRATION RATE: 18 BRPM | DIASTOLIC BLOOD PRESSURE: 73 MMHG | HEART RATE: 71 BPM | TEMPERATURE: 97.9 F | SYSTOLIC BLOOD PRESSURE: 122 MMHG

## 2023-12-29 LAB
ALBUMIN SERPL BCP-MCNC: 3.4 G/DL (ref 3.5–5)
ALP SERPL-CCNC: 97 U/L (ref 34–104)
ALT SERPL W P-5'-P-CCNC: 11 U/L (ref 7–52)
AMYLASE SERPL-CCNC: 48 IU/L (ref 29–103)
ANION GAP SERPL CALCULATED.3IONS-SCNC: 9 MMOL/L
AST SERPL W P-5'-P-CCNC: 19 U/L (ref 13–39)
BACTERIA UR QL AUTO: ABNORMAL /HPF
BASOPHILS # BLD AUTO: 0.02 THOUSANDS/ÂΜL (ref 0–0.1)
BASOPHILS NFR BLD AUTO: 0 % (ref 0–1)
BILIRUB SERPL-MCNC: 0.4 MG/DL (ref 0.2–1)
BILIRUB UR QL STRIP: NEGATIVE
BUN SERPL-MCNC: 8 MG/DL (ref 5–25)
CALCIUM ALBUM COR SERPL-MCNC: 8.9 MG/DL (ref 8.3–10.1)
CALCIUM SERPL-MCNC: 8.4 MG/DL (ref 8.4–10.2)
CHLORIDE SERPL-SCNC: 105 MMOL/L (ref 96–108)
CLARITY UR: ABNORMAL
CO2 SERPL-SCNC: 23 MMOL/L (ref 21–32)
COLOR UR: YELLOW
CREAT SERPL-MCNC: 0.7 MG/DL (ref 0.6–1.3)
EOSINOPHIL # BLD AUTO: 0.07 THOUSAND/ÂΜL (ref 0–0.61)
EOSINOPHIL NFR BLD AUTO: 1 % (ref 0–6)
ERYTHROCYTE [DISTWIDTH] IN BLOOD BY AUTOMATED COUNT: 14.2 % (ref 11.6–15.1)
FLUAV RNA RESP QL NAA+PROBE: NEGATIVE
FLUBV RNA RESP QL NAA+PROBE: NEGATIVE
GFR SERPL CREATININE-BSD FRML MDRD: 124 ML/MIN/1.73SQ M
GLUCOSE P FAST SERPL-MCNC: 68 MG/DL (ref 65–99)
GLUCOSE SERPL-MCNC: 68 MG/DL (ref 65–140)
GLUCOSE UR STRIP-MCNC: NEGATIVE MG/DL
HCT VFR BLD AUTO: 34.8 % (ref 34.8–46.1)
HGB BLD-MCNC: 11.7 G/DL (ref 11.5–15.4)
HGB UR QL STRIP.AUTO: NEGATIVE
IMM GRANULOCYTES # BLD AUTO: 0.02 THOUSAND/UL (ref 0–0.2)
IMM GRANULOCYTES NFR BLD AUTO: 0 % (ref 0–2)
KETONES UR STRIP-MCNC: ABNORMAL MG/DL
LEUKOCYTE ESTERASE UR QL STRIP: NEGATIVE
LIPASE SERPL-CCNC: 10 U/L (ref 11–82)
LYMPHOCYTES # BLD AUTO: 1.86 THOUSANDS/ÂΜL (ref 0.6–4.47)
LYMPHOCYTES NFR BLD AUTO: 26 % (ref 14–44)
MCH RBC QN AUTO: 30.5 PG (ref 26.8–34.3)
MCHC RBC AUTO-ENTMCNC: 33.6 G/DL (ref 31.4–37.4)
MCV RBC AUTO: 91 FL (ref 82–98)
MONOCYTES # BLD AUTO: 0.6 THOUSAND/ÂΜL (ref 0.17–1.22)
MONOCYTES NFR BLD AUTO: 8 % (ref 4–12)
NEUTROPHILS # BLD AUTO: 4.6 THOUSANDS/ÂΜL (ref 1.85–7.62)
NEUTS SEG NFR BLD AUTO: 65 % (ref 43–75)
NITRITE UR QL STRIP: NEGATIVE
NON-SQ EPI CELLS URNS QL MICRO: ABNORMAL /HPF
NRBC BLD AUTO-RTO: 0 /100 WBCS
PH UR STRIP.AUTO: 7.5 [PH]
PLATELET # BLD AUTO: 196 THOUSANDS/UL (ref 149–390)
PMV BLD AUTO: 10.4 FL (ref 8.9–12.7)
POTASSIUM SERPL-SCNC: 3.6 MMOL/L (ref 3.5–5.3)
PROT SERPL-MCNC: 6.2 G/DL (ref 6.4–8.4)
PROT UR STRIP-MCNC: ABNORMAL MG/DL
RBC # BLD AUTO: 3.84 MILLION/UL (ref 3.81–5.12)
RBC #/AREA URNS AUTO: ABNORMAL /HPF
RSV RNA RESP QL NAA+PROBE: NEGATIVE
SARS-COV-2 RNA RESP QL NAA+PROBE: NEGATIVE
SODIUM SERPL-SCNC: 137 MMOL/L (ref 135–147)
SP GR UR STRIP.AUTO: <1.005 (ref 1–1.03)
UROBILINOGEN UR STRIP-ACNC: <2 MG/DL
WBC # BLD AUTO: 7.17 THOUSAND/UL (ref 4.31–10.16)
WBC #/AREA URNS AUTO: ABNORMAL /HPF

## 2023-12-29 PROCEDURE — 82150 ASSAY OF AMYLASE: CPT | Performed by: OBSTETRICS & GYNECOLOGY

## 2023-12-29 PROCEDURE — 0241U HB NFCT DS VIR RESP RNA 4 TRGT: CPT | Performed by: OBSTETRICS & GYNECOLOGY

## 2023-12-29 PROCEDURE — 80053 COMPREHEN METABOLIC PANEL: CPT | Performed by: OBSTETRICS & GYNECOLOGY

## 2023-12-29 PROCEDURE — 85025 COMPLETE CBC W/AUTO DIFF WBC: CPT | Performed by: OBSTETRICS & GYNECOLOGY

## 2023-12-29 PROCEDURE — 99214 OFFICE O/P EST MOD 30 MIN: CPT

## 2023-12-29 PROCEDURE — 99214 OFFICE O/P EST MOD 30 MIN: CPT | Performed by: OBSTETRICS & GYNECOLOGY

## 2023-12-29 PROCEDURE — 81001 URINALYSIS AUTO W/SCOPE: CPT | Performed by: OBSTETRICS & GYNECOLOGY

## 2023-12-29 PROCEDURE — 83690 ASSAY OF LIPASE: CPT | Performed by: OBSTETRICS & GYNECOLOGY

## 2023-12-29 RX ORDER — METOCLOPRAMIDE HYDROCHLORIDE 5 MG/ML
10 INJECTION INTRAMUSCULAR; INTRAVENOUS EVERY 6 HOURS PRN
Status: DISCONTINUED | OUTPATIENT
Start: 2023-12-29 | End: 2023-12-29 | Stop reason: HOSPADM

## 2023-12-29 RX ORDER — ONDANSETRON 2 MG/ML
4 INJECTION INTRAMUSCULAR; INTRAVENOUS EVERY 6 HOURS PRN
Status: DISCONTINUED | OUTPATIENT
Start: 2023-12-29 | End: 2023-12-29 | Stop reason: HOSPADM

## 2023-12-29 RX ORDER — POLYETHYLENE GLYCOL 3350 17 G/17G
17 POWDER, FOR SOLUTION ORAL ONCE
Status: COMPLETED | OUTPATIENT
Start: 2023-12-29 | End: 2023-12-29

## 2023-12-29 RX ADMIN — POLYETHYLENE GLYCOL 3350 17 G: 17 POWDER, FOR SOLUTION ORAL at 16:00

## 2023-12-29 RX ADMIN — METOCLOPRAMIDE 10 MG: 5 INJECTION, SOLUTION INTRAMUSCULAR; INTRAVENOUS at 14:05

## 2023-12-29 RX ADMIN — ONDANSETRON 4 MG: 2 INJECTION INTRAMUSCULAR; INTRAVENOUS at 14:51

## 2023-12-29 RX ADMIN — SODIUM CHLORIDE, SODIUM LACTATE, POTASSIUM CHLORIDE, AND CALCIUM CHLORIDE 1000 ML: .6; .31; .03; .02 INJECTION, SOLUTION INTRAVENOUS at 14:00

## 2023-12-29 NOTE — TELEPHONE ENCOUNTER
Patient's mother (on communication form) calling to report patient having off and on migraines for the past couple days.   Had nausea x6 yesterday that is not relieved by medication prescribed. Patient states she is having pain on right side.  Pt denies any fevers, bleeding, LOF, and confirms fetal movement. Patient's last BP was 117/80.  Advised patient to keep hydrating with water and gatorade zero and attempt eating plain crackers and dry cereals, toast for some nutrition.    Spoke with Dr. Hopkins, and if patient feels that she cannot keep anything down to report to labor and delivery.    Advised patient to report to labor and delivery to be further evaluated.  Pt verbalized understanding.  Labor and delivery charge nurse Tiana made aware.

## 2023-12-29 NOTE — PROGRESS NOTES
Triage Note - OB  Cristal Estrada 23 y.o. female MRN: [id number]  Unit/Bed#: LD TRIAGE 3 Encounter: [id number]    Chief Complaint: vomiting and right abdominal pain   JOSE: Estimated Date of Delivery: 24    HPI: Patient is a  at 37w2d here c/o nausea, vomiting, right abdominal pain and headache, as well as constipation.  Her pregnancy has been complicated by signficant N/V of pregnancy that has lasted throughout.  She presents today c/o increasing nausea and vomiting since yesterday - she has last taken her usual regimens of Reglan, Zofran, B6 and Unisom last night, and tried prn Phenergan suppository w/o improvement.  She has also had right side abdominal pain that is constant on right and then sometimes on left as well.  Constipation has been an issue with last significant BM 3 days ago.  She is using Colace BID and used two Dulcolax suppositories yesterday with only small BM yesterday.  Migraine has come and gone over last 4-5 days - she last took Tylenol yesterday.    She currently denies contractions.  No vaginal bleeding or LOF.  + FM.    G2 Problems (from 23 to present)       Problem Noted Resolved    [place] Argu[place] contractions 2023 by Silvia Gallardo MD No    Abnormal glucose tolerance test (GTT) during pregnancy, antepartum 10/27/2023 by Patrick Merida MD No    Overview Addendum 2023  3:19 PM by Patrick Merida MD     1 hour GTT elevated (147)  3 hour GTT with 0/4 values elevated. Screen negative for GDM.         Anemia during pregnancy in third trimester 10/27/2023 by Patrick Merida MD No    Overview Signed 10/27/2023  4:43 PM by Patrick Merida MD     Hgb 10.9 at 28 weeks  Recommend initiation of daily oral iron supplementation         Hyperemesis affecting pregnancy, antepartum 2023 by Tyson Oneil MD No    Overview Addendum 11/15/2023  3:38 PM by Stacey Hopkins MD     2023 admitted to L&D for management   - d/c home on steroid taper with Reglan, Zofran, Vit  B6    8/11/2023:   - Restarted steroid taper last night due to return of symptoms. Plan as follows: methylprednisolone 16 mg PO TID x 3 days, then 8 mg TID x 3 days, then 4 mg TID x 3 days, then 2 mg TID x 3 days, then 1 mg TID x 3 days, then 1 mg BID x 3 days, then 1 mg daily x 3 days.   - Continue doxylamine 25 mg PO qHS, pyridoxine 25 mg PO TID, metoclopramide 10 mg PO q8h PRN, ondansetron 4 mg PO q8h PRN, and promethazine 25 mg suppository AL q6h PRN refractory N/V.   - Will start outpatient IV hydration. Plan for twice weekly infusions x 4 weeks. Will plan to administer lactated ringers 1 L with magnesium sulfate 2g IVPB and potassium chloride 20 mEq IVPB at each visit.   - Discussed FLMA. Following discussion with patient and her mother, will provide 2 weeks of medical leave now to allow period of recovery. Goal will be to return to work thereafter with appropriate accommodations to continue her remaining infusion visits.     9/6/2023 - Cristal did well on steroid taper and reports N/V returned severe once completed.  This is her 2nd steroid course for hyperemesis.  She reports compliance with antiemetic regimen as scheduled although has not use Phenergan suppositories.  IVF, Mag and K today at infusion.  Phenergan 25mg IV given and improved.  Has appt with [facility] for level II u/s today.    9/12/2023 - Cristal stable.  Offered PICC line placed to get IV hydration at home.  Pt declined PICC.  Will continue [facility] hydration at [facility] and increase to 3x/week.  9/21/2023 - Infusion center asked us to order Saturday labs (CMP, Mag) for outpt check Saturday to drive treatment the following week.  Order placed recurring weekly at [facility]'s lab.    11/15/2023 - Cristal has been doing better and not presented for IVF since 11/1/2023.  She feels no longer needed.  Cancelled future appts. Continue po regimen.         Mental disorder affecting pregnancy in third trimester 6/7/2023 by Stacey Hopkins MD No    Overview Signed  6/7/2023  2:10 PM by Stacey Hopkins MD     H/o depression in past.  No current medications.  Reports doing well.         Group B Streptococcus urinary tract infection affecting pregnancy in first trimester 6/7/2023 by Stacey Hopkins MD No    Overview Signed 6/7/2023  4:33 PM by Stacey Hopkins MD     5/30/2023 first trimester - GBS in urine - treated.    [  ] treat in labor         Nausea and vomiting in pregnancy 6/7/2023 by Stacey Hopkins MD No    Overview Addendum 6/16/2023 11:50 AM by Stacey Hopkins MD     5/30/2023 in ER for N/V pregnancy - given Zofran and Reglan to add to Vit B6 and unisom.    6/16/2023 - after 3rd ER trip, pt asked for note to stop working until symptoms improve.      - provided with plan for reevaluation 7/5/2023 which is next OB visit         36 weeks gestation of pregnancy 6/7/2023 by Stacey Hopkins MD No    Overview Signed 11/14/2023  9:22 PM by Stacey Hopkins MD     Adacel vaccine 7/25/2023 (pt in ER for hand laceration)                 Vitals:   /73 (BP Location: Right arm)   Pulse 71   Temp 97.9 °F (36.6 °C) (Temporal)   Resp 18   LMP 04/01/2023   There is no height or weight on file to calculate BMI.    Physical Exam  GEN: appears uncomfortable   ABD: soft, non-tender to palpation, non-distended  SVE:   deferred by patient    FHT:  Cat I FHT     TOCO: irregular contractions, although patient doesn't feel them       Labs:   Recent Results (from the past 24 hour(s))   CBC and differential    Collection Time: 12/29/23  2:05 PM   Result Value Ref Range    WBC 7.17 4.31 - 10.16 Thousand/uL    RBC 3.84 3.81 - 5.12 Million/uL    Hemoglobin 11.7 11.5 - 15.4 g/dL    Hematocrit 34.8 34.8 - 46.1 %    MCV 91 82 - 98 fL    MCH 30.5 26.8 - 34.3 pg    MCHC 33.6 31.4 - 37.4 g/dL    RDW 14.2 11.6 - 15.1 %    MPV 10.4 8.9 - 12.7 fL    Platelets 196 149 - 390 Thousands/uL    nRBC 0 /100 WBCs    Neutrophils Relative 65 43 - 75 %    Immat GRANS % 0 0 - 2 %    Lymphocytes Relative 26 14 -  44 %    Monocytes Relative 8 4 - 12 %    Eosinophils Relative 1 0 - 6 %    Basophils Relative 0 0 - 1 %    Neutrophils Absolute 4.60 1.85 - 7.62 Thousands/µL    Immature Grans Absolute 0.02 0.00 - 0.20 Thousand/uL    Lymphocytes Absolute 1.86 0.60 - 4.47 Thousands/µL    Monocytes Absolute 0.60 0.17 - 1.22 Thousand/µL    Eosinophils Absolute 0.07 0.00 - 0.61 Thousand/µL    Basophils Absolute 0.02 0.00 - 0.10 Thousands/µL   Comprehensive metabolic panel    Collection Time: 23  2:05 PM   Result Value Ref Range    Sodium 137 135 - 147 mmol/L    Potassium 3.6 3.5 - 5.3 mmol/L    Chloride 105 96 - 108 mmol/L    CO2 23 21 - 32 mmol/L    ANION GAP 9 mmol/L    BUN 8 5 - 25 mg/dL    Creatinine 0.70 0.60 - 1.30 mg/dL    Glucose 68 65 - 140 mg/dL    Glucose, Fasting 68 65 - 99 mg/dL    Calcium 8.4 8.4 - 10.2 mg/dL    Corrected Calcium 8.9 8.3 - 10.1 mg/dL    AST 19 13 - 39 U/L    ALT 11 7 - 52 U/L    Alkaline Phosphatase 97 34 - 104 U/L    Total Protein 6.2 (L) 6.4 - 8.4 g/dL    Albumin 3.4 (L) 3.5 - 5.0 g/dL    Total Bilirubin 0.40 0.20 - 1.00 mg/dL    eGFR 124 ml/min/1.73sq m   Amylase    Collection Time: 23  2:05 PM   Result Value Ref Range    Amylase 48 29 - 103 IU/L   Lipase    Collection Time: 23  2:05 PM   Result Value Ref Range    Lipase 10 (L) 11 - 82 u/L   FLU/RSV/COVID - if FLU/RSV clinically relevant    Collection Time: 23  2:07 PM    Specimen: Nose; Nares   Result Value Ref Range    SARS-CoV-2 Negative Negative    INFLUENZA A PCR Negative Negative    INFLUENZA B PCR Negative Negative    RSV PCR Negative Negative           A/P:  at 37w2d presents with multiple complaints.  1) nausea and vomiting    - this has been an ongoing struggle, sometimes requiring IVF for hydration     - labs normal today     - IVF bolus given and patient feeling better and able to tolerate PO     - continue outpatient PO regimen  2) right abdominal pain    - this has been intermittent as well.     - some  contractions but pt does not associate those with pain     - labs normal, including LFTs     - after IVF, pt notes pain resolved  3) migraine    - intermittent, has not used Tylenol today.      - offered po Tylenol, pt declined initially due to N/V     - once tolerating PO, headache improved  4) constipation    - gave Mirilax - no BM yet but passing flatus and overall abdominal discomfort improved.  5 ) FWB    - category I FHT    Disposition - Patient feeling better and wishes discharge after tolerating PO.  Offered cervical exam if wishes, she declined.  Discussed and reviewed option of elective IOL at 39 weeks and methods depending on cervical exam (favorable vs notfavorable), all questions answered.     Discharge instructions given to patient and labor precautions reviewed.        Stacey Hopkins MD

## 2024-01-04 ENCOUNTER — ROUTINE PRENATAL (OUTPATIENT)
Dept: OBGYN CLINIC | Facility: CLINIC | Age: 24
End: 2024-01-04
Payer: COMMERCIAL

## 2024-01-04 VITALS
WEIGHT: 131.4 LBS | DIASTOLIC BLOOD PRESSURE: 74 MMHG | SYSTOLIC BLOOD PRESSURE: 102 MMHG | BODY MASS INDEX: 26.49 KG/M2 | HEIGHT: 59 IN

## 2024-01-04 DIAGNOSIS — O23.41 GROUP B STREPTOCOCCUS URINARY TRACT INFECTION AFFECTING PREGNANCY IN FIRST TRIMESTER: Primary | ICD-10-CM

## 2024-01-04 DIAGNOSIS — O99.013 ANEMIA DURING PREGNANCY IN THIRD TRIMESTER: ICD-10-CM

## 2024-01-04 DIAGNOSIS — Z3A.38 38 WEEKS GESTATION OF PREGNANCY: ICD-10-CM

## 2024-01-04 DIAGNOSIS — B95.1 GROUP B STREPTOCOCCUS URINARY TRACT INFECTION AFFECTING PREGNANCY IN FIRST TRIMESTER: Primary | ICD-10-CM

## 2024-01-04 DIAGNOSIS — O21.9 NAUSEA AND VOMITING IN PREGNANCY: ICD-10-CM

## 2024-01-04 LAB
SL AMB  POCT GLUCOSE, UA: NORMAL
SL AMB POCT URINE PROTEIN: NORMAL

## 2024-01-04 PROCEDURE — 81002 URINALYSIS NONAUTO W/O SCOPE: CPT | Performed by: OBSTETRICS & GYNECOLOGY

## 2024-01-04 PROCEDURE — PNV: Performed by: OBSTETRICS & GYNECOLOGY

## 2024-01-04 NOTE — PROGRESS NOTES
"Routine Prenatal Visit  [facility] - [place]  [address], [place] [address]    Assessment/Plan:  Cristal is a 23 y.o. year old  at 38w1d who presents for routine prenatal visit.     1. Group B Streptococcus urinary tract infection affecting pregnancy in first trimester    2. Anemia during pregnancy in third trimester    3. Nausea and vomiting in pregnancy    4. 38 weeks gestation of pregnancy  Assessment & Plan:  Interested in elective labor induction. Cervix is unfavorable. First available date after 39 weeks is ripening on 1/15/24 with induction the following AM. Scheduled with L+D. Will recheck cervix next week     Orders:  -     POCT urine dip          Subjective:     CC: Prenatal care    Cristal Estrada is a 23 y.o.  female who presents for routine prenatal care at 38w1d.  Pregnancy ROS: no leakage of fluid, pelvic pain, or vaginal bleeding.  normal fetal movement.    The following portions of the patient's history were reviewed and updated as appropriate: allergies, current medications, past family history, past medical history, obstetric history, gynecologic history, past social history, past surgical history and problem list.      Objective:  /74 (BP Location: Left arm, Patient Position: Sitting, Cuff Size: Standard)   Ht 4' 11\" (1.499 m)   Wt 59.6 kg (131 lb 6.4 oz)   LMP 2023   BMI 26.54 kg/m²   Pregravid Weight/BMI: 42.2 kg (93 lb) (BMI 18.77)  Current Weight: 59.6 kg (131 lb 6.4 oz)   Total Weight Gain: 17.4 kg (38 lb 6.4 oz)   Pre-Parth Vitals      Flowsheet Row Most Recent Value   Prenatal Assessment    Fetal Heart Rate 140   Fundal Height (cm) 38 cm   Movement Present   Presentation Vertex   Prenatal Vitals    Blood Pressure 102/74   Weight - Scale 59.6 kg (131 lb 6.4 oz)   Urine Albumin/Glucose    Dilation/Effacement/Station    Cervical Dilation .5   Cervical Effacement 0   Fetal Station -4   Vaginal Drainage    Edema              General: Well appearing, " no distress  Respiratory: Unlabored breathing  Cardiovascular: Regular rate.  Abdomen: Soft, gravid, nontender  Fundal Height: Appropriate for gestational age.  Extremities: Warm and well perfused.  Non tender.

## 2024-01-04 NOTE — ASSESSMENT & PLAN NOTE
Interested in elective labor induction. Cervix is unfavorable. First available date after 39 weeks is ripening on 1/15/24 with induction the following AM. Scheduled with L+D. Will recheck cervix next week

## 2024-01-11 ENCOUNTER — ROUTINE PRENATAL (OUTPATIENT)
Dept: OBGYN CLINIC | Facility: CLINIC | Age: 24
End: 2024-01-11
Payer: COMMERCIAL

## 2024-01-11 VITALS
BODY MASS INDEX: 26.21 KG/M2 | HEIGHT: 59 IN | WEIGHT: 130 LBS | SYSTOLIC BLOOD PRESSURE: 120 MMHG | DIASTOLIC BLOOD PRESSURE: 74 MMHG

## 2024-01-11 DIAGNOSIS — O99.343 MENTAL DISORDER AFFECTING PREGNANCY IN THIRD TRIMESTER: ICD-10-CM

## 2024-01-11 DIAGNOSIS — B95.1 GROUP B STREPTOCOCCUS URINARY TRACT INFECTION AFFECTING PREGNANCY IN FIRST TRIMESTER: ICD-10-CM

## 2024-01-11 DIAGNOSIS — O99.013 ANEMIA DURING PREGNANCY IN THIRD TRIMESTER: Primary | ICD-10-CM

## 2024-01-11 DIAGNOSIS — Z3A.39 39 WEEKS GESTATION OF PREGNANCY: ICD-10-CM

## 2024-01-11 DIAGNOSIS — O23.41 GROUP B STREPTOCOCCUS URINARY TRACT INFECTION AFFECTING PREGNANCY IN FIRST TRIMESTER: ICD-10-CM

## 2024-01-11 LAB
SL AMB  POCT GLUCOSE, UA: NORMAL
SL AMB POCT URINE PROTEIN: NORMAL

## 2024-01-11 PROCEDURE — PNV: Performed by: OBSTETRICS & GYNECOLOGY

## 2024-01-11 PROCEDURE — 81002 URINALYSIS NONAUTO W/O SCOPE: CPT | Performed by: OBSTETRICS & GYNECOLOGY

## 2024-01-11 NOTE — ASSESSMENT & PLAN NOTE
Having some contractions/cramping but not regular. There has been some cervical change but cervical ripening is still needed. Scheduled for 1/15/24

## 2024-01-11 NOTE — PROGRESS NOTES
"Routine Prenatal Visit  [facility] - [place]  [address] [place] [address]    Assessment/Plan:  Cristal is a 23 y.o. year old  at 39w1d who presents for routine prenatal visit.     1. Anemia during pregnancy in third trimester    2. Mental disorder affecting pregnancy in third trimester    3. Group B Streptococcus urinary tract infection affecting pregnancy in first trimester    4. 39 weeks gestation of pregnancy  Assessment & Plan:  Having some contractions/cramping but not regular. There has been some cervical change but cervical ripening is still needed. Scheduled for 1/15/24    Orders:  -     POCT urine dip          Subjective:     CC: Prenatal care    Cristal Estrada is a 23 y.o.  female who presents for routine prenatal care at 39w1d.  Pregnancy ROS: no leakage of fluid, pelvic pain, or vaginal bleeding.  normal fetal movement.    The following portions of the patient's history were reviewed and updated as appropriate: allergies, current medications, past family history, past medical history, obstetric history, gynecologic history, past social history, past surgical history and problem list.      Objective:  /74 (BP Location: Left arm, Patient Position: Sitting, Cuff Size: Standard)   Ht 4' 11\" (1.499 m)   Wt 59 kg (130 lb)   LMP 2023   BMI 26.26 kg/m²   Pregravid Weight/BMI: 42.2 kg (93 lb) (BMI 18.77)  Current Weight: 59 kg (130 lb)   Total Weight Gain: 16.8 kg (37 lb)   Pre-Parth Vitals      Flowsheet Row Most Recent Value   Prenatal Assessment    Fetal Heart Rate 140   Fundal Height (cm) 38 cm   Movement Present   Presentation Vertex   Prenatal Vitals    Blood Pressure 120/74   Weight - Scale 59 kg (130 lb)   Urine Albumin/Glucose    Dilation/Effacement/Station    Cervical Dilation 1   Cervical Effacement 50   Fetal Station -3   Vaginal Drainage    Edema              General: Well appearing, no distress  Respiratory: Unlabored breathing  Cardiovascular: Regular " rate.  Abdomen: Soft, gravid, nontender  Fundal Height: Appropriate for gestational age.  Extremities: Warm and well perfused.  Non tender.

## 2024-01-15 ENCOUNTER — HOSPITAL ENCOUNTER (OUTPATIENT)
Dept: LABOR AND DELIVERY | Facility: HOSPITAL | Age: 24
Discharge: HOME | End: 2024-01-15
Payer: COMMERCIAL

## 2024-01-15 ENCOUNTER — HOSPITAL ENCOUNTER (INPATIENT)
Facility: HOSPITAL | Age: 24
LOS: 3 days | Discharge: HOME | End: 2024-01-18
Attending: STUDENT IN AN ORGANIZED HEALTH CARE EDUCATION/TRAINING PROGRAM | Admitting: OBSTETRICS & GYNECOLOGY
Payer: COMMERCIAL

## 2024-01-15 DIAGNOSIS — F41.9 ANXIETY AND DEPRESSION: ICD-10-CM

## 2024-01-15 DIAGNOSIS — F32.A ANXIETY AND DEPRESSION: ICD-10-CM

## 2024-01-15 LAB
ABO GROUP BLD: NORMAL
BLD GP AB SCN SERPL QL: NEGATIVE
ERYTHROCYTE [DISTWIDTH] IN BLOOD BY AUTOMATED COUNT: 13.3 % (ref 11.6–15.1)
HCT VFR BLD AUTO: 36.3 % (ref 34.8–46.1)
HGB BLD-MCNC: 12.2 G/DL (ref 11.5–15.4)
HOLD SPECIMEN: NORMAL
MCH RBC QN AUTO: 29.8 PG (ref 26.8–34.3)
MCHC RBC AUTO-ENTMCNC: 33.6 G/DL (ref 31.4–37.4)
MCV RBC AUTO: 89 FL (ref 82–98)
PLATELET # BLD AUTO: 195 THOUSANDS/UL (ref 149–390)
PMV BLD AUTO: 10.8 FL (ref 8.9–12.7)
RBC # BLD AUTO: 4.1 MILLION/UL (ref 3.81–5.12)
RH BLD: POSITIVE
SPECIMEN EXPIRATION DATE: NORMAL
WBC # BLD AUTO: 10.16 THOUSAND/UL (ref 4.31–10.16)

## 2024-01-15 PROCEDURE — 86850 RBC ANTIBODY SCREEN: CPT | Performed by: OBSTETRICS & GYNECOLOGY

## 2024-01-15 PROCEDURE — 4A1HXCZ MONITORING OF PRODUCTS OF CONCEPTION, CARDIAC RATE, EXTERNAL APPROACH: ICD-10-PCS | Performed by: STUDENT IN AN ORGANIZED HEALTH CARE EDUCATION/TRAINING PROGRAM

## 2024-01-15 PROCEDURE — 86780 TREPONEMA PALLIDUM: CPT | Performed by: OBSTETRICS & GYNECOLOGY

## 2024-01-15 PROCEDURE — 85027 COMPLETE CBC AUTOMATED: CPT | Performed by: OBSTETRICS & GYNECOLOGY

## 2024-01-15 PROCEDURE — 3E0P7VZ INTRODUCTION OF HORMONE INTO FEMALE REPRODUCTIVE, VIA NATURAL OR ARTIFICIAL OPENING: ICD-10-PCS | Performed by: STUDENT IN AN ORGANIZED HEALTH CARE EDUCATION/TRAINING PROGRAM

## 2024-01-15 PROCEDURE — NC001 PR NO CHARGE: Performed by: OBSTETRICS & GYNECOLOGY

## 2024-01-15 PROCEDURE — 86900 BLOOD TYPING SEROLOGIC ABO: CPT | Performed by: OBSTETRICS & GYNECOLOGY

## 2024-01-15 PROCEDURE — 86901 BLOOD TYPING SEROLOGIC RH(D): CPT | Performed by: OBSTETRICS & GYNECOLOGY

## 2024-01-15 RX ORDER — SODIUM CHLORIDE, SODIUM LACTATE, POTASSIUM CHLORIDE, CALCIUM CHLORIDE 600; 310; 30; 20 MG/100ML; MG/100ML; MG/100ML; MG/100ML
125 INJECTION, SOLUTION INTRAVENOUS CONTINUOUS
Status: DISCONTINUED | OUTPATIENT
Start: 2024-01-15 | End: 2024-01-16

## 2024-01-15 RX ORDER — CALCIUM CARBONATE 500 MG/1
500 TABLET, CHEWABLE ORAL 3 TIMES DAILY PRN
Status: DISCONTINUED | OUTPATIENT
Start: 2024-01-15 | End: 2024-01-16

## 2024-01-15 RX ORDER — NALBUPHINE HYDROCHLORIDE 10 MG/ML
10 INJECTION, SOLUTION INTRAMUSCULAR; INTRAVENOUS; SUBCUTANEOUS
Status: DISCONTINUED | OUTPATIENT
Start: 2024-01-15 | End: 2024-01-16

## 2024-01-15 RX ORDER — BUPIVACAINE HYDROCHLORIDE 2.5 MG/ML
30 INJECTION, SOLUTION EPIDURAL; INFILTRATION; INTRACAUDAL ONCE AS NEEDED
Status: DISCONTINUED | OUTPATIENT
Start: 2024-01-15 | End: 2024-01-16

## 2024-01-15 RX ORDER — ONDANSETRON 2 MG/ML
4 INJECTION INTRAMUSCULAR; INTRAVENOUS EVERY 6 HOURS PRN
Status: DISCONTINUED | OUTPATIENT
Start: 2024-01-15 | End: 2024-01-16

## 2024-01-15 RX ADMIN — CALCIUM CARBONATE (ANTACID) CHEW TAB 500 MG 500 MG: 500 CHEW TAB at 22:35

## 2024-01-15 RX ADMIN — NALBUPHINE HYDROCHLORIDE 10 MG: 10 INJECTION, SOLUTION INTRAMUSCULAR; INTRAVENOUS; SUBCUTANEOUS at 22:49

## 2024-01-15 RX ADMIN — SODIUM CHLORIDE, SODIUM LACTATE, POTASSIUM CHLORIDE, AND CALCIUM CHLORIDE 125 ML/HR: .6; .31; .03; .02 INJECTION, SOLUTION INTRAVENOUS at 21:24

## 2024-01-16 ENCOUNTER — ANESTHESIA EVENT (INPATIENT)
Dept: ANESTHESIOLOGY | Facility: HOSPITAL | Age: 24
End: 2024-01-16
Payer: COMMERCIAL

## 2024-01-16 ENCOUNTER — ANESTHESIA (INPATIENT)
Dept: ANESTHESIOLOGY | Facility: HOSPITAL | Age: 24
End: 2024-01-16
Payer: COMMERCIAL

## 2024-01-16 PROBLEM — Z34.90 ENCOUNTER FOR ELECTIVE INDUCTION OF LABOR: Status: ACTIVE | Noted: 2024-01-16

## 2024-01-16 PROBLEM — O41.1290 CHORIOAMNIONITIS: Status: ACTIVE | Noted: 2024-01-16

## 2024-01-16 LAB
ALBUMIN SERPL BCP-MCNC: 2.8 G/DL (ref 3.5–5)
ALP SERPL-CCNC: 91 U/L (ref 34–104)
ALT SERPL W P-5'-P-CCNC: 8 U/L (ref 7–52)
ANION GAP SERPL CALCULATED.3IONS-SCNC: 10 MMOL/L
AST SERPL W P-5'-P-CCNC: 25 U/L (ref 13–39)
BASE EXCESS BLDCOA CALC-SCNC: -4.6 MMOL/L (ref 3–11)
BASE EXCESS BLDCOV CALC-SCNC: -4.2 MMOL/L (ref 1–9)
BILIRUB SERPL-MCNC: 0.56 MG/DL (ref 0.2–1)
BUN SERPL-MCNC: 11 MG/DL (ref 5–25)
CALCIUM ALBUM COR SERPL-MCNC: 9.2 MG/DL (ref 8.3–10.1)
CALCIUM SERPL-MCNC: 8.2 MG/DL (ref 8.4–10.2)
CHLORIDE SERPL-SCNC: 105 MMOL/L (ref 96–108)
CO2 SERPL-SCNC: 20 MMOL/L (ref 21–32)
CREAT SERPL-MCNC: 1.02 MG/DL (ref 0.6–1.3)
ERYTHROCYTE [DISTWIDTH] IN BLOOD BY AUTOMATED COUNT: 13.4 % (ref 11.6–15.1)
GFR SERPL CREATININE-BSD FRML MDRD: 78 ML/MIN/1.73SQ M
GLUCOSE SERPL-MCNC: 79 MG/DL (ref 65–140)
HCO3 BLDCOA-SCNC: 24.1 MMOL/L (ref 17.3–27.3)
HCO3 BLDCOV-SCNC: 21.7 MMOL/L (ref 12.2–28.6)
HCT VFR BLD AUTO: 33.1 % (ref 34.8–46.1)
HGB BLD-MCNC: 11.3 G/DL (ref 11.5–15.4)
MCH RBC QN AUTO: 30.4 PG (ref 26.8–34.3)
MCHC RBC AUTO-ENTMCNC: 34.1 G/DL (ref 31.4–37.4)
MCV RBC AUTO: 89 FL (ref 82–98)
O2 CT VFR BLDCOA CALC: 3 ML/DL
OXYHGB MFR BLDCOA: 13.4 %
OXYHGB MFR BLDCOV: 43.4 %
PCO2 BLDCOA: 59 MM[HG] (ref 30–60)
PCO2 BLDCOV: 42.8 MM HG (ref 27–43)
PH BLDCOA: 7.23 [PH] (ref 7.23–7.43)
PH BLDCOV: 7.32 [PH] (ref 7.19–7.49)
PLATELET # BLD AUTO: 174 THOUSANDS/UL (ref 149–390)
PMV BLD AUTO: 10.8 FL (ref 8.9–12.7)
PO2 BLDCOA: 11.1 MM HG (ref 5–25)
PO2 BLDCOV: 20 MM HG (ref 15–45)
POTASSIUM SERPL-SCNC: 3.6 MMOL/L (ref 3.5–5.3)
PROT SERPL-MCNC: 5.2 G/DL (ref 6.4–8.4)
RBC # BLD AUTO: 3.72 MILLION/UL (ref 3.81–5.12)
SAO2 % BLDCOV: 9.8 ML/DL
SODIUM SERPL-SCNC: 135 MMOL/L (ref 135–147)
TREPONEMA PALLIDUM IGG+IGM AB [PRESENCE] IN SERUM OR PLASMA BY IMMUNOASSAY: NORMAL
WBC # BLD AUTO: 14.26 THOUSAND/UL (ref 4.31–10.16)

## 2024-01-16 PROCEDURE — 82805 BLOOD GASES W/O2 SATURATION: CPT | Performed by: STUDENT IN AN ORGANIZED HEALTH CARE EDUCATION/TRAINING PROGRAM

## 2024-01-16 PROCEDURE — 80053 COMPREHEN METABOLIC PANEL: CPT | Performed by: STUDENT IN AN ORGANIZED HEALTH CARE EDUCATION/TRAINING PROGRAM

## 2024-01-16 PROCEDURE — 85027 COMPLETE CBC AUTOMATED: CPT | Performed by: STUDENT IN AN ORGANIZED HEALTH CARE EDUCATION/TRAINING PROGRAM

## 2024-01-16 PROCEDURE — 10907ZC DRAINAGE OF AMNIOTIC FLUID, THERAPEUTIC FROM PRODUCTS OF CONCEPTION, VIA NATURAL OR ARTIFICIAL OPENING: ICD-10-PCS | Performed by: STUDENT IN AN ORGANIZED HEALTH CARE EDUCATION/TRAINING PROGRAM

## 2024-01-16 PROCEDURE — 0UQGXZZ REPAIR VAGINA, EXTERNAL APPROACH: ICD-10-PCS | Performed by: STUDENT IN AN ORGANIZED HEALTH CARE EDUCATION/TRAINING PROGRAM

## 2024-01-16 PROCEDURE — 3E033VJ INTRODUCTION OF OTHER HORMONE INTO PERIPHERAL VEIN, PERCUTANEOUS APPROACH: ICD-10-PCS | Performed by: STUDENT IN AN ORGANIZED HEALTH CARE EDUCATION/TRAINING PROGRAM

## 2024-01-16 PROCEDURE — 59400 OBSTETRICAL CARE: CPT | Performed by: STUDENT IN AN ORGANIZED HEALTH CARE EDUCATION/TRAINING PROGRAM

## 2024-01-16 RX ORDER — ONDANSETRON 2 MG/ML
4 INJECTION INTRAMUSCULAR; INTRAVENOUS EVERY 8 HOURS PRN
Status: DISCONTINUED | OUTPATIENT
Start: 2024-01-16 | End: 2024-01-18 | Stop reason: HOSPADM

## 2024-01-16 RX ORDER — CALCIUM CARBONATE 500 MG/1
1000 TABLET, CHEWABLE ORAL DAILY PRN
Status: DISCONTINUED | OUTPATIENT
Start: 2024-01-16 | End: 2024-01-18 | Stop reason: HOSPADM

## 2024-01-16 RX ORDER — METOCLOPRAMIDE HYDROCHLORIDE 5 MG/ML
10 INJECTION INTRAMUSCULAR; INTRAVENOUS EVERY 6 HOURS PRN
Status: DISCONTINUED | OUTPATIENT
Start: 2024-01-16 | End: 2024-01-16

## 2024-01-16 RX ORDER — DOCUSATE SODIUM 100 MG/1
100 CAPSULE, LIQUID FILLED ORAL 2 TIMES DAILY
Status: DISCONTINUED | OUTPATIENT
Start: 2024-01-17 | End: 2024-01-18 | Stop reason: HOSPADM

## 2024-01-16 RX ORDER — ACETAMINOPHEN 325 MG/1
650 TABLET ORAL EVERY 4 HOURS PRN
Status: DISCONTINUED | OUTPATIENT
Start: 2024-01-16 | End: 2024-01-18 | Stop reason: HOSPADM

## 2024-01-16 RX ORDER — IBUPROFEN 600 MG/1
600 TABLET ORAL EVERY 6 HOURS PRN
Status: DISCONTINUED | OUTPATIENT
Start: 2024-01-16 | End: 2024-01-18 | Stop reason: HOSPADM

## 2024-01-16 RX ORDER — ACETAMINOPHEN 160 MG/5ML
975 SUSPENSION ORAL ONCE
Status: DISCONTINUED | OUTPATIENT
Start: 2024-01-16 | End: 2024-01-16

## 2024-01-16 RX ORDER — DIPHENHYDRAMINE HCL 25 MG
25 TABLET ORAL EVERY 6 HOURS PRN
Status: DISCONTINUED | OUTPATIENT
Start: 2024-01-16 | End: 2024-01-18 | Stop reason: HOSPADM

## 2024-01-16 RX ORDER — OXYTOCIN/RINGER'S LACTATE 30/500 ML
1-30 PLASTIC BAG, INJECTION (ML) INTRAVENOUS
Status: DISCONTINUED | OUTPATIENT
Start: 2024-01-16 | End: 2024-01-16

## 2024-01-16 RX ORDER — ACETAMINOPHEN 325 MG/1
975 TABLET ORAL EVERY 6 HOURS PRN
Status: DISCONTINUED | OUTPATIENT
Start: 2024-01-16 | End: 2024-01-16

## 2024-01-16 RX ORDER — BUPIVACAINE HYDROCHLORIDE 2.5 MG/ML
INJECTION, SOLUTION EPIDURAL; INFILTRATION; INTRACAUDAL AS NEEDED
Status: DISCONTINUED | OUTPATIENT
Start: 2024-01-16 | End: 2024-01-17 | Stop reason: HOSPADM

## 2024-01-16 RX ORDER — BENZOCAINE/MENTHOL 6 MG-10 MG
1 LOZENGE MUCOUS MEMBRANE DAILY PRN
Status: DISCONTINUED | OUTPATIENT
Start: 2024-01-16 | End: 2024-01-18 | Stop reason: HOSPADM

## 2024-01-16 RX ADMIN — SODIUM CHLORIDE 3 G: 9 INJECTION, SOLUTION INTRAVENOUS at 21:08

## 2024-01-16 RX ADMIN — ONDANSETRON 4 MG: 2 INJECTION INTRAMUSCULAR; INTRAVENOUS at 11:34

## 2024-01-16 RX ADMIN — NALBUPHINE HYDROCHLORIDE 10 MG: 10 INJECTION, SOLUTION INTRAMUSCULAR; INTRAVENOUS; SUBCUTANEOUS at 09:09

## 2024-01-16 RX ADMIN — SODIUM CHLORIDE, SODIUM LACTATE, POTASSIUM CHLORIDE, AND CALCIUM CHLORIDE 125 ML/HR: .6; .31; .03; .02 INJECTION, SOLUTION INTRAVENOUS at 18:51

## 2024-01-16 RX ADMIN — ONDANSETRON 4 MG: 2 INJECTION INTRAMUSCULAR; INTRAVENOUS at 03:40

## 2024-01-16 RX ADMIN — METOCLOPRAMIDE 10 MG: 5 INJECTION, SOLUTION INTRAMUSCULAR; INTRAVENOUS at 13:17

## 2024-01-16 RX ADMIN — Medication 2 MILLI-UNITS/MIN: at 11:07

## 2024-01-16 RX ADMIN — Medication 2.5 MILLION UNITS: at 20:18

## 2024-01-16 RX ADMIN — ACETAMINOPHEN 325MG 975 MG: 325 TABLET ORAL at 21:13

## 2024-01-16 RX ADMIN — NALBUPHINE HYDROCHLORIDE 10 MG: 10 INJECTION, SOLUTION INTRAMUSCULAR; INTRAVENOUS; SUBCUTANEOUS at 05:56

## 2024-01-16 RX ADMIN — ROPIVACAINE HYDROCHLORIDE: 2 INJECTION, SOLUTION EPIDURAL; INFILTRATION at 18:49

## 2024-01-16 RX ADMIN — CALCIUM CARBONATE (ANTACID) CHEW TAB 500 MG 500 MG: 500 CHEW TAB at 10:58

## 2024-01-16 RX ADMIN — Medication 2.5 MILLION UNITS: at 15:24

## 2024-01-16 RX ADMIN — ONDANSETRON 4 MG: 2 INJECTION INTRAMUSCULAR; INTRAVENOUS at 18:24

## 2024-01-16 RX ADMIN — SODIUM CHLORIDE, SODIUM LACTATE, POTASSIUM CHLORIDE, AND CALCIUM CHLORIDE 125 ML/HR: .6; .31; .03; .02 INJECTION, SOLUTION INTRAVENOUS at 09:24

## 2024-01-16 RX ADMIN — BUPIVACAINE HYDROCHLORIDE 1.2 ML: 2.5 INJECTION, SOLUTION EPIDURAL; INFILTRATION; INTRACAUDAL; PERINEURAL at 13:04

## 2024-01-16 RX ADMIN — Medication 25 MCG: at 05:03

## 2024-01-16 RX ADMIN — SODIUM CHLORIDE 5 MILLION UNITS: 0.9 INJECTION, SOLUTION INTRAVENOUS at 10:54

## 2024-01-16 RX ADMIN — ROPIVACAINE HYDROCHLORIDE: 2 INJECTION, SOLUTION EPIDURAL; INFILTRATION at 13:17

## 2024-01-16 NOTE — OB LABOR/OXYTOCIN SAFETY PROGRESS
Oxytocin Safety Progress Check Note - Cristal Estrada 23 y.o. female MRN: [id number]    Unit/Bed#: -01 Encounter: [id number]    Dose (laura-units/min) Oxytocin: 8 laura-units/min  Contraction Frequency (minutes): 1.5-3  Contraction Intensity: Moderate/Strong  Uterine Activity Characteristics: Regular  Cervical Dilation: 9        Cervical Effacement: 90  Fetal Station: -1  Baseline Rate (FHR): 130 bpm  Fetal Heart Rate (FHT): 121 BPM  FHR Category: I           Vital Signs:   Vitals:    01/16/24 1742   BP: 134/78   Pulse: 81   Resp:    Temp:    SpO2:        Notes/comments:   - Fetal and maternal status reassuring. Patient continues to progress in the active phase of labor. Continue pitocin titration.       Patrick Merida MD 1/16/2024 6:24 PM

## 2024-01-16 NOTE — OB LABOR/OXYTOCIN SAFETY PROGRESS
Oxytocin Safety Progress Check Note - Cristal Estrada 23 y.o. female MRN: [id number]    Unit/Bed#: -01 Encounter: [id number]    Dose (laura-units/min) Oxytocin: 8 laura-units/min  Contraction Frequency (minutes): 2-3  Contraction Intensity: Moderate  Uterine Activity Characteristics: Irregular  Cervical Dilation: 7-8        Cervical Effacement: 80  Fetal Station: -2  Baseline Rate (FHR): 115 bpm  Fetal Heart Rate (FHT): 121 BPM  FHR Category: I           Vital Signs:   Vitals:    01/16/24 1456   BP: 109/56   Pulse: 85   Resp:    Temp:    SpO2:        Notes/comments:   - Fetal and maternal status reassuring. Progressing appropriately in active phase of labor. Continue pitocin titration.       Patrick Merida MD 1/16/2024 3:55 PM

## 2024-01-16 NOTE — OB LABOR/OXYTOCIN SAFETY PROGRESS
Labor Progress Note - Cristal Estrada 23 y.o. female MRN: [id number]    Unit/Bed#: -01 Encounter: [id number]       Contraction Frequency (minutes): 3-7  Contraction Intensity: Mild  Uterine Activity Characteristics: Irregular                 Baseline Rate (FHR): 110 bpm  Fetal Heart Rate (FHT): 121 BPM  FHR Category: I           Vital Signs:   Vitals:    01/16/24 0535   BP: 122/80   Pulse: 83   Resp:    Temp:    SpO2:        Notes/comments:   - Assumed care of patient at 07:30. Isbell balloon remains in place. Fetal and maternal status are reassuring. Received second dose of cytotec PO at 05:00. Will allow patient to eat light breakfast, then continue with induction. Plan to initiate penicillin once balloon is out.      Patrick Merida MD 1/16/2024 7:32 AM

## 2024-01-16 NOTE — OB LABOR/OXYTOCIN SAFETY PROGRESS
Oxytocin Safety Progress Check Note - Cristal Estrada 23 y.o. female MRN: [id number]    Unit/Bed#: -01 Encounter: [id number]    Dose (laura-units/min) Oxytocin: 8 laura-units/min  Contraction Frequency (minutes): 2-4  Contraction Intensity: Moderate/Strong  Uterine Activity Characteristics: Regular  Cervical Dilation: 6  Cervical Effacement: 70  Fetal Station: -2  Baseline Rate (FHR): 135 bpm  Fetal Heart Rate (FHT): 121 BPM  FHR Category: I               Vital Signs:   Vitals:    01/16/24 1320   BP: 107/70   Pulse: 77   Resp:    Temp:    SpO2:        Notes/comments:   - Fetal and maternal status reassuring. Patient progressing. First exam in active phase. AROM performed on exam. Continue pitocin titration.    Patrick Merida MD 1/16/2024 1:58 PM

## 2024-01-16 NOTE — OB LABOR/OXYTOCIN SAFETY PROGRESS
Oxytocin Safety Progress Check Note - Cristal Estrada 23 y.o. female MRN: [id number]    Unit/Bed#: -01 Encounter: [id number]       Contraction Frequency (minutes): 5-6  Contraction Intensity: Mild  Uterine Activity Characteristics: Irregular  Cervical Dilation: 4-5        Cervical Effacement: 50  Fetal Station: -3  Baseline Rate (FHR): 115 bpm  Fetal Heart Rate (FHT): 121 BPM  FHR Category: 1               Vital Signs:   Vitals:    01/16/24 0721   BP: 127/83   Pulse: 75   Resp: 18   Temp: 97.9 °F (36.6 °C)   SpO2:        Notes/comments:       Balloon out   Will begin antibiotics and pitocin now      Leticia Arango DO 1/16/2024 10:46 AM

## 2024-01-16 NOTE — ANESTHESIA PREPROCEDURE EVALUATION
Procedure:  LABOR ANALGESIA    Relevant Problems   ANESTHESIA (within normal limits)      /RENAL   (+) Nephrolithiasis      GYN   (+) 39 weeks gestation of pregnancy      NEURO/PSYCH   (+) Anxiety and depression   (+) Other headache syndrome      PULMONARY (within normal limits)      Physical Exam    Airway       Dental       Cardiovascular      Pulmonary      Other Findings  post-pubertal.    Lab Results   Component Value Date    HGB 12.2 01/15/2024     01/15/2024       Anesthesia Plan  ASA Score- 2     Anesthesia Type- epidural with ASA Monitors.         Additional Monitors:     Airway Plan:            Plan Factors-Exercise tolerance (METS): >4 METS.    Chart reviewed.   Existing labs reviewed. Patient summary reviewed.    Patient is not a current smoker.              Induction- intravenous.    Postoperative Plan-     Informed Consent- Anesthetic plan and risks discussed with patient and spouse.  I personally reviewed this patient with the CRNA. Discussed and agreed on the Anesthesia Plan with the CRNA..

## 2024-01-16 NOTE — ANESTHESIA PROCEDURE NOTES
CSE Block    Patient location during procedure: OB  Start time: 1/16/2024 1:04 PM  Reason for block: procedure for pain and at surgeon's request  Staffing  Performed by: Marycruz Oreilly MD  Authorized by: Marycruz Oreilly MD    Preanesthetic Checklist  Completed: patient identified, IV checked, risks and benefits discussed, surgical consent, monitors and equipment checked, pre-op evaluation and timeout performed  CSE  Patient position: sitting  Prep: ChloraPrep  Patient monitoring: heart rate, continuous pulse ox and frequent blood pressure checks  Approach: midline  Spinal Needle  Needle type: pencil-tip   Needle gauge: 27 G  Epidural Needle  Injection technique: EDUAR saline  Needle type: Tuohy   Needle gauge: 17 G  Needle length: 9 cm  Needle insertion depth: 4 cm  Location: L4-L5  Catheter  Catheter type: side hole  Catheter size: 19 G  Catheter at skin depth: 9 cm  Catheter securement method: stabilization device  Assessment  Injection Assessment:  negative aspiration for heme, no pain on injection, no paresthesia on injection and positive aspiration for clear CSF.  Additional Notes  Pt positioned sitting, timeout, sterile prep and drape.  Placement X1 attempt at L 4/5 with EDUAR to NS.  Needle through needle CSE with + CSF, transient right thigh parasthesia resolved prior to injection, easy injection, + relief.  Cath threaded easily, negative aspiration.  Patient laid supine with MONE, PCEA initiated.

## 2024-01-16 NOTE — H&P
History and Physical - Obstetrics  Cristal Estrada 23 y.o. female MRN: [id number]  Unit/Bed#: -01 Encounter: [id number]  Admit date: 1/15/2024.  Today's date: 01/15/24      Assessment/Plan   Cristal Estrada is a 23 y.o. year-old  at 39w5d admitted for elective IOL    -cervical ripening: cervical olivo placed  -GBS prophylaxis once active labor  -labs, IVF         Chief Complaint: encounter for elective IOL      History of Present Illness   Cristal Estrada is a 23 y.o. year-old  with an JOSE of Estimated Date of Delivery: 24 at 39w5d, Hospital Day: 1, admitted for elective IOL.  Pt feels good fetal movement, no bleeding, no leakage of fluid.  +     ROS  Pertinent items are noted in HPI.      Pregnancy Plan:  Pregnancy: Dickey  Fetal sex: Female     Delivery Plans  Planned delivery method: Vaginal  Planned delivery location: [facility] L[facility]D     Post-Delivery Plans  Feeding intentions: Breast Milk    Other history is as follows:    Historical Information   OB History    Para Term  AB Living   2 0 0 0 1 0   SAB IAB Ectopic Multiple Live Births   1 0 0 0 0      # Outcome Date GA Lbr Micah/2nd Weight Sex Delivery Anes PTL Lv   2 Current            1 SAB 24             Gynecologic history: Patient's last menstrual period was 2023.     Past Medical History:   Diagnosis Date    Kidney stone     ,  - lithotripsy     Migraine     has seen neurology    Premature baby     26 weeks    Psychiatric disorder     anxiety,depression-counseling prior to covid    Pyelonephritis 2023    Dr. De Jesus     Past Surgical History:   Procedure Laterality Date    HEMANGIOMA EXCISION Right     [facility]    [place] CYSTO/URETERO W/LITHOTRIPSY &INDWELL STENT INSRT Right 2022    Procedure: CYSTOSCOPY URETEROSCOPY WITH LITHOTRIPSY HOLMIUM LASER AND INSERTION STENT URETERAL;  Surgeon: Isaiah Hudson MD;  [facility]: [facility];  Service: Urology     Social History   Social History      Substance and Sexual Activity   Alcohol Use Never     Social History     Substance and Sexual Activity   Drug Use Never     Social History     Tobacco Use   Smoking Status Never    Passive exposure: Never   Smokeless Tobacco Never     Family History: non-contributory      Meds/Allergies   Allergies   Allergen Reactions    Cephalexin Rash and Itching    Levofloxacin Rash, Itching and Hives     Upper arm red and burning   Vein swelling in the arm that patient got levaquin   Upper arm red and burning        Objective    No data found.  Vitals: Last menstrual period 04/01/2023, unknown if currently breastfeeding.There is no height or weight on file to calculate BMI.    VSS afebrile  General appearance AAOX 3 NAD  Unlabored breathing  Cor RR  Abdomen gravid, nt  Extr nt  Cervix:  SSE Isbell catheter attempted to be placed via visualization, but not able to.  Isbell then placed in the cervix via digital exam without difficulty, balloon filled to 60 cc.  Vertex by leopolds    SVE:    1-2/60/-2    FHT: 120-130s reactive cat I       [place]: Irregular contractions      Lab Results   Component Value Date    WBC 7.17 12/29/2023    HGB 11.7 12/29/2023    HCT 34.8 12/29/2023     12/29/2023     Lab Results   Component Value Date    K 3.6 12/29/2023     12/29/2023    CO2 23 12/29/2023    BUN 8 12/29/2023    CREATININE 0.70 12/29/2023    AST 19 12/29/2023    ALT 11 12/29/2023         Eunice Bedolla DO  1/15/2024  8:47 PM

## 2024-01-17 LAB
ALBUMIN SERPL BCP-MCNC: 3.1 G/DL (ref 3.5–5)
ALP SERPL-CCNC: 92 U/L (ref 34–104)
ALT SERPL W P-5'-P-CCNC: 13 U/L (ref 7–52)
ANION GAP SERPL CALCULATED.3IONS-SCNC: 6 MMOL/L
AST SERPL W P-5'-P-CCNC: 32 U/L (ref 13–39)
BASOPHILS # BLD AUTO: 0.05 THOUSANDS/ÂΜL (ref 0–0.1)
BASOPHILS NFR BLD AUTO: 0 % (ref 0–1)
BILIRUB SERPL-MCNC: 0.34 MG/DL (ref 0.2–1)
BUN SERPL-MCNC: 13 MG/DL (ref 5–25)
CALCIUM ALBUM COR SERPL-MCNC: 9.9 MG/DL (ref 8.3–10.1)
CALCIUM SERPL-MCNC: 9.2 MG/DL (ref 8.4–10.2)
CHLORIDE SERPL-SCNC: 109 MMOL/L (ref 96–108)
CO2 SERPL-SCNC: 25 MMOL/L (ref 21–32)
CREAT SERPL-MCNC: 0.94 MG/DL (ref 0.6–1.3)
EOSINOPHIL # BLD AUTO: 0.11 THOUSAND/ÂΜL (ref 0–0.61)
EOSINOPHIL NFR BLD AUTO: 1 % (ref 0–6)
ERYTHROCYTE [DISTWIDTH] IN BLOOD BY AUTOMATED COUNT: 13.5 % (ref 11.6–15.1)
ERYTHROCYTE [DISTWIDTH] IN BLOOD BY AUTOMATED COUNT: 13.7 % (ref 11.6–15.1)
GFR SERPL CREATININE-BSD FRML MDRD: 86 ML/MIN/1.73SQ M
GLUCOSE SERPL-MCNC: 97 MG/DL (ref 65–140)
HCT VFR BLD AUTO: 29.5 % (ref 34.8–46.1)
HCT VFR BLD AUTO: 29.6 % (ref 34.8–46.1)
HGB BLD-MCNC: 10.2 G/DL (ref 11.5–15.4)
HGB BLD-MCNC: 10.2 G/DL (ref 11.5–15.4)
IMM GRANULOCYTES # BLD AUTO: 0.07 THOUSAND/UL (ref 0–0.2)
IMM GRANULOCYTES NFR BLD AUTO: 1 % (ref 0–2)
LYMPHOCYTES # BLD AUTO: 2.55 THOUSANDS/ÂΜL (ref 0.6–4.47)
LYMPHOCYTES NFR BLD AUTO: 17 % (ref 14–44)
MCH RBC QN AUTO: 30.4 PG (ref 26.8–34.3)
MCH RBC QN AUTO: 31 PG (ref 26.8–34.3)
MCHC RBC AUTO-ENTMCNC: 34.5 G/DL (ref 31.4–37.4)
MCHC RBC AUTO-ENTMCNC: 34.6 G/DL (ref 31.4–37.4)
MCV RBC AUTO: 88 FL (ref 82–98)
MCV RBC AUTO: 90 FL (ref 82–98)
MONOCYTES # BLD AUTO: 1.18 THOUSAND/ÂΜL (ref 0.17–1.22)
MONOCYTES NFR BLD AUTO: 8 % (ref 4–12)
NEUTROPHILS # BLD AUTO: 11.53 THOUSANDS/ÂΜL (ref 1.85–7.62)
NEUTS SEG NFR BLD AUTO: 73 % (ref 43–75)
NRBC BLD AUTO-RTO: 0 /100 WBCS
PLATELET # BLD AUTO: 143 THOUSANDS/UL (ref 149–390)
PLATELET # BLD AUTO: 162 THOUSANDS/UL (ref 149–390)
PMV BLD AUTO: 10.5 FL (ref 8.9–12.7)
PMV BLD AUTO: 10.6 FL (ref 8.9–12.7)
POTASSIUM SERPL-SCNC: 3.6 MMOL/L (ref 3.5–5.3)
PROT SERPL-MCNC: 5.6 G/DL (ref 6.4–8.4)
RBC # BLD AUTO: 3.29 MILLION/UL (ref 3.81–5.12)
RBC # BLD AUTO: 3.36 MILLION/UL (ref 3.81–5.12)
SODIUM SERPL-SCNC: 140 MMOL/L (ref 135–147)
WBC # BLD AUTO: 14.66 THOUSAND/UL (ref 4.31–10.16)
WBC # BLD AUTO: 15.49 THOUSAND/UL (ref 4.31–10.16)

## 2024-01-17 PROCEDURE — 88307 TISSUE EXAM BY PATHOLOGIST: CPT | Performed by: PATHOLOGY

## 2024-01-17 PROCEDURE — 85027 COMPLETE CBC AUTOMATED: CPT | Performed by: STUDENT IN AN ORGANIZED HEALTH CARE EDUCATION/TRAINING PROGRAM

## 2024-01-17 PROCEDURE — 99024 POSTOP FOLLOW-UP VISIT: CPT | Performed by: OBSTETRICS & GYNECOLOGY

## 2024-01-17 PROCEDURE — 85025 COMPLETE CBC W/AUTO DIFF WBC: CPT | Performed by: OBSTETRICS & GYNECOLOGY

## 2024-01-17 PROCEDURE — 80053 COMPREHEN METABOLIC PANEL: CPT | Performed by: OBSTETRICS & GYNECOLOGY

## 2024-01-17 RX ADMIN — DOCUSATE SODIUM 100 MG: 100 CAPSULE, LIQUID FILLED ORAL at 19:00

## 2024-01-17 RX ADMIN — IBUPROFEN 600 MG: 600 TABLET, FILM COATED ORAL at 13:39

## 2024-01-17 RX ADMIN — CALCIUM CARBONATE (ANTACID) CHEW TAB 500 MG 1000 MG: 500 CHEW TAB at 00:30

## 2024-01-17 RX ADMIN — BENZOCAINE AND LEVOMENTHOL 1 APPLICATION: 200; 5 SPRAY TOPICAL at 00:30

## 2024-01-17 RX ADMIN — IBUPROFEN 600 MG: 600 TABLET, FILM COATED ORAL at 07:48

## 2024-01-17 RX ADMIN — IBUPROFEN 600 MG: 600 TABLET, FILM COATED ORAL at 00:30

## 2024-01-17 RX ADMIN — HYDROCORTISONE 1 APPLICATION: 1 CREAM TOPICAL at 00:30

## 2024-01-17 NOTE — PROGRESS NOTES
"Progress Note - OB/GYN  Post-Partum Physician Note   Cristal Estrada 23 y.o. female MRN: [id number]  Unit/Bed#:  209-01 Encounter: [id number]    Patient is postpartum day 1 from a Vaginal, Spontaneous  with bilateral vaginal laceration and Anesthesia: epidural    Subjective:   Pain: some low back pain, upper buttock pain  Tolerating Oral Intake: yes  Voiding: yes  Flatus: yes  Bowel Movement: no  Ambulating: yes  Breastfeeding: Breastfeeding  Shortness of Breath: no  Leg Pain/Discomfort: no  Lochia: normal      Objective:   Vitals:    01/17/24 0000 01/17/24 0300 01/17/24 0700 01/17/24 1200   BP: 127/73 130/81 136/94 130/91   BP Location: Right arm Right arm Right arm Right arm   Pulse: 72 78 76 67   Resp: 18 18 18 16   Temp: 98.9 °F (37.2 °C) 98.2 °F (36.8 °C) 97.5 °F (36.4 °C) 98.3 °F (36.8 °C)   TempSrc: Oral Oral Temporal Oral   SpO2:  98% 96% 97%   Weight:       Height:           Intake/Output Summary (Last 24 hours) at 1/17/2024 1637  Last data filed at 1/17/2024 0659  Gross per 24 hour   Intake --   Output 1378 ml   Net -1378 ml       Physical Exam:  General: in no apparent distress  Abdomen: abdomen is soft without significant tenderness  Fundus: Firm and non-tender, at the umbilicus  Perineum: exam deferred  Back: no bruising, defect or tenderness to palpation  Lower extremities: nontender      Labs/Tests:   Lab Results   Component Value Date    WBC 14.66 (H) 01/17/2024    HGB 10.2 (L) 01/17/2024    HCT 29.6 (L) 01/17/2024    MCV 88 01/17/2024     (L) 01/17/2024       Brief OB Lab review:  ABO Grouping   Date Value Ref Range Status   01/15/2024 B  Final      Rh Factor   Date Value Ref Range Status   01/15/2024 Positive  Final    No results found for: \"ANTIBODYSCR\"  No results found for: \"RUBM\"    MEDS:   Current Facility-Administered Medications   Medication Dose Route Frequency    acetaminophen (TYLENOL) tablet 650 mg  650 mg Oral Q4H PRN    benzocaine-menthol-lanolin-aloe (DERMOPLAST) 20-0.5 % " topical spray 1 Application  1 Application Topical Q6H PRN    calcium carbonate (TUMS) chewable tablet 1,000 mg  1,000 mg Oral Daily PRN    diphenhydrAMINE (BENADRYL) tablet 25 mg  25 mg Oral Q6H PRN    docusate sodium (COLACE) capsule 100 mg  100 mg Oral BID    hydrocortisone 1 % cream 1 Application  1 Application Topical Daily PRN    ibuprofen (MOTRIN) tablet 600 mg  600 mg Oral Q6H PRN    ondansetron (ZOFRAN) injection 4 mg  4 mg Intravenous Q8H PRN    witch hazel-glycerin (TUCKS) topical pad 1 Pad  1 Pad Topical Q4H PRN     Invasive Devices       Peripheral Intravenous Line  Duration             Peripheral IV 01/15/24 Distal;Left;Upper;Ventral (anterior) Arm 1 day                    Assessment and Plan:  23 y.o. year-old , postpartum day 1 status-post  Vaginal, Spontaneous  and vaginal laceration.    Continue routine postpartum care  Encourage ambulation    Planning discharge tomorrow    Low back pain/upper buttock pain   - mostly with position change, exam normal   - reassured likely MSK and continue analgesia    Mildly elevated diastolic BP   - DBP today 94 and 91, systolic normal   - no symptoms of pre-eclampsia   - if next elevated will check pre-eclampsia labs      Stacey Hokpins MD

## 2024-01-17 NOTE — LACTATION NOTE
This note was copied from a baby's chart.  CONSULT - LACTATION  Baby Girl Estrada (Emily) 1 days female MRN: [id number]    [facility] NURSERY Room / Bed: (N)/(N) Encounter: [id number]    Maternal Information     MOTHER:  Cristal Estrada  Maternal Age: 23 y.o.   OB History: # 1 - Date: 22, Sex: None, Weight: None, GA: None, Delivery: None, Apgar1: None, Apgar5: None, Living: None, Birth Comments: None    # 2 - Date: 24, Sex: Female, Weight: 2990 g (6 lb 9.5 oz), GA: 39w6d, Delivery: Vaginal, Spontaneous, Apgar1: 9, Apgar5: 9, Living: Living, Birth Comments: None   Previouse breast reduction surgery? No    Lactation history:   Has patient previously breast fed: No   How long had patient previously breast fed:     Previous breast feeding complications:       Past Surgical History:   Procedure Laterality Date    HEMANGIOMA EXCISION Right     [facility]    [place] CYSTO/URETER[place] W/LITHOTRIPSY &INDWELL STENT INSRT Right 2022    Procedure: CYSTOSCOPY URETEROSCOPY WITH LITHOTRIPSY HOLMIUM LASER AND INSERTION STENT URETERAL;  Surgeon: Isaiah Hudson MD;  [facility];  Service: Urology        Birth information:  YOB: 2022   Time of birth: 9:52 PM   Sex: female   Delivery type: Vaginal, Spontaneous   Birth Weight: 2990 g (6 lb 9.5 oz)   Percent of Weight Change: 0%     Gestational Age: 39w6d   [unfilled]    Assessment     Breast and nipple assessment: normal assessment     Assessment:  uncoordinated, sleepy, suck is strong, tongue comes back after 2-3 suckles, tongue comes to lower lip    Feeding assessment:  baby latched after receiving 15 drops for 5 minutes and then received 7 drops from other side (21 total)  LATCH:  Latch: Grasps breast, tongue down, lips flanged, rhythmic sucking   Audible Swallowing: A few with stimulation   Type of Nipple: Everted (After stimulation)   Comfort (Breast/Nipple): Soft/non-tender   Hold  "(Positioning): Partial assist, teach one side, mother does other, staff holds   LATCH Score: 8          Feeding recommendations:   offer the breasts, any position then lay back. If baby does not latch, hand express/manual pump 15-20 minutes. Provide expressed colostrum and formula as supplement (5-10 ml). Awaken to feed expressed colostrum via finger/syringe. Begin double breast pump tonight if not latching. Set up appointment with [facility] for a tongue evaluation outpatient.    Met with parents to discuss feeding plan. Mother discussed that she is would like to breastfeed and bottle feed formula.    The Ready, Set, Baby Booklet was discussed. Discussed importance of skin to skin to help baby awaken for breastfeeding, to help with milk production as well as stabilize temperature, blood sugars, decrease pain, promote relaxation, and calm the baby as well as for bonding that father may do as well. Discussed tummy size progression as milk production increases to meet the nutritional/growing needs of the baby. Discussed alternative feeding methods as a manner to provide baby with additional colostrum/breast milk if baby is sleepy and/or unable to breastfeed directly to help protect the milk supply and preserve latching abilities at the breast. Mother was encouraged to hand express after feedings to provide \"dessert\" and when sleepy to hand express to provide \"snacks\" which could help baby to awaken for a feeding.    Discussed “Second Night Syndrome” explaining how baby’s cluster feeds to meet growing needs. Growth spurts periods were discussed within the first year and how cluster feeding helps boost milk supply.    Explained feeding cues and fullness cues as well as importance of obtaining a deep latch for effective milk removal and proper positioning (tummy to tummy, at level, nose to nipple, bring chin to breast first and bringing baby to breast) with ear, shoulder, and hip alignment. Demonstrated how " to hold, compress and perform hand expression. Mother was able to express 15 drops, baby latched and fed football, laid back on the right side for 5 minutes and received 7 drops of colostrum from the left side.    During suck training, baby had good cupping of the finger, but tongue would came back after 2-3 suckles. Tongue comes to lower lip.    Mother was set up with a manual hand pump and encouraged to use when baby does not latch and hand express. Cleaning station with basin was placed by sink.    Addressed breast pump needs and mother has a double breast pump for home use.    Parents were made aware of how to communicate with lactation and encouraged to reach out for a latch assessment, continued support and/or questions that arise.     Filomena Gooden RN 1/17/2024 1:02 PM

## 2024-01-17 NOTE — L&D DELIVERY NOTE
DELIVERY NOTE  Cristal Estrada 23 y.o. female MRN: [id number]  Unit/Bed#: -01 Encounter: [id number]    Obstetrician:  Patrick Merida MD    Pre-Delivery Diagnosis: Dickey pregnancy in vertex presentation at 39w6d gestation.    Post-Delivery Diagnosis: Same, delivered    Procedure: Spontaneous vaginal delivery    Delivery Date and Time:  1/16/2024 at 21:52    Umbilical Artery  Recent Labs     01/16/24  2155   PHCART 7.229*   BECART -4.6*       Umbilical Vein  Recent Labs     01/16/24  2155   PHCVEN 7.323   BECVEN -4.2*       Apgars: 9 at 1 minute, 9 at 5 minutes    Weight: 2990 grams           Complications: None    Description of Procedure:  Patient was found to be completely dilated and +1 station. She pushed for 2 hours and 15 minutes to spontaneously deliver a liveborn infant in direct occiput anterior position through clear fluid at 21:52. The head was delivered with the assistance of Ritgen's maneuver and the shoulders delivered easily, with the body easily delivering thereafter. The infant was placed on maternal abdomen. Cord clamping was delayed for 30 seconds, after which the cord was doubly clamped and cut. Routine cord gases and cord blood were collected. Pitocin was started for active management of the 3rd stage. Placenta delivered spontaneously and was noted to have a centrally-inserted 3-vessel cord. The placenta was sent to pathology. The fundus was noted to be firm. A thorough pelvic exam revealed bilateral shallow vaginal lacerations, which were repaired with 3-0 polyglactin suture in typical fashion. A rectal exam was performed and no defect noted. Excellent hemostasis was noted, with total QBL of 278 mL. All needle, sponge, and instrument counts were noted to be correct. The patient tolerated the procedure well and was allowed to recover in labor and delivery room.     Patrick Merida MD  1/16/2024 10:58 PM

## 2024-01-17 NOTE — ANESTHESIA POSTPROCEDURE EVALUATION
Post-Op Assessment Note    CV Status:  Stable    Pain management: adequate      Post-op block assessment: catheter intact and no complications   Mental Status:  Alert and awake   Hydration Status:  Euvolemic   PONV Controlled:  Controlled   Airway Patency:  Patent     Post Op Vitals Reviewed: Yes      Staff: Anesthesiologist               Vitals:    01/17/24 0000   BP: 127/73   Pulse: 72   Resp: 18   Temp: 98.9 °F (37.2 °C)   SpO2:

## 2024-01-17 NOTE — OB LABOR/OXYTOCIN SAFETY PROGRESS
Oxytocin Safety Progress Check Note - Cristal Estrada 23 y.o. female MRN: [id number]    Unit/Bed#: -01 Encounter: [id number]    Dose (laura-units/min) Oxytocin: 14 laura-units/min  Contraction Frequency (minutes): 2-3.5  Contraction Intensity: Strong  Uterine Activity Characteristics: Regular  Cervical Dilation: 10  Dilation Complete Date: 01/16/24  Dilation Complete Time: 1924  Cervical Effacement: 100  Fetal Station: 2  Baseline Rate (FHR): 160 bpm  Fetal Heart Rate (FHT): 171 BPM  FHR Category: II           Vital Signs:   Vitals:    01/16/24 2053   BP:    Pulse:    Resp:    Temp: (!) 100.6 °F (38.1 °C)   SpO2:        Notes/comments:   - Patient progressing in active phase of labor. FHR is category II due to late decelerations and fetal tachycardia. There is moderate variability and accelerations present. Overall reassuring in the second stage of labor. Will continue pushing.   - Patient now with fever and fetal tachycardia. Will treat for presumed intra-amniotic infection with acetaminophen 975 mg PO once and Unasyn 3 g IV.       Patrick Merida MD 1/16/2024 8:58 PM

## 2024-01-18 VITALS
TEMPERATURE: 97.8 F | RESPIRATION RATE: 16 BRPM | SYSTOLIC BLOOD PRESSURE: 145 MMHG | HEIGHT: 59 IN | WEIGHT: 130 LBS | OXYGEN SATURATION: 98 % | HEART RATE: 60 BPM | BODY MASS INDEX: 26.21 KG/M2 | DIASTOLIC BLOOD PRESSURE: 93 MMHG

## 2024-01-18 PROCEDURE — 99024 POSTOP FOLLOW-UP VISIT: CPT | Performed by: OBSTETRICS & GYNECOLOGY

## 2024-01-18 PROCEDURE — NC001 PR NO CHARGE: Performed by: OBSTETRICS & GYNECOLOGY

## 2024-01-18 RX ORDER — ACETAMINOPHEN 325 MG/1
650 TABLET ORAL EVERY 4 HOURS PRN
Start: 2024-01-18

## 2024-01-18 RX ORDER — IBUPROFEN 600 MG/1
600 TABLET ORAL EVERY 6 HOURS PRN
Start: 2024-01-18

## 2024-01-18 RX ADMIN — ACETAMINOPHEN 325MG 650 MG: 325 TABLET ORAL at 08:59

## 2024-01-18 RX ADMIN — DOCUSATE SODIUM 100 MG: 100 CAPSULE, LIQUID FILLED ORAL at 08:59

## 2024-01-18 RX ADMIN — IBUPROFEN 600 MG: 600 TABLET, FILM COATED ORAL at 13:14

## 2024-01-18 NOTE — PLAN OF CARE
Problem: POSTPARTUM  Goal: Experiences normal postpartum course  Description: INTERVENTIONS:  - Monitor maternal vital signs  - Assess uterine involution and lochia  2024 163 by Andra Curry RN  Outcome: Completed  2024 1550 by Andra Curry RN  Outcome: Progressing  Goal: Appropriate maternal -  bonding  Description: INTERVENTIONS:  - Identify family support  - Assess for appropriate maternal/infant bonding   -Encourage maternal/infant bonding opportunities  - Referral to  or  as needed  2024 1638 by Andra Curry RN  Outcome: Completed  2024 1550 by Andra Curry RN  Outcome: Progressing  Goal: Establishment of infant feeding pattern  Description: INTERVENTIONS:  - Assess breast/bottle feeding  - Refer to lactation as needed  2024 by Andra Curry RN  Outcome: Completed  2024 155 by Andra Curry RN  Outcome: Progressing  Goal: Incision(s), wounds(s) or drain site(s) healing without S/S of infection  Description: INTERVENTIONS  - Assess and document dressing, incision, wound bed, drain sites and surrounding tissue  - Provide patient and family education    2024 by Andra Curry RN  Outcome: Completed  2024 155 by Andra Curry RN  Outcome: Progressing     Problem: PAIN - ADULT  Goal: Verbalizes/displays adequate comfort level or baseline comfort level  Description: Interventions:  - Encourage patient to monitor pain and request assistance  - Assess pain using appropriate pain scale  - Administer analgesics based on type and severity of pain and evaluate response  - Implement non-pharmacological measures as appropriate and evaluate response  - Consider cultural and social influences on pain and pain management  - Notify physician/advanced practitioner if interventions unsuccessful or patient reports new pain  20248 by Andra Curry RN  Outcome: Completed  2024 155 by Andra Curry RN  Outcome:  Progressing     Problem: INFECTION - ADULT  Goal: Absence or prevention of progression during hospitalization  Description: INTERVENTIONS:  - Assess and monitor for signs and symptoms of infection  - Monitor lab/diagnostic results  - Monitor all insertion sites, i.e. indwelling lines, tubes, and drains  - Monitor endotracheal if appropriate and nasal secretions for changes in amount and color  - [place] appropriate cooling/warming therapies per order  - Administer medications as ordered  - Instruct and encourage patient and family to use good hand hygiene technique  - Identify and instruct in appropriate isolation precautions for identified infection/condition  1/18/2024 1638 by Andra Curry RN  Outcome: Completed  1/18/2024 1550 by Andra Curry RN  Outcome: Progressing  Goal: Absence of fever/infection during neutropenic period  Description: INTERVENTIONS:  - Monitor WBC    1/18/2024 1638 by Andra Curry RN  Outcome: Completed  1/18/2024 1550 by Andra Curry RN  Outcome: Progressing     Problem: SAFETY ADULT  Goal: Patient will remain free of falls  Description: INTERVENTIONS:  - Educate patient/family on patient safety including physical limitations  - Instruct patient to call for assistance with activity   - Consult OT/PT to assist with strengthening/mobility   - Keep Call bell within reach  - Keep bed low and locked with side rails adjusted as appropriate  - Keep care items and personal belongings within reach  - Initiate and maintain comfort rounds  - Make Fall Risk Sign visible to staff    - Apply yellow socks and bracelet for high fall risk patients  - Consider moving patient to room near nurses station  1/18/2024 1638 by Andra Curry RN  Outcome: Completed  1/18/2024 1550 by Andra Curry RN  Outcome: Progressing  Goal: Maintain or return to baseline ADL function  Description: INTERVENTIONS:  -  Assess patient's ability to carry out ADLs; assess patient's baseline for ADL function and identify  physical deficits which impact ability to perform ADLs (bathing, care of mouth/teeth, toileting, grooming, dressing, etc.)  - Assess/evaluate cause of self-care deficits   - Assess range of motion  - Assess patient's mobility; develop plan if impaired  - Assess patient's need for assistive devices and provide as appropriate  - Encourage maximum independence but intervene and supervise when necessary  - Involve family in performance of ADLs  - Assess for home care needs following discharge   - Consider OT consult to assist with ADL evaluation and planning for discharge  - Provide patient education as appropriate  1/18/2024 1638 by Andra Curry RN  Outcome: Completed  1/18/2024 1550 by Andra Curry RN  Outcome: Progressing  Goal: Maintains/Returns to pre admission functional level  Description: INTERVENTIONS:  - Perform AM-PAC 6 Click Basic Mobility/ Daily Activity assessment daily.  - Set and communicate daily mobility goal to care team and patient/family/caregiver.   - Collaborate with rehabilitation services on mobility goals if consulted    - Out of bed for toileting  - Record patient progress and toleration of activity level   1/18/2024 1638 by Andra Curry RN  Outcome: Completed  1/18/2024 1550 by Andra Curry RN  Outcome: Progressing     Problem: DISCHARGE PLANNING  Goal: Discharge to home or other facility with appropriate resources  Description: INTERVENTIONS:  - Identify barriers to discharge w/patient and caregiver  - Arrange for needed discharge resources and transportation as appropriate  - Identify discharge learning needs (meds, wound care, etc.)  - Arrange for interpretive services to assist at discharge as needed  - Refer to Case Management Department for coordinating discharge planning if the patient needs post-hospital services based on physician/advanced practitioner order or complex needs related to functional status, cognitive ability, or social support system  1/18/2024 1638 by Andra DAVIS  Antoinette RN  Outcome: Completed  1/18/2024 1550 by Andra Curry, RENA  Outcome: Progressing

## 2024-01-18 NOTE — PROGRESS NOTES
Progress Note - OB/GYN  Post-Partum Physician Note   Cristal Estrada 23 y.o. female MRN: [id number]  Unit/Bed#: -01 Encounter: [id number]    Assessment:  23 y.o. year-old , postpartum day #2 s/p     Plan:   Continue routine postpartum care  Encourage ambulation  Advance diet as tolerated  D/C home    Gestational hypertension without significant proteinuria, postpartum  - Postpartum day 1 - DBP low 90s, SBP 130s  - no sx pre-eclampsia  - repeat labs 2024 normal  - will continue to monitor    _________________________________    Subjective:   Pain: Denies  Tolerating Oral Intake: +  Voiding: +  Ambulating: +  Chest Pain: denies  Shortness of Breath: -  Leg Pain/Discomfort: -  Lochia: moderate    Objective:   Vitals:    24 2300 24 0311 24 0750 24 0907   BP: 133/86 137/82 146/97 141/93   BP Location: Left arm Right arm Left arm Right arm   Pulse:  65     Resp: 18 16 18    Temp: 98 °F (36.7 °C) 97.6 °F (36.4 °C) 98.3 °F (36.8 °C)    TempSrc: Temporal Temporal Temporal    SpO2: 98% 99% 98%    Weight:       Height:         No intake or output data in the 24 hours ending 24 1038    Physical Exam:  General: in no apparent distress  Abdomen: abdomen is soft without significant tenderness, masses, organomegaly or guarding  Fundus: Firm,     Labs/Tests:   Lab Results   Component Value Date/Time    HGB 10.2 (L) 2024 06:24 PM    HGB 10.2 (L) 2024 07:58 AM    HGB 11.3 (L) 2024 11:00 PM    HGB 12.2 01/15/2024 08:48 PM     2024 06:24 PM     (L) 2024 07:58 AM     2024 11:00 PM     01/15/2024 08:48 PM    WBC 15.49 (H) 2024 06:24 PM    WBC 14.66 (H) 2024 07:58 AM    WBC 14.26 (H) 2024 11:00 PM    WBC 10.16 01/15/2024 08:48 PM    CREATININE 0.94 2024 06:24 PM    CREATININE 1.02 2024 11:00 PM    ALT 13 2024 06:24 PM    ALT 8 2024 11:00 PM    AST 32 2024 06:24 PM    AST 25  "01/16/2024 11:00 PM        Brief OB Lab review:  ABO Grouping   Date Value Ref Range Status   01/15/2024 B  Final      Rh Factor   Date Value Ref Range Status   01/15/2024 Positive  Final    No results found for: \"ANTIBODYSCR\"  No results found for: \"RUBM\"    MEDS:   Current Facility-Administered Medications   Medication Dose Route Frequency    acetaminophen (TYLENOL) tablet 650 mg  650 mg Oral Q4H PRN    benzocaine-menthol-lanolin-aloe (DERMOPLAST) 20-0.5 % topical spray 1 Application  1 Application Topical Q6H PRN    calcium carbonate (TUMS) chewable tablet 1,000 mg  1,000 mg Oral Daily PRN    diphenhydrAMINE (BENADRYL) tablet 25 mg  25 mg Oral Q6H PRN    docusate sodium (COLACE) capsule 100 mg  100 mg Oral BID    hydrocortisone 1 % cream 1 Application  1 Application Topical Daily PRN    ibuprofen (MOTRIN) tablet 600 mg  600 mg Oral Q6H PRN    ondansetron (ZOFRAN) injection 4 mg  4 mg Intravenous Q8H PRN    witch hazel-glycerin (TUCKS) topical pad 1 Pad  1 Pad Topical Q4H PRN     Invasive Devices       None                     Aditya Alcazar DO  1/18/2024 10:38 AM            "

## 2024-01-18 NOTE — NURSING NOTE
RN reviewed discharge paperwork with MOB and FOB. Educated parents on safe sleep recommendations,  screenings, bath instructions, and POST birth warning signs magnet given. Patient verbalized understanding of education and asked appropriate questions. All questions acknowledged and answered.

## 2024-01-18 NOTE — LACTATION NOTE
This note was copied from a baby's chart.  CONSULT - LACTATION  Baby Girl (Margarita Estrada 2 days female MRN: [id number]    [facility] NURSERY Room / Bed: (N)/(N) Encounter: [id number]    Maternal Information     MOTHER:  Cristal Estrada  Maternal Age: 23 y.o.   OB History: # 1 - Date: 22, Sex: None, Weight: None, GA: None, Delivery: None, Apgar1: None, Apgar5: None, Living: None, Birth Comments: None    # 2 - Date: 24, Sex: Female, Weight: 2990 g (6 lb 9.5 oz), GA: 39w6d, Delivery: Vaginal, Spontaneous, Apgar1: 9, Apgar5: 9, Living: Living, Birth Comments: None   Previouse breast reduction surgery? No    Lactation history:   Has patient previously breast fed: No   How long had patient previously breast fed:     Previous breast feeding complications:       Past Surgical History:   Procedure Laterality Date    HEMANGIOMA EXCISION Right     [facility]    VA CYSTO/URETERO W/LITHOTRIPSY &INDWELL STENT INSRT Right 2022    Procedure: CYSTOSCOPY URETEROSCOPY WITH LITHOTRIPSY HOLMIUM LASER AND INSERTION STENT URETERAL;  Surgeon: Isaiah Hudson MD;  Location: [facility];  Service: Urology        Birth information:  YOB: 2022   Time of birth: 9:52 PM   Sex: female   Delivery type: Vaginal, Spontaneous   Birth Weight: 2990 g (6 lb 9.5 oz)   Percent of Weight Change: -4%     Gestational Age: 39w6d   [unfilled]    Assessment     Breast and nipple assessment: normal assessment    [place] Assessment:  cups finger well, tongue goes back after 2-3 suckles, more coordinated    Feeding assessment:  baby latched in a laid back position and mother reported comfort.  LATCH:  Latch: Grasps breast, tongue down, lips flanged, rhythmic sucking   Audible Swallowing: Spontaneous and intermittent (24 hours old)   Type of Nipple: Everted (After stimulation)   Comfort (Breast/Nipple): Soft/non-tender   Hold (Positioning): Partial assist, teach one side,  mother does other, staff holds   LATCH Score: 9          Feeding recommendations:   offer the breasts, in a laid back position, offer both sides is possible and if searching for more may supplement with formula. When milk transitions and comes in, did not skip breastfeeding sessions, if baby does not empty, pump and provide via paced bottle feeding. Follow up with [facility] for a tongue evaluation.    Met with parents to follow up and discuss the Breastfeeding Discharge Booklet.     Assisted mother with latch and position, baby latched deeply in a laid back position and fed 10 minutes and latched on other side in same position. Mother reported comfort.    Discussed the importance of ensuring that baby feeds 8-12x in 24 hours and that baby has 6 wet diapers or more that are becoming more dilute as well as soiled diapers that are transitioning demonstrated by color change from meconium to a yellow/gold seedy loose stool by day 5.    Mother was given resources to look up medications to ensure they are safe with breastfeeding, by communicating with the [facility], [facility], [facility] as well as using [email or web address] (assisted mother to pin to home screen on personal phone).    Discussed engorgement time frame (when mature milk comes in) and management as well as how to deal with conditions that may occur while breastfeeding (plugged ducts, milk blebs and mastitis) and when is appropriate to communicate with her OB/GYN and/or a lactation consultant.    Discussed, reviewed and demonstrated how to set up a pump, how to cycle (stimulation vs expression phases during a pumping session), importance of flange fit and trying different sizes to ensure best fit, milk storage and how to properly clean parts. Discussed handouts for tips on pumping when returning to work and paced bottle feeding.    Discussed community resources for continued support in breastfeeding once discharged home. She was  encouraged to communicate with [facility], insurance for lactation home visits and/or with her baby's pediatrician for lactation support/services that could be offered in the practice or close to home.    Parents were encouraged to call for further questions that arise prior to discharge.    Filomena Gooden RN 1/18/2024 10:27 AM

## 2024-01-18 NOTE — QUICK NOTE
Blood pressure   - DBP still low 90s with normal SBP   - no symptoms preeclampsia   - repeat plt, creatinine, LFTs normal just now   - dx gHTN in postpartum period, will continue to monitor    Stacey Hopkins MD

## 2024-01-18 NOTE — DISCHARGE SUMMARY
Discharge Summary - OB/GYN   Cristal Estrada 23 y.o. female MRN: [id number]  Unit/Bed#: -01 Encounter: [id number]    Admission Date: 1/15/2024     Discharge Date: 24    Principal Diagnosis: No admission diagnoses are documented for this encounter.,  Pregnancy at 39w6d    Secondary Diagnosis: GHTN    Attending - Delivery: Patrick Scott MD         - Discharge: Aditya Alcazar DO    Procedures: Vaginal, Spontaneous , vaginal laceration    Anesthesia: epidural    Complications: none apparent    Hospital Course:      Cristal Estrada is a 23 y.o.  at 39w6d who was admitted with induction of labor for elective    She delivered a viable  with weight of 6 pounds 9.5 oz, apgars of 9 (1 min) and 9 (5 min).     was transferred to  nursery. Patient tolerated the procedure well and was transferred to recovery in stable condition.      Her postpartum course was complicated by hypertension. PP hemoglobin is 10.2     On day of discharge, she was ambulating and able to reasonably perform all ADLs. She was voiding and had appropriate bowel function. Pain was well controlled. She was discharged home on postpartum day #2 without complications. Patient was instructed to follow up with her OB as an outpatient and was given appropriate warnings to call provider if she develops signs of infection or uncontrolled pain.    Condition at discharge: good     Discharge instructions/Information to patient and family:   See after visit summary for information provided to patient and family.      Provisions for Follow-Up Care:  See after visit summary for information related to follow-up care and any pertinent home health orders.      Disposition: See After Visit Summary for discharge disposition information.    Planned Readmission: No    Discharge Medications:  For a complete list of the patient's medications, please refer to her med rec.    Aditya Alcazar DO  2024  11:47 AM

## 2024-01-18 NOTE — DISCHARGE INSTR - AVS FIRST PAGE
You should take your blood pressure at home one time daily.     Normal-range blood pressures in the postpartum period are less than 140 for the top number (systolic) AND less than 90 for the bottom number (diastolic).    Mildly elevated blood pressures in the postpartum period are 140-159 systolic OR  diastolic. Some mildly elevated blood pressures are expected due to your diagnosis of gestational hypertension.    Severe-range blood pressures in the postpartum period are 160 or greater systolic  or greater diastolic. You should NOT have any severely elevated blood pressures at home.     If your blood pressure is greater than 160/110, you should promptly call [facility] [place] at [phone] and proceed to the hospital for evaluation for pre-eclampsia.    Symptoms of Pre-Eclampsia include headache that does not go away with Tylenol, changes in vision such as blurred vision or spots in your vision, chest pain, shortness of breath, pain in the upper right side of your abdomen, or sudden onset or worsening of swelling.    If you develop any of the above symptoms, you should promptly call [facility] [place] at [phone] and present to the hospital for evaluation of pre-eclampsia.

## 2024-01-18 NOTE — CASE MANAGEMENT
Case Management Progress Note    Patient name Cristal Estrada  Location /- MRN [id number]  : 2000 Date 2024       LOS (days): 3  Geometric Mean LOS (GMLOS) (days):   Days to GMLOS:        OBJECTIVE:        Current admission status: Inpatient  Preferred Pharmacy:   [facility]/pharmacy #[id number] - [address] [address]  Phone: [phone] Fax: [phone]    Primary Care Provider: Mohinder Pandya DO    Primary Insurance: BLUE CROSS  Secondary Insurance: [place] [employer]    PROGRESS NOTE: Cm consulted for high PPD score.  Cm met with MOB and FOB to provide PPD packet. Cm discussed with MOB asking for help , baby blue vs true depression. Cm recommended if Mob feels like she needs help to call her ins company and ask who in her area is covered for MH. MOB was open to the discussion. Very pleasant. No concerns expressed.

## 2024-01-18 NOTE — PLAN OF CARE

## 2024-01-18 NOTE — PLAN OF CARE
Problem: POSTPARTUM  Goal: Experiences normal postpartum course  Description: INTERVENTIONS:  - Monitor maternal vital signs  - Assess uterine involution and lochia  Outcome: Progressing  Goal: Appropriate maternal -  bonding  Description: INTERVENTIONS:  - Identify family support  - Assess for appropriate maternal/infant bonding   -Encourage maternal/infant bonding opportunities  - Referral to  or  as needed  Outcome: Progressing  Goal: Establishment of infant feeding pattern  Description: INTERVENTIONS:  - Assess breast/bottle feeding  - Refer to lactation as needed  Outcome: Progressing  Goal: Incision(s), wounds(s) or drain site(s) healing without S/S of infection  Description: INTERVENTIONS  - Assess and document dressing, incision, wound bed, drain sites and surrounding tissue  - Provide patient and family education  Outcome: Progressing     Problem: PAIN - ADULT  Goal: Verbalizes/displays adequate comfort level or baseline comfort level  Description: Interventions:  - Encourage patient to monitor pain and request assistance  - Assess pain using appropriate pain scale  - Administer analgesics based on type and severity of pain and evaluate response  - Implement non-pharmacological measures as appropriate and evaluate response  - Consider cultural and social influences on pain and pain management  - Notify physician/advanced practitioner if interventions unsuccessful or patient reports new pain  Outcome: Progressing     Problem: INFECTION - ADULT  Goal: Absence or prevention of progression during hospitalization  Description: INTERVENTIONS:  - Assess and monitor for signs and symptoms of infection  - Monitor lab/diagnostic results  - Monitor all insertion sites, i.e. indwelling lines, tubes, and drains  - Monitor endotracheal if appropriate and nasal secretions for changes in amount and color  - [place] appropriate cooling/warming therapies per order  - Administer medications  as ordered  - Instruct and encourage patient and family to use good hand hygiene technique  - Identify and instruct in appropriate isolation precautions for identified infection/condition  Outcome: Progressing  Goal: Absence of fever/infection during neutropenic period  Description: INTERVENTIONS:  - Monitor WBC    Outcome: Progressing     Problem: SAFETY ADULT  Goal: Patient will remain free of falls  Description: INTERVENTIONS:  - Educate patient/family on patient safety including physical limitations  - Instruct patient to call for assistance with activity   - Consult OT/PT to assist with strengthening/mobility   - Keep Call bell within reach  - Keep bed low and locked with side rails adjusted as appropriate  - Keep care items and personal belongings within reach  - Initiate and maintain comfort rounds  - Make Fall Risk Sign visible to staff  - Apply yellow socks and bracelet for high fall risk patients  - Consider moving patient to room near nurses station  Outcome: Progressing  Goal: Maintain or return to baseline ADL function  Description: INTERVENTIONS:  -  Assess patient's ability to carry out ADLs; assess patient's baseline for ADL function and identify physical deficits which impact ability to perform ADLs (bathing, care of mouth/teeth, toileting, grooming, dressing, etc.)  - Assess/evaluate cause of self-care deficits   - Assess range of motion  - Assess patient's mobility; develop plan if impaired  - Assess patient's need for assistive devices and provide as appropriate  - Encourage maximum independence but intervene and supervise when necessary  - Involve family in performance of ADLs  - Assess for home care needs following discharge   - Consider OT consult to assist with ADL evaluation and planning for discharge  - Provide patient education as appropriate  Outcome: Progressing  Goal: Maintains/Returns to pre admission functional level  Description: INTERVENTIONS:  - Perform AM-PAC 6 Click Basic  Mobility/ Daily Activity assessment daily.  - Set and communicate daily mobility goal to care team and patient/family/caregiver.   - Collaborate with rehabilitation services on mobility goals if consulted  - Out of bed for toileting  - Record patient progress and toleration of activity level   Outcome: Progressing     Problem: DISCHARGE PLANNING  Goal: Discharge to home or other facility with appropriate resources  Description: INTERVENTIONS:  - Identify barriers to discharge w/patient and caregiver  - Arrange for needed discharge resources and transportation as appropriate  - Identify discharge learning needs (meds, wound care, etc.)  - Arrange for interpretive services to assist at discharge as needed  - Refer to Case Management Department for coordinating discharge planning if the patient needs post-hospital services based on physician/advanced practitioner order or complex needs related to functional status, cognitive ability, or social support system  Outcome: Progressing     Problem: Knowledge Deficit  Goal: Verbalizes understanding of labor plan  Description: Assess patient/family/caregiver's baseline knowledge level and ability to understand information.  Provide education via patient/family/caregiver's preferred learning method at appropriate level of understanding.     1. Provide teaching at level of understanding.  2. Provide teaching via preferred learning method(s).  Outcome: Completed  Goal: Patient/family/caregiver demonstrates understanding of disease process, treatment plan, medications, and discharge instructions  Description: Complete learning assessment and assess knowledge base.  Interventions:  - Provide teaching at level of understanding  - Provide teaching via preferred learning methods  Outcome: Completed     Problem: Labor & Delivery  Goal: Manages discomfort  Description: Assess and monitor for signs and symptoms of discomfort.  Assess patient's pain level regularly and per hospital  policy.  Administer medications as ordered. Support use of nonpharmacological methods to help control pain such as distraction, imagery, relaxation, and application of heat and cold.  Collaborate with interdisciplinary team and patient to determine appropriate pain management plan.    1. Include patient in decisions related to comfort.  2. Offer non-pharmacological pain management interventions.  3. Report ineffective pain management to physician.  Outcome: Completed  Goal: Patient vital signs are stable  Description: 1. Assess vital signs - vaginal delivery.  Outcome: Completed

## 2024-01-19 PROCEDURE — 88307 TISSUE EXAM BY PATHOLOGIST: CPT | Performed by: PATHOLOGY

## 2024-01-19 NOTE — UTILIZATION REVIEW
"    MOTHER AND BABY DISCHARGED     NOTIFICATION OF INPATIENT ADMISSION   MATERNITY/DELIVERY AUTHORIZATION REQUEST   SERVICING FACILITY:   [facility]  Parent Child Health - L&D, , NICU  [address] [place] [address]  Tax ID: [id number]  NPI: [id number] ATTENDING PROVIDER:  Attending Name and NPI#: Patrick Merida Md [[id number]]  [facility] [place] [address]  Phone: [phone]     ADMISSION INFORMATION:  Place of Service: Inpatient [facility]  Place of Service Code: 21  Inpatient Admission Date/Time: 1/15/24  8:07 PM  Discharge Date/Time: 2024  5:54 PM  Admitting Diagnosis Code/Description:  No admission diagnoses are documented for this encounter.     Mother: Cristal Estrada 2000 Estimated Date of Delivery: 24  Delivering clinician: Patrick Merida    OB History          2    Para   1    Term   1       0    AB   1    Living   1         SAB   1    IAB   0    Ectopic   0    Multiple   0    Live Births   1               [place] Name & MRN:   Information for the patient's :  Sean, Baby Girl (Cristal) [[id number]]   [place] Delivery Information:  Sex: female  Delivered 2024 9:52 PM by Vaginal, Spontaneous; Gestational Age: 39w6d     Measurements:  Weight: 6 lb 9.5 oz (2990 g);  Height: 19\"    APGAR 1 minute 5 minutes 10 minutes   Totals: 9 9       Birth Information: 23 y.o. female MRN: [id number] Unit/Bed#: -01   Birthweight: No birth weight on file. Gestational Age: <None> Delivery Type:    APGARS Totals:        UTILIZATION REVIEW CONTACT:  Cristel Linares, Utilization   Network Utilization Review Department  Phone: [phone]  Fax [phone]  Email: [email or web address]  Contact for approvals/pending authorizations, clinical reviews, and discharge.     PHYSICIAN ADVISORY SERVICES:  Medical Necessity Denial & Nyks-aj-Jlig Review  Phone: [phone]  " Fax: [phone]  Email: [email or web address]     DISCHARGE SUPPORT TEAM:  For Patients Discharge Needs & Updates  Phone: [phone] opt. 2 Fax: [phone]  Email: [email or web address]        Discharge Summary - OB/GYN   Cristal Estrada 23 y.o. female MRN: [id number]  Unit/Bed#: -01 Encounter: [id number]     Admission Date: 1/15/2024      Discharge Date: 24     Principal Diagnosis: No admission diagnoses are documented for this encounter.,  Pregnancy at 39w6d     Secondary Diagnosis: GHTN     Attending - Delivery: Patrick Scott MD                    - Discharge: Aditya Alcazar,      Procedures: Vaginal, Spontaneous , vaginal laceration     Anesthesia: epidural     Complications: none apparent     Hospital Course:      Cristal Estrada is a 23 y.o.  at 39w6d who was admitted with induction of labor for elective     She delivered a viable  with weight of 6 pounds 9.5 oz, apgars of 9 (1 min) and 9 (5 min).      was transferred to  nursery. Patient tolerated the procedure well and was transferred to recovery in stable condition.      Her postpartum course was complicated by hypertension. PP hemoglobin is 10.2     On day of discharge, she was ambulating and able to reasonably perform all ADLs. She was voiding and had appropriate bowel function. Pain was well controlled. She was discharged home on postpartum day #2 without complications. Patient was instructed to follow up with her OB as an outpatient and was given appropriate warnings to call provider if she develops signs of infection or uncontrolled pain.     Condition at discharge: good      Discharge instructions/Information to patient and family:   See after visit summary for information provided to patient and family.       Provisions for Follow-Up Care:  See after visit summary for information related to follow-up care and any pertinent home health orders.        Disposition: See After Visit Summary for discharge disposition information.     Planned Readmission: No     Discharge Medications:  For a complete list of the patient's medications, please refer to her med rec.     Aditya Alcazar DO  1/18/2024 11:47 AM                       Routing History

## 2024-01-22 ENCOUNTER — CLINICAL SUPPORT (OUTPATIENT)
Dept: OBGYN CLINIC | Facility: CLINIC | Age: 24
End: 2024-01-22

## 2024-01-22 VITALS — SYSTOLIC BLOOD PRESSURE: 130 MMHG | BODY MASS INDEX: 23.67 KG/M2 | WEIGHT: 117.2 LBS | DIASTOLIC BLOOD PRESSURE: 98 MMHG

## 2024-01-22 NOTE — PROGRESS NOTES
Patient presents for a BP check today.  Pt's home BP's having been ranging in the 130's systolic over 90s/100s. She has been having headaches that are relieved with tylenol, but denies any other symptoms.  She states she has not been getting a lot of sleep.      Spoke with Dr. Merida, and recommendations were to continue home blood pressures.  Pt to report any Bps 140/90s to the office.  Any visual disturbances, headaches that are not relieved with tylenol, swelling, RUQ pain, pt is to report to the office.      Reviewed recommendations with patient and mother in room. Pt verbalized understanding and voiced appreciation.

## 2024-01-25 ENCOUNTER — POSTPARTUM VISIT (OUTPATIENT)
Dept: OBGYN CLINIC | Facility: CLINIC | Age: 24
End: 2024-01-25

## 2024-01-25 ENCOUNTER — TELEPHONE (OUTPATIENT)
Dept: OBGYN CLINIC | Facility: CLINIC | Age: 24
End: 2024-01-25

## 2024-01-25 VITALS
SYSTOLIC BLOOD PRESSURE: 122 MMHG | BODY MASS INDEX: 23.1 KG/M2 | HEIGHT: 59 IN | DIASTOLIC BLOOD PRESSURE: 86 MMHG | WEIGHT: 114.6 LBS

## 2024-01-25 PROCEDURE — 99024 POSTOP FOLLOW-UP VISIT: CPT | Performed by: OBSTETRICS & GYNECOLOGY

## 2024-01-25 NOTE — TELEPHONE ENCOUNTER
Patient called into office reporting nipple pain that is now radiating to the rest of her breast.  Patient denies any fever, chills, or red streaks at this time.  She reports having night sweats 2 nights ago. Advised patient to make appt to be further evaluated. She verbalized understanding and was transferred to the  to be scheduled

## 2024-01-25 NOTE — PROGRESS NOTES
"Assessment/Plan:         Diagnoses and all orders for this visit:    Lactating mother      Explained engorgement to the patient. Explained that she either needs to empty the breasts regularly of milk or stop nursing. Encouraged continued nursing but she plans to stop. Discussed strategies to help suppress milk supply    Subjective:      Patient ID: Cristal Estrada is a 23 y.o. female.    HPI    Pt delivered 1/16/24. She has been primarily pumping as the baby did not latch well. She is planning to stop nursing as there is not a good place at work to pump. She woke up this AM with breast pain. No fever. She has not pumped or nursed the baby since last evening prior to going to bed    The following portions of the patient's history were reviewed and updated as appropriate: allergies, current medications, past family history, past medical history, past social history, past surgical history, and problem list.    Review of Systems      Objective:      /86 (BP Location: Right arm, Patient Position: Sitting, Cuff Size: Standard)   Ht 4' 11\" (1.499 m)   Wt 52 kg (114 lb 9.6 oz)   LMP 04/01/2023   Breastfeeding No Comment: only pumping  BMI 23.15 kg/m²          Physical Exam      Breasts are engorged. No masses, erythema or palpable collections  "

## 2024-01-30 ENCOUNTER — OFFICE VISIT (OUTPATIENT)
Dept: URGENT CARE | Facility: CLINIC | Age: 24
End: 2024-01-30
Payer: COMMERCIAL

## 2024-01-30 VITALS
RESPIRATION RATE: 18 BRPM | SYSTOLIC BLOOD PRESSURE: 124 MMHG | HEART RATE: 82 BPM | DIASTOLIC BLOOD PRESSURE: 85 MMHG | OXYGEN SATURATION: 96 % | TEMPERATURE: 97.5 F

## 2024-01-30 DIAGNOSIS — Z20.822 ENCOUNTER FOR LABORATORY TESTING FOR COVID-19 VIRUS: ICD-10-CM

## 2024-01-30 DIAGNOSIS — B34.9 VIRAL SYNDROME: Primary | ICD-10-CM

## 2024-01-30 LAB
SARS-COV-2 AG UPPER RESP QL IA: NEGATIVE
VALID CONTROL: NORMAL

## 2024-01-30 PROCEDURE — 99213 OFFICE O/P EST LOW 20 MIN: CPT | Performed by: PHYSICIAN ASSISTANT

## 2024-01-30 PROCEDURE — 87811 SARS-COV-2 COVID19 W/OPTIC: CPT | Performed by: PHYSICIAN ASSISTANT

## 2024-01-30 PROCEDURE — 87636 SARSCOV2 & INF A&B AMP PRB: CPT | Performed by: PHYSICIAN ASSISTANT

## 2024-01-30 RX ORDER — ONDANSETRON 4 MG/1
4 TABLET, FILM COATED ORAL EVERY 12 HOURS PRN
Qty: 20 TABLET | Refills: 0 | Status: SHIPPED | OUTPATIENT
Start: 2024-01-30

## 2024-01-30 NOTE — PROGRESS NOTES
[facility]        NAME: Cristal Estrada is a 23 y.o. female  : 2000    MRN: [id number]  DATE: 2024  TIME: 3:30 PM    /85   Pulse 82   Temp 97.5 °F (36.4 °C)   Resp 18   LMP 2023   SpO2 96%     Assessment and Plan   Viral syndrome [B34.9]  1. Viral syndrome  Covid/Flu- Office Collect Normal      2. Encounter for laboratory testing for COVID-19 virus              Patient Instructions       Follow up with PCP in 3-5 days.  Proceed to  ER if symptoms worsen.    Chief Complaint     Chief Complaint   Patient presents with    Flu like symptoms     Patient has had headache, abd pain, nasal congestion, cough, nausea, vomiting starting 3 days ago         History of Present Illness       Pt with nasal congestion frontal headache nausea vomiting  for about 3 days        Review of Systems   Review of Systems   Constitutional:  Positive for fatigue and fever.   HENT:  Positive for congestion.    Eyes: Negative.    Respiratory: Negative.     Cardiovascular: Negative.    Gastrointestinal:  Positive for abdominal pain and nausea.   Endocrine: Negative.    Genitourinary: Negative.    Musculoskeletal: Negative.    Allergic/Immunologic: Negative.    Neurological: Negative.    Hematological: Negative.    Psychiatric/Behavioral: Negative.     All other systems reviewed and are negative.        Current Medications       Current Outpatient Medications:     acetaminophen (TYLENOL) 325 mg tablet, Take 2 tablets (650 mg total) by mouth every 4 (four) hours as needed for mild pain, Disp: , Rfl:     docusate sodium (COLACE) 100 mg capsule, Take 1 capsule (100 mg total) by mouth 2 (two) times a day, Disp: , Rfl: 0    ibuprofen (MOTRIN) 600 mg tablet, Take 1 tablet (600 mg total) by mouth every 6 (six) hours as needed (First-line for mild pain.), Disp: , Rfl:     Magnesium 250 MG TABS, Take 250 mg by mouth once, Disp: , Rfl:     Melatonin 1 MG CHEW, Chew, Disp: , Rfl:     Prenatal Vit-Fe Fumarate-FA  (PRENATAL 19 PO), Take 1 capsule by mouth daily, Disp: , Rfl:     witch hazel-glycerin (TUCKS) topical pad, Apply 1 Pad topically every 4 (four) hours as needed for irritation, Disp: , Rfl:     Current Allergies     Allergies as of 01/30/2024 - Reviewed 01/30/2024   Allergen Reaction Noted    Cephalexin Rash and Itching 04/12/2022    Levofloxacin Rash, Itching, and Hives 04/19/2022            The following portions of the patient's history were reviewed and updated as appropriate: allergies, current medications, past family history, past medical history, past social history, past surgical history and problem list.     Past Medical History:   Diagnosis Date    Kidney stone     2022, 2023 - lithotripsy 2022    Migraine     has seen neurology    Premature baby     26 weeks    Psychiatric disorder     anxiety,depression-counseling prior to covid    Pyelonephritis 03/11/2023    Dr. De Jesus       Past Surgical History:   Procedure Laterality Date    HEMANGIOMA EXCISION Right 2011    [facility] hospital    MS CYSTO/URETERO W/LITHOTRIPSY &INDWELL STENT INSRT Right 04/19/2022    Procedure: CYSTOSCOPY URETEROSCOPY WITH LITHOTRIPSY HOLMIUM LASER AND INSERTION STENT URETERAL;  Surgeon: Isaiah Hudson MD;  Location: [facility];  Service: Urology       Family History   Problem Relation Age of Onset    Hypertension Mother     Migraines Mother     Hypertension Father     Diabetes Father     Nephrolithiasis Brother     Asthma Sister     Asthma Brother          Medications have been verified.        Objective   /85   Pulse 82   Temp 97.5 °F (36.4 °C)   Resp 18   LMP 04/01/2023   SpO2 96%        Physical Exam     Physical Exam  Vitals and nursing note reviewed.   Constitutional:       Appearance: Normal appearance. She is normal weight.      Comments: Headache is frontal    HENT:      Head: Normocephalic and atraumatic.      Right Ear: Tympanic membrane, ear canal and external ear normal.      Left Ear: Tympanic membrane, ear canal  and external ear normal.      Nose: Nose normal.      Mouth/Throat:      Mouth: Mucous membranes are moist.      Pharynx: Oropharynx is clear.   Eyes:      Extraocular Movements: Extraocular movements intact.      Conjunctiva/sclera: Conjunctivae normal.   Cardiovascular:      Rate and Rhythm: Normal rate and regular rhythm.      Pulses: Normal pulses.      Heart sounds: Normal heart sounds.   Pulmonary:      Effort: Pulmonary effort is normal.      Breath sounds: Normal breath sounds.   Abdominal:      General: Abdomen is flat. Bowel sounds are normal.      Palpations: Abdomen is soft.      Comments: Mid epigastric tenderness    Musculoskeletal:         General: Normal range of motion.      Cervical back: Normal range of motion and neck supple.   Skin:     General: Skin is warm.   Neurological:      General: No focal deficit present.      Mental Status: She is alert and oriented to person, place, and time.

## 2024-01-31 LAB
FLUAV RNA RESP QL NAA+PROBE: NEGATIVE
FLUBV RNA RESP QL NAA+PROBE: NEGATIVE
SARS-COV-2 RNA RESP QL NAA+PROBE: NEGATIVE

## 2024-02-06 ENCOUNTER — OFFICE VISIT (OUTPATIENT)
Age: 24
End: 2024-02-06
Payer: COMMERCIAL

## 2024-02-06 VITALS — HEART RATE: 106 BPM | DIASTOLIC BLOOD PRESSURE: 68 MMHG | SYSTOLIC BLOOD PRESSURE: 108 MMHG | OXYGEN SATURATION: 96 %

## 2024-02-06 PROCEDURE — 99204 OFFICE O/P NEW MOD 45 MIN: CPT | Performed by: STUDENT IN AN ORGANIZED HEALTH CARE EDUCATION/TRAINING PROGRAM

## 2024-02-06 NOTE — PROGRESS NOTES
INITIAL BREAST FEEDING EVALUATION    Informant/Relationship: mother     Discussion of General Lactation Issues: had trouble with latching at the hospital and mom was having a lot of pain, switched to bottle and she is doing great with that, she does dribble, plan is to bottle feed, was told in the hospital by doctor that she may have a tongue tie   Left breast does still occasionally express milk and she feels strong let downs at times     Infant is 3 weeks old today.      History:  Fertility Problem:no  Breast changes: yes  :  yes  Full term: yes   labor:no  First nursing/attempt < 1 hour after birth: yes  Skin to skin following delivery: yes  Breast changes after delivery: yes  Rooming in (infant in room with mother with exception of procedures, eg. Circumcision:  yes  Blood sugar issues:no  NICU stay:no  Jaundice:yes - mild  Phototherapy:no  Supplement given: (list supplement and method used as well as reason(s):yes - formula     Past Medical History:   Diagnosis Date   • Kidney stone     ,  - lithotripsy    • Migraine     has seen neurology   • Premature baby     26 weeks   • Psychiatric disorder     anxiety,depression-counseling prior to covid   • Pyelonephritis 2023    Dr. De Jesus         Current Outpatient Medications:   •  acetaminophen (TYLENOL) 325 mg tablet, Take 2 tablets (650 mg total) by mouth every 4 (four) hours as needed for mild pain, Disp: , Rfl:   •  docusate sodium (COLACE) 100 mg capsule, Take 1 capsule (100 mg total) by mouth 2 (two) times a day, Disp: , Rfl: 0  •  ibuprofen (MOTRIN) 600 mg tablet, Take 1 tablet (600 mg total) by mouth every 6 (six) hours as needed (First-line for mild pain.), Disp: , Rfl:   •  Magnesium 250 MG TABS, Take 250 mg by mouth once, Disp: , Rfl:   •  Melatonin 1 MG CHEW, Chew, Disp: , Rfl:   •  Prenatal Vit-Fe Fumarate-FA (PRENATAL 19 PO), Take 1 capsule by mouth daily, Disp: , Rfl:   •  witch hazel-glycerin (TUCKS) topical pad,  Apply 1 Pad topically every 4 (four) hours as needed for irritation, Disp: , Rfl:   •  ondansetron (ZOFRAN) 4 mg tablet, Take 1 tablet (4 mg total) by mouth every 12 (twelve) hours as needed for nausea or vomiting (Patient not taking: Reported on 2/6/2024), Disp: 20 tablet, Rfl: 0    Allergies   Allergen Reactions   • Cephalexin Rash and Itching   • Levofloxacin Rash, Itching and Hives     Upper arm red and burning   Vein swelling in the arm that patient got levaquin   Upper arm red and burning        Social History     Substance and Sexual Activity   Drug Use Never       Social History     Interval Breastfeeding History:  Frequency of breast feeding: none right now   Stooling pattern normal: lYes  Urinating frequently:Yes  Using shield or shells: No    Alternative/Artificial Feedings:   Bottle: Yes, Dr. Jasso's anti colic, size 1 nipple   Cup: No  Syringe/Finger: No           Formula Type: similac advanced                      Amount: 2-4 oz every 2-3 hours     Elimination Problems: No    Mom:  Breast: Not performed today  Mother's Awareness of Feeding Cues                 Recognizes: Yes                  Verbalizes: Yes  Support System: FOB and grandmother   History of Breastfeeding: none   Changes/Stressors/Violence: mom was sick recently   Concerns/Goals: would like to know if baby has a tongue tie     Problems with Mom: none at the moment     Physical Exam  Constitutional:       Appearance: Normal appearance.   HENT:      Head: Normocephalic and atraumatic.   Eyes:      Extraocular Movements: Extraocular movements intact.   Cardiovascular:      Rate and Rhythm: Normal rate and regular rhythm.      Heart sounds: Normal heart sounds.   Musculoskeletal:      Cervical back: Normal range of motion and neck supple.      Right lower leg: No edema.      Left lower leg: No edema.   Neurological:      General: No focal deficit present.   Psychiatric:         Attention and Perception: Attention normal.         Mood and  Affect: Mood normal.         Behavior: Behavior normal. Behavior is cooperative.         Infant:  Behaviors: Alert  Color: healthy   Birth weight: 2990 g  Current weight: 3440 g    Problems with infant: possible tongue tie       General Appearance:  Alert, active, no distress                            Head:  Normocephalic, AFOF, sutures opposed                            Eyes:   Conjunctiva clear, no drainage                            Ears:   Normally placed, no anomolies                           Nose:   Septum intact, no drainage or erythema                          Mouth:  No lesions, tongue extends to lower alveolar ridge, slightly poor lateralization, tongue can lift past mid mouth but stays slightly flat with crying, moderate cupping of examiner's finger, loses suction easily, intermittently has good peristalsis, frenulum attaches close to base of tongue and is elastic                    Neck:  Supple, symmetrical, trachea midline, no adenopathy; thyroid: no enlargement, symmetric, no tenderness/mass/nodules                Respiratory:  No grunting, flaring, retractions, breath sounds clear and equal           Cardiovascular:  Regular rate and rhythm. No murmur. Adequate perfusion/capillary refill. Femoral pulse present                  Abdomen:    Soft, non-tender, no masses, bowel sounds present, no HSM            Genitourinary:  Normal female genitalia, anus patent                         Spine:   No abnormalities noted       Musculoskeletal:   Full range of motion         Skin/Hair/Nails:   Skin warm, dry, and intact, no rashes or abnormal dyspigmentation or lesions               Neurologic:   No abnormal movement, tone appropriate for gestational age    [place] Latch:bottle   Efficiency:               Lips Flanged: Yes              Audible Swallow: Yes              Suck Swallow Cycle: Breathing: non labored, Coordinated: yes  Latch Problems: does well with the bottle, occasionally loses suction but  doesn't make much of a mess while feeding     Position:  Infant's Ergonomics/Body: sat upright for paced feeding     Education:  Reviewed Infant Elimination: normal stooling pattern  Reviewed Alternative/Artificial Feedings: paced bottle feeding  Reviewed Mom/Breast care: reducing milk supply       Plan:    Reviewed findings of restricted tongue movement on Pat's exam with mother.   Given that she is bottle feeding now and gaining weight well would not recommend any intervention at this time.   Recommended Cristal try peppermint tea or OTC sudafed if she desires to cut milk supply quicker.   Can follow up as needed.     I have spent 45 minutes with Patient and family today in which greater than 50% of this time was spent in counseling/coordination of care regarding Prognosis, Risks and benefits of tx options, Instructions for management, Patient and family education, Impressions, Counseling / Coordination of care, Documenting in the medical record, and Obtaining or reviewing history  .

## 2024-02-20 PROBLEM — B95.1 GROUP B STREPTOCOCCUS URINARY TRACT INFECTION AFFECTING PREGNANCY IN FIRST TRIMESTER: Status: RESOLVED | Noted: 2023-06-07 | Resolved: 2024-02-20

## 2024-02-20 PROBLEM — O23.41 GROUP B STREPTOCOCCUS URINARY TRACT INFECTION AFFECTING PREGNANCY IN FIRST TRIMESTER: Status: RESOLVED | Noted: 2023-06-07 | Resolved: 2024-02-20

## 2024-02-21 PROBLEM — O23.03 PYELONEPHRITIS AFFECTING PREGNANCY IN THIRD TRIMESTER: Status: RESOLVED | Noted: 2023-10-05 | Resolved: 2024-02-21

## 2024-02-23 ENCOUNTER — OFFICE VISIT (OUTPATIENT)
Dept: OBGYN CLINIC | Facility: CLINIC | Age: 24
End: 2024-02-23
Payer: COMMERCIAL

## 2024-02-23 ENCOUNTER — NURSE TRIAGE (OUTPATIENT)
Age: 24
End: 2024-02-23

## 2024-02-23 VITALS
HEIGHT: 59 IN | SYSTOLIC BLOOD PRESSURE: 120 MMHG | WEIGHT: 106 LBS | BODY MASS INDEX: 21.37 KG/M2 | DIASTOLIC BLOOD PRESSURE: 84 MMHG

## 2024-02-23 DIAGNOSIS — N93.9 VAGINAL BLEEDING: Primary | ICD-10-CM

## 2024-02-23 PROCEDURE — 99213 OFFICE O/P EST LOW 20 MIN: CPT | Performed by: OBSTETRICS & GYNECOLOGY

## 2024-02-23 NOTE — PROGRESS NOTES
Assessment/Plan:      Diagnoses and all orders for this visit:    Vaginal bleeding  -  Informed patient of dark blood c/w menstrual bleeding.  Patient's bleeding most likely her menses.  Advised to take 3 Advil or Motrin 600 mg every 6 hours for management of bleeding.     -  Advised to inform Ob/Gyn if soaking a pad an hour and present to ER.  -  Proceed with pelvic U/S after bleeding stops   -     US pelvis complete w transvaginal; Future          Subjective:     Patient ID: Cristal Estrada is a 23 y.o. female.    Patient presents with mom and her baby for evaluation of vaginal bleeding that started today.  Patient is 5 weeks post-partum, s/p .  Patient states lochia stopped several days ago and this morning patient had new onset of vaginal bleeding with her passing dime size clots.  Patient states she changed a pad for the first hour after the bleeding started.  Now the bleeding is like a normal period.  Patient has not taken any medication (Motrin/Advil) since she does not have any pelvic discomfort.        Review of Systems   Constitutional:  Negative for activity change, chills, diaphoresis and fever.   Gastrointestinal:  Negative for abdominal pain, nausea and vomiting.   Genitourinary:  Positive for vaginal bleeding. Negative for pelvic pain, vaginal discharge and vaginal pain.   Musculoskeletal:  Negative for back pain and gait problem.         Objective:     Physical Exam  Vitals and nursing note reviewed.   HENT:      Head: Normocephalic.   Abdominal:      General: There is no distension.      Palpations: Abdomen is soft. There is no mass.      Tenderness: There is no abdominal tenderness. There is no rebound.   Genitourinary:     General: Normal vulva.      Exam position: Lithotomy position.      Labia:         Right: No rash, tenderness or lesion.         Left: No rash, tenderness or lesion.       Urethra: No urethral pain or urethral lesion.      Vagina: Normal. No vaginal discharge.      Cervix:  No cervical motion tenderness, lesion or erythema.      Uterus: Normal.       Adnexa: Right adnexa normal and left adnexa normal.      Rectum: No external hemorrhoid.      Comments: Cervix closed with minimal active bleeding noted from cervical os.  No clots noted in vagina.  Dark liquid blood noted in vaginal vault, approximately 10 ml.  Musculoskeletal:      Right lower leg: No edema.      Left lower leg: No edema.   Skin:     General: Skin is warm.   Neurological:      Mental Status: She is alert and oriented to person, place, and time.   Psychiatric:         Mood and Affect: Mood normal.         Behavior: Behavior normal.         Thought Content: Thought content normal.

## 2024-02-23 NOTE — TELEPHONE ENCOUNTER
"Patient called, c/o PP bleeding 1 pad an hour. Clots the size of a dime. Denies abdominal pain/cramping, nausea, vomiting, diarrhea, shortness of breath, chest pain, lightheadedness, dizziness, visual disturbances. Patient had 1 episode of blurred vision on her left eye, but this does happen pre pregnancy. Resolved on its own. Appointment made for patient today at 1445 per protocol.     Reason for Disposition   MODERATE vaginal bleeding (e.g., soaking 1 pad per hour AND present > 6 hours)    Answer Assessment - Initial Assessment Questions  1. ONSET: \"Describe your bleeding: is it getting worse, staying the same, improving, or stopping and starting?\"      Bleeding with clots and very heavy   2. AMOUNT: \"How much bleeding are you having today?\"      - SPOTTING: spotting, or pinkish / brownish mucous discharge; does not fill panti-liner or pad     - MILD:  less than 1 pad / hour; less than patient's usual menstrual bleeding    - MODERATE: 1-2 pads / hour; 1 menstrual cup every 6 hours; small-medium blood clots (e.g., pea, grape, small coin)    - SEVERE: soaking 2 or more pads/hour for 2 or more hours; 1 menstrual cup every 2 hours; bleeding not contained by pads or continuous red blood from vagina; large blood clots (e.g., golf ball, large coin)       A pad an hour today, before was 1-2 hours. Clotsd the size of a dime  3. ABDOMINAL PAIN: \"Do you have any pain?\" \"How bad is the pain?\" \"What does it keep you from doing?\"      - MILD -  doesn't interfere with normal activities, abdomen soft and not tender to touch      - MODERATE - interferes with normal activities or awakens from sleep, tender to touch      - SEVERE - excruciating pain, doubled over, unable to do any normal activities        Denies  4. HORMONES: \"Are you taking any birth control medications?\" (e.g., birth control pills, Depo-Provera)      Denies  5. BLOOD THINNERS: \"Do you take any blood thinners?\" (e.g., coumadin, aspirin)      Denies  6. OTHER " "SYMPTOMS: \"Do you have any other symptoms?\" (e.g., fever, chills, dizziness)      Left eye a little blurry - that happens occasionally Resolved on its own. Denies abdominal pain/cramping, nausea, vomiting, diarrhea, shortness of breath, chest pain, lightheadedness, dizziness, visual disturbances.   .    7. DELIVERY DATE: \"When was your delivery date?\" \"Vaginal delivery or ?\"      24  8. BREASTFEEDING: \"Are you breastfeeding?\"      No    Protocols used: Postpartum - Vaginal Bleeding and Lochia-ADULT-[place]    "

## 2024-02-28 ENCOUNTER — POSTPARTUM VISIT (OUTPATIENT)
Dept: OBGYN CLINIC | Facility: CLINIC | Age: 24
End: 2024-02-28

## 2024-02-28 VITALS
DIASTOLIC BLOOD PRESSURE: 72 MMHG | BODY MASS INDEX: 21.61 KG/M2 | HEIGHT: 59 IN | WEIGHT: 107.2 LBS | SYSTOLIC BLOOD PRESSURE: 104 MMHG

## 2024-02-28 DIAGNOSIS — Z30.011 OCP (ORAL CONTRACEPTIVE PILLS) INITIATION: ICD-10-CM

## 2024-02-28 PROCEDURE — 99024 POSTOP FOLLOW-UP VISIT: CPT | Performed by: STUDENT IN AN ORGANIZED HEALTH CARE EDUCATION/TRAINING PROGRAM

## 2024-02-28 RX ORDER — NORETHINDRONE ACETATE AND ETHINYL ESTRADIOL 1.5-30(21)
1 KIT ORAL DAILY
Qty: 84 TABLET | Refills: 1 | Status: SHIPPED | OUTPATIENT
Start: 2024-02-28

## 2024-02-28 NOTE — LETTER
February 28, 2024     Patient: Cristal Estrada  YOB: 2000  Date of Visit: 2/28/2024      To Whom it May Concern:    Cristal Estrada is under my professional care. Cristal was seen in my office on 2/28/2024. Cristal may return to work without restriction.    If you have any questions or concerns, please don't hesitate to call.         Sincerely,          Patrick Merida MD

## 2024-02-28 NOTE — PROGRESS NOTES
"[facility] [address], [address], [place] [address]    Assessment/Plan:  Cristal is a 23 y.o. year old  who presents for postpartum visit.    Routine Postpartum Care  Normal postpartum exam  Contraception: Options reviewed, including LARC options. Desires to start OCP. Rx provided.   Depression Screen: Medium risk. Patient feels that she is overall doing well. Feeling overwhelmed with new motherhood. Resources offered. She declines for now. Denies SI/HI.  Feeding: Bottle  Cervical cancer screening due. Will complete first pap at upcoming annual eaxm.   Follow up in: 3 months for annual exam or as needed.    Additional Problems:  1. Postpartum care following vaginal delivery    2. OCP (oral contraceptive pills) initiation  -     norethindrone-ethinyl estradiol-iron (Loestrin Fe 1.5/30) 1.5-30 MG-MCG tablet; Take 1 tablet by mouth daily          Subjective:     CC: Postpartum visit    Cristal Estrada is a 23 y.o. y.o. female  who presents for a postpartum visit.     She is 6 weeks postpartum following a vaginal delivery on 2024 at 39 weeks. Postpartum course has been uncomplicated. She was seen for bleeding last week, which was consistent with period and has since resolved.      Bleeding no bleeding. Bowel function is normal. Bladder function is normal. Patient is not sexually active.     Postpartum Depression: Medium Risk (2024)    [place]  Depression Scale     Last EPDS Total Score: 7     Last EPDS Self Harm Result: Never       The following portions of the patient's history were reviewed and updated as appropriate: allergies, current medications, past family history, past medical history, obstetric history, gynecologic history, past social history, past surgical history and problem list.    Objective:  /72 (BP Location: Left arm, Patient Position: Sitting, Cuff Size: Standard)   Ht 4' 11\" (1.499 m)   Wt 48.6 kg (107 lb 3.2 oz)   LMP  (LMP Unknown) "   Breastfeeding No   BMI 21.65 kg/m²   Pregravid Weight/BMI: 42.2 kg (93 lb) (BMI 18.77)  Current Weight: 48.6 kg (107 lb 3.2 oz)   Total Weight Gain: 16.8 kg (37 lb)   Pre- Vitals      Flowsheet Row Most Recent Value   Prenatal Assessment    Prenatal Vitals    Blood Pressure 104/72   Weight - Scale 48.6 kg (107 lb 3.2 oz)   Urine Albumin/Glucose    Dilation/Effacement/Station    Vaginal Drainage    Edema              Chaperone present? Yes: Michael Brito MA.    General Appearance: alert and oriented, in no acute distress.   Abdomen: Soft, non-tender, non-distended, no masses, no rebound or guarding.  Pelvic:       External genitalia: Normal appearance, no abnormal pigmentation, no lesions or masses. Normal [place]'s and [place]'s. Obstetric laceration well-healed.       Urinary system: Urethral meatus normal, bladder non-tender.      Vaginal: normal mucosa without prolapse or lesions. Normal-appearing physiologic discharge.      Cervix: Normal-appearing, well-epithelialized, no gross lesions or masses. No cervical motion tenderness.      Adnexa: No adnexal masses or tenderness noted.      Uterus: Normal-sized, regular contour, midline, mobile, no uterine tenderness.   Extremities: Normal range of motion.   Skin: normal, no rash or abnormalities  Neurologic: alert, oriented x3  Psychiatric: Appropriate affect, mood stable, cooperative with exam.        Patrick Merida MD  2024 11:39 AM

## 2024-02-29 ENCOUNTER — TELEPHONE (OUTPATIENT)
Age: 24
End: 2024-02-29

## 2024-02-29 NOTE — TELEPHONE ENCOUNTER
Patient called to inform office that her dentist is faxing a form to be completed indicating that she is released from Prenatal care.  Patient delivered on 1/16/24.  Her post partum appointment was on 2/28/24. The form has her new  name which is Cristal Martinez. Please complete the form with her new name and fax back to dentist.

## 2024-03-01 NOTE — TELEPHONE ENCOUNTER
3/1/24-Spoke with pt and she will bring her marriage license in to the office for scanning, has not yet changed her 's license or SS card. Then dental form can be completed.

## (undated) DEVICE — GUIDEWIRE STRGHT TIP 0.035 IN  SOLO PLUS

## (undated) DEVICE — 3M™ TEGADERM™ TRANSPARENT FILM DRESSING FRAME STYLE, 1626W, 4 IN X 4-3/4 IN (10 CM X 12 CM), 50/CT 4CT/CASE: Brand: 3M™ TEGADERM™

## (undated) DEVICE — ENDOSCOPIC VALVE WITH ADAPTER.: Brand: SURSEAL® II

## (undated) DEVICE — GLOVE SRG BIOGEL 7

## (undated) DEVICE — FIBER STD QUANTA 272 MICRON

## (undated) DEVICE — SPECIMEN CONTAINER STERILE PEEL PACK

## (undated) DEVICE — PACK TUR

## (undated) DEVICE — SYRINGE 10ML LL